# Patient Record
Sex: MALE | Race: WHITE | Employment: OTHER | URBAN - NONMETROPOLITAN AREA
[De-identification: names, ages, dates, MRNs, and addresses within clinical notes are randomized per-mention and may not be internally consistent; named-entity substitution may affect disease eponyms.]

---

## 2018-06-19 ENCOUNTER — HOSPITAL ENCOUNTER (OUTPATIENT)
Dept: GENERAL RADIOLOGY | Age: 48
Discharge: HOME | End: 2018-06-19
Payer: MEDICARE

## 2018-06-19 ENCOUNTER — OFFICE VISIT (OUTPATIENT)
Dept: PRIMARY CARE CLINIC | Age: 48
End: 2018-06-19
Payer: MEDICARE

## 2018-06-19 VITALS
OXYGEN SATURATION: 98 % | DIASTOLIC BLOOD PRESSURE: 75 MMHG | SYSTOLIC BLOOD PRESSURE: 119 MMHG | HEIGHT: 68 IN | WEIGHT: 204 LBS | HEART RATE: 83 BPM | BODY MASS INDEX: 30.92 KG/M2 | TEMPERATURE: 98 F

## 2018-06-19 DIAGNOSIS — M54.50 ACUTE MIDLINE LOW BACK PAIN WITHOUT SCIATICA: ICD-10-CM

## 2018-06-19 DIAGNOSIS — K64.9 HEMORRHOIDS, UNSPECIFIED HEMORRHOID TYPE: ICD-10-CM

## 2018-06-19 DIAGNOSIS — E65 CENTRAL OBESITY: ICD-10-CM

## 2018-06-19 DIAGNOSIS — E78.2 MIXED HYPERLIPIDEMIA: Primary | ICD-10-CM

## 2018-06-19 DIAGNOSIS — H90.5 CONGENITAL HEARING DISORDER OF BOTH EARS: ICD-10-CM

## 2018-06-19 DIAGNOSIS — F17.200 TOBACCO USE DISORDER: ICD-10-CM

## 2018-06-19 PROCEDURE — 72100 X-RAY EXAM L-S SPINE 2/3 VWS: CPT

## 2018-06-19 PROCEDURE — 99407 BEHAV CHNG SMOKING > 10 MIN: CPT | Performed by: FAMILY MEDICINE

## 2018-06-19 PROCEDURE — 4004F PT TOBACCO SCREEN RCVD TLK: CPT | Performed by: FAMILY MEDICINE

## 2018-06-19 PROCEDURE — 3017F COLORECTAL CA SCREEN DOC REV: CPT | Performed by: FAMILY MEDICINE

## 2018-06-19 PROCEDURE — 99204 OFFICE O/P NEW MOD 45 MIN: CPT | Performed by: FAMILY MEDICINE

## 2018-06-19 PROCEDURE — G8417 CALC BMI ABV UP PARAM F/U: HCPCS | Performed by: FAMILY MEDICINE

## 2018-06-19 PROCEDURE — G8427 DOCREV CUR MEDS BY ELIG CLIN: HCPCS | Performed by: FAMILY MEDICINE

## 2018-06-19 RX ORDER — SIMVASTATIN 20 MG
20 TABLET ORAL NIGHTLY
Qty: 90 TABLET | Refills: 3 | Status: SHIPPED | OUTPATIENT
Start: 2018-06-19 | End: 2018-07-17 | Stop reason: SDUPTHER

## 2018-06-19 ASSESSMENT — PATIENT HEALTH QUESTIONNAIRE - PHQ9
1. LITTLE INTEREST OR PLEASURE IN DOING THINGS: 0
SUM OF ALL RESPONSES TO PHQ QUESTIONS 1-9: 0
SUM OF ALL RESPONSES TO PHQ9 QUESTIONS 1 & 2: 0
2. FEELING DOWN, DEPRESSED OR HOPELESS: 0

## 2018-06-19 ASSESSMENT — ENCOUNTER SYMPTOMS
EYE ITCHING: 0
COUGH: 0
SHORTNESS OF BREATH: 0
COLOR CHANGE: 0
NAUSEA: 0
BACK PAIN: 1
SORE THROAT: 0
ABDOMINAL PAIN: 0
RHINORRHEA: 0

## 2018-06-29 ENCOUNTER — TELEPHONE (OUTPATIENT)
Dept: PRIMARY CARE CLINIC | Age: 48
End: 2018-06-29

## 2018-06-29 NOTE — TELEPHONE ENCOUNTER
----- Message from Sarmad Ayala MD sent at 6/20/2018  9:24 PM CDT -----      Called and LM    There is arthritis that is substantial.  Physical therapy and antiinflammatories and stretching. Can do injectionss as well to help perhaps.

## 2018-07-11 DIAGNOSIS — E78.2 MIXED HYPERLIPIDEMIA: ICD-10-CM

## 2018-07-11 LAB
ALBUMIN SERPL-MCNC: 4 G/DL (ref 3.5–5.2)
ALP BLD-CCNC: 122 U/L (ref 40–130)
ALT SERPL-CCNC: 22 U/L (ref 5–41)
ANION GAP SERPL CALCULATED.3IONS-SCNC: 17 MMOL/L (ref 7–19)
AST SERPL-CCNC: 18 U/L (ref 5–40)
BILIRUB SERPL-MCNC: 0.8 MG/DL (ref 0.2–1.2)
BUN BLDV-MCNC: 8 MG/DL (ref 6–20)
CALCIUM SERPL-MCNC: 9.7 MG/DL (ref 8.6–10)
CHLORIDE BLD-SCNC: 99 MMOL/L (ref 98–111)
CHOLESTEROL, TOTAL: 249 MG/DL (ref 160–199)
CO2: 22 MMOL/L (ref 22–29)
CREAT SERPL-MCNC: 0.8 MG/DL (ref 0.5–1.2)
GFR NON-AFRICAN AMERICAN: >60
GLUCOSE BLD-MCNC: 84 MG/DL (ref 74–109)
HDLC SERPL-MCNC: 33 MG/DL (ref 55–121)
LDL CHOLESTEROL CALCULATED: 164 MG/DL
POTASSIUM SERPL-SCNC: 4.2 MMOL/L (ref 3.5–5)
SODIUM BLD-SCNC: 138 MMOL/L (ref 136–145)
TOTAL PROTEIN: 7.3 G/DL (ref 6.6–8.7)
TRIGL SERPL-MCNC: 260 MG/DL (ref 0–149)

## 2018-07-16 ENCOUNTER — TELEPHONE (OUTPATIENT)
Dept: PRIMARY CARE CLINIC | Age: 48
End: 2018-07-16

## 2018-07-17 DIAGNOSIS — E78.00 PURE HYPERCHOLESTEROLEMIA: Primary | ICD-10-CM

## 2018-07-17 RX ORDER — SIMVASTATIN 40 MG
40 TABLET ORAL NIGHTLY
Qty: 90 TABLET | Refills: 1 | Status: SHIPPED | OUTPATIENT
Start: 2018-07-17 | End: 2019-12-02 | Stop reason: SDUPTHER

## 2018-07-24 ENCOUNTER — OFFICE VISIT (OUTPATIENT)
Dept: PRIMARY CARE CLINIC | Age: 48
End: 2018-07-24
Payer: MEDICARE

## 2018-07-24 VITALS
OXYGEN SATURATION: 98 % | RESPIRATION RATE: 20 BRPM | SYSTOLIC BLOOD PRESSURE: 110 MMHG | TEMPERATURE: 98 F | HEIGHT: 68 IN | BODY MASS INDEX: 30.31 KG/M2 | WEIGHT: 200 LBS | HEART RATE: 81 BPM | DIASTOLIC BLOOD PRESSURE: 82 MMHG

## 2018-07-24 DIAGNOSIS — E78.00 PURE HYPERCHOLESTEROLEMIA: Primary | ICD-10-CM

## 2018-07-24 DIAGNOSIS — M47.815 SPONDYLOSIS OF THORACOLUMBAR REGION WITHOUT MYELOPATHY OR RADICULOPATHY: ICD-10-CM

## 2018-07-24 DIAGNOSIS — F17.200 TOBACCO USE DISORDER: ICD-10-CM

## 2018-07-24 DIAGNOSIS — Z23 NEED FOR PROPHYLACTIC VACCINATION AGAINST STREPTOCOCCUS PNEUMONIAE (PNEUMOCOCCUS): ICD-10-CM

## 2018-07-24 PROCEDURE — G0009 ADMIN PNEUMOCOCCAL VACCINE: HCPCS | Performed by: FAMILY MEDICINE

## 2018-07-24 PROCEDURE — 99406 BEHAV CHNG SMOKING 3-10 MIN: CPT | Performed by: FAMILY MEDICINE

## 2018-07-24 PROCEDURE — 3017F COLORECTAL CA SCREEN DOC REV: CPT | Performed by: FAMILY MEDICINE

## 2018-07-24 PROCEDURE — G8427 DOCREV CUR MEDS BY ELIG CLIN: HCPCS | Performed by: FAMILY MEDICINE

## 2018-07-24 PROCEDURE — G8417 CALC BMI ABV UP PARAM F/U: HCPCS | Performed by: FAMILY MEDICINE

## 2018-07-24 PROCEDURE — 90732 PPSV23 VACC 2 YRS+ SUBQ/IM: CPT | Performed by: FAMILY MEDICINE

## 2018-07-24 PROCEDURE — 99213 OFFICE O/P EST LOW 20 MIN: CPT | Performed by: FAMILY MEDICINE

## 2018-07-24 PROCEDURE — 4004F PT TOBACCO SCREEN RCVD TLK: CPT | Performed by: FAMILY MEDICINE

## 2018-07-24 RX ORDER — VARENICLINE TARTRATE 1 MG/1
1 TABLET, FILM COATED ORAL 2 TIMES DAILY
Qty: 60 TABLET | Refills: 3 | Status: SHIPPED | OUTPATIENT
Start: 2018-07-24 | End: 2018-09-05

## 2018-07-24 RX ORDER — VARENICLINE TARTRATE 25 MG
KIT ORAL
Qty: 1 EACH | Refills: 0 | Status: SHIPPED | OUTPATIENT
Start: 2018-07-24 | End: 2018-09-05

## 2018-07-24 ASSESSMENT — ENCOUNTER SYMPTOMS
NAUSEA: 0
DIARRHEA: 0
COUGH: 0
WHEEZING: 0
VOMITING: 0
CONSTIPATION: 0
ABDOMINAL PAIN: 0
CHEST TIGHTNESS: 0
SHORTNESS OF BREATH: 0
BACK PAIN: 1

## 2018-07-24 NOTE — PROGRESS NOTES
Packs/day: 1.00     Years: 21.00     Quit date: 7/24/1997    Smokeless tobacco: Current User    Alcohol use Yes      Comment: OCC       Family History   Problem Relation Age of Onset    High Blood Pressure Father     Diabetes Father        /82   Pulse 81   Temp 98 °F (36.7 °C) (Temporal)   Resp 20   Ht 5' 8\" (1.727 m)   Wt 200 lb (90.7 kg)   SpO2 98%   BMI 30.41 kg/m²     Physical Exam   Constitutional: He is oriented to person, place, and time. He appears well-developed and well-nourished. Non-toxic appearance. No distress. HENT:   Head: Normocephalic and atraumatic. Cardiovascular: Normal rate, regular rhythm, normal heart sounds and intact distal pulses. Exam reveals no gallop and no friction rub. No murmur heard. Pulmonary/Chest: Effort normal and breath sounds normal. No respiratory distress. He has no wheezes. He has no rales. He exhibits no tenderness. Abdominal: Soft. Bowel sounds are normal. He exhibits no distension and no mass. There is no tenderness. There is no rebound and no guarding. Musculoskeletal:        Lumbar back: He exhibits decreased range of motion and tenderness. He exhibits no bony tenderness. Right lower leg: He exhibits no edema. Left lower leg: He exhibits no edema. Neurological: He is alert and oriented to person, place, and time. Coordination and gait normal.   Skin: Skin is warm and dry. He is not diaphoretic. No cyanosis. Nails show no clubbing. Nursing note and vitals reviewed. Assessment:    ICD-10-CM ICD-9-CM    1. Pure hypercholesterolemia E78.00 272.0    2. Need for prophylactic vaccination against Streptococcus pneumoniae (pneumococcus) Z23 V03.82 Pneumococcal polysaccharide vaccine 23-valent PPSV23   3. Tobacco use disorder F17.200 305.1    4. 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  Approximately 12 minutes of education was provided about quit smoking and the harms of tobacco.  Patient does show understanding. Patient has  the desire to quit cigarettes in the near future. Chantix prescribed. Advised patient to remain regular activity and daily stretching for osteoarthritis spine. He does not want any surgery and off on further imaging at this time. Orders Placed This Encounter   Procedures    Pneumococcal polysaccharide vaccine 23-valent PPSV23     Orders Placed This Encounter   Medications    varenicline (CHANTIX STARTING MONTH OMERO) 0.5 MG X 11 & 1 MG X 42 tablet     Sig: Take by mouth. Dispense:  1 each     Refill:  0    varenicline (CHANTIX CONTINUING MONTH OMERO) 1 MG tablet     Sig: Take 1 tablet by mouth 2 times daily     Dispense:  60 tablet     Refill:  3     There are no discontinued medications. Patient Instructions   Start chantix starting pack. Start taking it immediately. Continue to smoke while starting the medication and pick a quit smoking date for about 2 weeks from now. Pick the least stressful time of the week for your quit smoking day. Talk to your family, loved ones, and friends and let them know you will be quitting and to not smoke around you. Continue on the pill even when you quit smoking. You need to continue on the medication if tolerated for 3 months to beat the psychological habit of it. Cassia Ac in there. When you complete the starter pack,  the continuing pack immediately and continue with it. Call if you have questions. Quitting smoking is the most important thing you can do for your health. Patient given educational handouts and has had all questions answered. Patient voices understanding and agrees to plans along with risks and benefits of plan. Patient is instructed to continue prior meds, diet, and exercise plans unless instructed otherwise. Patient agrees to follow up as instructed and sooner if needed. Patient agrees to go to ER if condition becomes emergent.     Notes may be

## 2018-07-24 NOTE — PATIENT INSTRUCTIONS
Start chantix starting pack. Start taking it immediately. Continue to smoke while starting the medication and pick a quit smoking date for about 2 weeks from now. Pick the least stressful time of the week for your quit smoking day. Talk to your family, loved ones, and friends and let them know you will be quitting and to not smoke around you. Continue on the pill even when you quit smoking. You need to continue on the medication if tolerated for 3 months to beat the psychological habit of it. Luther Monk in there. When you complete the starter pack,  the continuing pack immediately and continue with it. Call if you have questions. Quitting smoking is the most important thing you can do for your health.

## 2018-09-05 ENCOUNTER — OFFICE VISIT (OUTPATIENT)
Dept: PRIMARY CARE CLINIC | Age: 48
End: 2018-09-05
Payer: MEDICARE

## 2018-09-05 VITALS
WEIGHT: 198 LBS | DIASTOLIC BLOOD PRESSURE: 80 MMHG | HEART RATE: 88 BPM | RESPIRATION RATE: 20 BRPM | SYSTOLIC BLOOD PRESSURE: 110 MMHG | BODY MASS INDEX: 30.01 KG/M2 | OXYGEN SATURATION: 98 % | HEIGHT: 68 IN | TEMPERATURE: 98 F

## 2018-09-05 DIAGNOSIS — E78.00 PURE HYPERCHOLESTEROLEMIA: ICD-10-CM

## 2018-09-05 DIAGNOSIS — T88.7XXA MEDICATION SIDE EFFECT: ICD-10-CM

## 2018-09-05 DIAGNOSIS — M54.50 CHRONIC MIDLINE LOW BACK PAIN WITHOUT SCIATICA: Primary | ICD-10-CM

## 2018-09-05 DIAGNOSIS — L81.9 HYPERPIGMENTATION: ICD-10-CM

## 2018-09-05 DIAGNOSIS — M51.36 LUMBAR DEGENERATIVE DISC DISEASE: ICD-10-CM

## 2018-09-05 DIAGNOSIS — F17.200 TOBACCO USE DISORDER: ICD-10-CM

## 2018-09-05 DIAGNOSIS — G89.29 CHRONIC MIDLINE LOW BACK PAIN WITHOUT SCIATICA: Primary | ICD-10-CM

## 2018-09-05 PROCEDURE — 99406 BEHAV CHNG SMOKING 3-10 MIN: CPT | Performed by: FAMILY MEDICINE

## 2018-09-05 PROCEDURE — 3017F COLORECTAL CA SCREEN DOC REV: CPT | Performed by: FAMILY MEDICINE

## 2018-09-05 PROCEDURE — 4004F PT TOBACCO SCREEN RCVD TLK: CPT | Performed by: FAMILY MEDICINE

## 2018-09-05 PROCEDURE — 99214 OFFICE O/P EST MOD 30 MIN: CPT | Performed by: FAMILY MEDICINE

## 2018-09-05 PROCEDURE — G8417 CALC BMI ABV UP PARAM F/U: HCPCS | Performed by: FAMILY MEDICINE

## 2018-09-05 PROCEDURE — G8427 DOCREV CUR MEDS BY ELIG CLIN: HCPCS | Performed by: FAMILY MEDICINE

## 2018-09-05 RX ORDER — MELOXICAM 15 MG/1
15 TABLET ORAL DAILY
Qty: 30 TABLET | Refills: 3 | Status: SHIPPED | OUTPATIENT
Start: 2018-09-05 | End: 2019-03-18 | Stop reason: SDUPTHER

## 2018-09-05 ASSESSMENT — ENCOUNTER SYMPTOMS
CONSTIPATION: 0
WHEEZING: 0
VOMITING: 0
SHORTNESS OF BREATH: 0
DIARRHEA: 0
CHEST TIGHTNESS: 0
NAUSEA: 0
ABDOMINAL PAIN: 0
COUGH: 0
BACK PAIN: 1

## 2018-09-05 NOTE — PROGRESS NOTES
chantix due to SE. Taper 1 tab every other week. Nipple pigmentation w/o abnormal exam, likely due to [facility] from hormonal changes but hold off on work up as there is no nippple d/c or gynecomastia. Orders Placed This Encounter   Procedures   [facility]     Referral Priority:   Routine     Referral Type:   Eval and Treat     Referral Reason:   Specialty Services Required     Requested Specialty:   Physical Therapy     Number of Visits Requested:   1    External Referral To Orthopedic Surgery     Referral Priority:   Routine     Referral Type:   Eval and Treat     Referral Reason:   Specialty Services Required     Requested Specialty:   Orthopedic Surgery     Number of Visits Requested:   1     No orders of the defined types were placed in this encounter. Medications Discontinued During This Encounter   Medication Reason    varenicline (CHANTIX STARTING MONTH PAK) 0.5 MG X 11 & 1 MG X 42 tablet     varenicline (CHANTIX CONTINUING MONTH OMERO) 1 MG tablet      There are no Patient Instructions on file for this visit. Patient given educational handouts and has had all questions answered. Patient voices understanding and agrees to plans along with risks and benefits of plan. Patient is instructed to continue prior meds, diet, and exercise plans unless instructed otherwise. Patient agrees to follow up as instructed and sooner if needed. Patient agrees to go to ER if condition becomes emergent. Notes may be completed with dictation device and spelling errors may occur. No Follow-up on file.

## 2018-11-06 ENCOUNTER — OFFICE VISIT (OUTPATIENT)
Dept: PRIMARY CARE CLINIC | Age: 48
End: 2018-11-06
Payer: MEDICARE

## 2018-11-06 VITALS
HEART RATE: 88 BPM | RESPIRATION RATE: 20 BRPM | DIASTOLIC BLOOD PRESSURE: 88 MMHG | SYSTOLIC BLOOD PRESSURE: 136 MMHG | HEIGHT: 68 IN | WEIGHT: 198 LBS | OXYGEN SATURATION: 98 % | BODY MASS INDEX: 30.01 KG/M2 | TEMPERATURE: 98 F

## 2018-11-06 DIAGNOSIS — Z87.891 PERSONAL HISTORY OF TOBACCO USE, PRESENTING HAZARDS TO HEALTH: ICD-10-CM

## 2018-11-06 DIAGNOSIS — Z00.00 ROUTINE GENERAL MEDICAL EXAMINATION AT A HEALTH CARE FACILITY: Primary | ICD-10-CM

## 2018-11-06 DIAGNOSIS — F17.200 TOBACCO USE DISORDER: ICD-10-CM

## 2018-11-06 DIAGNOSIS — Z13.6 SCREENING FOR CARDIOVASCULAR CONDITION: ICD-10-CM

## 2018-11-06 PROCEDURE — 99407 BEHAV CHNG SMOKING > 10 MIN: CPT | Performed by: FAMILY MEDICINE

## 2018-11-06 PROCEDURE — G0438 PPPS, INITIAL VISIT: HCPCS | Performed by: FAMILY MEDICINE

## 2018-11-06 PROCEDURE — G8484 FLU IMMUNIZE NO ADMIN: HCPCS | Performed by: FAMILY MEDICINE

## 2018-11-06 RX ORDER — NICOTINE 21 MG/24HR
1 PATCH, TRANSDERMAL 24 HOURS TRANSDERMAL EVERY 24 HOURS
Qty: 30 PATCH | Refills: 3 | Status: SHIPPED | OUTPATIENT
Start: 2018-11-06 | End: 2019-03-18

## 2018-11-06 ASSESSMENT — LIFESTYLE VARIABLES: HOW OFTEN DO YOU HAVE A DRINK CONTAINING ALCOHOL: 0

## 2018-11-06 ASSESSMENT — ANXIETY QUESTIONNAIRES: GAD7 TOTAL SCORE: 0

## 2018-11-06 ASSESSMENT — PATIENT HEALTH QUESTIONNAIRE - PHQ9
SUM OF ALL RESPONSES TO PHQ QUESTIONS 1-9: 0
SUM OF ALL RESPONSES TO PHQ QUESTIONS 1-9: 0

## 2018-12-28 ENCOUNTER — CARE COORDINATION (OUTPATIENT)
Dept: CARE COORDINATION | Age: 48
End: 2018-12-28

## 2019-02-19 ENCOUNTER — HOSPITAL ENCOUNTER (EMERGENCY)
Age: 49
Discharge: HOME | End: 2019-02-19
Payer: MEDICARE

## 2019-02-19 VITALS
DIASTOLIC BLOOD PRESSURE: 82 MMHG | TEMPERATURE: 97.4 F | WEIGHT: 200 LBS | SYSTOLIC BLOOD PRESSURE: 128 MMHG | HEART RATE: 86 BPM | RESPIRATION RATE: 18 BRPM | BODY MASS INDEX: 30.31 KG/M2 | HEIGHT: 68 IN | OXYGEN SATURATION: 96 %

## 2019-02-19 DIAGNOSIS — R60.9 DEPENDENT EDEMA: Primary | ICD-10-CM

## 2019-02-19 LAB
ALBUMIN SERPL-MCNC: 4.1 G/DL (ref 3.5–5.2)
ALP BLD-CCNC: 119 U/L (ref 40–130)
ALT SERPL-CCNC: 22 U/L (ref 5–41)
ANION GAP SERPL CALCULATED.3IONS-SCNC: 8 MMOL/L (ref 7–19)
APTT: 25 SEC (ref 26–36.2)
AST SERPL-CCNC: 13 U/L (ref 5–40)
BASOPHILS ABSOLUTE: 0.1 K/UL (ref 0–0.2)
BASOPHILS RELATIVE PERCENT: 0.7 % (ref 0–1)
BILIRUB SERPL-MCNC: 0.5 MG/DL (ref 0.2–1.2)
BUN BLDV-MCNC: 10 MG/DL (ref 6–20)
CALCIUM SERPL-MCNC: 9.7 MG/DL (ref 8.6–10)
CHLORIDE BLD-SCNC: 103 MMOL/L (ref 98–111)
CO2: 30 MMOL/L (ref 22–29)
CREAT SERPL-MCNC: 0.9 MG/DL (ref 0.5–1.2)
D DIMER: 0.44 UG/ML FEU (ref 0–0.48)
EOSINOPHILS ABSOLUTE: 0.2 K/UL (ref 0–0.6)
EOSINOPHILS RELATIVE PERCENT: 1.9 % (ref 0–5)
GFR NON-AFRICAN AMERICAN: >60
GLUCOSE BLD-MCNC: 104 MG/DL (ref 74–109)
HCT VFR BLD CALC: 54.1 % (ref 42–52)
HEMOGLOBIN: 18.5 G/DL (ref 14–18)
INR BLD: 0.93 (ref 0.88–1.18)
LYMPHOCYTES ABSOLUTE: 2.9 K/UL (ref 1.1–4.5)
LYMPHOCYTES RELATIVE PERCENT: 27.3 % (ref 20–40)
MCH RBC QN AUTO: 31.5 PG (ref 27–31)
MCHC RBC AUTO-ENTMCNC: 34.2 G/DL (ref 33–37)
MCV RBC AUTO: 92 FL (ref 80–94)
MONOCYTES ABSOLUTE: 0.8 K/UL (ref 0–0.9)
MONOCYTES RELATIVE PERCENT: 7.3 % (ref 0–10)
NEUTROPHILS ABSOLUTE: 6.5 K/UL (ref 1.5–7.5)
NEUTROPHILS RELATIVE PERCENT: 62 % (ref 50–65)
PDW BLD-RTO: 13.2 % (ref 11.5–14.5)
PLATELET # BLD: 271 K/UL (ref 130–400)
PMV BLD AUTO: 9.4 FL (ref 9.4–12.4)
POTASSIUM SERPL-SCNC: 4.5 MMOL/L (ref 3.5–5)
PROTHROMBIN TIME: 11.9 SEC (ref 12–14.6)
RBC # BLD: 5.88 M/UL (ref 4.7–6.1)
SODIUM BLD-SCNC: 141 MMOL/L (ref 136–145)
TOTAL PROTEIN: 7.5 G/DL (ref 6.6–8.7)
WBC # BLD: 10.5 K/UL (ref 4.8–10.8)

## 2019-02-19 PROCEDURE — 85730 THROMBOPLASTIN TIME PARTIAL: CPT

## 2019-02-19 PROCEDURE — 85379 FIBRIN DEGRADATION QUANT: CPT

## 2019-02-19 PROCEDURE — 99283 EMERGENCY DEPT VISIT LOW MDM: CPT | Performed by: NURSE PRACTITIONER

## 2019-02-19 PROCEDURE — 85025 COMPLETE CBC W/AUTO DIFF WBC: CPT

## 2019-02-19 PROCEDURE — 99283 EMERGENCY DEPT VISIT LOW MDM: CPT

## 2019-02-19 PROCEDURE — 85610 PROTHROMBIN TIME: CPT

## 2019-02-19 PROCEDURE — 80053 COMPREHEN METABOLIC PANEL: CPT

## 2019-02-19 PROCEDURE — 36415 COLL VENOUS BLD VENIPUNCTURE: CPT

## 2019-02-19 ASSESSMENT — ENCOUNTER SYMPTOMS
BACK PAIN: 1
GASTROINTESTINAL NEGATIVE: 1

## 2019-02-21 ENCOUNTER — OFFICE VISIT (OUTPATIENT)
Dept: PRIMARY CARE CLINIC | Age: 49
End: 2019-02-21
Payer: MEDICARE

## 2019-02-21 VITALS
BODY MASS INDEX: 30.92 KG/M2 | HEART RATE: 82 BPM | OXYGEN SATURATION: 98 % | TEMPERATURE: 96.7 F | WEIGHT: 204 LBS | SYSTOLIC BLOOD PRESSURE: 118 MMHG | DIASTOLIC BLOOD PRESSURE: 72 MMHG | HEIGHT: 68 IN

## 2019-02-21 DIAGNOSIS — M79.662 PAIN OF LEFT CALF: Chronic | ICD-10-CM

## 2019-02-21 DIAGNOSIS — R60.9 DEPENDENT EDEMA: ICD-10-CM

## 2019-02-21 PROCEDURE — 4004F PT TOBACCO SCREEN RCVD TLK: CPT | Performed by: NURSE PRACTITIONER

## 2019-02-21 PROCEDURE — G8427 DOCREV CUR MEDS BY ELIG CLIN: HCPCS | Performed by: NURSE PRACTITIONER

## 2019-02-21 PROCEDURE — 1111F DSCHRG MED/CURRENT MED MERGE: CPT | Performed by: NURSE PRACTITIONER

## 2019-02-21 PROCEDURE — G8484 FLU IMMUNIZE NO ADMIN: HCPCS | Performed by: NURSE PRACTITIONER

## 2019-02-21 PROCEDURE — G8417 CALC BMI ABV UP PARAM F/U: HCPCS | Performed by: NURSE PRACTITIONER

## 2019-02-21 PROCEDURE — 99214 OFFICE O/P EST MOD 30 MIN: CPT | Performed by: NURSE PRACTITIONER

## 2019-02-21 PROCEDURE — 3017F COLORECTAL CA SCREEN DOC REV: CPT | Performed by: NURSE PRACTITIONER

## 2019-02-21 RX ORDER — TRAMADOL HYDROCHLORIDE 50 MG/1
TABLET ORAL
Refills: 0 | COMMUNITY
Start: 2019-01-31 | End: 2019-03-18

## 2019-02-21 ASSESSMENT — ENCOUNTER SYMPTOMS
SHORTNESS OF BREATH: 0
APNEA: 0
WHEEZING: 0

## 2019-03-04 ENCOUNTER — HOSPITAL ENCOUNTER (OUTPATIENT)
Dept: VASCULAR LAB | Age: 49
Discharge: HOME | End: 2019-03-04
Payer: MEDICARE

## 2019-03-04 DIAGNOSIS — M79.662 PAIN OF LEFT CALF: Chronic | ICD-10-CM

## 2019-03-04 PROCEDURE — 93971 EXTREMITY STUDY: CPT

## 2019-03-08 ENCOUNTER — TELEPHONE (OUTPATIENT)
Dept: PRIMARY CARE CLINIC | Age: 49
End: 2019-03-08

## 2019-03-18 ENCOUNTER — OFFICE VISIT (OUTPATIENT)
Dept: PRIMARY CARE CLINIC | Age: 49
End: 2019-03-18
Payer: MEDICARE

## 2019-03-18 VITALS
RESPIRATION RATE: 20 BRPM | WEIGHT: 212 LBS | HEART RATE: 88 BPM | BODY MASS INDEX: 32.13 KG/M2 | DIASTOLIC BLOOD PRESSURE: 80 MMHG | SYSTOLIC BLOOD PRESSURE: 110 MMHG | TEMPERATURE: 98.7 F | HEIGHT: 68 IN | OXYGEN SATURATION: 98 %

## 2019-03-18 DIAGNOSIS — F17.210 CIGARETTE SMOKER: ICD-10-CM

## 2019-03-18 DIAGNOSIS — I87.2 VENOUS INSUFFICIENCY: ICD-10-CM

## 2019-03-18 DIAGNOSIS — G62.89 OTHER POLYNEUROPATHY: Primary | ICD-10-CM

## 2019-03-18 PROCEDURE — 99406 BEHAV CHNG SMOKING 3-10 MIN: CPT | Performed by: FAMILY MEDICINE

## 2019-03-18 PROCEDURE — 99214 OFFICE O/P EST MOD 30 MIN: CPT | Performed by: FAMILY MEDICINE

## 2019-03-18 PROCEDURE — G8417 CALC BMI ABV UP PARAM F/U: HCPCS | Performed by: FAMILY MEDICINE

## 2019-03-18 PROCEDURE — 4004F PT TOBACCO SCREEN RCVD TLK: CPT | Performed by: FAMILY MEDICINE

## 2019-03-18 PROCEDURE — G8484 FLU IMMUNIZE NO ADMIN: HCPCS | Performed by: FAMILY MEDICINE

## 2019-03-18 PROCEDURE — 3017F COLORECTAL CA SCREEN DOC REV: CPT | Performed by: FAMILY MEDICINE

## 2019-03-18 PROCEDURE — G8427 DOCREV CUR MEDS BY ELIG CLIN: HCPCS | Performed by: FAMILY MEDICINE

## 2019-03-18 RX ORDER — PREGABALIN 50 MG/1
50 CAPSULE ORAL 3 TIMES DAILY
Qty: 90 CAPSULE | Refills: 3 | Status: SHIPPED | OUTPATIENT
Start: 2019-03-18 | End: 2019-04-23

## 2019-03-18 RX ORDER — MELOXICAM 15 MG/1
15 TABLET ORAL DAILY
Qty: 30 TABLET | Refills: 3 | Status: SHIPPED | OUTPATIENT
Start: 2019-03-18 | End: 2019-04-23

## 2019-03-18 ASSESSMENT — PATIENT HEALTH QUESTIONNAIRE - PHQ9
SUM OF ALL RESPONSES TO PHQ9 QUESTIONS 1 & 2: 0
SUM OF ALL RESPONSES TO PHQ QUESTIONS 1-9: 0
1. LITTLE INTEREST OR PLEASURE IN DOING THINGS: 0
2. FEELING DOWN, DEPRESSED OR HOPELESS: 0
SUM OF ALL RESPONSES TO PHQ QUESTIONS 1-9: 0

## 2019-03-18 ASSESSMENT — ENCOUNTER SYMPTOMS
ABDOMINAL PAIN: 0
CONSTIPATION: 0
CHEST TIGHTNESS: 0
NAUSEA: 0
SHORTNESS OF BREATH: 0
DIARRHEA: 0
WHEEZING: 0
VOMITING: 0
COUGH: 0

## 2019-03-28 ENCOUNTER — HOSPITAL ENCOUNTER (EMERGENCY)
Age: 49
Discharge: HOME | End: 2019-03-28
Payer: MEDICARE

## 2019-03-28 ENCOUNTER — APPOINTMENT (OUTPATIENT)
Dept: GENERAL RADIOLOGY | Age: 49
End: 2019-03-28
Payer: MEDICARE

## 2019-03-28 VITALS
TEMPERATURE: 98.4 F | HEIGHT: 68 IN | HEART RATE: 91 BPM | WEIGHT: 210 LBS | OXYGEN SATURATION: 93 % | DIASTOLIC BLOOD PRESSURE: 95 MMHG | SYSTOLIC BLOOD PRESSURE: 133 MMHG | BODY MASS INDEX: 31.83 KG/M2 | RESPIRATION RATE: 20 BRPM

## 2019-03-28 DIAGNOSIS — L03.119 CELLULITIS OF FOOT: Primary | ICD-10-CM

## 2019-03-28 PROCEDURE — 73630 X-RAY EXAM OF FOOT: CPT

## 2019-03-28 PROCEDURE — 73610 X-RAY EXAM OF ANKLE: CPT

## 2019-03-28 PROCEDURE — 99283 EMERGENCY DEPT VISIT LOW MDM: CPT

## 2019-03-28 PROCEDURE — 99283 EMERGENCY DEPT VISIT LOW MDM: CPT | Performed by: PHYSICIAN ASSISTANT

## 2019-03-28 RX ORDER — METHYLPREDNISOLONE 4 MG/1
TABLET ORAL
Qty: 1 KIT | Refills: 0 | Status: SHIPPED | OUTPATIENT
Start: 2019-03-28 | End: 2019-04-03

## 2019-03-28 RX ORDER — DOXYCYCLINE HYCLATE 100 MG/1
100 CAPSULE ORAL 2 TIMES DAILY
Qty: 20 CAPSULE | Refills: 0 | Status: SHIPPED | OUTPATIENT
Start: 2019-03-28 | End: 2019-04-07

## 2019-03-28 SDOH — HEALTH STABILITY: MENTAL HEALTH: HOW OFTEN DO YOU HAVE A DRINK CONTAINING ALCOHOL?: NEVER

## 2019-03-28 ASSESSMENT — PAIN SCALES - GENERAL: PAINLEVEL_OUTOF10: 10

## 2019-03-28 NOTE — ED PROVIDER NOTES
[facility]  eMERGENCYdEPARTMENT eNCOUnter      Pt Name: Junior Trammell  MRN: [id number]  [place] 1970  Date of evaluation: 3/28/2019  Provider:BRODY aCmejo    CHIEF COMPLAINT       Chief Complaint   Patient presents with    Foot Pain     left foot pain x1 week         HISTORY OF PRESENT ILLNESS  (Location/Symptom, Timing/Onset, Context/Setting, Quality, Duration, Modifying Factors, Severity.)   Junior Trammell is a 46 y.o. male who presents to the emergency department with complaint of left foot pain x 1 week with no known injury. Patient denies numbness tingling. Admits to color change. Denies wound. The chief complaint is over the bunion of his foot but also pain in 3rd toe which has purple discoloration to it. He admits to good blood flow and blanching. There is some weeping between 4/5 digit from contact. Concern for poor shoe space. He denies weakness numbness. HPI    Nursing Notes were reviewed and I agree. REVIEW OF SYSTEMS    (2-9 systems for level 4, 10 or more for level 5)     Review of Systems   Constitutional: Negative for chills, diaphoresis, fatigue and fever. Respiratory: Negative for cough, choking, chest tightness, shortness of breath, wheezing and stridor. Cardiovascular: Negative for chest pain, palpitations and leg swelling. Gastrointestinal: Negative for abdominal pain and nausea. Musculoskeletal: Positive for arthralgias and gait problem. Negative for back pain, joint swelling, myalgias and neck pain. Skin: Positive for color change. Negative for pallor, rash and wound. Neurological: Negative for dizziness, tremors, weakness and numbness. Psychiatric/Behavioral: The patient is not nervous/anxious. Except as noted above the remainder of the review of systems was reviewed and negative.        PAST MEDICAL HISTORY     Past Medical History:   Diagnosis Date    Hyperlipidemia     Nerve damage     Bilateral ears, hard of hearing         SURGICAL HISTORY Past Surgical History:   Procedure Laterality Date    HERNIA REPAIR      X2    OTHER SURGICAL HISTORY      infected gland         CURRENT MEDICATIONS       Discharge Medication List as of 3/28/2019 12:25 PM      CONTINUE these medications which have NOT CHANGED    Details   meloxicam (MOBIC) 15 MG tablet Take 1 tablet by mouth daily, Disp-30 tablet, R-3Normal      pregabalin (LYRICA) 50 MG capsule Take 1 capsule by mouth 3 times daily for 30 days. , Disp-90 capsule, R-3Normal      simvastatin (ZOCOR) 40 MG tablet Take 1 tablet by mouth nightly, Disp-90 tablet, R-1Normal             ALLERGIES     Patient has no known allergies.     FAMILY HISTORY       Family History   Problem Relation Age of Onset    High Blood Pressure Father     Diabetes Father           SOCIAL HISTORY       Social History     Socioeconomic History    Marital status:      Spouse name: None    Number of children: None    Years of education: None    Highest education level: None   Occupational History    None   Social Needs    Financial resource strain: None    Food insecurity:     Worry: None     Inability: None    Transportation needs:     Medical: None     Non-medical: None   Tobacco Use    Smoking status: Former Smoker     Packs/day: 1.00     Years: 21.00     Pack years: 21.00     Last attempt to quit: 1997     Years since quittin.6    Smokeless tobacco: Current User   Substance and Sexual Activity    Alcohol use: Never     Frequency: Never     Comment: OCC    Drug use: No    Sexual activity: None   Lifestyle    Physical activity:     Days per week: None     Minutes per session: None    Stress: None   Relationships    Social connections:     Talks on phone: None     Gets together: None     Attends Alevism service: None     Active member of club or organization: None     Attends meetings of clubs or organizations: None     Relationship status: None    Intimate partner violence:     Fear of current or ex partner: None     Emotionally abused: None     Physically abused: None     Forced sexual activity: None   Other Topics Concern    None   Social History Narrative    None       SCREENINGS           PHYSICAL EXAM    (up to 7 forlevel 4, 8 or more for level 5)     ED Triage Vitals [03/28/19 1123]   BP Temp Temp src Pulse Resp SpO2 Height Weight   (!) 133/95 98.4 °F (36.9 °C) -- 91 20 93 % 5' 8\" (1.727 m) 210 lb (95.3 kg)       Physical Exam   Constitutional: He is oriented to person, place, and time. He appears well-developed and well-nourished. No distress. HENT:   Head: Normocephalic and atraumatic. Right Ear: External ear normal.   Left Ear: External ear normal.   Nose: Nose normal.   Mouth/Throat: Oropharynx is clear and moist.   Eyes: Pupils are equal, round, and reactive to light. Conjunctivae and EOM are normal.   Neck: Normal range of motion. Neck supple. No tracheal deviation present. Cardiovascular: Normal rate, regular rhythm, normal heart sounds and intact distal pulses. No murmur heard. Pulmonary/Chest: Effort normal and breath sounds normal. No stridor. He has no wheezes. He exhibits no tenderness. Abdominal: Soft. Bowel sounds are normal. He exhibits no distension. There is no tenderness. Musculoskeletal: Normal range of motion. He exhibits tenderness and deformity. He exhibits no edema. Obvious hallux valgus. There is good blanching of skin on 3rd digit. The 4 5 digit are tender toe nails very long angulated. Serous fluid between 4/5 digit. Patient will need hygiene and foot care with podiatry. No vascular problem suspected at this time. Neurological: He is alert and oriented to person, place, and time. Skin: Skin is warm and dry. Capillary refill takes less than 2 seconds. He is not diaphoretic. Psychiatric: He has a normal mood and affect.  His behavior is normal.         DIAGNOSTIC RESULTS     RADIOLOGY:   Non-plain film images such as CT, Ultrasound and MRI are read by the radiologist. Morgan Sciara radiographic images are visualized and preliminarilyinterpreted by No att. providers found with the below findings:      Interpretation per the Radiologist below, if available at the time of this note:    XR FOOT LEFT (MIN 3 VIEWS)   Final Result   No acute findings. Hallux valgus. Signed by Dr Tanvir Marquez on 3/28/2019 11:50 AM      XR ANKLE LEFT (MIN 3 VIEWS)   Final Result   No acute findings. Signed by Dr Tanvir Marquez on 3/28/2019 11:53 AM          LABS:  Labs Reviewed - No data to display    All other labs were within normal range or notreturned as of this dictation. RE-ASSESSMENT        EMERGENCY DEPARTMENT COURSE and DIFFERENTIAL DIAGNOSIS/MDM:   Vitals:    Vitals:    03/28/19 1123   BP: (!) 133/95   Pulse: 91   Resp: 20   Temp: 98.4 °F (36.9 °C)   SpO2: 93%   Weight: 210 lb (95.3 kg)   Height: 5' 8\" (1.727 m)       MDM  Number of Diagnoses or Management Options  Cellulitis of foot:   Diagnosis management comments: Will cover this patient on abx. He will need to follow with podiatry. Nothing emergent at this time requiring surgery or vascular evaluation. Cellulitis in differential. Discussed better fitting shoes with patient. Plan for discharge. Dr Caterina Marshall the attending is agreeable to plan. PROCEDURES:    Procedures      FINAL IMPRESSION      1.  Cellulitis of foot          DISPOSITION/PLAN   DISPOSITION Decision To Discharge 03/28/2019 12:23:06 PM      PATIENT REFERRED TO:  Elizabeth Staton DPM  [address]  [address] [facility] [address] [phone]    Schedule an appointment as soon as possible for a visit       Paulino Pacheco MD  [address] [facility] [address] [phone]      As needed      DISCHARGE MEDICATIONS:  Discharge Medication List as of 3/28/2019 12:25 PM      START taking these medications    Details   doxycycline hyclate (VIBRAMYCIN) 100 MG capsule Take 1 capsule by mouth 2 times daily for 10 days, Disp-20 capsule, R-0Print      methylPREDNISolone

## 2019-03-28 NOTE — ED NOTES
Pt to be d/c home with Abx and steroid prescription.  Follow up with Podiatrist for further stressed      Hector Santiago RN  03/28/19 1877

## 2019-03-29 ENCOUNTER — APPOINTMENT (OUTPATIENT)
Dept: ULTRASOUND IMAGING | Facility: HOSPITAL | Age: 49
End: 2019-03-29

## 2019-03-29 ENCOUNTER — HOSPITAL ENCOUNTER (INPATIENT)
Facility: HOSPITAL | Age: 49
LOS: 4 days | Discharge: HOME | End: 2019-04-02
Attending: EMERGENCY MEDICINE | Admitting: INTERNAL MEDICINE

## 2019-03-29 ENCOUNTER — OFFICE VISIT (OUTPATIENT)
Dept: URGENT CARE | Age: 49
End: 2019-03-29

## 2019-03-29 ENCOUNTER — APPOINTMENT (OUTPATIENT)
Dept: MRI IMAGING | Facility: HOSPITAL | Age: 49
End: 2019-03-29

## 2019-03-29 ENCOUNTER — APPOINTMENT (OUTPATIENT)
Dept: GENERAL RADIOLOGY | Facility: HOSPITAL | Age: 49
End: 2019-03-29

## 2019-03-29 VITALS
DIASTOLIC BLOOD PRESSURE: 90 MMHG | SYSTOLIC BLOOD PRESSURE: 190 MMHG | HEART RATE: 140 BPM | OXYGEN SATURATION: 97 % | RESPIRATION RATE: 24 BRPM

## 2019-03-29 DIAGNOSIS — I73.9 PAD (PERIPHERAL ARTERY DISEASE) (HCC): ICD-10-CM

## 2019-03-29 DIAGNOSIS — L03.116 CELLULITIS OF FOOT, LEFT: Primary | ICD-10-CM

## 2019-03-29 DIAGNOSIS — M79.672 FOOT PAIN, LEFT: Primary | ICD-10-CM

## 2019-03-29 PROBLEM — R52 ACUTE PAIN: Status: ACTIVE | Noted: 2019-03-29

## 2019-03-29 PROBLEM — I10 ESSENTIAL HYPERTENSION: Status: ACTIVE | Noted: 2019-03-29

## 2019-03-29 PROBLEM — E78.5 HYPERLIPIDEMIA: Status: ACTIVE | Noted: 2019-03-29

## 2019-03-29 LAB
ALBUMIN SERPL-MCNC: 5 G/DL (ref 3.5–5)
ALBUMIN/GLOB SERPL: 1.4 G/DL (ref 1.1–2.5)
ALP SERPL-CCNC: 117 U/L (ref 24–120)
ALT SERPL W P-5'-P-CCNC: 32 U/L (ref 0–54)
ANION GAP SERPL CALCULATED.3IONS-SCNC: 12 MMOL/L (ref 4–13)
AST SERPL-CCNC: 24 U/L (ref 7–45)
BASOPHILS # BLD AUTO: 0.09 10*3/MM3 (ref 0–0.2)
BASOPHILS NFR BLD AUTO: 0.5 % (ref 0–2)
BILIRUB SERPL-MCNC: 0.7 MG/DL (ref 0.1–1)
BUN BLD-MCNC: 15 MG/DL (ref 5–21)
BUN/CREAT SERPL: 20 (ref 7–25)
CALCIUM SPEC-SCNC: 10.1 MG/DL (ref 8.4–10.4)
CHLORIDE SERPL-SCNC: 102 MMOL/L (ref 98–110)
CO2 SERPL-SCNC: 26 MMOL/L (ref 24–31)
CREAT BLD-MCNC: 0.75 MG/DL (ref 0.5–1.4)
D-LACTATE SERPL-SCNC: 1.4 MMOL/L (ref 0.5–2)
DEPRECATED RDW RBC AUTO: 42.2 FL (ref 40–54)
EOSINOPHIL # BLD AUTO: 0.01 10*3/MM3 (ref 0–0.7)
EOSINOPHIL NFR BLD AUTO: 0.1 % (ref 0–4)
ERYTHROCYTE [DISTWIDTH] IN BLOOD BY AUTOMATED COUNT: 12.9 % (ref 12–15)
GFR SERPL CREATININE-BSD FRML MDRD: 110 ML/MIN/1.73
GLOBULIN UR ELPH-MCNC: 3.5 GM/DL
GLUCOSE BLD-MCNC: 85 MG/DL (ref 70–100)
HCT VFR BLD AUTO: 49.9 % (ref 40–52)
HGB BLD-MCNC: 17.6 G/DL (ref 14–18)
IMM GRANULOCYTES # BLD AUTO: 0.15 10*3/MM3 (ref 0–0.05)
IMM GRANULOCYTES NFR BLD AUTO: 0.9 % (ref 0–5)
LYMPHOCYTES # BLD AUTO: 1.88 10*3/MM3 (ref 0.72–4.86)
LYMPHOCYTES NFR BLD AUTO: 11.4 % (ref 15–45)
MCH RBC QN AUTO: 31.4 PG (ref 28–32)
MCHC RBC AUTO-ENTMCNC: 35.3 G/DL (ref 33–36)
MCV RBC AUTO: 88.9 FL (ref 82–95)
MONOCYTES # BLD AUTO: 0.82 10*3/MM3 (ref 0.19–1.3)
MONOCYTES NFR BLD AUTO: 5 % (ref 4–12)
NEUTROPHILS # BLD AUTO: 13.54 10*3/MM3 (ref 1.87–8.4)
NEUTROPHILS NFR BLD AUTO: 82.1 % (ref 39–78)
NRBC BLD AUTO-RTO: 0 /100 WBC (ref 0–0)
PLATELET # BLD AUTO: 324 10*3/MM3 (ref 130–400)
PMV BLD AUTO: 9.8 FL (ref 6–12)
POTASSIUM BLD-SCNC: 4.4 MMOL/L (ref 3.5–5.3)
PROT SERPL-MCNC: 8.5 G/DL (ref 6.3–8.7)
RBC # BLD AUTO: 5.61 10*6/MM3 (ref 4.8–5.9)
SODIUM BLD-SCNC: 140 MMOL/L (ref 135–145)
URATE SERPL-MCNC: 6.7 MG/DL (ref 3.5–8.5)
WBC NRBC COR # BLD: 16.49 10*3/MM3 (ref 4.8–10.8)

## 2019-03-29 PROCEDURE — 80053 COMPREHEN METABOLIC PANEL: CPT | Performed by: EMERGENCY MEDICINE

## 2019-03-29 PROCEDURE — 25010000002 PIPERACILLIN SOD-TAZOBACTAM PER 1 G: Performed by: NURSE PRACTITIONER

## 2019-03-29 PROCEDURE — 93926 LOWER EXTREMITY STUDY: CPT | Performed by: SURGERY

## 2019-03-29 PROCEDURE — 73630 X-RAY EXAM OF FOOT: CPT

## 2019-03-29 PROCEDURE — 25010000002 VANCOMYCIN 1 G RECONSTITUTED SOLUTION: Performed by: EMERGENCY MEDICINE

## 2019-03-29 PROCEDURE — 93926 LOWER EXTREMITY STUDY: CPT

## 2019-03-29 PROCEDURE — 99285 EMERGENCY DEPT VISIT HI MDM: CPT

## 2019-03-29 PROCEDURE — 25010000002 HYDROMORPHONE 1 MG/ML SOLUTION: Performed by: NURSE PRACTITIONER

## 2019-03-29 PROCEDURE — 83605 ASSAY OF LACTIC ACID: CPT | Performed by: EMERGENCY MEDICINE

## 2019-03-29 PROCEDURE — 25010000002 HYDROMORPHONE PER 4 MG: Performed by: NURSE PRACTITIONER

## 2019-03-29 PROCEDURE — 85025 COMPLETE CBC W/AUTO DIFF WBC: CPT | Performed by: EMERGENCY MEDICINE

## 2019-03-29 PROCEDURE — 84550 ASSAY OF BLOOD/URIC ACID: CPT | Performed by: NURSE PRACTITIONER

## 2019-03-29 PROCEDURE — 73720 MRI LWR EXTREMITY W/O&W/DYE: CPT

## 2019-03-29 PROCEDURE — 94799 UNLISTED PULMONARY SVC/PX: CPT

## 2019-03-29 PROCEDURE — 87040 BLOOD CULTURE FOR BACTERIA: CPT | Performed by: EMERGENCY MEDICINE

## 2019-03-29 PROCEDURE — A9577 INJ MULTIHANCE: HCPCS | Performed by: EMERGENCY MEDICINE

## 2019-03-29 PROCEDURE — 0 GADOBENATE DIMEGLUMINE 529 MG/ML SOLUTION: Performed by: EMERGENCY MEDICINE

## 2019-03-29 RX ORDER — HYDROMORPHONE HYDROCHLORIDE 1 MG/ML
0.5 INJECTION, SOLUTION INTRAMUSCULAR; INTRAVENOUS; SUBCUTANEOUS ONCE
Status: COMPLETED | OUTPATIENT
Start: 2019-03-29 | End: 2019-03-29

## 2019-03-29 RX ORDER — ONDANSETRON 4 MG/1
4 TABLET, ORALLY DISINTEGRATING ORAL EVERY 6 HOURS PRN
Status: DISCONTINUED | OUTPATIENT
Start: 2019-03-29 | End: 2019-04-02 | Stop reason: HOSPADM

## 2019-03-29 RX ORDER — SODIUM CHLORIDE 0.9 % (FLUSH) 0.9 %
10 SYRINGE (ML) INJECTION AS NEEDED
Status: DISCONTINUED | OUTPATIENT
Start: 2019-03-29 | End: 2019-04-02 | Stop reason: HOSPADM

## 2019-03-29 RX ORDER — HYDROCODONE BITARTRATE AND ACETAMINOPHEN 10; 325 MG/1; MG/1
1 TABLET ORAL EVERY 4 HOURS PRN
Status: DISCONTINUED | OUTPATIENT
Start: 2019-03-29 | End: 2019-04-02 | Stop reason: HOSPADM

## 2019-03-29 RX ORDER — SODIUM CHLORIDE 0.9 % (FLUSH) 0.9 %
3-10 SYRINGE (ML) INJECTION AS NEEDED
Status: DISCONTINUED | OUTPATIENT
Start: 2019-03-29 | End: 2019-04-02 | Stop reason: HOSPADM

## 2019-03-29 RX ORDER — PREGABALIN 50 MG/1
50 CAPSULE ORAL 3 TIMES DAILY
Status: DISCONTINUED | OUTPATIENT
Start: 2019-03-29 | End: 2019-04-02 | Stop reason: HOSPADM

## 2019-03-29 RX ORDER — ATORVASTATIN CALCIUM 10 MG/1
20 TABLET, FILM COATED ORAL NIGHTLY
Status: DISCONTINUED | OUTPATIENT
Start: 2019-03-29 | End: 2019-04-02 | Stop reason: HOSPADM

## 2019-03-29 RX ORDER — HYDROMORPHONE HYDROCHLORIDE 1 MG/ML
1 INJECTION, SOLUTION INTRAMUSCULAR; INTRAVENOUS; SUBCUTANEOUS ONCE
Status: COMPLETED | OUTPATIENT
Start: 2019-03-29 | End: 2019-03-29

## 2019-03-29 RX ORDER — SODIUM CHLORIDE 0.9 % (FLUSH) 0.9 %
3 SYRINGE (ML) INJECTION EVERY 12 HOURS SCHEDULED
Status: DISCONTINUED | OUTPATIENT
Start: 2019-03-29 | End: 2019-04-02 | Stop reason: HOSPADM

## 2019-03-29 RX ORDER — VANCOMYCIN HYDROCHLORIDE 1 G/200ML
1000 INJECTION, SOLUTION INTRAVENOUS EVERY 8 HOURS
Status: DISCONTINUED | OUTPATIENT
Start: 2019-03-30 | End: 2019-03-31

## 2019-03-29 RX ORDER — DOXYCYCLINE HYCLATE 100 MG/1
100 CAPSULE ORAL 2 TIMES DAILY
COMMUNITY
Start: 2019-03-28 | End: 2019-04-02 | Stop reason: HOSPADM

## 2019-03-29 RX ORDER — MELOXICAM 7.5 MG/1
15 TABLET ORAL DAILY
Status: DISCONTINUED | OUTPATIENT
Start: 2019-03-30 | End: 2019-03-30

## 2019-03-29 RX ORDER — ONDANSETRON 2 MG/ML
4 INJECTION INTRAMUSCULAR; INTRAVENOUS EVERY 6 HOURS PRN
Status: DISCONTINUED | OUTPATIENT
Start: 2019-03-29 | End: 2019-04-02 | Stop reason: HOSPADM

## 2019-03-29 RX ORDER — ONDANSETRON 4 MG/1
4 TABLET, FILM COATED ORAL EVERY 6 HOURS PRN
Status: DISCONTINUED | OUTPATIENT
Start: 2019-03-29 | End: 2019-04-02 | Stop reason: HOSPADM

## 2019-03-29 RX ORDER — ACETAMINOPHEN 325 MG/1
650 TABLET ORAL EVERY 4 HOURS PRN
Status: DISCONTINUED | OUTPATIENT
Start: 2019-03-29 | End: 2019-04-02 | Stop reason: HOSPADM

## 2019-03-29 RX ORDER — MELOXICAM 15 MG/1
15 TABLET ORAL DAILY
COMMUNITY
End: 2019-04-02 | Stop reason: HOSPADM

## 2019-03-29 RX ORDER — PREGABALIN 50 MG/1
50 CAPSULE ORAL 3 TIMES DAILY
COMMUNITY
End: 2019-04-19

## 2019-03-29 RX ORDER — SIMVASTATIN 20 MG
20 TABLET ORAL NIGHTLY
COMMUNITY
End: 2022-05-23 | Stop reason: SDUPTHER

## 2019-03-29 RX ADMIN — VANCOMYCIN HYDROCHLORIDE 1000 MG: 1 INJECTION, POWDER, LYOPHILIZED, FOR SOLUTION INTRAVENOUS at 18:09

## 2019-03-29 RX ADMIN — ATORVASTATIN CALCIUM 20 MG: 10 TABLET, FILM COATED ORAL at 22:24

## 2019-03-29 RX ADMIN — HYDROMORPHONE HYDROCHLORIDE 0.5 MG: 1 INJECTION, SOLUTION INTRAMUSCULAR; INTRAVENOUS; SUBCUTANEOUS at 18:33

## 2019-03-29 RX ADMIN — HYDROMORPHONE HYDROCHLORIDE 1 MG: 1 INJECTION, SOLUTION INTRAMUSCULAR; INTRAVENOUS; SUBCUTANEOUS at 19:38

## 2019-03-29 RX ADMIN — HYDROCODONE BITARTRATE AND ACETAMINOPHEN 1 TABLET: 10; 325 TABLET ORAL at 22:24

## 2019-03-29 RX ADMIN — GADOBENATE DIMEGLUMINE 10 ML: 529 INJECTION, SOLUTION INTRAVENOUS at 19:19

## 2019-03-29 RX ADMIN — SODIUM CHLORIDE, PRESERVATIVE FREE 3 ML: 5 INJECTION INTRAVENOUS at 22:24

## 2019-03-29 RX ADMIN — PREGABALIN 50 MG: 50 CAPSULE ORAL at 22:24

## 2019-03-29 RX ADMIN — TAZOBACTAM SODIUM AND PIPERACILLIN SODIUM 3.38 G: 375; 3 INJECTION, SOLUTION INTRAVENOUS at 22:24

## 2019-03-29 ASSESSMENT — ENCOUNTER SYMPTOMS
CHOKING: 0
SHORTNESS OF BREATH: 0
COLOR CHANGE: 1
ABDOMINAL PAIN: 0
BACK PAIN: 0
CHEST TIGHTNESS: 0
WHEEZING: 0
COUGH: 0
STRIDOR: 0
NAUSEA: 0

## 2019-03-30 ENCOUNTER — APPOINTMENT (OUTPATIENT)
Dept: ULTRASOUND IMAGING | Facility: HOSPITAL | Age: 49
End: 2019-03-30

## 2019-03-30 LAB
ANION GAP SERPL CALCULATED.3IONS-SCNC: 9 MMOL/L (ref 4–13)
ARTICHOKE IGE QN: 171 MG/DL (ref 0–99)
BUN BLD-MCNC: 15 MG/DL (ref 5–21)
BUN/CREAT SERPL: 18.1 (ref 7–25)
CALCIUM SPEC-SCNC: 9.3 MG/DL (ref 8.4–10.4)
CHLORIDE SERPL-SCNC: 103 MMOL/L (ref 98–110)
CHOLEST SERPL-MCNC: 225 MG/DL (ref 130–200)
CO2 SERPL-SCNC: 26 MMOL/L (ref 24–31)
CREAT BLD-MCNC: 0.83 MG/DL (ref 0.5–1.4)
DEPRECATED RDW RBC AUTO: 43.1 FL (ref 40–54)
EOSINOPHIL # BLD MANUAL: 0.11 10*3/MM3 (ref 0–0.7)
EOSINOPHIL NFR BLD MANUAL: 1 % (ref 0–4)
ERYTHROCYTE [DISTWIDTH] IN BLOOD BY AUTOMATED COUNT: 13.2 % (ref 12–15)
GFR SERPL CREATININE-BSD FRML MDRD: 98 ML/MIN/1.73
GLUCOSE BLD-MCNC: 77 MG/DL (ref 70–100)
HBA1C MFR BLD: 5.1 %
HCT VFR BLD AUTO: 46.7 % (ref 40–52)
HDLC SERPL-MCNC: 40 MG/DL
HGB BLD-MCNC: 16.5 G/DL (ref 14–18)
LDLC/HDLC SERPL: 3.84 {RATIO}
LYMPHOCYTES # BLD MANUAL: 3.7 10*3/MM3 (ref 0.72–4.86)
LYMPHOCYTES NFR BLD MANUAL: 35 % (ref 15–45)
LYMPHOCYTES NFR BLD MANUAL: 4 % (ref 4–12)
MCH RBC QN AUTO: 31.7 PG (ref 28–32)
MCHC RBC AUTO-ENTMCNC: 35.3 G/DL (ref 33–36)
MCV RBC AUTO: 89.6 FL (ref 82–95)
MONOCYTES # BLD AUTO: 0.42 10*3/MM3 (ref 0.19–1.3)
NEUTROPHILS # BLD AUTO: 6.02 10*3/MM3 (ref 1.87–8.4)
NEUTROPHILS NFR BLD MANUAL: 57 % (ref 39–78)
PLAT MORPH BLD: NORMAL
PLATELET # BLD AUTO: 244 10*3/MM3 (ref 130–400)
PMV BLD AUTO: 9.8 FL (ref 6–12)
POTASSIUM BLD-SCNC: 4.3 MMOL/L (ref 3.5–5.3)
RBC # BLD AUTO: 5.21 10*6/MM3 (ref 4.8–5.9)
RBC MORPH BLD: NORMAL
SODIUM BLD-SCNC: 138 MMOL/L (ref 135–145)
TRIGL SERPL-MCNC: 157 MG/DL (ref 0–149)
VARIANT LYMPHS NFR BLD MANUAL: 3 % (ref 0–5)
WBC MORPH BLD: NORMAL
WBC NRBC COR # BLD: 10.56 10*3/MM3 (ref 4.8–10.8)

## 2019-03-30 PROCEDURE — 25010000002 PIPERACILLIN SOD-TAZOBACTAM PER 1 G: Performed by: NURSE PRACTITIONER

## 2019-03-30 PROCEDURE — 85025 COMPLETE CBC W/AUTO DIFF WBC: CPT | Performed by: FAMILY MEDICINE

## 2019-03-30 PROCEDURE — 80061 LIPID PANEL: CPT | Performed by: NURSE PRACTITIONER

## 2019-03-30 PROCEDURE — 99222 1ST HOSP IP/OBS MODERATE 55: CPT | Performed by: SURGERY

## 2019-03-30 PROCEDURE — 93923 UPR/LXTR ART STDY 3+ LVLS: CPT

## 2019-03-30 PROCEDURE — 94799 UNLISTED PULMONARY SVC/PX: CPT

## 2019-03-30 PROCEDURE — 93924 LWR XTR VASC STDY BILAT: CPT | Performed by: SURGERY

## 2019-03-30 PROCEDURE — 85007 BL SMEAR W/DIFF WBC COUNT: CPT | Performed by: FAMILY MEDICINE

## 2019-03-30 PROCEDURE — 83036 HEMOGLOBIN GLYCOSYLATED A1C: CPT | Performed by: NURSE PRACTITIONER

## 2019-03-30 PROCEDURE — 80048 BASIC METABOLIC PNL TOTAL CA: CPT | Performed by: NURSE PRACTITIONER

## 2019-03-30 PROCEDURE — 25010000002 VANCOMYCIN PER 500 MG: Performed by: NURSE PRACTITIONER

## 2019-03-30 RX ORDER — ASPIRIN 81 MG/1
81 TABLET ORAL DAILY
Status: DISCONTINUED | OUTPATIENT
Start: 2019-03-30 | End: 2019-04-02 | Stop reason: HOSPADM

## 2019-03-30 RX ADMIN — HYDROCODONE BITARTRATE AND ACETAMINOPHEN 1 TABLET: 10; 325 TABLET ORAL at 08:27

## 2019-03-30 RX ADMIN — SODIUM CHLORIDE, PRESERVATIVE FREE 3 ML: 5 INJECTION INTRAVENOUS at 08:28

## 2019-03-30 RX ADMIN — ATORVASTATIN CALCIUM 20 MG: 10 TABLET, FILM COATED ORAL at 22:15

## 2019-03-30 RX ADMIN — PREGABALIN 50 MG: 50 CAPSULE ORAL at 22:15

## 2019-03-30 RX ADMIN — SODIUM CHLORIDE, PRESERVATIVE FREE 3 ML: 5 INJECTION INTRAVENOUS at 22:17

## 2019-03-30 RX ADMIN — HYDROCODONE BITARTRATE AND ACETAMINOPHEN 1 TABLET: 10; 325 TABLET ORAL at 12:49

## 2019-03-30 RX ADMIN — HYDROCODONE BITARTRATE AND ACETAMINOPHEN 1 TABLET: 10; 325 TABLET ORAL at 04:03

## 2019-03-30 RX ADMIN — ASPIRIN 81 MG: 81 TABLET, DELAYED RELEASE ORAL at 17:37

## 2019-03-30 RX ADMIN — TAZOBACTAM SODIUM AND PIPERACILLIN SODIUM 3.38 G: 375; 3 INJECTION, SOLUTION INTRAVENOUS at 04:03

## 2019-03-30 RX ADMIN — VANCOMYCIN HYDROCHLORIDE 1000 MG: 1 INJECTION, SOLUTION INTRAVENOUS at 18:41

## 2019-03-30 RX ADMIN — HYDROCODONE BITARTRATE AND ACETAMINOPHEN 1 TABLET: 10; 325 TABLET ORAL at 17:37

## 2019-03-30 RX ADMIN — VANCOMYCIN HYDROCHLORIDE 1000 MG: 1 INJECTION, SOLUTION INTRAVENOUS at 10:16

## 2019-03-30 RX ADMIN — MELOXICAM 15 MG: 7.5 TABLET ORAL at 08:27

## 2019-03-30 RX ADMIN — TAZOBACTAM SODIUM AND PIPERACILLIN SODIUM 3.38 G: 375; 3 INJECTION, SOLUTION INTRAVENOUS at 22:17

## 2019-03-30 RX ADMIN — VANCOMYCIN HYDROCHLORIDE 1000 MG: 1 INJECTION, SOLUTION INTRAVENOUS at 02:52

## 2019-03-30 RX ADMIN — PREGABALIN 50 MG: 50 CAPSULE ORAL at 08:28

## 2019-03-30 RX ADMIN — TAZOBACTAM SODIUM AND PIPERACILLIN SODIUM 3.38 G: 375; 3 INJECTION, SOLUTION INTRAVENOUS at 15:40

## 2019-03-30 RX ADMIN — PREGABALIN 50 MG: 50 CAPSULE ORAL at 15:45

## 2019-03-31 ENCOUNTER — APPOINTMENT (OUTPATIENT)
Dept: CT IMAGING | Facility: HOSPITAL | Age: 49
End: 2019-03-31

## 2019-03-31 LAB
ALBUMIN SERPL-MCNC: 3.8 G/DL (ref 3.5–5)
ALBUMIN/GLOB SERPL: 1.5 G/DL (ref 1.1–2.5)
ALP SERPL-CCNC: 80 U/L (ref 24–120)
ALT SERPL W P-5'-P-CCNC: 32 U/L (ref 0–54)
ANION GAP SERPL CALCULATED.3IONS-SCNC: 8 MMOL/L (ref 4–13)
AST SERPL-CCNC: 21 U/L (ref 7–45)
BASOPHILS # BLD AUTO: 0.08 10*3/MM3 (ref 0–0.2)
BASOPHILS NFR BLD AUTO: 0.8 % (ref 0–2)
BILIRUB SERPL-MCNC: 0.9 MG/DL (ref 0.1–1)
BUN BLD-MCNC: 15 MG/DL (ref 5–21)
BUN/CREAT SERPL: 17 (ref 7–25)
CALCIUM SPEC-SCNC: 8.9 MG/DL (ref 8.4–10.4)
CHLORIDE SERPL-SCNC: 103 MMOL/L (ref 98–110)
CO2 SERPL-SCNC: 26 MMOL/L (ref 24–31)
CREAT BLD-MCNC: 0.88 MG/DL (ref 0.5–1.4)
DEPRECATED RDW RBC AUTO: 41.1 FL (ref 40–54)
EOSINOPHIL # BLD AUTO: 0.24 10*3/MM3 (ref 0–0.7)
EOSINOPHIL NFR BLD AUTO: 2.4 % (ref 0–4)
ERYTHROCYTE [DISTWIDTH] IN BLOOD BY AUTOMATED COUNT: 12.9 % (ref 12–15)
GFR SERPL CREATININE-BSD FRML MDRD: 92 ML/MIN/1.73
GLOBULIN UR ELPH-MCNC: 2.5 GM/DL
GLUCOSE BLD-MCNC: 89 MG/DL (ref 70–100)
HCT VFR BLD AUTO: 46.5 % (ref 40–52)
HGB BLD-MCNC: 16.4 G/DL (ref 14–18)
IMM GRANULOCYTES # BLD AUTO: 0.07 10*3/MM3 (ref 0–0.05)
IMM GRANULOCYTES NFR BLD AUTO: 0.7 % (ref 0–5)
LYMPHOCYTES # BLD AUTO: 2.51 10*3/MM3 (ref 0.72–4.86)
LYMPHOCYTES NFR BLD AUTO: 25.2 % (ref 15–45)
MCH RBC QN AUTO: 31.1 PG (ref 28–32)
MCHC RBC AUTO-ENTMCNC: 35.3 G/DL (ref 33–36)
MCV RBC AUTO: 88.1 FL (ref 82–95)
MONOCYTES # BLD AUTO: 1.11 10*3/MM3 (ref 0.19–1.3)
MONOCYTES NFR BLD AUTO: 11.1 % (ref 4–12)
NEUTROPHILS # BLD AUTO: 5.96 10*3/MM3 (ref 1.87–8.4)
NEUTROPHILS NFR BLD AUTO: 59.8 % (ref 39–78)
NRBC BLD AUTO-RTO: 0 /100 WBC (ref 0–0)
PLATELET # BLD AUTO: 245 10*3/MM3 (ref 130–400)
PMV BLD AUTO: 9.9 FL (ref 6–12)
POTASSIUM BLD-SCNC: 4 MMOL/L (ref 3.5–5.3)
PROT SERPL-MCNC: 6.3 G/DL (ref 6.3–8.7)
RBC # BLD AUTO: 5.28 10*6/MM3 (ref 4.8–5.9)
SODIUM BLD-SCNC: 137 MMOL/L (ref 135–145)
VANCOMYCIN TROUGH SERPL-MCNC: 26.2 MCG/ML (ref 10–20)
WBC NRBC COR # BLD: 9.97 10*3/MM3 (ref 4.8–10.8)

## 2019-03-31 PROCEDURE — 94799 UNLISTED PULMONARY SVC/PX: CPT

## 2019-03-31 PROCEDURE — 0 IOPAMIDOL PER 1 ML: Performed by: SURGERY

## 2019-03-31 PROCEDURE — 75635 CT ANGIO ABDOMINAL ARTERIES: CPT

## 2019-03-31 PROCEDURE — 80202 ASSAY OF VANCOMYCIN: CPT | Performed by: FAMILY MEDICINE

## 2019-03-31 PROCEDURE — 25010000002 PIPERACILLIN SOD-TAZOBACTAM PER 1 G: Performed by: NURSE PRACTITIONER

## 2019-03-31 PROCEDURE — 25010000002 VANCOMYCIN PER 500 MG: Performed by: NURSE PRACTITIONER

## 2019-03-31 PROCEDURE — 85025 COMPLETE CBC W/AUTO DIFF WBC: CPT | Performed by: FAMILY MEDICINE

## 2019-03-31 PROCEDURE — 80053 COMPREHEN METABOLIC PANEL: CPT | Performed by: FAMILY MEDICINE

## 2019-03-31 PROCEDURE — 99232 SBSQ HOSP IP/OBS MODERATE 35: CPT | Performed by: SURGERY

## 2019-03-31 RX ORDER — CLINDAMYCIN HYDROCHLORIDE 150 MG/1
300 CAPSULE ORAL EVERY 6 HOURS SCHEDULED
Status: DISCONTINUED | OUTPATIENT
Start: 2019-03-31 | End: 2019-04-02 | Stop reason: HOSPADM

## 2019-03-31 RX ADMIN — PREGABALIN 50 MG: 50 CAPSULE ORAL at 17:39

## 2019-03-31 RX ADMIN — CLINDAMYCIN HYDROCHLORIDE 300 MG: 150 CAPSULE ORAL at 17:40

## 2019-03-31 RX ADMIN — VANCOMYCIN HYDROCHLORIDE 1000 MG: 1 INJECTION, SOLUTION INTRAVENOUS at 02:16

## 2019-03-31 RX ADMIN — IOPAMIDOL 200 ML: 755 INJECTION, SOLUTION INTRAVENOUS at 08:00

## 2019-03-31 RX ADMIN — PREGABALIN 50 MG: 50 CAPSULE ORAL at 20:29

## 2019-03-31 RX ADMIN — VANCOMYCIN HYDROCHLORIDE 1000 MG: 1 INJECTION, SOLUTION INTRAVENOUS at 10:49

## 2019-03-31 RX ADMIN — HYDROCODONE BITARTRATE AND ACETAMINOPHEN 1 TABLET: 10; 325 TABLET ORAL at 00:27

## 2019-03-31 RX ADMIN — HYDROCODONE BITARTRATE AND ACETAMINOPHEN 1 TABLET: 10; 325 TABLET ORAL at 16:04

## 2019-03-31 RX ADMIN — HYDROCODONE BITARTRATE AND ACETAMINOPHEN 1 TABLET: 10; 325 TABLET ORAL at 20:29

## 2019-03-31 RX ADMIN — SODIUM CHLORIDE, PRESERVATIVE FREE 3 ML: 5 INJECTION INTRAVENOUS at 09:11

## 2019-03-31 RX ADMIN — PREGABALIN 50 MG: 50 CAPSULE ORAL at 09:11

## 2019-03-31 RX ADMIN — ASPIRIN 81 MG: 81 TABLET, DELAYED RELEASE ORAL at 09:11

## 2019-03-31 RX ADMIN — TAZOBACTAM SODIUM AND PIPERACILLIN SODIUM 3.38 G: 375; 3 INJECTION, SOLUTION INTRAVENOUS at 04:12

## 2019-03-31 RX ADMIN — TAZOBACTAM SODIUM AND PIPERACILLIN SODIUM 3.38 G: 375; 3 INJECTION, SOLUTION INTRAVENOUS at 12:45

## 2019-03-31 RX ADMIN — HYDROCODONE BITARTRATE AND ACETAMINOPHEN 1 TABLET: 10; 325 TABLET ORAL at 09:11

## 2019-03-31 RX ADMIN — ATORVASTATIN CALCIUM 20 MG: 10 TABLET, FILM COATED ORAL at 20:29

## 2019-04-01 LAB
ALBUMIN SERPL-MCNC: 3.7 G/DL (ref 3.5–5)
ALBUMIN/GLOB SERPL: 1.4 G/DL (ref 1.1–2.5)
ALP SERPL-CCNC: 82 U/L (ref 24–120)
ALT SERPL W P-5'-P-CCNC: 35 U/L (ref 0–54)
ANION GAP SERPL CALCULATED.3IONS-SCNC: 5 MMOL/L (ref 4–13)
AST SERPL-CCNC: 27 U/L (ref 7–45)
BASOPHILS # BLD AUTO: 0.05 10*3/MM3 (ref 0–0.2)
BASOPHILS NFR BLD AUTO: 0.5 % (ref 0–2)
BILIRUB SERPL-MCNC: 0.8 MG/DL (ref 0.1–1)
BUN BLD-MCNC: 14 MG/DL (ref 5–21)
BUN/CREAT SERPL: 15.2 (ref 7–25)
CALCIUM SPEC-SCNC: 8.8 MG/DL (ref 8.4–10.4)
CHLORIDE SERPL-SCNC: 104 MMOL/L (ref 98–110)
CO2 SERPL-SCNC: 30 MMOL/L (ref 24–31)
CREAT BLD-MCNC: 0.92 MG/DL (ref 0.5–1.4)
DEPRECATED RDW RBC AUTO: 42.5 FL (ref 40–54)
EOSINOPHIL # BLD AUTO: 0.31 10*3/MM3 (ref 0–0.7)
EOSINOPHIL NFR BLD AUTO: 3.1 % (ref 0–4)
ERYTHROCYTE [DISTWIDTH] IN BLOOD BY AUTOMATED COUNT: 13 % (ref 12–15)
GFR SERPL CREATININE-BSD FRML MDRD: 87 ML/MIN/1.73
GLOBULIN UR ELPH-MCNC: 2.6 GM/DL
GLUCOSE BLD-MCNC: 90 MG/DL (ref 70–100)
HCT VFR BLD AUTO: 47.2 % (ref 40–52)
HGB BLD-MCNC: 16.5 G/DL (ref 14–18)
IMM GRANULOCYTES # BLD AUTO: 0.06 10*3/MM3 (ref 0–0.05)
IMM GRANULOCYTES NFR BLD AUTO: 0.6 % (ref 0–5)
LYMPHOCYTES # BLD AUTO: 2.7 10*3/MM3 (ref 0.72–4.86)
LYMPHOCYTES NFR BLD AUTO: 27.1 % (ref 15–45)
MCH RBC QN AUTO: 31.7 PG (ref 28–32)
MCHC RBC AUTO-ENTMCNC: 35 G/DL (ref 33–36)
MCV RBC AUTO: 90.8 FL (ref 82–95)
MONOCYTES # BLD AUTO: 0.98 10*3/MM3 (ref 0.19–1.3)
MONOCYTES NFR BLD AUTO: 9.8 % (ref 4–12)
NEUTROPHILS # BLD AUTO: 5.87 10*3/MM3 (ref 1.87–8.4)
NEUTROPHILS NFR BLD AUTO: 58.9 % (ref 39–78)
NRBC BLD AUTO-RTO: 0 /100 WBC (ref 0–0)
PLATELET # BLD AUTO: 231 10*3/MM3 (ref 130–400)
PMV BLD AUTO: 9.9 FL (ref 6–12)
POTASSIUM BLD-SCNC: 4.2 MMOL/L (ref 3.5–5.3)
PROT SERPL-MCNC: 6.3 G/DL (ref 6.3–8.7)
RBC # BLD AUTO: 5.2 10*6/MM3 (ref 4.8–5.9)
SODIUM BLD-SCNC: 139 MMOL/L (ref 135–145)
WBC NRBC COR # BLD: 9.97 10*3/MM3 (ref 4.8–10.8)

## 2019-04-01 PROCEDURE — 94799 UNLISTED PULMONARY SVC/PX: CPT

## 2019-04-01 PROCEDURE — 94760 N-INVAS EAR/PLS OXIMETRY 1: CPT

## 2019-04-01 PROCEDURE — 80053 COMPREHEN METABOLIC PANEL: CPT | Performed by: FAMILY MEDICINE

## 2019-04-01 PROCEDURE — 85025 COMPLETE CBC W/AUTO DIFF WBC: CPT | Performed by: FAMILY MEDICINE

## 2019-04-01 PROCEDURE — 99232 SBSQ HOSP IP/OBS MODERATE 35: CPT | Performed by: SURGERY

## 2019-04-01 RX ADMIN — PREGABALIN 50 MG: 50 CAPSULE ORAL at 19:53

## 2019-04-01 RX ADMIN — CLINDAMYCIN HYDROCHLORIDE 300 MG: 150 CAPSULE ORAL at 00:31

## 2019-04-01 RX ADMIN — SODIUM CHLORIDE, PRESERVATIVE FREE 3 ML: 5 INJECTION INTRAVENOUS at 08:05

## 2019-04-01 RX ADMIN — ASPIRIN 81 MG: 81 TABLET, DELAYED RELEASE ORAL at 08:05

## 2019-04-01 RX ADMIN — ATORVASTATIN CALCIUM 20 MG: 10 TABLET, FILM COATED ORAL at 19:53

## 2019-04-01 RX ADMIN — HYDROCODONE BITARTRATE AND ACETAMINOPHEN 1 TABLET: 10; 325 TABLET ORAL at 18:08

## 2019-04-01 RX ADMIN — HYDROCODONE BITARTRATE AND ACETAMINOPHEN 1 TABLET: 10; 325 TABLET ORAL at 00:31

## 2019-04-01 RX ADMIN — CLINDAMYCIN HYDROCHLORIDE 300 MG: 150 CAPSULE ORAL at 06:47

## 2019-04-01 RX ADMIN — SODIUM CHLORIDE, PRESERVATIVE FREE 3 ML: 5 INJECTION INTRAVENOUS at 19:53

## 2019-04-01 RX ADMIN — HYDROCODONE BITARTRATE AND ACETAMINOPHEN 1 TABLET: 10; 325 TABLET ORAL at 13:47

## 2019-04-01 RX ADMIN — PREGABALIN 50 MG: 50 CAPSULE ORAL at 15:51

## 2019-04-01 RX ADMIN — CLINDAMYCIN HYDROCHLORIDE 300 MG: 150 CAPSULE ORAL at 12:12

## 2019-04-01 RX ADMIN — CLINDAMYCIN HYDROCHLORIDE 300 MG: 150 CAPSULE ORAL at 18:00

## 2019-04-01 RX ADMIN — PREGABALIN 50 MG: 50 CAPSULE ORAL at 08:04

## 2019-04-01 RX ADMIN — HYDROCODONE BITARTRATE AND ACETAMINOPHEN 1 TABLET: 10; 325 TABLET ORAL at 08:05

## 2019-04-01 RX ADMIN — HYDROCODONE BITARTRATE AND ACETAMINOPHEN 1 TABLET: 10; 325 TABLET ORAL at 22:27

## 2019-04-02 ENCOUNTER — APPOINTMENT (OUTPATIENT)
Dept: GENERAL RADIOLOGY | Facility: HOSPITAL | Age: 49
End: 2019-04-02

## 2019-04-02 ENCOUNTER — ANESTHESIA (OUTPATIENT)
Dept: PERIOP | Facility: HOSPITAL | Age: 49
End: 2019-04-02

## 2019-04-02 ENCOUNTER — ANESTHESIA EVENT (OUTPATIENT)
Dept: PERIOP | Facility: HOSPITAL | Age: 49
End: 2019-04-02

## 2019-04-02 ENCOUNTER — APPOINTMENT (OUTPATIENT)
Dept: INTERVENTIONAL RADIOLOGY/VASCULAR | Facility: HOSPITAL | Age: 49
End: 2019-04-02

## 2019-04-02 VITALS
BODY MASS INDEX: 31.86 KG/M2 | OXYGEN SATURATION: 96 % | HEART RATE: 73 BPM | WEIGHT: 210.2 LBS | TEMPERATURE: 98.1 F | SYSTOLIC BLOOD PRESSURE: 130 MMHG | RESPIRATION RATE: 22 BRPM | DIASTOLIC BLOOD PRESSURE: 89 MMHG | HEIGHT: 68 IN

## 2019-04-02 LAB
ALBUMIN SERPL-MCNC: 3.8 G/DL (ref 3.5–5)
ALBUMIN/GLOB SERPL: 1.7 G/DL (ref 1.1–2.5)
ALP SERPL-CCNC: 89 U/L (ref 24–120)
ALT SERPL W P-5'-P-CCNC: 36 U/L (ref 0–54)
ANION GAP SERPL CALCULATED.3IONS-SCNC: 7 MMOL/L (ref 4–13)
AST SERPL-CCNC: 24 U/L (ref 7–45)
BASOPHILS # BLD AUTO: 0.08 10*3/MM3 (ref 0–0.2)
BASOPHILS NFR BLD AUTO: 0.8 % (ref 0–2)
BILIRUB SERPL-MCNC: 0.8 MG/DL (ref 0.1–1)
BUN BLD-MCNC: 14 MG/DL (ref 5–21)
BUN/CREAT SERPL: 15.7 (ref 7–25)
CALCIUM SPEC-SCNC: 9.4 MG/DL (ref 8.4–10.4)
CHLORIDE SERPL-SCNC: 103 MMOL/L (ref 98–110)
CO2 SERPL-SCNC: 29 MMOL/L (ref 24–31)
CREAT BLD-MCNC: 0.89 MG/DL (ref 0.5–1.4)
DEPRECATED RDW RBC AUTO: 43.8 FL (ref 40–54)
EOSINOPHIL # BLD AUTO: 0.37 10*3/MM3 (ref 0–0.7)
EOSINOPHIL NFR BLD AUTO: 3.8 % (ref 0–4)
ERYTHROCYTE [DISTWIDTH] IN BLOOD BY AUTOMATED COUNT: 13.1 % (ref 12–15)
GFR SERPL CREATININE-BSD FRML MDRD: 90 ML/MIN/1.73
GLOBULIN UR ELPH-MCNC: 2.3 GM/DL
GLUCOSE BLD-MCNC: 88 MG/DL (ref 70–100)
HCT VFR BLD AUTO: 48.8 % (ref 40–52)
HGB BLD-MCNC: 16.7 G/DL (ref 14–18)
IMM GRANULOCYTES # BLD AUTO: 0.05 10*3/MM3 (ref 0–0.05)
IMM GRANULOCYTES NFR BLD AUTO: 0.5 % (ref 0–5)
LYMPHOCYTES # BLD AUTO: 3.21 10*3/MM3 (ref 0.72–4.86)
LYMPHOCYTES NFR BLD AUTO: 32.8 % (ref 15–45)
MCH RBC QN AUTO: 31.5 PG (ref 28–32)
MCHC RBC AUTO-ENTMCNC: 34.2 G/DL (ref 33–36)
MCV RBC AUTO: 91.9 FL (ref 82–95)
MONOCYTES # BLD AUTO: 0.9 10*3/MM3 (ref 0.19–1.3)
MONOCYTES NFR BLD AUTO: 9.2 % (ref 4–12)
NEUTROPHILS # BLD AUTO: 5.17 10*3/MM3 (ref 1.87–8.4)
NEUTROPHILS NFR BLD AUTO: 52.9 % (ref 39–78)
NRBC BLD AUTO-RTO: 0 /100 WBC (ref 0–0)
PLATELET # BLD AUTO: 241 10*3/MM3 (ref 130–400)
PMV BLD AUTO: 10.1 FL (ref 6–12)
POTASSIUM BLD-SCNC: 4.7 MMOL/L (ref 3.5–5.3)
PROT SERPL-MCNC: 6.1 G/DL (ref 6.3–8.7)
RBC # BLD AUTO: 5.31 10*6/MM3 (ref 4.8–5.9)
SODIUM BLD-SCNC: 139 MMOL/L (ref 135–145)
WBC NRBC COR # BLD: 9.78 10*3/MM3 (ref 4.8–10.8)

## 2019-04-02 PROCEDURE — 37226 PR REVSC OPN/PRQ FEM/POP W/STNT/ANGIOP SM VSL: CPT | Performed by: SURGERY

## 2019-04-02 PROCEDURE — 93005 ELECTROCARDIOGRAM TRACING: CPT | Performed by: SURGERY

## 2019-04-02 PROCEDURE — C1725 CATH, TRANSLUMIN NON-LASER: HCPCS | Performed by: SURGERY

## 2019-04-02 PROCEDURE — 25010000002 PROPOFOL 1000 MG/ML EMULSION: Performed by: NURSE ANESTHETIST, CERTIFIED REGISTERED

## 2019-04-02 PROCEDURE — 71045 X-RAY EXAM CHEST 1 VIEW: CPT

## 2019-04-02 PROCEDURE — 93010 ELECTROCARDIOGRAM REPORT: CPT | Performed by: INTERNAL MEDICINE

## 2019-04-02 PROCEDURE — 36245 INS CATH ABD/L-EXT ART 1ST: CPT | Performed by: SURGERY

## 2019-04-02 PROCEDURE — C1894 INTRO/SHEATH, NON-LASER: HCPCS | Performed by: SURGERY

## 2019-04-02 PROCEDURE — B41G1ZZ FLUOROSCOPY OF LEFT LOWER EXTREMITY ARTERIES USING LOW OSMOLAR CONTRAST: ICD-10-PCS | Performed by: SURGERY

## 2019-04-02 PROCEDURE — 75716 ARTERY X-RAYS ARMS/LEGS: CPT | Performed by: SURGERY

## 2019-04-02 PROCEDURE — C1887 CATHETER, GUIDING: HCPCS | Performed by: SURGERY

## 2019-04-02 PROCEDURE — 047L3D1 DILATION OF LEFT FEMORAL ARTERY WITH INTRALUMINAL DEVICE, USING DRUG-COATED BALLOON, PERCUTANEOUS APPROACH: ICD-10-PCS | Performed by: SURGERY

## 2019-04-02 PROCEDURE — C1769 GUIDE WIRE: HCPCS | Performed by: SURGERY

## 2019-04-02 PROCEDURE — C1876 STENT, NON-COA/NON-COV W/DEL: HCPCS | Performed by: SURGERY

## 2019-04-02 PROCEDURE — 80053 COMPREHEN METABOLIC PANEL: CPT | Performed by: FAMILY MEDICINE

## 2019-04-02 PROCEDURE — 37220 PR REVASCULARIZATION ILIAC ARTERY ANGIOP 1ST VSL: CPT | Performed by: SURGERY

## 2019-04-02 PROCEDURE — 25010000002 IOPAMIDOL 61 % SOLUTION: Performed by: SURGERY

## 2019-04-02 PROCEDURE — 76000 FLUOROSCOPY <1 HR PHYS/QHP: CPT

## 2019-04-02 PROCEDURE — 75716 ARTERY X-RAYS ARMS/LEGS: CPT

## 2019-04-02 PROCEDURE — 25010000002 HEPARIN (PORCINE) PER 1000 UNITS: Performed by: NURSE ANESTHETIST, CERTIFIED REGISTERED

## 2019-04-02 PROCEDURE — 85025 COMPLETE CBC W/AUTO DIFF WBC: CPT | Performed by: FAMILY MEDICINE

## 2019-04-02 PROCEDURE — B41D1ZZ FLUOROSCOPY OF AORTA AND BILATERAL LOWER EXTREMITY ARTERIES USING LOW OSMOLAR CONTRAST: ICD-10-PCS | Performed by: SURGERY

## 2019-04-02 PROCEDURE — 047J3ZZ DILATION OF LEFT EXTERNAL ILIAC ARTERY, PERCUTANEOUS APPROACH: ICD-10-PCS | Performed by: SURGERY

## 2019-04-02 PROCEDURE — 25010000002 HEPARIN (PORCINE) PER 1000 UNITS: Performed by: SURGERY

## 2019-04-02 PROCEDURE — C1760 CLOSURE DEV, VASC: HCPCS | Performed by: SURGERY

## 2019-04-02 PROCEDURE — C2623 CATH, TRANSLUMIN, DRUG-COAT: HCPCS | Performed by: SURGERY

## 2019-04-02 PROCEDURE — 76937 US GUIDE VASCULAR ACCESS: CPT

## 2019-04-02 DEVICE — STNT PERIPH EVERFLEX ENTRUST 5F 6X60MM 120CM: Type: IMPLANTABLE DEVICE | Site: LEG | Status: FUNCTIONAL

## 2019-04-02 RX ORDER — CLOPIDOGREL BISULFATE 75 MG/1
75 TABLET ORAL DAILY
Status: DISCONTINUED | OUTPATIENT
Start: 2019-04-03 | End: 2019-04-02 | Stop reason: HOSPADM

## 2019-04-02 RX ORDER — KETAMINE HYDROCHLORIDE 50 MG/ML
INJECTION, SOLUTION, CONCENTRATE INTRAMUSCULAR; INTRAVENOUS AS NEEDED
Status: DISCONTINUED | OUTPATIENT
Start: 2019-04-02 | End: 2019-04-02 | Stop reason: SURG

## 2019-04-02 RX ORDER — IBUPROFEN 600 MG/1
600 TABLET ORAL ONCE AS NEEDED
Status: DISCONTINUED | OUTPATIENT
Start: 2019-04-02 | End: 2019-04-02 | Stop reason: HOSPADM

## 2019-04-02 RX ORDER — FENTANYL CITRATE 50 UG/ML
25 INJECTION, SOLUTION INTRAMUSCULAR; INTRAVENOUS AS NEEDED
Status: DISCONTINUED | OUTPATIENT
Start: 2019-04-02 | End: 2019-04-02 | Stop reason: HOSPADM

## 2019-04-02 RX ORDER — ACETAMINOPHEN 500 MG
1000 TABLET ORAL ONCE
Status: COMPLETED | OUTPATIENT
Start: 2019-04-02 | End: 2019-04-02

## 2019-04-02 RX ORDER — CLOPIDOGREL BISULFATE 75 MG/1
150 TABLET ORAL ONCE
Status: COMPLETED | OUTPATIENT
Start: 2019-04-02 | End: 2019-04-02

## 2019-04-02 RX ORDER — IPRATROPIUM BROMIDE AND ALBUTEROL SULFATE 2.5; .5 MG/3ML; MG/3ML
3 SOLUTION RESPIRATORY (INHALATION) ONCE AS NEEDED
Status: DISCONTINUED | OUTPATIENT
Start: 2019-04-02 | End: 2019-04-02 | Stop reason: HOSPADM

## 2019-04-02 RX ORDER — SODIUM CHLORIDE, SODIUM LACTATE, POTASSIUM CHLORIDE, CALCIUM CHLORIDE 600; 310; 30; 20 MG/100ML; MG/100ML; MG/100ML; MG/100ML
100 INJECTION, SOLUTION INTRAVENOUS CONTINUOUS
Status: DISCONTINUED | OUTPATIENT
Start: 2019-04-02 | End: 2019-04-02

## 2019-04-02 RX ORDER — OXYCODONE HYDROCHLORIDE AND ACETAMINOPHEN 5; 325 MG/1; MG/1
1 TABLET ORAL ONCE AS NEEDED
Status: DISCONTINUED | OUTPATIENT
Start: 2019-04-02 | End: 2019-04-02 | Stop reason: HOSPADM

## 2019-04-02 RX ORDER — OXYCODONE AND ACETAMINOPHEN 10; 325 MG/1; MG/1
1 TABLET ORAL EVERY 4 HOURS PRN
Status: DISCONTINUED | OUTPATIENT
Start: 2019-04-02 | End: 2019-04-02 | Stop reason: HOSPADM

## 2019-04-02 RX ORDER — BUPIVACAINE HCL/0.9 % NACL/PF 0.1 %
2 PLASTIC BAG, INJECTION (ML) EPIDURAL ONCE
Status: COMPLETED | OUTPATIENT
Start: 2019-04-02 | End: 2019-04-02

## 2019-04-02 RX ORDER — CLINDAMYCIN HYDROCHLORIDE 300 MG/1
300 CAPSULE ORAL EVERY 6 HOURS SCHEDULED
Qty: 21 CAPSULE | Refills: 0 | Status: SHIPPED | OUTPATIENT
Start: 2019-04-02 | End: 2019-04-08

## 2019-04-02 RX ORDER — MEPERIDINE HYDROCHLORIDE 25 MG/ML
12.5 INJECTION INTRAMUSCULAR; INTRAVENOUS; SUBCUTANEOUS
Status: DISCONTINUED | OUTPATIENT
Start: 2019-04-02 | End: 2019-04-02 | Stop reason: HOSPADM

## 2019-04-02 RX ORDER — NALOXONE HCL 0.4 MG/ML
0.4 VIAL (ML) INJECTION AS NEEDED
Status: DISCONTINUED | OUTPATIENT
Start: 2019-04-02 | End: 2019-04-02 | Stop reason: HOSPADM

## 2019-04-02 RX ORDER — CLOPIDOGREL BISULFATE 75 MG/1
75 TABLET ORAL DAILY
Qty: 30 TABLET | Refills: 0 | Status: SHIPPED | OUTPATIENT
Start: 2019-04-03 | End: 2019-07-24

## 2019-04-02 RX ORDER — ONDANSETRON 2 MG/ML
4 INJECTION INTRAMUSCULAR; INTRAVENOUS ONCE AS NEEDED
Status: DISCONTINUED | OUTPATIENT
Start: 2019-04-02 | End: 2019-04-02 | Stop reason: HOSPADM

## 2019-04-02 RX ORDER — SODIUM CHLORIDE 0.9 % (FLUSH) 0.9 %
1-10 SYRINGE (ML) INJECTION AS NEEDED
Status: DISCONTINUED | OUTPATIENT
Start: 2019-04-02 | End: 2019-04-02 | Stop reason: HOSPADM

## 2019-04-02 RX ORDER — SODIUM CHLORIDE 0.9 % (FLUSH) 0.9 %
3 SYRINGE (ML) INJECTION EVERY 12 HOURS SCHEDULED
Status: DISCONTINUED | OUTPATIENT
Start: 2019-04-02 | End: 2019-04-02 | Stop reason: HOSPADM

## 2019-04-02 RX ORDER — ASPIRIN 81 MG/1
81 TABLET ORAL DAILY
Qty: 30 TABLET | Refills: 0 | Status: SHIPPED | OUTPATIENT
Start: 2019-04-02 | End: 2022-05-23 | Stop reason: SDUPTHER

## 2019-04-02 RX ORDER — IPRATROPIUM BROMIDE AND ALBUTEROL SULFATE 2.5; .5 MG/3ML; MG/3ML
3 SOLUTION RESPIRATORY (INHALATION) ONCE
Status: COMPLETED | OUTPATIENT
Start: 2019-04-02 | End: 2019-04-02

## 2019-04-02 RX ORDER — HEPARIN SODIUM 1000 [USP'U]/ML
INJECTION, SOLUTION INTRAVENOUS; SUBCUTANEOUS AS NEEDED
Status: DISCONTINUED | OUTPATIENT
Start: 2019-04-02 | End: 2019-04-02 | Stop reason: SURG

## 2019-04-02 RX ORDER — LIDOCAINE HYDROCHLORIDE 10 MG/ML
INJECTION, SOLUTION INFILTRATION; PERINEURAL AS NEEDED
Status: DISCONTINUED | OUTPATIENT
Start: 2019-04-02 | End: 2019-04-02 | Stop reason: HOSPADM

## 2019-04-02 RX ORDER — METOCLOPRAMIDE HYDROCHLORIDE 5 MG/ML
5 INJECTION INTRAMUSCULAR; INTRAVENOUS
Status: DISCONTINUED | OUTPATIENT
Start: 2019-04-02 | End: 2019-04-02 | Stop reason: HOSPADM

## 2019-04-02 RX ORDER — LABETALOL HYDROCHLORIDE 5 MG/ML
5 INJECTION, SOLUTION INTRAVENOUS
Status: DISCONTINUED | OUTPATIENT
Start: 2019-04-02 | End: 2019-04-02 | Stop reason: HOSPADM

## 2019-04-02 RX ORDER — HYDROCODONE BITARTRATE AND ACETAMINOPHEN 10; 325 MG/1; MG/1
1 TABLET ORAL EVERY 4 HOURS PRN
Qty: 16 TABLET | Refills: 0 | Status: SHIPPED | OUTPATIENT
Start: 2019-04-02 | End: 2019-04-19

## 2019-04-02 RX ADMIN — Medication 2 G: at 08:51

## 2019-04-02 RX ADMIN — ACETAMINOPHEN 1000 MG: 500 TABLET, FILM COATED ORAL at 08:27

## 2019-04-02 RX ADMIN — CLINDAMYCIN HYDROCHLORIDE 300 MG: 150 CAPSULE ORAL at 01:40

## 2019-04-02 RX ADMIN — CLINDAMYCIN HYDROCHLORIDE 300 MG: 150 CAPSULE ORAL at 14:39

## 2019-04-02 RX ADMIN — PROPOFOL 150 MCG/KG/MIN: 10 INJECTION, EMULSION INTRAVENOUS at 08:52

## 2019-04-02 RX ADMIN — CLOPIDOGREL 150 MG: 75 TABLET, FILM COATED ORAL at 10:25

## 2019-04-02 RX ADMIN — HEPARIN SODIUM 6000 UNITS: 1000 INJECTION, SOLUTION INTRAVENOUS; SUBCUTANEOUS at 09:19

## 2019-04-02 RX ADMIN — CLINDAMYCIN HYDROCHLORIDE 300 MG: 150 CAPSULE ORAL at 17:37

## 2019-04-02 RX ADMIN — SODIUM CHLORIDE, POTASSIUM CHLORIDE, SODIUM LACTATE AND CALCIUM CHLORIDE 100 ML/HR: 600; 310; 30; 20 INJECTION, SOLUTION INTRAVENOUS at 08:26

## 2019-04-02 RX ADMIN — PROPOFOL: 10 INJECTION, EMULSION INTRAVENOUS at 09:43

## 2019-04-02 RX ADMIN — KETAMINE HYDROCHLORIDE 100 MG: 50 INJECTION, SOLUTION INTRAMUSCULAR; INTRAVENOUS at 08:52

## 2019-04-02 RX ADMIN — HYDROCODONE BITARTRATE AND ACETAMINOPHEN 1 TABLET: 10; 325 TABLET ORAL at 11:10

## 2019-04-02 RX ADMIN — PREGABALIN 50 MG: 50 CAPSULE ORAL at 17:37

## 2019-04-02 RX ADMIN — IPRATROPIUM BROMIDE AND ALBUTEROL SULFATE 3 ML: 2.5; .5 SOLUTION RESPIRATORY (INHALATION) at 08:27

## 2019-04-02 NOTE — ANESTHESIA POSTPROCEDURE EVALUATION
"Patient: Gilberto Roberts    Procedure Summary     Date:  04/02/19 Room / Location:   [facility] OR  /  [facility] HYBRID OR 12    Anesthesia Start:  0848 Anesthesia Stop:  1009    Procedure:  AORTAGRAM, LEFT LEG ANGIOGRAM, ANGIOPLASTY/STENT PLACEMENT, ANGIOSEAL CLOSURE (Bilateral Groin) Diagnosis:       PAD (peripheral artery disease) (CMS/HCC)      (PAD (peripheral artery disease) (CMS/HCC) [I73.9])    Surgeon:  Luis Billings MD Provider:  LUIS Davila CRNA    Anesthesia Type:  MAC ASA Status:  3          Anesthesia Type: MAC  Last vitals  BP   148/99 (04/02/19 1037)   Temp   97.2 °F (36.2 °C) (04/02/19 1037)   Pulse   76 (04/02/19 1037)   Resp   14 (04/02/19 1037)     SpO2   96 % (04/02/19 1037)     Post Anesthesia Care and Evaluation    Patient location during evaluation: PACU  Patient participation: complete - patient participated  Level of consciousness: awake and alert  Pain management: adequate  Airway patency: patent  Anesthetic complications: No anesthetic complications    Cardiovascular status: acceptable  Respiratory status: acceptable  Hydration status: acceptable    Comments: Blood pressure 148/99, pulse 76, temperature 97.2 °F (36.2 °C), resp. rate 14, height 172.7 cm (68\"), weight 95.3 kg (210 lb 3.2 oz), SpO2 96 %.    Pt discharged from PACU based on razia score >8      "

## 2019-04-02 NOTE — ANESTHESIA PREPROCEDURE EVALUATION
Anesthesia Evaluation     Patient summary reviewed and Nursing notes reviewed   no history of anesthetic complications:  NPO Solid Status: > 8 hours  NPO Liquid Status: > 8 hours           Airway   Mallampati: III  TM distance: >3 FB  No difficulty expected  Dental      Pulmonary    (+) a smoker Current,   Cardiovascular   Exercise tolerance: poor (<4 METS)    ECG reviewed    (+) hypertension, PVD, hyperlipidemia,       Neuro/Psych  (-) seizures, TIA, CVA  GI/Hepatic/Renal/Endo    (+) obesity,     (-) liver disease, no renal disease, diabetes    Musculoskeletal     (+) back pain,   Abdominal    Substance History      OB/GYN          Other   (+) arthritis                     Anesthesia Plan    ASA 3     MAC     intravenous induction   Anesthetic plan, all risks, benefits, and alternatives have been provided, discussed and informed consent has been obtained with: patient.

## 2019-04-03 ENCOUNTER — READMISSION MANAGEMENT (OUTPATIENT)
Dept: CALL CENTER | Facility: HOSPITAL | Age: 49
End: 2019-04-03

## 2019-04-03 LAB
BACTERIA SPEC AEROBE CULT: NORMAL
BACTERIA SPEC AEROBE CULT: NORMAL

## 2019-04-03 NOTE — OUTREACH NOTE
Prep Survey      Responses   Facility patient discharged from?  [facility]   Is patient eligible?  Yes   Discharge diagnosis  PAD Atherosclerosis, Cellulitis of left foot   Does the patient have one of the following disease processes/diagnoses(primary or secondary)?  General Surgery   Does the patient have Home health ordered?  No   Is there a DME ordered?  No   Medication alerts for this patient  Plavix and ASA    Prep survey completed?  Yes          Marixa Dee RN

## 2019-04-04 ENCOUNTER — TELEPHONE (OUTPATIENT)
Dept: INTERNAL MEDICINE | Age: 49
End: 2019-04-04

## 2019-04-04 DIAGNOSIS — R60.0 EDEMA LEG: ICD-10-CM

## 2019-04-04 DIAGNOSIS — L03.90 CELLULITIS, UNSPECIFIED CELLULITIS SITE: Primary | ICD-10-CM

## 2019-04-04 DIAGNOSIS — L03.116 CELLULITIS OF LEFT FOOT: Primary | ICD-10-CM

## 2019-04-04 NOTE — TELEPHONE ENCOUNTER
Spoke with patient for TCM progress report. Patient would like to be referred to [facility] for assessment for services. Patient states he has difficulty even putting on socks and would like to see if he could possibly have nursing care during his recuperation from cellulitis of left foot, post angioplasty of left external iliac artery. Please review this request and refer patient if appropriate. Please, do not send request back to me as I'm not in office every day. Thank you.

## 2019-04-04 NOTE — TELEPHONE ENCOUNTER
[facility] Trans[facility] Initial Follow Up Call    Outreach made within 2 business days of discharge: Yes    Patient: Rosa Query Patient : 1970   MRN: [id number]  Reason for Admission: Patient was admitted on 2019 due to cellulitis of left foot. Discharge Diagnoses: Active Hospital Problems   Diagnosis    **PAD (peripheral artery disease) (CMS/[facility])  Atherosclerosis    Cellulitis of left foot    Cellulitis of foot, left    Acute pain    Essential hypertension    Hyperlipidemia     Presenting Problem/History of Present Illness:  Cellulitis of left foot [L03.116]  Cellulitis of foot, left [L03.116]    Discharge Date: 2019     Spoke with:patient. Discharge department/facility:[facility] Interactive Patient Contact:  Was patient able to fill all prescriptions: Yes  Was patient instructed to bring all medications to the follow-up visit: Yes  Is patient taking all medications as directed in the discharge summary? Yes  Does patient understand their discharge instructions: Yes  Does patient have questions or concerns that need addressed prior to 7-14 day follow up office visit: no.  Patient states he feels he is doing OK. He still has pain in foot. Bowels and bladder working OK. Appetite is fair. Sleeping in recliner. Patient feels he needs [facility] services as he has difficulty bending over to even put on socks. Will request review for [facility] services. Patient to bring all medications to [facility] for review and will contact office with any concerns.     Scheduled appointment with PCP within 7-14 days    Follow Up  Future Appointments   Date Time Provider Teresa Love   2019  2:45 PM CATHY Gar [facility]   2019  7:45 AM MD SHARI Mitchell [facility]   2019 11:00 AM Jose Martinez MD [address] [facility] Staff, [place]

## 2019-04-05 ENCOUNTER — READMISSION MANAGEMENT (OUTPATIENT)
Dept: CALL CENTER | Facility: HOSPITAL | Age: 49
End: 2019-04-05

## 2019-04-05 NOTE — OUTREACH NOTE
General Surgery Week 1 Survey      Responses   Facility patient discharged from?  [facility]   Does the patient have one of the following disease processes/diagnoses(primary or secondary)?  General Surgery   Is there a successful TCM telephone encounter documented?  No   Week 1 attempt successful?  No   Unsuccessful attempts  Attempt 1          Areli Gallardo RN

## 2019-04-07 ENCOUNTER — READMISSION MANAGEMENT (OUTPATIENT)
Dept: CALL CENTER | Facility: HOSPITAL | Age: 49
End: 2019-04-07

## 2019-04-07 NOTE — OUTREACH NOTE
General Surgery Week 1 Survey      Responses   Facility patient discharged from?  [facility]   Does the patient have one of the following disease processes/diagnoses(primary or secondary)?  General Surgery   Is there a successful TCM telephone encounter documented?  No   Week 1 attempt successful?  Yes   Call start time  1423   Rescheduled  Revoked [declined to participate. patient does not have phone number and only number provided is for the person over [place]]   Call end time  1426   Discharge diagnosis  PAD Atherosclerosis, Cellulitis of left foot          Leola Gallardo RN

## 2019-04-08 ENCOUNTER — OFFICE VISIT (OUTPATIENT)
Dept: PRIMARY CARE CLINIC | Age: 49
End: 2019-04-08
Payer: MEDICARE

## 2019-04-08 VITALS
TEMPERATURE: 97.6 F | OXYGEN SATURATION: 100 % | HEART RATE: 91 BPM | RESPIRATION RATE: 20 BRPM | BODY MASS INDEX: 30.92 KG/M2 | SYSTOLIC BLOOD PRESSURE: 138 MMHG | HEIGHT: 68 IN | WEIGHT: 204 LBS | DIASTOLIC BLOOD PRESSURE: 94 MMHG

## 2019-04-08 DIAGNOSIS — M20.10 HALLUX VALGUS WITH BUNIONS, UNSPECIFIED LATERALITY: ICD-10-CM

## 2019-04-08 DIAGNOSIS — L03.119 CELLULITIS OF LOWER EXTREMITY, UNSPECIFIED LATERALITY: ICD-10-CM

## 2019-04-08 DIAGNOSIS — I15.9 SECONDARY HYPERTENSION: Chronic | ICD-10-CM

## 2019-04-08 DIAGNOSIS — M21.619 HALLUX VALGUS WITH BUNIONS, UNSPECIFIED LATERALITY: ICD-10-CM

## 2019-04-08 DIAGNOSIS — Z09 HOSPITAL DISCHARGE FOLLOW-UP: ICD-10-CM

## 2019-04-08 DIAGNOSIS — I73.9 PAD (PERIPHERAL ARTERY DISEASE) (HCC): Primary | ICD-10-CM

## 2019-04-08 PROCEDURE — 1111F DSCHRG MED/CURRENT MED MERGE: CPT | Performed by: NURSE PRACTITIONER

## 2019-04-08 PROCEDURE — 99495 TRANSJ CARE MGMT MOD F2F 14D: CPT | Performed by: NURSE PRACTITIONER

## 2019-04-08 RX ORDER — CLINDAMYCIN HYDROCHLORIDE 300 MG/1
300 CAPSULE ORAL 3 TIMES DAILY
COMMUNITY
End: 2019-04-23

## 2019-04-08 RX ORDER — CLOPIDOGREL BISULFATE 75 MG/1
75 TABLET ORAL DAILY
COMMUNITY
End: 2021-11-22 | Stop reason: SDUPTHER

## 2019-04-08 NOTE — PROGRESS NOTES
[address] [place] [address]  Phone:  [phone]  Fax:  [phone]       Post-Discharge Transitional Care Management Services      Mauricio Conte   YOB: 1970    Date of Visit:  4/8/2019      No Known Allergies  Outpatient Medications Marked as Taking for the 4/8/19 encounter (Office Visit) with CATHY Ku   Medication Sig Dispense Refill    clindamycin (CLEOCIN) 300 MG capsule Take 300 mg by mouth 3 times daily      clopidogrel (PLAVIX) 75 MG tablet Take 75 mg by mouth daily      LOW-DOSE ASPIRIN PO Take 81 mg by mouth      pregabalin (LYRICA) 50 MG capsule Take 1 capsule by mouth 3 times daily for 30 days. 90 capsule 3    simvastatin (ZOCOR) 40 MG tablet Take 1 tablet by mouth nightly 90 tablet 1         Vitals:    04/08/19 1501 04/08/19 1506   BP: (!) 154/90 (!) 138/94   Pulse: 91    Resp: 20    Temp: 97.6 °F (36.4 °C)    TempSrc: Temporal    SpO2: 100%    Weight: 204 lb (92.5 kg)    Height: 5' 8\" (1.727 m)      Body mass index is 31.02 kg/m². Wt Readings from Last 3 Encounters:   04/08/19 204 lb (92.5 kg)   03/28/19 210 lb (95.3 kg)   03/18/19 212 lb (96.2 kg)     BP Readings from Last 3 Encounters:   04/08/19 (!) 138/94   03/29/19 (!) 190/90   03/28/19 (!) 133/95        Patient was admitted to [place] [id number]    from   Date of Admission: 3/29/2019      to    Date of Discharge: 4/2/2019    for   Discharge Diagnoses: Active Hospital Problems   Diagnosis    **PAD (peripheral artery disease) (CMS/[facility])  Atherosclerosis    Cellulitis of left foot    Cellulitis of foot, left    Acute pain    Essential hypertension    Hyperlipidemia       HPI  Patient presents to the office to follow-up from recent hospitalization. He was admitted to [facility] on March 29, 2019 and discharged home on April 2, 2019. Patient was admitted with cellulitis of the left foot.  Patient also had a left external iliac artery occlusion and had an arterial stent placed during the hospitalization. He tolerated the procedure well was discharged home on antibiotic therapy, started on aspirin and Plavix and statin and is to follow-up with vascular in 2 weeks. Patient reports he is doing well now other than he is having severe pain in his left 5th toe. He has extremely long unkept toenails and there appears to be an area that is abraded between his 4th and 5th toe from his toenail being slightly embedded and rubbing the area. He also has possible fungal infection in his nails and has ongoing foot pain so I'm recommending a referral to podiatry. Inpatient course: Discharge summary reviewed- see chart. Current status:fair    Review of Systems:  A comprehensive review of systems was negative except for what was noted in the HPI. Physical Exam:  Physical Exam   Constitutional: He is oriented to person, place, and time. He appears well-developed and well-nourished. HENT:   Head: Normocephalic and atraumatic. Patient wears hearing aid does have a speech impediment, but seems to communicate well. Eyes: Pupils are equal, round, and reactive to light. EOM are normal.   Neck: Normal range of motion. Neck supple. No JVD present. No thyromegaly present. Cardiovascular: Normal rate, regular rhythm, normal heart sounds and intact distal pulses. Pulmonary/Chest: Effort normal and breath sounds normal. No respiratory distress. He has no wheezes. He has no rales. He exhibits no tenderness. Abdominal: Soft. Bowel sounds are normal. There is tenderness. Musculoskeletal: Normal range of motion. Lymphadenopathy:     He has no cervical adenopathy. Neurological: He is alert and oriented to person, place, and time. Skin: Skin is warm and dry. See foot exam below   Nursing note and vitals reviewed. Monofilament Exam Reveals:  Pulses: normal  Edema:Trace  Skin Lesions:1.  Hallux valgus deformity with bunion

## 2019-04-12 PROBLEM — M20.10 HALLUX VALGUS WITH BUNIONS: Status: ACTIVE | Noted: 2019-04-12

## 2019-04-12 PROBLEM — M21.619 HALLUX VALGUS WITH BUNIONS: Status: ACTIVE | Noted: 2019-04-12

## 2019-04-12 PROBLEM — I73.9 PAD (PERIPHERAL ARTERY DISEASE) (HCC): Status: ACTIVE | Noted: 2019-04-12

## 2019-04-17 ENCOUNTER — TELEPHONE (OUTPATIENT)
Dept: VASCULAR SURGERY | Facility: CLINIC | Age: 49
End: 2019-04-17

## 2019-04-17 NOTE — TELEPHONE ENCOUNTER
I contacted the patient to remind him of his appt in the office on Friday with Dr. Billings.  He was aware of this appt and confirmed.

## 2019-04-18 NOTE — PROGRESS NOTES
04/19/2019      Hernandez Marie MD  [address] [place] [address] [address] [place] [address]        Gilberto Armando  1970    Chief Complaint   Patient presents with   • Post-op     2 wk po angio. Pt states that his leg and foot feel a lot better.         Dear Hernandez Marie MD:    HPI     I had the pleasure of seeing your patient in the office today for follow up.  As you recall, the patient is a 48 y.o. male who we are currently following for peripheral arterial disease.  He recently presented to [facility] with pain and dusky discoloration of the left fourth and fifth toes as well as findings consistent with cellulitis with a leukocytosis to 16,000.  He was admitted to the medical service and started on IV antibiotics.  Noninvasive imaging showed likely left SFA stenosis as well as some tibial disease.  The waveforms are noted to be blunted in the common femoral and so there was concern for more proximal aortoiliac stenosis or occlusion and so CTA was done which did show a significant left external iliac artery stenosis as well. He went left lower extremity angiogram on 4/2 given findings on imaging of both external iliac and SFA stenoses.  We were able to successfully angioplasty the external iliac stenosis as well as perform angioplasty/stenting of the left SFA.  He tolerated the procedure well with no issues.  He returns today with no complaints.  The discoloration to the left fourth and fifth toes has improved and he reports no further issues with claudication since the procedure.  He denies any pain or swelling at the right groin access site.      Review of Systems   Constitutional: Negative.  Negative for activity change, appetite change, chills, diaphoresis, fatigue and fever.   HENT: Negative.  Negative for congestion, sneezing, sore throat and trouble swallowing.    Eyes: Negative.  Negative for visual disturbance.   Respiratory: Negative.  Negative for chest tightness and shortness of breath.    Cardiovascular:  "Negative.  Negative for chest pain, palpitations and leg swelling.   Gastrointestinal: Negative.  Negative for abdominal distention, abdominal pain, nausea and vomiting.   Endocrine: Negative.    Genitourinary: Negative.    Musculoskeletal: Negative.    Skin: Negative.    Allergic/Immunologic: Negative.    Neurological: Negative.    Hematological: Negative.    Psychiatric/Behavioral: Negative.        /76   Pulse 92   Ht 172.7 cm (68\")   Wt 95.3 kg (210 lb)   SpO2 99%   BMI 31.93 kg/m²   Physical Exam   Constitutional: He is oriented to person, place, and time. He appears well-developed and well-nourished.   HENT:   Head: Normocephalic and atraumatic.   Eyes: EOM are normal. Pupils are equal, round, and reactive to light.   Neck: Normal range of motion. Neck supple. No JVD present.   Cardiovascular: Normal rate, regular rhythm and intact distal pulses.   Pulses:       Carotid pulses are 2+ on the right side, and 2+ on the left side.       Radial pulses are 2+ on the right side, and 2+ on the left side.        Femoral pulses are 2+ on the right side, and 2+ on the left side.       Popliteal pulses are 2+ on the right side, and 2+ on the left side.        Dorsalis pedis pulses are 1+ on the right side, and 0 on the left side.        Posterior tibial pulses are 2+ on the right side, and 2+ on the left side.   He has a nonpalpable left DP pulse which is his baseline.  Angiogram showed what appears to be chronic occlusion of the left anterior tibial/DP.  Runoff into the foot was via the posterior tibial and collaterals from the peroneal.   Pulmonary/Chest: Effort normal. No respiratory distress.   Abdominal: Soft. He exhibits no distension and no mass. There is no tenderness.   Musculoskeletal: Normal range of motion. He exhibits no edema, tenderness or deformity.   Neurological: He is alert and oriented to person, place, and time. No sensory deficit. He exhibits normal muscle tone.   Skin:   Dusky " discoloration to the left fourth and fifth toes has resolved.  Capillary refill is now less than 2 seconds bilaterally in both feet are warm and well perfused appearing.   Psychiatric: He has a normal mood and affect. His behavior is normal. Judgment and thought content normal.   Vitals reviewed.      DIAGNOSTIC DATA:    Xr Foot 3+ View Left    Result Date: 3/29/2019  Narrative: XR FOOT 3+ VW LEFT- 3/29/2019 4:01 PM CDT  HISTORY: pain in toes with swelling  COMPARISON: None  FINDINGS: Frontal, lateral and oblique radiographs of the left foot were provided for review.  Valgus deformity is seen in the first through fourth metatarsophalangeal joints. A bunion is present on the first metatarsophalangeal articulation. No fractures are seen. The soft tissues are normal in appearance.       Impression: 1. Hallux valgus deformity with bunion formation. 2. Valgus deformity of the second through fourth MTP joints. This report was finalized on 03/29/2019 16:21 by Dr. Farzad Tafoya MD.    Ir Angiogram Extremity Bilateral    Result Date: 4/3/2019  Narrative: Performed by Dr. Billings. Please see procedure note. This report was finalized on 04/03/2019 10:42 by Dr. Luis Billings MD.    Us Arterial Doppler Lower Extremity Left    Result Date: 3/31/2019  Narrative: History: Ischemia to the toes of the left foot  Comments: Left lower extremity arterial duplex was performed using gray scale imaging as well as color flow Doppler.  On the left the common femoral artery peak systolic velocity is 138 cm/s, the proximal deep femoral artery peak systolic velocity is 79.4 cm/s, proximal superficial femoral artery peak systolic velocity is 63.9 cm/s, the mid superficial femoral artery peak systolic velocity is 280 cm/s, distal superficial femoral artery peak systolic velocity is 31.0 cm/s, the proximal popliteal artery peak systolic velocity is 32.2 cm/s, the distal posterior tibial artery peak systolic velocity is 31.8cm/s, and the proximal  anterior tibial artery peak systolic velocity is 8.7 cm/s. All arteries appear patent. There appears to be high-grade stenosis in the mid to distal SFA. There also appears to be diffuse atherosclerotic disease of the anterior tibial artery with minimal flow noted distally. Finally the common femoral artery and all waveforms distally appear monophasic suggesting more proximal aortoiliac stenosis or occlusion.      Impression: Impression: 1. Patent arteries of the left lower extremity with evidence of significant stenosis in the mid to distal SFA. There also appears to be diffuse anterior tibial artery disease. Given the fact that all waveforms from the common femoral distally are monophasic. There may be a more proximal aortoiliac stenosis or occlusion.   This report was finalized on 03/31/2019 10:42 by Dr. Luis Billings MD.    Ct Angio Abdominal Aorta Bilateral Iliofem Runoff With & Without Contrast    Result Date: 3/31/2019  Narrative: EXAMINATION: CT ANGIO ABDOMINAL AORTA BILAT ILIOFEM RUNOFF W WO CONTRAST- 3/31/2019 9:03 AM CDT  HISTORY: Claudication vascular, assess for revascularization; L03.116-Cellulitis of left lower limb  DOSE: 687 mGycm (Automatic exposure control technique was implemented in an effort to keep the radiation dose as low as possible without compromising image quality)  REPORT: Spiral CT of the abdomen and pelvis was performed after administration of intravenous contrast from the lung bases through the feet using CTA protocol. Reconstructed runoff, coronal, 3-D and sagittal images are also reviewed.  COMPARISON: None.  Review of lung windows demonstrates mild to moderate bibasilar atelectasis. There is decreased attenuation of the liver compatible with fatty infiltration. The gallbladder is unremarkable. The spleen is homogeneous and within normal limits. The stomach is decompressed. The pancreas and adrenal glands are unremarkable. The kidneys enhance normally, there is mild atrophy of the  right kidney. No renal mass or hydronephrosis is identified. Bowel loops are normal in caliber and appear unremarkable. No free fluid or free air is identified. The appendix is normal. There are fat-containing hernias, the largest cyst on the left and measures approximately 3.3 cm. There is evidence of previous inguinal hernia repair. The bladder is within normal limits.  CTA images demonstrate patency of the abdominal aorta, with moderate mixed calcified and noncalcified plaque, mostly distally. There are similar atherosclerotic changes of the iliac arteries. No aortic aneurysm is identified. There is normal patency of the celiac artery and its branches as well as the SMA and LUPE. Both renal arteries are patent, on the left, there is spinal stenosis of the proximal left renal artery which appears to be less than 50%. No right renal artery stenosis is identified. Both iliac arteries are patent, the external and internal iliac arteries are patent, with moderate eccentric plaque disease demonstrated. There is focal stenosis of the left external iliac artery estimated at 75%, visualized on image 200 of series 6.  Runoff images demonstrate somewhat small caliber of the common femoral arteries. The right SFA and profunda artery are patent. Mild plaque deposition is noted within the distal right SFA at the adductor canal. No flow-limiting stenosis or occlusion seen involving the SFA. The right popliteal artery is patent with minimal calcified plaque. Calcified plaque is noted within the proximal trifurcation vessels, there appears to be 2 vessel runoff to the right foot, with segments of the anterior tibial artery not well visualized at the distal leg above the ankle. The posterior tibial artery appears to be the dominant vessel.  On the left, the SFA is patent, the somewhat small in caliber diffusely, the profunda artery is also patent. The left popliteal artery is patent with minimal plaque. There appears to be  two-vessel runoff to the left foot, the posterior tibial and peroneal artery, with poor visualization of a long segment of the anterior tibial artery above the ankle, which may be occluded.      Impression: 1. Moderate atherosclerotic changes of the abdominal aorta, iliac and runoff vessels, there is focal stenosis in the left external iliac artery, approximately 75%, without occlusion. Normal patency of the mesenteric arteries is demonstrated. There is mild left-sided renal artery stenosis, not flow-limiting. 2. There are 2 primary vessels supplying arterial flow to both feet, the posterior tibial and peroneal arteries, with extensive atherosclerosis and poor visualization of segments of both distal anterior tibial arteries, worse on the left. 3. Fatty infiltration of the liver. Mild atrophy of the right kidney. 4. Small bilateral fat-containing inguinal hernias.   This report was finalized on 03/31/2019 09:23 by Dr. Walter Beckham MD.    Xr Chest 1 View    Result Date: 4/2/2019  Narrative: XR CHEST 1 VW- 4/2/2019 3:52 AM CDT  HISTORY: Pre-op Surgery; L03.116-Cellulitis of left lower limb; I73.9-Peripheral vascular disease, unspecified   COMPARISON: 08/17/2019.  FINDINGS: 2 small foci of consolidation are seen in the left base. Lungs are otherwise clear. No pleural effusion. The cardiomediastinal silhouette and pulmonary vascularity are within normal limits.  The osseous structures and surrounding soft tissues demonstrate no acute abnormality.      Impression: 1. Adjacent small foci of consolidation in the left base, possible small inflammatory infiltrates or perhaps scarring.   This report was finalized on 04/02/2019 08:09 by Dr Devin Agrawal, .    Mri Foot Left With & Without Contrast    Result Date: 3/29/2019  Narrative: MRI FOOT LEFT W WO CONTRAST- 3/29/2019 7:17 PM CDT  REASON FOR EXAM: Foot pain, chronic, etiol unknown, first study; L03.116-Cellulitis of left lower limb  COMPARISON: None  TECHNIQUE:  Multiplanar, multisequential MR images of the left foot were obtained with and without contrast.  FINDINGS:  No acute fracture or traumatic subluxation. No pathologic marrow infiltrate. Degenerative interphalangeal joint flexion deformities. First metatarsophalangeal joint osteoarthritis with a moderate valgus deformity. Minimal subchondral cystic change at the head of the first metatarsal. Articulations in the midfoot and hindfoot are well-maintained. Tendinous structures of the foot are intact. Intrinsic foot musculature has normal signal. No muscle atrophy or myositis. No tenosynovitis. There is noticeable soft tissue swelling lateral to the ankle as well as along the included portion of the lower leg. This is predominantly superficial, and I see no evidence of concerning inflammatory change at the ankle or subtalar joint. There is no joint effusion or evidence of synovitis. No evidence for soft tissue abscess.      Impression: 1. Nonspecific subcutaneous edema along the lateral aspect of the lower leg and ankle. No evidence for abscess collection. 2. No effusion or synovitis at the ankle or subtalar joints. No myositis or tenosynovitis. 3. Moderate first metatarsophalangeal joint osteoarthritis with associated hallux valgus deformity.    This report was finalized on 03/29/2019 21:36 by Dr Devin Agrawal, .    Us Ankle / Brachial Indices Extremity Complete    Result Date: 3/31/2019  Narrative:  History: PAD  Comments: Bilateral lower extremity arterial with multi-level pulse volume recordings and segmental pressures were performed at rest and stress.  The right ankle/brachial index is 1.14. Doppler waveforms are biphasic and PVR waveforms at the ankle are mildly dampened. Toe/brachial index is 1.06. First digit PVR waveform is also moderately dampened.These findings are consistent with no significant arterial insufficiency of the right lower extremity at rest.  Post exercise CATALINA is 1.05.  The left ankle/brachial  index is 1.05. Doppler waveform at the posterior tibial is biphasic however at the dorsalis pedis is monophasic and dampened. Toe/brachial index could not be obtained. First digit. Waveform is moderately dampened. These findings are consistent with mild arterial insufficiency of the left lower extremity at rest.    Post exercise CATALINA is 1.08.      Impression: Impression: 1. No significant arterial insufficiency of the right lower extremity at rest. 2. Mild arterial insufficiency of the left lower extremity at rest.   This report was finalized on 03/31/2019 10:44 by Dr. Luis Billings MD.    Fl C Arm During Surgery    Result Date: 4/3/2019  Narrative: Performed by Dr. Billings. Please see procedure note. This report was finalized on 04/03/2019 10:42 by Dr. Luis Billings MD.      Patient Active Problem List   Diagnosis   • Cellulitis of left foot   • PAD (peripheral artery disease) (CMS/[facility])   • Acute pain   • Essential hypertension   • Hyperlipidemia         ICD-10-CM ICD-9-CM   1. PAD (peripheral artery disease) (CMS/[facility]) I73.9 443.9   2. Atherosclerosis of native arteries of the extremities with ulceration (CMS/[facility]) I70.25 440.23     707.9           PLAN: After thoroughly evaluating Gilberto Roberts, I believe the best course of action is to remain conservative from a vascular standpoint.  He has done well since his left lower extremity angiogram with angioplasty of the external iliac as well as angioplasty and stent of the left SFA.  As mentioned above at the completion of the procedure he did have runoff to the foot via a posterior tibial and peroneal however the AT was chronically occluded.  Overall his foot looks good with resolution of the duskiness to the left fourth and fifth toes and he now denies any further claudication type pain.  He continues to follow with podiatry regarding his toes and will see them likely in 3 months.  I will plan to see him back here in the office in 3 months for repeat CATALINA for  continued surveillance..  The patient is to continue taking their medications as previously discussed.   I did discuss vascular risk factors as they pertain to the progression of vascular disease including controlling hyperlipidemia. Patient's Body mass index is 31.93 kg/m². BMI is above normal parameters. Recommendations include: educational material. I did  extensively on smoking cessation, and the patient was advised of the continued risks of smoking.  I provided over 10 minutes counseling on this matter. This was all discussed in full with complete understanding.  Thank you for allowing me to participate in the care of your patient.  Please do not hesitate to call with any questions or concerns.  We will keep you aware of any further encounters with Gilberto Roberts.      Sincerely Yours,      Luis Billings MD

## 2019-04-19 ENCOUNTER — OFFICE VISIT (OUTPATIENT)
Dept: VASCULAR SURGERY | Facility: CLINIC | Age: 49
End: 2019-04-19

## 2019-04-19 VITALS
DIASTOLIC BLOOD PRESSURE: 76 MMHG | WEIGHT: 210 LBS | BODY MASS INDEX: 31.83 KG/M2 | HEIGHT: 68 IN | OXYGEN SATURATION: 99 % | HEART RATE: 92 BPM | SYSTOLIC BLOOD PRESSURE: 124 MMHG

## 2019-04-19 DIAGNOSIS — I73.9 PAD (PERIPHERAL ARTERY DISEASE) (HCC): Primary | ICD-10-CM

## 2019-04-19 DIAGNOSIS — I70.25 ATHEROSCLEROSIS OF NATIVE ARTERIES OF THE EXTREMITIES WITH ULCERATION (HCC): ICD-10-CM

## 2019-04-19 PROCEDURE — 99214 OFFICE O/P EST MOD 30 MIN: CPT | Performed by: SURGERY

## 2019-04-19 NOTE — PATIENT INSTRUCTIONS
For more information:    Quit Now [place]  1-800-QUIT-NOW  https://[place].quitlogix.org/en-[place]/  Steps to Quit Smoking  Smoking tobacco can be harmful to your health and can affect almost every organ in your body. Smoking puts you, and those around you, at risk for developing many serious chronic diseases. Quitting smoking is difficult, but it is one of the best things that you can do for your health. It is never too late to quit.  What are the benefits of quitting smoking?  When you quit smoking, you lower your risk of developing serious diseases and conditions, such as:  · Lung cancer or lung disease, such as COPD.  · Heart disease.  · Stroke.  · Heart attack.  · Infertility.  · Osteoporosis and bone fractures.  Additionally, symptoms such as coughing, wheezing, and shortness of breath may get better when you quit. You may also find that you get sick less often because your body is stronger at fighting off colds and infections. If you are pregnant, quitting smoking can help to reduce your chances of having a baby of low birth weight.  How do I get ready to quit?  When you decide to quit smoking, create a plan to make sure that you are successful. Before you quit:  · Pick a date to quit. Set a date within the next two weeks to give you time to prepare.  · Write down the reasons why you are quitting. Keep this list in places where you will see it often, such as on your bathroom mirror or in your car or wallet.  · Identify the people, places, things, and activities that make you want to smoke (triggers) and avoid them. Make sure to take these actions:  ¨ Throw away all cigarettes at home, at work, and in your car.  ¨ Throw away smoking accessories, such as ashtrays and lighters.  ¨ Clean your car and make sure to empty the ashtray.  ¨ Clean your home, including curtains and carpets.  · Tell your family, friends, and coworkers that you are quitting. Support from your loved ones can make quitting easier.  · Talk with  your health care provider about your options for quitting smoking.  · Find out what treatment options are covered by your health insurance.  What strategies can I use to quit smoking?  Talk with your healthcare provider about different strategies to quit smoking. Some strategies include:  · Quitting smoking altogether instead of gradually lessening how much you smoke over a period of time. Research shows that quitting “cold turkey” is more successful than gradually quitting.  · Attending in-person counseling to help you build problem-solving skills. You are more likely to have success in quitting if you attend several counseling sessions. Even short sessions of 10 minutes can be effective.  · Finding resources and support systems that can help you to quit smoking and remain smoke-free after you quit. These resources are most helpful when you use them often. They can include:  ¨ Online chats with a counselor.  ¨ Telephone quitlines.  ¨ Printed self-help materials.  ¨ Support groups or group counseling.  ¨ Text messaging programs.  ¨ Mobile phone applications.  · Taking medicines to help you quit smoking. (If you are pregnant or breastfeeding, talk with your health care provider first.) Some medicines contain nicotine and some do not. Both types of medicines help with cravings, but the medicines that include nicotine help to relieve withdrawal symptoms. Your health care provider may recommend:  ¨ Nicotine patches, gum, or lozenges.  ¨ Nicotine inhalers or sprays.  ¨ Non-nicotine medicine that is taken by mouth.  Talk with your health care provider about combining strategies, such as taking medicines while you are also receiving in-person counseling. Using these two strategies together makes you more likely to succeed in quitting than if you used either strategy on its own.  If you are pregnant or breastfeeding, talk with your health care provider about finding counseling or other support strategies to quit smoking. Do  not take medicine to help you quit smoking unless told to do so by your health care provider.  What things can I do to make it easier to quit?  Quitting smoking might feel overwhelming at first, but there is a lot that you can do to make it easier. Take these important actions:  · Reach out to your family and friends and ask that they support and encourage you during this time. Call telephone quitlines, reach out to support groups, or work with a counselor for support.  · Ask people who smoke to avoid smoking around you.  · Avoid places that trigger you to smoke, such as bars, parties, or smoke-break areas at work.  · Spend time around people who do not smoke.  · Lessen stress in your life, because stress can be a smoking trigger for some people. To lessen stress, try:  ¨ Exercising regularly.  ¨ Deep-breathing exercises.  ¨ Yoga.  ¨ Meditating.  ¨ Performing a body scan. This involves closing your eyes, scanning your body from head to toe, and noticing which parts of your body are particularly tense. Purposefully relax the muscles in those areas.  · Download or purchase mobile phone or tablet apps (applications) that can help you stick to your quit plan by providing reminders, tips, and encouragement. There are many free apps, such as QuitGuide from the CDC (Centers for Disease Control and Prevention). You can find other support for quitting smoking (smoking cessation) through smokefree.gov and other websites.  How will I feel when I quit smoking?  Within the first 24 hours of quitting smoking, you may start to feel some withdrawal symptoms. These symptoms are usually most noticeable 2-3 days after quitting, but they usually do not last beyond 2-3 weeks. Changes or symptoms that you might experience include:  · Mood swings.  · Restlessness, anxiety, or irritation.  · Difficulty concentrating.  · Dizziness.  · Strong cravings for sugary foods in addition to nicotine.  · Mild weight  gain.  · Constipation.  · Nausea.  · Coughing or a sore throat.  · Changes in how your medicines work in your body.  · A depressed mood.  · Difficulty sleeping (insomnia).  After the first 2-3 weeks of quitting, you may start to notice more positive results, such as:  · Improved sense of smell and taste.  · Decreased coughing and sore throat.  · Slower heart rate.  · Lower blood pressure.  · Clearer skin.  · The ability to breathe more easily.  · Fewer sick days.  Quitting smoking is very challenging for most people. Do not get discouraged if you are not successful the first time. Some people need to make many attempts to quit before they achieve long-term success. Do your best to stick to your quit plan, and talk with your health care provider if you have any questions or concerns.  This information is not intended to replace advice given to you by your health care provider. Make sure you discuss any questions you have with your health care provider.  Document Released: 12/12/2002 Document Revised: 08/15/2017 Document Reviewed: 05/03/2016  [employer] Interactive Patient Education © 2017 [employer]    BMI for Adults  Body mass index (BMI) is a number that is calculated from a person's weight and height. In most adults, the number is used to find how much of an adult's weight is made up of fat. BMI is not as accurate as a direct measure of body fat.  How is BMI calculated?  BMI is calculated by dividing weight in kilograms by height in meters squared. It can also be calculated by dividing weight in pounds by height in inches squared, then multiplying the resulting number by 703. Charts are available to help you find your BMI quickly and easily without doing this calculation.  How is BMI interpreted?  Health care professionals use BMI charts to identify whether an adult is underweight, at a normal weight, or overweight based on the following guidelines:  · Underweight: BMI less than 18.5.  · Normal weight: BMI  between 18.5 and 24.9.  · Overweight: BMI between 25 and 29.9.  · Obese: BMI of 30 and above.    BMI is usually interpreted the same for males and females.  Weight includes both fat and muscle, so someone with a muscular build, such as an athlete, may have a BMI that is higher than 24.9. In cases like these, BMI may not accurately depict body fat. To determine if excess body fat is the cause of a BMI of 25 or higher, further assessments may need to be done by a health care provider.  Why is BMI a useful tool?  BMI is used to identify a possible weight problem that may be related to a medical problem or may increase the risk for medical problems. BMI can also be used to promote changes to reach a healthy weight.  This information is not intended to replace advice given to you by your health care provider. Make sure you discuss any questions you have with your health care provider.  Document Released: 08/29/2005 Document Revised: 04/27/2017 Document Reviewed: 05/15/2015  [employer] Interactive Patient Education © 2018 [employer]

## 2019-04-23 ENCOUNTER — OFFICE VISIT (OUTPATIENT)
Dept: PRIMARY CARE CLINIC | Age: 49
End: 2019-04-23
Payer: MEDICARE

## 2019-04-23 VITALS
OXYGEN SATURATION: 98 % | WEIGHT: 206 LBS | SYSTOLIC BLOOD PRESSURE: 134 MMHG | BODY MASS INDEX: 31.22 KG/M2 | HEIGHT: 68 IN | TEMPERATURE: 97.4 F | DIASTOLIC BLOOD PRESSURE: 82 MMHG | HEART RATE: 78 BPM

## 2019-04-23 DIAGNOSIS — I73.9 PAD (PERIPHERAL ARTERY DISEASE) (HCC): Primary | ICD-10-CM

## 2019-04-23 PROCEDURE — G8417 CALC BMI ABV UP PARAM F/U: HCPCS | Performed by: FAMILY MEDICINE

## 2019-04-23 PROCEDURE — G8427 DOCREV CUR MEDS BY ELIG CLIN: HCPCS | Performed by: FAMILY MEDICINE

## 2019-04-23 PROCEDURE — 99213 OFFICE O/P EST LOW 20 MIN: CPT | Performed by: FAMILY MEDICINE

## 2019-04-23 PROCEDURE — 4004F PT TOBACCO SCREEN RCVD TLK: CPT | Performed by: FAMILY MEDICINE

## 2019-04-23 PROCEDURE — 3017F COLORECTAL CA SCREEN DOC REV: CPT | Performed by: FAMILY MEDICINE

## 2019-04-23 ASSESSMENT — ENCOUNTER SYMPTOMS
ABDOMINAL PAIN: 0
VOMITING: 0
DIARRHEA: 0
WHEEZING: 0
CHEST TIGHTNESS: 0
SHORTNESS OF BREATH: 0
CONSTIPATION: 0
COUGH: 0
NAUSEA: 0
BACK PAIN: 0

## 2019-04-23 NOTE — PROGRESS NOTES
Anthony Skaggs is a 46 y.o. male who presents today for   Chief Complaint   Patient presents with    Pain     polyneuropathy lyrica f/u        HPI  Patient is here for f/u polyneuropathy. Had recent stenting of L LE and doing better and feeling better. Has been taking off of the lyrica and mobic and feeling well. Circulation and color is improving. No change in PMH, family, social, or surgical history unless mentioned above. Review of Systems   Constitutional: Negative for chills and fever. Respiratory: Negative for cough, chest tightness, shortness of breath and wheezing. Cardiovascular: Negative for chest pain, palpitations and leg swelling. Gastrointestinal: Negative for abdominal pain, constipation, diarrhea, nausea and vomiting. Genitourinary: Negative for difficulty urinating, dysuria and frequency. Musculoskeletal: Positive for gait problem. Negative for back pain. Past Medical History:   Diagnosis Date    Hyperlipidemia     Nerve damage     Bilateral ears, hard of hearing       Current Outpatient Medications   Medication Sig Dispense Refill    clopidogrel (PLAVIX) 75 MG tablet Take 75 mg by mouth daily      LOW-DOSE ASPIRIN PO Take 81 mg by mouth      simvastatin (ZOCOR) 40 MG tablet Take 1 tablet by mouth nightly 90 tablet 1     No current facility-administered medications for this visit.         No Known Allergies    Past Surgical History:   Procedure Laterality Date    ARTERIAL BYPASS SURGRY      L leg 2.5 weeks ago     HERNIA REPAIR      X2    OTHER SURGICAL HISTORY      infected gland       Social History     Tobacco Use    Smoking status: Former Smoker     Packs/day: 1.00     Years: 21.00     Pack years: 21.00     Last attempt to quit: 1997     Years since quittin.7    Smokeless tobacco: Current User   Substance Use Topics    Alcohol use: Never     Frequency: Never     Comment: OCC    Drug use: No       Family History   Problem Relation Age of Patient given educational handouts and has had all questions answered. Patient voices understanding and agrees to plans along with risks and benefits of plan. Patient isinstructed to continue prior meds, diet, and exercise plans unless instructed otherwise. Patient agrees to follow up as instructed and sooner if needed. Patient agrees to go to ER if condition becomes emergent. Notesmay be completed with dictation device and spelling errors may occur. Return in about 6 months (around 10/23/2019) for f/u PAD.

## 2019-05-02 ENCOUNTER — TELEPHONE (OUTPATIENT)
Dept: PRIMARY CARE CLINIC | Age: 49
End: 2019-05-02

## 2019-06-18 ENCOUNTER — TELEPHONE (OUTPATIENT)
Dept: VASCULAR SURGERY | Facility: CLINIC | Age: 49
End: 2019-06-18

## 2019-06-18 NOTE — TELEPHONE ENCOUNTER
Left a message for the patient to call back to let him know that we had to change his appts for testing and to see Dr. Billings on 7/19/19 due to Dr. Billings being in surgery that morning.

## 2019-07-17 ENCOUNTER — TELEPHONE (OUTPATIENT)
Dept: VASCULAR SURGERY | Facility: CLINIC | Age: 49
End: 2019-07-17

## 2019-07-17 NOTE — TELEPHONE ENCOUNTER
Left a message for the patient to call back reminding him of his appts for testing and to be seen at Dr. Billings on Friday.

## 2019-07-17 NOTE — TELEPHONE ENCOUNTER
I called the patient to remind him of his appt on Friday with Dr. Billings.  He said that he needed an appt for a Monday or Wednesday because those were the days that he is off work.     I called down and rescheduled his testing for 7/24.  I called the patient and let him know that I was able to change his appts and have him the dates and times.  He said that this would work for him.

## 2019-07-19 ENCOUNTER — APPOINTMENT (OUTPATIENT)
Dept: ULTRASOUND IMAGING | Facility: HOSPITAL | Age: 49
End: 2019-07-19

## 2019-07-24 ENCOUNTER — OFFICE VISIT (OUTPATIENT)
Dept: VASCULAR SURGERY | Facility: CLINIC | Age: 49
End: 2019-07-24

## 2019-07-24 ENCOUNTER — HOSPITAL ENCOUNTER (OUTPATIENT)
Dept: ULTRASOUND IMAGING | Facility: HOSPITAL | Age: 49
Discharge: HOME | End: 2019-07-24
Admitting: SURGERY

## 2019-07-24 VITALS
DIASTOLIC BLOOD PRESSURE: 80 MMHG | BODY MASS INDEX: 32.43 KG/M2 | HEIGHT: 68 IN | WEIGHT: 214 LBS | OXYGEN SATURATION: 96 % | HEART RATE: 97 BPM | SYSTOLIC BLOOD PRESSURE: 136 MMHG

## 2019-07-24 DIAGNOSIS — E78.5 HYPERLIPIDEMIA, UNSPECIFIED HYPERLIPIDEMIA TYPE: ICD-10-CM

## 2019-07-24 DIAGNOSIS — I65.23 CAROTID STENOSIS, ASYMPTOMATIC, BILATERAL: ICD-10-CM

## 2019-07-24 DIAGNOSIS — I70.25 ATHEROSCLEROSIS OF NATIVE ARTERIES OF THE EXTREMITIES WITH ULCERATION (HCC): ICD-10-CM

## 2019-07-24 DIAGNOSIS — I73.9 PAD (PERIPHERAL ARTERY DISEASE) (HCC): Primary | ICD-10-CM

## 2019-07-24 DIAGNOSIS — I10 ESSENTIAL HYPERTENSION: ICD-10-CM

## 2019-07-24 PROCEDURE — 93924 LWR XTR VASC STDY BILAT: CPT | Performed by: SURGERY

## 2019-07-24 PROCEDURE — 99407 BEHAV CHNG SMOKING > 10 MIN: CPT | Performed by: SURGERY

## 2019-07-24 PROCEDURE — 99214 OFFICE O/P EST MOD 30 MIN: CPT | Performed by: SURGERY

## 2019-07-24 PROCEDURE — 93923 UPR/LXTR ART STDY 3+ LVLS: CPT

## 2019-07-24 NOTE — PATIENT INSTRUCTIONS
BMI for Adults  Body mass index (BMI) is a number that is calculated from a person's weight and height. In most adults, the number is used to find how much of an adult's weight is made up of fat. BMI is not as accurate as a direct measure of body fat.  How is BMI calculated?  BMI is calculated by dividing weight in kilograms by height in meters squared. It can also be calculated by dividing weight in pounds by height in inches squared, then multiplying the resulting number by 703. Charts are available to help you find your BMI quickly and easily without doing this calculation.  How is BMI interpreted?  Health care professionals use BMI charts to identify whether an adult is underweight, at a normal weight, or overweight based on the following guidelines:  · Underweight: BMI less than 18.5.  · Normal weight: BMI between 18.5 and 24.9.  · Overweight: BMI between 25 and 29.9.  · Obese: BMI of 30 and above.    BMI is usually interpreted the same for males and females.  Weight includes both fat and muscle, so someone with a muscular build, such as an athlete, may have a BMI that is higher than 24.9. In cases like these, BMI may not accurately depict body fat. To determine if excess body fat is the cause of a BMI of 25 or higher, further assessments may need to be done by a health care provider.  Why is BMI a useful tool?  BMI is used to identify a possible weight problem that may be related to a medical problem or may increase the risk for medical problems. BMI can also be used to promote changes to reach a healthy weight.  This information is not intended to replace advice given to you by your health care provider. Make sure you discuss any questions you have with your health care provider.  Document Released: 08/29/2005 Document Revised: 04/27/2017 Document Reviewed: 05/15/2015  [employer] Interactive Patient Education © 2018 [employer]        For more information:    Quit Now  [place]  1-800-QUIT-NOW  https://[place].quitlogix.org/en-[place]/  Steps to Quit Smoking  Smoking tobacco can be harmful to your health and can affect almost every organ in your body. Smoking puts you, and those around you, at risk for developing many serious chronic diseases. Quitting smoking is difficult, but it is one of the best things that you can do for your health. It is never too late to quit.  What are the benefits of quitting smoking?  When you quit smoking, you lower your risk of developing serious diseases and conditions, such as:  · Lung cancer or lung disease, such as COPD.  · Heart disease.  · Stroke.  · Heart attack.  · Infertility.  · Osteoporosis and bone fractures.  Additionally, symptoms such as coughing, wheezing, and shortness of breath may get better when you quit. You may also find that you get sick less often because your body is stronger at fighting off colds and infections. If you are pregnant, quitting smoking can help to reduce your chances of having a baby of low birth weight.  How do I get ready to quit?  When you decide to quit smoking, create a plan to make sure that you are successful. Before you quit:  · Pick a date to quit. Set a date within the next two weeks to give you time to prepare.  · Write down the reasons why you are quitting. Keep this list in places where you will see it often, such as on your bathroom mirror or in your car or wallet.  · Identify the people, places, things, and activities that make you want to smoke (triggers) and avoid them. Make sure to take these actions:  ¨ Throw away all cigarettes at home, at work, and in your car.  ¨ Throw away smoking accessories, such as ashtrays and lighters.  ¨ Clean your car and make sure to empty the ashtray.  ¨ Clean your home, including curtains and carpets.  · Tell your family, friends, and coworkers that you are quitting. Support from your loved ones can make quitting easier.  · Talk with your health care provider about  your options for quitting smoking.  · Find out what treatment options are covered by your health insurance.  What strategies can I use to quit smoking?  Talk with your healthcare provider about different strategies to quit smoking. Some strategies include:  · Quitting smoking altogether instead of gradually lessening how much you smoke over a period of time. Research shows that quitting “cold turkey” is more successful than gradually quitting.  · Attending in-person counseling to help you build problem-solving skills. You are more likely to have success in quitting if you attend several counseling sessions. Even short sessions of 10 minutes can be effective.  · Finding resources and support systems that can help you to quit smoking and remain smoke-free after you quit. These resources are most helpful when you use them often. They can include:  ¨ Online chats with a counselor.  ¨ Telephone quitlines.  ¨ Printed self-help materials.  ¨ Support groups or group counseling.  ¨ Text messaging programs.  ¨ Mobile phone applications.  · Taking medicines to help you quit smoking. (If you are pregnant or breastfeeding, talk with your health care provider first.) Some medicines contain nicotine and some do not. Both types of medicines help with cravings, but the medicines that include nicotine help to relieve withdrawal symptoms. Your health care provider may recommend:  ¨ Nicotine patches, gum, or lozenges.  ¨ Nicotine inhalers or sprays.  ¨ Non-nicotine medicine that is taken by mouth.  Talk with your health care provider about combining strategies, such as taking medicines while you are also receiving in-person counseling. Using these two strategies together makes you more likely to succeed in quitting than if you used either strategy on its own.  If you are pregnant or breastfeeding, talk with your health care provider about finding counseling or other support strategies to quit smoking. Do not take medicine to help you  quit smoking unless told to do so by your health care provider.  What things can I do to make it easier to quit?  Quitting smoking might feel overwhelming at first, but there is a lot that you can do to make it easier. Take these important actions:  · Reach out to your family and friends and ask that they support and encourage you during this time. Call telephone quitlines, reach out to support groups, or work with a counselor for support.  · Ask people who smoke to avoid smoking around you.  · Avoid places that trigger you to smoke, such as bars, parties, or smoke-break areas at work.  · Spend time around people who do not smoke.  · Lessen stress in your life, because stress can be a smoking trigger for some people. To lessen stress, try:  ¨ Exercising regularly.  ¨ Deep-breathing exercises.  ¨ Yoga.  ¨ Meditating.  ¨ Performing a body scan. This involves closing your eyes, scanning your body from head to toe, and noticing which parts of your body are particularly tense. Purposefully relax the muscles in those areas.  · Download or purchase mobile phone or tablet apps (applications) that can help you stick to your quit plan by providing reminders, tips, and encouragement. There are many free apps, such as QuitGuide from the CDC (Centers for Disease Control and Prevention). You can find other support for quitting smoking (smoking cessation) through smokefree.gov and other websites.  How will I feel when I quit smoking?  Within the first 24 hours of quitting smoking, you may start to feel some withdrawal symptoms. These symptoms are usually most noticeable 2-3 days after quitting, but they usually do not last beyond 2-3 weeks. Changes or symptoms that you might experience include:  · Mood swings.  · Restlessness, anxiety, or irritation.  · Difficulty concentrating.  · Dizziness.  · Strong cravings for sugary foods in addition to nicotine.  · Mild weight gain.  · Constipation.  · Nausea.  · Coughing or a sore  throat.  · Changes in how your medicines work in your body.  · A depressed mood.  · Difficulty sleeping (insomnia).  After the first 2-3 weeks of quitting, you may start to notice more positive results, such as:  · Improved sense of smell and taste.  · Decreased coughing and sore throat.  · Slower heart rate.  · Lower blood pressure.  · Clearer skin.  · The ability to breathe more easily.  · Fewer sick days.  Quitting smoking is very challenging for most people. Do not get discouraged if you are not successful the first time. Some people need to make many attempts to quit before they achieve long-term success. Do your best to stick to your quit plan, and talk with your health care provider if you have any questions or concerns.  This information is not intended to replace advice given to you by your health care provider. Make sure you discuss any questions you have with your health care provider.  Document Released: 12/12/2002 Document Revised: 08/15/2017 Document Reviewed: 05/03/2016  [employer] Interactive Patient Education © 2017 [employer]

## 2019-07-24 NOTE — PROGRESS NOTES
07/24/2019      Hernandez Marie MD  [address] [place] [address] DR [place] [address]        Gilberto LEYVA Armando  1970    Chief Complaint   Patient presents with   • Follow-up     3 month f/u with ABIs.         Dear Hernandez Marie MD:    HPI     I had the pleasure of seeing your patient in the office today for follow up.  As you recall, the patient is a 49 y.o. male who we are currently following for left lower extremity ischemia.  He had previously been seen here when he was admitted to [facility] with ischemic discoloration of toes to the left foot.  He ultimately underwent angiogram with angioplasty of the left external iliac artery as well as angioplasty and stenting of the left SFA.  He was seen here postoperatively back in April and was doing very well with resolution of the symptoms to his left foot and palpable posterior tibial pulse.  Today he returns after having undergone surveillance CATALINA/PVR which I did review.  It shows normal CATALINA values bilaterally with no evidence of arterial insufficiency.  He notes he is able to walk without issue and has no complaints regarding his lower extremities.  He continues taking 81 mg aspirin daily as well as a statin.  He does continue to smoke.  He is otherwise without complaint today.      Review of Systems   Constitutional: Negative.  Negative for activity change, appetite change, chills, diaphoresis, fatigue and fever.   HENT: Negative.  Negative for congestion, sneezing, sore throat and trouble swallowing.    Eyes: Negative.  Negative for visual disturbance.   Respiratory: Negative.  Negative for chest tightness and shortness of breath.    Cardiovascular: Negative.  Negative for chest pain, palpitations and leg swelling.   Gastrointestinal: Negative.  Negative for abdominal distention, abdominal pain, nausea and vomiting.   Endocrine: Negative.    Genitourinary: Negative.    Musculoskeletal: Negative.    Skin: Negative.    Allergic/Immunologic: Negative.   "  Neurological: Negative.    Hematological: Negative.    Psychiatric/Behavioral: Negative.        /80   Pulse 97   Ht 172.7 cm (68\")   Wt 97.1 kg (214 lb)   SpO2 96%   BMI 32.54 kg/m²   Physical Exam   Constitutional: He is oriented to person, place, and time. He appears well-developed and well-nourished.   HENT:   Head: Normocephalic and atraumatic.   Eyes: EOM are normal. Pupils are equal, round, and reactive to light.   Neck: Normal range of motion. Neck supple. No JVD present.   Cardiovascular: Normal rate, regular rhythm and intact distal pulses.   Pulses:       Carotid pulses are 2+ on the right side, and 2+ on the left side.       Radial pulses are 2+ on the right side, and 2+ on the left side.        Femoral pulses are 2+ on the right side, and 2+ on the left side.       Popliteal pulses are 2+ on the right side, and 2+ on the left side.        Dorsalis pedis pulses are 2+ on the right side, and 0 on the left side.        Posterior tibial pulses are 2+ on the right side, and 2+ on the left side.   He has a nonpalpable left DP pulse which is his baseline.  Angiogram showed what appears to be chronic occlusion of the left anterior tibial/DP.  Runoff into the foot was via the posterior tibial and collaterals from the peroneal.   Pulmonary/Chest: Effort normal. No respiratory distress.   Abdominal: Soft. He exhibits no distension and no mass. There is no tenderness.   Musculoskeletal: Normal range of motion. He exhibits no edema, tenderness or deformity.   Neurological: He is alert and oriented to person, place, and time. No sensory deficit. He exhibits normal muscle tone.   Skin:   Dusky discoloration to the left fourth and fifth toes has resolved.  Capillary refill is now less than 2 seconds bilaterally in both feet are warm and well perfused appearing.   Psychiatric: He has a normal mood and affect. His behavior is normal. Judgment and thought content normal.   Vitals reviewed.      DIAGNOSTIC " DATA:  CATALINA/PVR was done today which I did review in the office.  It shows normal CATALINA values bilaterally with no evidence of significant arterial insufficiency to the bilateral lower extremities.    No results found.    Patient Active Problem List   Diagnosis   • Cellulitis of left foot   • PAD (peripheral artery disease) (CMS/formerly [facility])   • Acute pain   • Essential hypertension   • Hyperlipidemia         ICD-10-CM ICD-9-CM   1. PAD (peripheral artery disease) (CMS/formerly [facility]) I73.9 443.9   2. Essential hypertension I10 401.9   3. Hyperlipidemia, unspecified hyperlipidemia type E78.5 272.4   4. Carotid stenosis, asymptomatic, bilateral  I65.23 433.10     433.30       Lab Frequency Next Occurrence   US Ankle / Brachial Indices Extremity Complete Once 01/20/2020   US Carotid Bilateral Once 01/20/2020       PLAN: After thoroughly evaluating Gilberto Roberts, I believe the best course of action is to remain conservative from a vascular standpoint.  Overall he is done very well after having undergone left lower extremity angiogram with angioplasty of the left external iliac artery, and angioplasty/stenting of the left SFA.  The ischemic coloration to his toes had completely resolved at our last visit and remains resolved.  He denies any claudication or ischemic rest pain in both feet are warm with good pulses.  I will plan to see him back here in the office in 6 months with repeat CATALINA/PVR for continued surveillance.  I have also ordered a carotid duplex to evaluate for carotid disease given his history of peripheral vascular disease as well as smoking.  The patient is to continue taking their medications as previously discussed.   I did discuss vascular risk factors as they pertain to the progression of vascular disease including controlling hyperlipidemia. Patient's Body mass index is 32.54 kg/m². BMI is above normal parameters. Recommendations include: educational material. I did  extensively on smoking cessation, and the  patient was advised of the continued risks of smoking.  I provided over 10 minutes counseling on this matter. This was all discussed in full with complete understanding.  Thank you for allowing me to participate in the care of your patient.  Please do not hesitate to call with any questions or concerns.  We will keep you aware of any further encounters with Gilberto Roberts.      Sincerely Yours,      Luis Billings MD

## 2019-08-05 ENCOUNTER — HOSPITAL ENCOUNTER (EMERGENCY)
Facility: HOSPITAL | Age: 49
Discharge: HOME | End: 2019-08-05
Attending: EMERGENCY MEDICINE | Admitting: EMERGENCY MEDICINE

## 2019-08-05 VITALS
RESPIRATION RATE: 16 BRPM | OXYGEN SATURATION: 97 % | SYSTOLIC BLOOD PRESSURE: 122 MMHG | HEIGHT: 68 IN | DIASTOLIC BLOOD PRESSURE: 85 MMHG | WEIGHT: 214 LBS | HEART RATE: 65 BPM | BODY MASS INDEX: 32.43 KG/M2 | TEMPERATURE: 98 F

## 2019-08-05 DIAGNOSIS — S39.012A STRAIN OF LUMBAR REGION, INITIAL ENCOUNTER: Primary | ICD-10-CM

## 2019-08-05 PROCEDURE — 96372 THER/PROPH/DIAG INJ SC/IM: CPT

## 2019-08-05 PROCEDURE — 25010000002 KETOROLAC TROMETHAMINE PER 15 MG: Performed by: EMERGENCY MEDICINE

## 2019-08-05 PROCEDURE — 99283 EMERGENCY DEPT VISIT LOW MDM: CPT

## 2019-08-05 RX ORDER — CYCLOBENZAPRINE HCL 10 MG
10 TABLET ORAL 3 TIMES DAILY PRN
Qty: 15 TABLET | Refills: 0 | OUTPATIENT
Start: 2019-08-05 | End: 2019-12-19 | Stop reason: HOSPADM

## 2019-08-05 RX ORDER — KETOROLAC TROMETHAMINE 30 MG/ML
60 INJECTION, SOLUTION INTRAMUSCULAR; INTRAVENOUS ONCE
Status: COMPLETED | OUTPATIENT
Start: 2019-08-05 | End: 2019-08-05

## 2019-08-05 RX ORDER — DIAZEPAM 5 MG/1
5 TABLET ORAL ONCE
Status: COMPLETED | OUTPATIENT
Start: 2019-08-05 | End: 2019-08-05

## 2019-08-05 RX ADMIN — DIAZEPAM 5 MG: 5 TABLET ORAL at 13:19

## 2019-08-05 RX ADMIN — KETOROLAC TROMETHAMINE 60 MG: 30 INJECTION, SOLUTION INTRAMUSCULAR; INTRAVENOUS at 13:19

## 2019-08-05 NOTE — ED PROVIDER NOTES
"Subjective   This patient presents with an acute exacerbation of his chronic right-sided lower back pain.  He has been having pain there for years about 3 days ago he was out gardening and developed with the pain which is really made him stiff.  He denies any recent trauma.  Denies any radiation of pain down the legs denies problem with bowel or bladder function or any urinary symptoms.            Review of Systems   Constitutional: Positive for activity change.   HENT: Negative.    Respiratory: Negative.    Cardiovascular: Negative.    Gastrointestinal: Negative.    Genitourinary: Negative.    Musculoskeletal: Positive for back pain.        Patient denies any radiation of pain down the legs.  Nuys any bowel or bladder dysfunction.  Blood in the urine or dysuria frequency.  Pain is located to the right paraspinal region of his back.  This is in the lower lumbar region about L3-L5.   Skin: Negative.    Neurological: Negative.        Past Medical History:   Diagnosis Date   • Arthritis    • Essential hypertension 3/29/2019   • Hyperlipidemia    • Injury of back        No Known Allergies    Past Surgical History:   Procedure Laterality Date   • AORTAGRAM Bilateral 4/2/2019    Procedure: AORTAGRAM, LEFT LEG ANGIOGRAM, ANGIOPLASTY/STENT PLACEMENT, ANGIOSEAL CLOSURE;  Surgeon: Luis Billings MD;  Location: [facility] OR ;  Service: Vascular   • HEMORRHOIDECTOMY      x 3   • HERNIA REPAIR     • LEG SURGERY      as a child due to \"club foot\"       Family History   Problem Relation Age of Onset   • COPD Mother         respiratory failure   • Dementia Father    • Diabetes Father        Social History     Socioeconomic History   • Marital status:      Spouse name: Not on file   • Number of children: Not on file   • Years of education: Not on file   • Highest education level: Not on file   Tobacco Use   • Smoking status: Current Every Day Smoker     Packs/day: 0.50     Types: Cigarettes   • Smokeless " tobacco: Former User     Types: Chew   Substance and Sexual Activity   • Alcohol use: No     Frequency: Never   • Drug use: No   • Sexual activity: Defer           Objective   Physical Exam   Constitutional: He is oriented to person, place, and time. He appears well-developed and well-nourished.   HENT:   Head: Normocephalic and atraumatic.   Eyes: EOM are normal. Pupils are equal, round, and reactive to light.   Neck: Normal range of motion. Neck supple.   Cardiovascular: Normal rate and regular rhythm.   Pulmonary/Chest: Effort normal and breath sounds normal.   Abdominal: Soft. Bowel sounds are normal.   Musculoskeletal:   Patient is tender right lumbosacral paraspinal region.   Neurological: He is alert and oriented to person, place, and time.   Patient has a slightly straight leg raising sign on the right positive at about 40 degrees negative on the left.  Patient has normal deep tendon reflexes and muscle strength to the lower extremities bilaterally.  Normal sensation.   Skin: Skin is warm and dry. No rash noted.   Psychiatric: He has a normal mood and affect. His behavior is normal.   Nursing note and vitals reviewed.      Procedures           ED Course                  MDM  Number of Diagnoses or Management Options  Diagnosis management comments: Patient was given an injection of Toradol 60 mg IM and 5 mg of Valium.  Have some feeling of improvement.  He still moves rather stiffly.        Final diagnoses:   Strain of lumbar region, initial encounter            Dany Mckoy DO  08/05/19 9684

## 2019-10-23 ENCOUNTER — OFFICE VISIT (OUTPATIENT)
Dept: PRIMARY CARE CLINIC | Age: 49
End: 2019-10-23
Payer: MEDICARE

## 2019-10-23 VITALS
WEIGHT: 221.2 LBS | HEART RATE: 86 BPM | BODY MASS INDEX: 33.52 KG/M2 | DIASTOLIC BLOOD PRESSURE: 78 MMHG | TEMPERATURE: 96.6 F | HEIGHT: 68 IN | OXYGEN SATURATION: 98 % | RESPIRATION RATE: 16 BRPM | SYSTOLIC BLOOD PRESSURE: 120 MMHG

## 2019-10-23 DIAGNOSIS — M54.50 CHRONIC LOW BACK PAIN, UNSPECIFIED BACK PAIN LATERALITY, UNSPECIFIED WHETHER SCIATICA PRESENT: ICD-10-CM

## 2019-10-23 DIAGNOSIS — I73.9 PAD (PERIPHERAL ARTERY DISEASE) (HCC): Primary | ICD-10-CM

## 2019-10-23 DIAGNOSIS — M25.50 ARTHRALGIA OF MULTIPLE SITES: ICD-10-CM

## 2019-10-23 DIAGNOSIS — Z23 NEED FOR INFLUENZA VACCINATION: ICD-10-CM

## 2019-10-23 DIAGNOSIS — R79.9 ABNORMAL FINDING OF BLOOD CHEMISTRY, UNSPECIFIED: ICD-10-CM

## 2019-10-23 DIAGNOSIS — G89.29 CHRONIC LOW BACK PAIN, UNSPECIFIED BACK PAIN LATERALITY, UNSPECIFIED WHETHER SCIATICA PRESENT: ICD-10-CM

## 2019-10-23 PROCEDURE — 90686 IIV4 VACC NO PRSV 0.5 ML IM: CPT | Performed by: FAMILY MEDICINE

## 2019-10-23 PROCEDURE — G8427 DOCREV CUR MEDS BY ELIG CLIN: HCPCS | Performed by: FAMILY MEDICINE

## 2019-10-23 PROCEDURE — 3017F COLORECTAL CA SCREEN DOC REV: CPT | Performed by: FAMILY MEDICINE

## 2019-10-23 PROCEDURE — 4004F PT TOBACCO SCREEN RCVD TLK: CPT | Performed by: FAMILY MEDICINE

## 2019-10-23 PROCEDURE — G0008 ADMIN INFLUENZA VIRUS VAC: HCPCS | Performed by: FAMILY MEDICINE

## 2019-10-23 PROCEDURE — 99214 OFFICE O/P EST MOD 30 MIN: CPT | Performed by: FAMILY MEDICINE

## 2019-10-23 PROCEDURE — G8417 CALC BMI ABV UP PARAM F/U: HCPCS | Performed by: FAMILY MEDICINE

## 2019-10-23 PROCEDURE — G8482 FLU IMMUNIZE ORDER/ADMIN: HCPCS | Performed by: FAMILY MEDICINE

## 2019-10-23 RX ORDER — CELECOXIB 200 MG/1
200 CAPSULE ORAL 2 TIMES DAILY
Qty: 60 CAPSULE | Refills: 3 | Status: SHIPPED | OUTPATIENT
Start: 2019-10-23 | End: 2020-02-13

## 2019-10-23 ASSESSMENT — ENCOUNTER SYMPTOMS
VOMITING: 0
WHEEZING: 0
CONSTIPATION: 0
COUGH: 0
DIARRHEA: 0
ABDOMINAL PAIN: 0
CHEST TIGHTNESS: 0
NAUSEA: 0
SHORTNESS OF BREATH: 0

## 2019-11-20 DIAGNOSIS — I73.9 PAD (PERIPHERAL ARTERY DISEASE) (HCC): ICD-10-CM

## 2019-11-20 DIAGNOSIS — R79.9 ABNORMAL FINDING OF BLOOD CHEMISTRY, UNSPECIFIED: ICD-10-CM

## 2019-11-20 LAB
ALBUMIN SERPL-MCNC: 3.8 G/DL (ref 3.5–5.2)
ALP BLD-CCNC: 130 U/L (ref 40–130)
ALT SERPL-CCNC: 34 U/L (ref 5–41)
ANION GAP SERPL CALCULATED.3IONS-SCNC: 14 MMOL/L (ref 7–19)
AST SERPL-CCNC: 19 U/L (ref 5–40)
BILIRUB SERPL-MCNC: <0.2 MG/DL (ref 0.2–1.2)
BUN BLDV-MCNC: 9 MG/DL (ref 6–20)
CALCIUM SERPL-MCNC: 9.8 MG/DL (ref 8.6–10)
CHLORIDE BLD-SCNC: 101 MMOL/L (ref 98–111)
CHOLESTEROL, TOTAL: 240 MG/DL (ref 160–199)
CO2: 25 MMOL/L (ref 22–29)
CREAT SERPL-MCNC: 0.8 MG/DL (ref 0.5–1.2)
GFR NON-AFRICAN AMERICAN: >60
GLUCOSE BLD-MCNC: 121 MG/DL (ref 74–109)
HCT VFR BLD CALC: 51.1 % (ref 42–52)
HDLC SERPL-MCNC: 32 MG/DL (ref 55–121)
HEMOGLOBIN: 17.2 G/DL (ref 14–18)
LDL CHOLESTEROL CALCULATED: ABNORMAL MG/DL
LDL CHOLESTEROL DIRECT: 126 MG/DL
MCH RBC QN AUTO: 32.3 PG (ref 27–31)
MCHC RBC AUTO-ENTMCNC: 33.7 G/DL (ref 33–37)
MCV RBC AUTO: 95.9 FL (ref 80–94)
PDW BLD-RTO: 13 % (ref 11.5–14.5)
PLATELET # BLD: 313 K/UL (ref 130–400)
PMV BLD AUTO: 10.7 FL (ref 9.4–12.4)
POTASSIUM SERPL-SCNC: 3.7 MMOL/L (ref 3.5–5)
RBC # BLD: 5.33 M/UL (ref 4.7–6.1)
SODIUM BLD-SCNC: 140 MMOL/L (ref 136–145)
TOTAL PROTEIN: 6.8 G/DL (ref 6.6–8.7)
TRIGL SERPL-MCNC: 707 MG/DL (ref 0–149)
WBC # BLD: 12.5 K/UL (ref 4.8–10.8)

## 2019-12-02 RX ORDER — SIMVASTATIN 40 MG
40 TABLET ORAL NIGHTLY
Qty: 30 TABLET | Refills: 3 | Status: SHIPPED | OUTPATIENT
Start: 2019-12-02 | End: 2019-12-05 | Stop reason: SDUPTHER

## 2019-12-05 RX ORDER — SIMVASTATIN 40 MG
20 TABLET ORAL NIGHTLY
Qty: 30 TABLET | Refills: 3 | Status: SHIPPED | OUTPATIENT
Start: 2019-12-05 | End: 2020-04-14

## 2019-12-19 ENCOUNTER — APPOINTMENT (OUTPATIENT)
Dept: ULTRASOUND IMAGING | Facility: HOSPITAL | Age: 49
End: 2019-12-19

## 2019-12-19 ENCOUNTER — HOSPITAL ENCOUNTER (EMERGENCY)
Facility: HOSPITAL | Age: 49
Discharge: HOME | End: 2019-12-19
Attending: EMERGENCY MEDICINE | Admitting: EMERGENCY MEDICINE

## 2019-12-19 VITALS
TEMPERATURE: 98.4 F | SYSTOLIC BLOOD PRESSURE: 126 MMHG | OXYGEN SATURATION: 94 % | HEIGHT: 68 IN | WEIGHT: 225 LBS | DIASTOLIC BLOOD PRESSURE: 90 MMHG | HEART RATE: 91 BPM | BODY MASS INDEX: 34.1 KG/M2 | RESPIRATION RATE: 18 BRPM

## 2019-12-19 DIAGNOSIS — I73.9 PERIPHERAL VASCULAR DISEASE (HCC): Primary | ICD-10-CM

## 2019-12-19 LAB
ALBUMIN SERPL-MCNC: 4.1 G/DL (ref 3.5–5.2)
ALBUMIN/GLOB SERPL: 1.3 G/DL
ALP SERPL-CCNC: 113 U/L (ref 39–117)
ALT SERPL W P-5'-P-CCNC: 38 U/L (ref 1–41)
AMPHET+METHAMPHET UR QL: NEGATIVE
AMPHETAMINES UR QL: NEGATIVE
ANION GAP SERPL CALCULATED.3IONS-SCNC: 10 MMOL/L (ref 5–15)
AST SERPL-CCNC: 23 U/L (ref 1–40)
BARBITURATES UR QL SCN: NEGATIVE
BASOPHILS # BLD AUTO: 0.09 10*3/MM3 (ref 0–0.2)
BASOPHILS NFR BLD AUTO: 0.6 % (ref 0–1.5)
BENZODIAZ UR QL SCN: NEGATIVE
BILIRUB SERPL-MCNC: 0.6 MG/DL (ref 0.2–1.2)
BUN BLD-MCNC: 9 MG/DL (ref 6–20)
BUN/CREAT SERPL: 9.4 (ref 7–25)
BUPRENORPHINE SERPL-MCNC: NEGATIVE NG/ML
CALCIUM SPEC-SCNC: 9.6 MG/DL (ref 8.6–10.5)
CANNABINOIDS SERPL QL: POSITIVE
CHLORIDE SERPL-SCNC: 100 MMOL/L (ref 98–107)
CO2 SERPL-SCNC: 29 MMOL/L (ref 22–29)
COCAINE UR QL: NEGATIVE
CREAT BLD-MCNC: 0.96 MG/DL (ref 0.76–1.27)
DEPRECATED RDW RBC AUTO: 42.1 FL (ref 37–54)
EOSINOPHIL # BLD AUTO: 0.29 10*3/MM3 (ref 0–0.4)
EOSINOPHIL NFR BLD AUTO: 2 % (ref 0.3–6.2)
ERYTHROCYTE [DISTWIDTH] IN BLOOD BY AUTOMATED COUNT: 12.8 % (ref 12.3–15.4)
GFR SERPL CREATININE-BSD FRML MDRD: 83 ML/MIN/1.73
GLOBULIN UR ELPH-MCNC: 3.1 GM/DL
GLUCOSE BLD-MCNC: 117 MG/DL (ref 65–99)
HCT VFR BLD AUTO: 49 % (ref 37.5–51)
HGB BLD-MCNC: 17.3 G/DL (ref 13–17.7)
IMM GRANULOCYTES # BLD AUTO: 0.09 10*3/MM3 (ref 0–0.05)
IMM GRANULOCYTES NFR BLD AUTO: 0.6 % (ref 0–0.5)
LYMPHOCYTES # BLD AUTO: 3.44 10*3/MM3 (ref 0.7–3.1)
LYMPHOCYTES NFR BLD AUTO: 23.6 % (ref 19.6–45.3)
MCH RBC QN AUTO: 31.5 PG (ref 26.6–33)
MCHC RBC AUTO-ENTMCNC: 35.3 G/DL (ref 31.5–35.7)
MCV RBC AUTO: 89.1 FL (ref 79–97)
METHADONE UR QL SCN: NEGATIVE
MONOCYTES # BLD AUTO: 0.9 10*3/MM3 (ref 0.1–0.9)
MONOCYTES NFR BLD AUTO: 6.2 % (ref 5–12)
NEUTROPHILS # BLD AUTO: 9.77 10*3/MM3 (ref 1.7–7)
NEUTROPHILS NFR BLD AUTO: 67 % (ref 42.7–76)
NRBC BLD AUTO-RTO: 0 /100 WBC (ref 0–0.2)
OPIATES UR QL: NEGATIVE
OXYCODONE UR QL SCN: NEGATIVE
PCP UR QL SCN: NEGATIVE
PLATELET # BLD AUTO: 326 10*3/MM3 (ref 140–450)
PMV BLD AUTO: 9.6 FL (ref 6–12)
POTASSIUM BLD-SCNC: 3.7 MMOL/L (ref 3.5–5.2)
PROPOXYPH UR QL: NEGATIVE
PROT SERPL-MCNC: 7.2 G/DL (ref 6–8.5)
RBC # BLD AUTO: 5.5 10*6/MM3 (ref 4.14–5.8)
SODIUM BLD-SCNC: 139 MMOL/L (ref 136–145)
TRICYCLICS UR QL SCN: NEGATIVE
WBC NRBC COR # BLD: 14.58 10*3/MM3 (ref 3.4–10.8)

## 2019-12-19 PROCEDURE — 93926 LOWER EXTREMITY STUDY: CPT | Performed by: SURGERY

## 2019-12-19 PROCEDURE — 80053 COMPREHEN METABOLIC PANEL: CPT | Performed by: EMERGENCY MEDICINE

## 2019-12-19 PROCEDURE — 85025 COMPLETE CBC W/AUTO DIFF WBC: CPT | Performed by: EMERGENCY MEDICINE

## 2019-12-19 PROCEDURE — 80307 DRUG TEST PRSMV CHEM ANLYZR: CPT | Performed by: EMERGENCY MEDICINE

## 2019-12-19 PROCEDURE — 99284 EMERGENCY DEPT VISIT MOD MDM: CPT

## 2019-12-19 PROCEDURE — 99283 EMERGENCY DEPT VISIT LOW MDM: CPT | Performed by: SURGERY

## 2019-12-19 PROCEDURE — 93926 LOWER EXTREMITY STUDY: CPT

## 2019-12-19 NOTE — ED PROVIDER NOTES
Subjective   Lower extremity pain the left side has got history angioplasty in April came the ER increasing pain very poor historian noncompliant he states that if his leg has to be cut off he is fine with that does not appear to have ischemic extremity the pulse amplitude is diminished but present no necrosis or gangrene noted      Leg Pain   Location:  Leg  Injury: no    Leg location:  L leg  Pain details:     Quality:  Aching    Radiates to:  Does not radiate    Severity:  Moderate    Onset quality:  Sudden    Timing:  Constant    Progression:  Worsening  Chronicity:  Recurrent  Dislocation: no    Prior injury to area:  No  Relieved by:  Nothing  Ineffective treatments:  None tried  Associated symptoms: no back pain, no decreased ROM, no fatigue, no fever, no muscle weakness, no neck pain and no tingling    Risk factors: no concern for non-accidental trauma and no obesity        Review of Systems   Constitutional: Negative.  Negative for chills, fatigue and fever.   HENT: Negative.  Negative for congestion.    Respiratory: Negative.  Negative for cough, chest tightness and stridor.    Cardiovascular: Negative.  Negative for chest pain.   Gastrointestinal: Negative.  Negative for abdominal distention, abdominal pain, nausea and vomiting.   Endocrine: Negative.    Genitourinary: Negative.  Negative for difficulty urinating and flank pain.   Musculoskeletal: Negative.  Negative for back pain and neck pain.   Skin: Negative.  Negative for color change.   Neurological: Negative.  Negative for dizziness and headaches.   All other systems reviewed and are negative.      Past Medical History:   Diagnosis Date   • Arthritis    • Essential hypertension 3/29/2019   • Hyperlipidemia    • Injury of back        No Known Allergies    Past Surgical History:   Procedure Laterality Date   • AORTAGRAM Bilateral 4/2/2019    Procedure: AORTAGRAM, LEFT LEG ANGIOGRAM, ANGIOPLASTY/STENT PLACEMENT, ANGIOSEAL CLOSURE;  Surgeon: Awa  "Luis Richmond MD;  Location: [facility]BRID OR 12;  Service: Vascular   • HEMORRHOIDECTOMY      x 3   • HERNIA REPAIR     • LEG SURGERY      as a child due to \"club foot\"       Family History   Problem Relation Age of Onset   • COPD Mother         respiratory failure   • Dementia Father    • Diabetes Father        Social History     Socioeconomic History   • Marital status:      Spouse name: Not on file   • Number of children: Not on file   • Years of education: Not on file   • Highest education level: Not on file   Tobacco Use   • Smoking status: Current Every Day Smoker     Packs/day: 0.50     Types: Cigarettes   • Smokeless tobacco: Former User     Types: Chew   Substance and Sexual Activity   • Alcohol use: No     Frequency: Never   • Drug use: No   • Sexual activity: Defer           Objective   Physical Exam   Constitutional: He is oriented to person, place, and time. He appears well-developed and well-nourished.   HENT:   Head: Normocephalic and atraumatic.   Eyes: Pupils are equal, round, and reactive to light. Conjunctivae and EOM are normal.   Neck: Normal range of motion. Neck supple.   Cardiovascular: Normal rate, regular rhythm, normal heart sounds and intact distal pulses.   Pulses:       Dorsalis pedis pulses are 1+ on the right side, and 1+ on the left side.        Posterior tibial pulses are 1+ on the right side, and 1+ on the left side.   Pulmonary/Chest: Effort normal and breath sounds normal.   Abdominal: Soft. Bowel sounds are normal.   Musculoskeletal: Normal range of motion.   Neurological: He is alert and oriented to person, place, and time. He has normal reflexes.   Skin: Skin is warm and dry.   Psychiatric: He has a normal mood and affect.   Nursing note and vitals reviewed.      Procedures           ED Course  ED Course as of Dec 20 0654   u Dec 19, 2019   1528 Angioplasty of the left external iliac artery; angioplasty of the left superficial femoral artery; stent placement in the " left superficial femoral artery -performed on April 2 by Dr. Billings       [TS]   1640 Dr. Billings was called    [TS]   1739 This patient had angioplasty performed after comers today he comes in with progressive worsening left leg pain he is got dorsalis pedis and posterior tibial pulse which are palpable but decreased amplitude there is no evidence of any gangrene sonogram of the lower extremity has not been officially read but does reveal some blockage as per the tech.  Patient is comfortable in the bed waiting I have called the vascular surgery on call but not received a call back from them we have called him again waiting on their call back to give disposition for the patient    [TS]   1810 Still waiting on Vascular surgery Turned over to Dr crawford     [TS]   1858 Dr. Billings aware of patient, will come to see.     [JH]   2006 Dr. Billings did see the patient, feels this is a chronic occlusion will see in the office tomorrow. He does have dopplerable pulses. Patient is okay with this plan.    [JH]      ED Course User Index  [JH] Chris Crawford MD  [TS] Ward Yost MD                      No data recorded                        MDM    Final diagnoses:   Peripheral vascular disease (CMS/[facility])              Ward Yost MD  12/19/19 1913       Ward Yost MD  12/20/19 4348

## 2019-12-20 NOTE — ED PROVIDER NOTES
"Subjective   History of Present Illness    Review of Systems    Past Medical History:   Diagnosis Date   • Arthritis    • Essential hypertension 3/29/2019   • Hyperlipidemia    • Injury of back        No Known Allergies    Past Surgical History:   Procedure Laterality Date   • AORTAGRAM Bilateral 4/2/2019    Procedure: AORTAGRAM, LEFT LEG ANGIOGRAM, ANGIOPLASTY/STENT PLACEMENT, ANGIOSEAL CLOSURE;  Surgeon: Luis Billings MD;  Location: [place] [address];  Service: Vascular   • HEMORRHOIDECTOMY      x 3   • HERNIA REPAIR     • LEG SURGERY      as a child due to \"club foot\"       Family History   Problem Relation Age of Onset   • COPD Mother         respiratory failure   • Dementia Father    • Diabetes Father        Social History     Socioeconomic History   • Marital status:      Spouse name: Not on file   • Number of children: Not on file   • Years of education: Not on file   • Highest education level: Not on file   Tobacco Use   • Smoking status: Current Every Day Smoker     Packs/day: 0.50     Types: Cigarettes   • Smokeless tobacco: Former User     Types: Chew   Substance and Sexual Activity   • Alcohol use: No     Frequency: Never   • Drug use: No   • Sexual activity: Defer           Objective   Physical Exam    Procedures           ED Course  ED Course as of Dec 19 2008   Thu Dec 19, 2019   1528 Angioplasty of the left external iliac artery; angioplasty of the left superficial femoral artery; stent placement in the left superficial femoral artery -performed on April 2 by Dr. Billings       [TS]   1640 Dr. Billings was called    [TS]   2226 This patient had angioplasty performed after comers today he comes in with progressive worsening left leg pain he is got dorsalis pedis and posterior tibial pulse which are palpable but decreased amplitude there is no evidence of any gangrene sonogram of the lower extremity has not been officially read but does reveal some blockage as per the tech.  Patient is " comfortable in the bed waiting I have called the vascular surgery on call but not received a call back from them we have called him again waiting on their call back to give disposition for the patient    [TS]   1810 Still waiting on Vascular surgery Turned over to Dr crawford     [TS]   1858 Dr. Billings aware of patient, will come to see.     [JH]   2006 Dr. Billings did see the patient, feels this is a chronic occlusion will see in the office tomorrow. He does have dopplerable pulses. Patient is okay with this plan.    [JH]      ED Course User Index  [JH] Chris Crawford MD  [TS] Ward Yost MD                      No data recorded                        MDM    Final diagnoses:   Peripheral vascular disease (CMS/HCC)              Chris Crawford MD  12/19/19 2008

## 2019-12-20 NOTE — CONSULTS
"Gilberto Roberts  [id number]  [id number]  43/43  Chris Cabello MD  12/19/2019    Chief Complaint   Patient presents with   • Leg Pain       HPI: Gilberto Roberts is a 49 y.o. male who presented with left calf swelling.  He is a fairly poor historian.  He initially presented here to [facility], [place] in March with complaints of pain to the left foot and some discoloration of the toes.  He ultimately underwent angiogram on 4/2/2019 and had angioplasty of a left external iliac artery stenosis as well as angioplasty and stenting of a stenosis of the left SFA.  He had done well postoperatively and at his last office visit in July he had normal ABIs and was doing well with no claudication.  However he reports over the last few weeks he has been having some swelling to the left calf and it also sounds like he describes claudication to the left calf with moderate exercise.  He denies any ischemic rest pain.  Due to this he presented to the ER and ultimately had an arterial duplex that was done which I was able to review.  It does appear to show some in-stent stenosis at the previously placed SFA stent with dampened waveforms distal to this but maintained flow.  Patient reports he is continued on both his aspirin and statin at home and does not take any other medication.  He continues to smoke.  He otherwise is without complaints.    Past Medical History:   Diagnosis Date   • Arthritis    • Essential hypertension 3/29/2019   • Hyperlipidemia    • Injury of back        Past Surgical History:   Procedure Laterality Date   • AORTAGRAM Bilateral 4/2/2019    Procedure: AORTAGRAM, LEFT LEG ANGIOGRAM, ANGIOPLASTY/STENT PLACEMENT, ANGIOSEAL CLOSURE;  Surgeon: Luis Billings MD;  Location: [place] [address];  Service: Vascular   • HEMORRHOIDECTOMY      x 3   • HERNIA REPAIR     • LEG SURGERY      as a child due to \"club foot\"       Family History   Problem Relation Age of Onset   • COPD Mother         respiratory failure "   • Dementia Father    • Diabetes Father        Social History     Socioeconomic History   • Marital status:      Spouse name: Not on file   • Number of children: Not on file   • Years of education: Not on file   • Highest education level: Not on file   Tobacco Use   • Smoking status: Current Every Day Smoker     Packs/day: 0.50     Types: Cigarettes   • Smokeless tobacco: Former User     Types: Chew   Substance and Sexual Activity   • Alcohol use: No     Frequency: Never   • Drug use: No   • Sexual activity: Defer       No Known Allergies        Review of Systems   Constitutional: Negative.  Negative for activity change, appetite change, chills, diaphoresis, fatigue and fever.   HENT: Negative.  Negative for congestion, sneezing, sore throat and trouble swallowing.    Eyes: Negative.  Negative for visual disturbance.   Respiratory: Negative.  Negative for chest tightness and shortness of breath.    Cardiovascular: Negative.  Negative for chest pain, palpitations and leg swelling.   Gastrointestinal: Negative.  Negative for abdominal distention, abdominal pain, nausea and vomiting.   Endocrine: Negative.    Genitourinary: Negative.    Musculoskeletal: Negative.         Left calf claudication   Skin: Negative.    Allergic/Immunologic: Negative.    Neurological: Negative.    Hematological: Negative.    Psychiatric/Behavioral: Negative.        Physical Exam   Constitutional: He is oriented to person, place, and time. He appears well-developed and well-nourished.   HENT:   Head: Normocephalic and atraumatic.   Eyes: Pupils are equal, round, and reactive to light. EOM are normal.   Neck: Normal range of motion. Neck supple. No JVD present.   Cardiovascular: Normal rate and regular rhythm.   Pulses:       Carotid pulses are 2+ on the right side, and 2+ on the left side.       Radial pulses are 2+ on the right side, and 2+ on the left side.        Femoral pulses are 2+ on the right side, and 2+ on the left side.        Popliteal pulses are 2+ on the right side, and 0 on the left side.        Dorsalis pedis pulses are 2+ on the right side, and 0 on the left side.        Posterior tibial pulses are 2+ on the right side, and 0 on the left side.   On the left he has an easily audible Doppler signal at the PT and also a good Doppler signal at the distal AT at the ankle level.  Both feet are warm with no motor or sensory deficits.   Pulmonary/Chest: Effort normal.   Abdominal: Soft. He exhibits no distension and no mass. There is no tenderness.   Musculoskeletal: Normal range of motion. He exhibits no edema, tenderness or deformity.   Neurological: He is alert and oriented to person, place, and time. No sensory deficit. He exhibits normal muscle tone.   Skin: Skin is warm and dry. Less than 2 seconds on the right, 2 to 3 seconds on the left..   Psychiatric: He has a normal mood and affect. His behavior is normal. Judgment and thought content normal.   Vitals reviewed.      Laboratory Data:  Results from last 7 days   Lab Units 12/19/19  1542   WBC 10*3/mm3 14.58*   HEMOGLOBIN g/dL 17.3   HEMATOCRIT % 49.0   PLATELETS 10*3/mm3 326       Results from last 7 days   Lab Units 12/19/19  1542   SODIUM mmol/L 139   POTASSIUM mmol/L 3.7   CHLORIDE mmol/L 100   CO2 mmol/L 29.0   BUN mg/dL 9   CREATININE mg/dL 0.96   CALCIUM mg/dL 9.6   BILIRUBIN mg/dL 0.6   ALK PHOS U/L 113   ALT (SGPT) U/L 38   AST (SGOT) U/L 23   GLUCOSE mg/dL 117*             Diagnostic Data:  Imaging Results (Last 24 Hours)     Procedure Component Value Units Date/Time    US Arterial Doppler Lower Extremity Left [[id number]] Resulted:  12/19/19 1601     Updated:  12/19/19 1626          Impression: This is a 49-year-old gentleman with known peripheral vascular disease who underwent prior left lower extremity angiogram with angioplasty of the left external iliac as well as angioplasty and stent of the left SFA are now presents with what sounds like claudication to the left  lower extremity.  He has no evidence of any acute ischemia and no wounds.    * No active hospital problems. *      Plan: After thoroughly evaluating Gilberto Roberts, I believe the best course of action is to plan for repeat angiogram as an outpatient in the next week or so.  Currently it sounds like he is having some claudication to the left lower extremity with moderate exercise but has no ischemic rest pain.  Arterial duplex does appear to show dampened waveforms within the mid and distal SFA and likely significant stenosis of the previously placed stent.  However he does have flow distally in the popliteal as well as the posterior tibial and anterior tibial.  As such he will need repeat angiogram and intervention but this can be done as an outpatient.  As such she is clear for DC from the ER and I did discuss this with the ER team.  He will call my office tomorrow to make further arrangements to be seen and for the angiogram.  He should continue on his aspirin and statin. Thank you for allowing me to see Gilberto Roberts in consult. Please feel free to reach out with any questions or concerns.    Luis Billings MD  Vascular Surgery  [phone]

## 2019-12-23 ENCOUNTER — OFFICE VISIT (OUTPATIENT)
Dept: VASCULAR SURGERY | Facility: CLINIC | Age: 49
End: 2019-12-23

## 2019-12-23 VITALS
BODY MASS INDEX: 34.1 KG/M2 | DIASTOLIC BLOOD PRESSURE: 82 MMHG | SYSTOLIC BLOOD PRESSURE: 128 MMHG | HEART RATE: 95 BPM | HEIGHT: 68 IN | OXYGEN SATURATION: 98 % | WEIGHT: 225 LBS

## 2019-12-23 DIAGNOSIS — I70.212 ATHEROSCLEROSIS OF NATIVE ARTERY OF LEFT LOWER EXTREMITY WITH INTERMITTENT CLAUDICATION (HCC): Primary | ICD-10-CM

## 2019-12-23 DIAGNOSIS — E78.5 HYPERLIPIDEMIA, UNSPECIFIED HYPERLIPIDEMIA TYPE: ICD-10-CM

## 2019-12-23 PROCEDURE — 99214 OFFICE O/P EST MOD 30 MIN: CPT | Performed by: SURGERY

## 2019-12-23 NOTE — H&P
HPI     I had the pleasure of seeing your patient in the office today for follow up.  As you recall, the patient is a 49 y.o. male who we are currently following for peripheral vascular disease.  He had previously been admitted here at [facility] in the early part of this year and was found to have an arterial insufficiency of the left lower extremity.  He underwent angiogram of the left lower extremity with atherectomy, angioplasty, and stenting of the left SFA.  He had done well following this and was seen in the office here in July and noted to have a palpable posterior tibial pulse at that time.  However unfortunately he has continued to smoke despite our previous discussions.  He was recently seen here in the emergency room with new claudication to the left lower extremity at the calf level.  Ultimately arterial duplex was done which appears to show an occlusion of the mid left SFA with reconstitution distally and decreased waveforms beyond this.  He denies any ischemic rest pain.  He is continued on his aspirin as well as his statin since our last visit.  He is otherwise without acute complaint.      Review of Systems   Constitutional: Negative.  Negative for activity change, appetite change, chills, diaphoresis, fatigue and fever.   HENT: Negative.  Negative for congestion, sneezing, sore throat and trouble swallowing.    Eyes: Negative.  Negative for visual disturbance.   Respiratory: Negative.  Negative for chest tightness and shortness of breath.    Cardiovascular: Negative.  Negative for chest pain, palpitations and leg swelling.   Gastrointestinal: Negative.  Negative for abdominal distention, abdominal pain, nausea and vomiting.   Endocrine: Negative.    Genitourinary: Negative.    Musculoskeletal: Negative.         He reports claudication to the left calf.   Skin: Negative.    Allergic/Immunologic: Negative.    Neurological: Negative.    Hematological: Negative.    Psychiatric/Behavioral: Negative.   "      /82   Pulse 95   Ht 172.7 cm (68\")   Wt 102 kg (225 lb)   SpO2 98%   BMI 34.21 kg/m²   Physical Exam   Constitutional: He is oriented to person, place, and time. He appears well-developed and well-nourished.   HENT:   Head: Normocephalic and atraumatic.   Eyes: Pupils are equal, round, and reactive to light. EOM are normal.   Neck: Normal range of motion. Neck supple. No JVD present.   Cardiovascular: Normal rate, regular rhythm and intact distal pulses.   Pulses:       Carotid pulses are 2+ on the right side, and 2+ on the left side.       Radial pulses are 2+ on the right side, and 2+ on the left side.        Femoral pulses are 2+ on the right side, and 2+ on the left side.       Popliteal pulses are 2+ on the right side, and 0 on the left side.        Dorsalis pedis pulses are 2+ on the right side, and 0 on the left side.        Posterior tibial pulses are 2+ on the right side, and 0 on the left side.   Today he has a nonpalpable left DP and PT.  The nonpalpable DP is chronic in nature however he previously did have a palpable left PT pulse at our last visit in July.  He now has only a Doppler signal at the left DP.   Pulmonary/Chest: Effort normal. No respiratory distress.   Abdominal: Soft. He exhibits no distension and no mass. There is no tenderness.   Musculoskeletal: Normal range of motion. He exhibits no edema, tenderness or deformity.   Neurological: He is alert and oriented to person, place, and time. No sensory deficit. He exhibits normal muscle tone.   Skin: On the right is less than 2 seconds and on the left is 2 to 3 seconds..   Psychiatric: He has a normal mood and affect. His behavior is normal. Judgment and thought content normal.   Vitals reviewed.      DIAGNOSTIC DATA:    Us Arterial Doppler Lower Extremity Left    Result Date: 12/20/2019  Narrative: History: Pain  Comments: Grayscale imaging as well as color flow duplex were used to evaluate the left lower extremity arterial " systems.  On the left, the peak systolic velocity in the common femoral artery measured 149cm/s. In the profunda femoris artery measured 118cm/s. In the proximal SFA measured 62.9cm/s. The mid to distal SFA appears occluded. In the distal SFA measured 19.3 cm/s. In the popliteal artery measured 21cm/s. In the posterior tibial artery measured 24cm/s. In the anterior tibial artery measured 23.3cm/s.      Impression: Occluded mid to distal superficial femoral artery with dampened monophasic flow distally.  This report was finalized on 12/20/2019 11:08 by Dr. Carlos Hong MD.      Patient Active Problem List   Diagnosis   • Cellulitis of left foot   • PAD (peripheral artery disease) (CMS/[facility])   • Acute pain   • Essential hypertension   • Hyperlipidemia         ICD-10-CM ICD-9-CM   1. Atherosclerosis of native artery of left lower extremity with intermittent claudication (CMS/[facility]) I70.212 440.21   2. Hyperlipidemia, unspecified hyperlipidemia type E78.5 272.4       Lab Frequency Next Occurrence   US Ankle / Brachial Indices Extremity Complete Once 01/20/2020   US Carotid Bilateral Once 01/20/2020       PLAN: After thoroughly evaluating Gilberto Roberts, I believe the best course of action is to proceed with angiogram of the left lower extremity.  Previously at the time of left lower extremity angiogram he had atherectomy, angioplasty, and stenting of the left SFA.  It now appears that his stent has occluded and he is having claudication of the left lower extremity.  However his foot remains warm and he does have a Doppler signal at the left PT.  As such he will need to undergo angiogram and intervention for revascularization. Risks of angiogram were discussed.  These include, but are not limited to, bleeding, infection, vessel damage, nerve damage, embolus, and loss of limb.  The patient understands these risks and wishes to proceed with procedure.  The patient is to continue taking their medications as previously  discussed.   I did discuss vascular risk factors as they pertain to the progression of vascular disease including controlling hyperlipidemia. These factors remain stable. Patient's Body mass index is 34.21 kg/m². BMI is above normal parameters. Recommendations include: educational material.  Unfortunately he also continues to smoke and I did discuss at length with him the need for smoking cessation and without it the risk of recurrent arterial occlusion as well as ultimate limb loss. This was all discussed in full with complete understanding.

## 2019-12-23 NOTE — PATIENT INSTRUCTIONS
For more information:    Quit Now [place]  1-800-QUIT-NOW  https://[place].quitlogix.org/en-[place]/  Steps to Quit Smoking  Smoking tobacco can be harmful to your health and can affect almost every organ in your body. Smoking puts you, and those around you, at risk for developing many serious chronic diseases. Quitting smoking is difficult, but it is one of the best things that you can do for your health. It is never too late to quit.  What are the benefits of quitting smoking?  When you quit smoking, you lower your risk of developing serious diseases and conditions, such as:  · Lung cancer or lung disease, such as COPD.  · Heart disease.  · Stroke.  · Heart attack.  · Infertility.  · Osteoporosis and bone fractures.  Additionally, symptoms such as coughing, wheezing, and shortness of breath may get better when you quit. You may also find that you get sick less often because your body is stronger at fighting off colds and infections. If you are pregnant, quitting smoking can help to reduce your chances of having a baby of low birth weight.  How do I get ready to quit?  When you decide to quit smoking, create a plan to make sure that you are successful. Before you quit:  · Pick a date to quit. Set a date within the next two weeks to give you time to prepare.  · Write down the reasons why you are quitting. Keep this list in places where you will see it often, such as on your bathroom mirror or in your car or wallet.  · Identify the people, places, things, and activities that make you want to smoke (triggers) and avoid them. Make sure to take these actions:  ¨ Throw away all cigarettes at home, at work, and in your car.  ¨ Throw away smoking accessories, such as ashtrays and lighters.  ¨ Clean your car and make sure to empty the ashtray.  ¨ Clean your home, including curtains and carpets.  · Tell your family, friends, and coworkers that you are quitting. Support from your loved ones can make quitting easier.  · Talk with  your health care provider about your options for quitting smoking.  · Find out what treatment options are covered by your health insurance.  What strategies can I use to quit smoking?  Talk with your healthcare provider about different strategies to quit smoking. Some strategies include:  · Quitting smoking altogether instead of gradually lessening how much you smoke over a period of time. Research shows that quitting “cold turkey” is more successful than gradually quitting.  · Attending in-person counseling to help you build problem-solving skills. You are more likely to have success in quitting if you attend several counseling sessions. Even short sessions of 10 minutes can be effective.  · Finding resources and support systems that can help you to quit smoking and remain smoke-free after you quit. These resources are most helpful when you use them often. They can include:  ¨ Online chats with a counselor.  ¨ Telephone quitlines.  ¨ Printed self-help materials.  ¨ Support groups or group counseling.  ¨ Text messaging programs.  ¨ Mobile phone applications.  · Taking medicines to help you quit smoking. (If you are pregnant or breastfeeding, talk with your health care provider first.) Some medicines contain nicotine and some do not. Both types of medicines help with cravings, but the medicines that include nicotine help to relieve withdrawal symptoms. Your health care provider may recommend:  ¨ Nicotine patches, gum, or lozenges.  ¨ Nicotine inhalers or sprays.  ¨ Non-nicotine medicine that is taken by mouth.  Talk with your health care provider about combining strategies, such as taking medicines while you are also receiving in-person counseling. Using these two strategies together makes you more likely to succeed in quitting than if you used either strategy on its own.  If you are pregnant or breastfeeding, talk with your health care provider about finding counseling or other support strategies to quit smoking. Do  not take medicine to help you quit smoking unless told to do so by your health care provider.  What things can I do to make it easier to quit?  Quitting smoking might feel overwhelming at first, but there is a lot that you can do to make it easier. Take these important actions:  · Reach out to your family and friends and ask that they support and encourage you during this time. Call telephone quitlines, reach out to support groups, or work with a counselor for support.  · Ask people who smoke to avoid smoking around you.  · Avoid places that trigger you to smoke, such as bars, parties, or smoke-break areas at work.  · Spend time around people who do not smoke.  · Lessen stress in your life, because stress can be a smoking trigger for some people. To lessen stress, try:  ¨ Exercising regularly.  ¨ Deep-breathing exercises.  ¨ Yoga.  ¨ Meditating.  ¨ Performing a body scan. This involves closing your eyes, scanning your body from head to toe, and noticing which parts of your body are particularly tense. Purposefully relax the muscles in those areas.  · Download or purchase mobile phone or tablet apps (applications) that can help you stick to your quit plan by providing reminders, tips, and encouragement. There are many free apps, such as QuitGuide from the CDC (Centers for Disease Control and Prevention). You can find other support for quitting smoking (smoking cessation) through smokefree.gov and other websites.  How will I feel when I quit smoking?  Within the first 24 hours of quitting smoking, you may start to feel some withdrawal symptoms. These symptoms are usually most noticeable 2-3 days after quitting, but they usually do not last beyond 2-3 weeks. Changes or symptoms that you might experience include:  · Mood swings.  · Restlessness, anxiety, or irritation.  · Difficulty concentrating.  · Dizziness.  · Strong cravings for sugary foods in addition to nicotine.  · Mild weight  gain.  · Constipation.  · Nausea.  · Coughing or a sore throat.  · Changes in how your medicines work in your body.  · A depressed mood.  · Difficulty sleeping (insomnia).  After the first 2-3 weeks of quitting, you may start to notice more positive results, such as:  · Improved sense of smell and taste.  · Decreased coughing and sore throat.  · Slower heart rate.  · Lower blood pressure.  · Clearer skin.  · The ability to breathe more easily.  · Fewer sick days.  Quitting smoking is very challenging for most people. Do not get discouraged if you are not successful the first time. Some people need to make many attempts to quit before they achieve long-term success. Do your best to stick to your quit plan, and talk with your health care provider if you have any questions or concerns.  This information is not intended to replace advice given to you by your health care provider. Make sure you discuss any questions you have with your health care provider.  Document Released: 12/12/2002 Document Revised: 08/15/2017 Document Reviewed: 05/03/2016  [employer] Interactive Patient Education © 2017 [employer]        BMI for Adults    Body mass index (BMI) is a number that is calculated from a person's weight and height. BMI may help to estimate how much of a person's weight is composed of fat. BMI can help identify those who may be at higher risk for certain medical problems.  How is BMI used with adults?  BMI is used as a screening tool to identify possible weight problems. It is used to check whether a person is obese, overweight, healthy weight, or underweight.  How is BMI calculated?  BMI measures your weight and compares it to your height. This can be done either in English (U.S[place] or metric measurements. Note that charts are available to help you find your BMI quickly and easily without having to do these calculations yourself.  To calculate your BMI in English (U.S[place] measurements, your health care provider  "will:  1. Measure your weight in pounds (lb).  2. Multiply the number of pounds by 703.  ? For example, for a person who weighs 180 lb, multiply that number by 703, which equals 126,540.  3. Measure your height in inches (in). Then multiply that number by itself to get a measurement called \"inches squared.\"  ? For example, for a person who is 70 in tall, the \"inches squared\" measurement is 70 in x 70 in, which equals 4900 inches squared.  4. Divide the total from Step 2 (number of lb x 703) by the total from Step 3 (inches squared): 126,540 ÷ 4900 = 25.8. This is your BMI.  To calculate your BMI in metric measurements, your health care provider will:  1. Measure your weight in kilograms (kg).  2. Measure your height in meters (m). Then multiply that number by itself to get a measurement called \"meters squared.\"  ? For example, for a person who is 1.75 m tall, the \"meters squared\" measurement is 1.75 m x 1.75 m, which is equal to 3.1 meters squared.  3. Divide the number of kilograms (your weight) by the meters squared number. In this example: 70 ÷ 3.1 = 22.6. This is your BMI.  How is BMI interpreted?  To interpret your results, your health care provider will use BMI charts to identify whether you are underweight, normal weight, overweight, or obese. The following guidelines will be used:  · Underweight: BMI less than 18.5.  · Normal weight: BMI between 18.5 and 24.9.  · Overweight: BMI between 25 and 29.9.  · Obese: BMI of 30 and above.  Please note:  · Weight includes both fat and muscle, so someone with a muscular build, such as an athlete, may have a BMI that is higher than 24.9. In cases like these, BMI is not an accurate measure of body fat.  · To determine if excess body fat is the cause of a BMI of 25 or higher, further assessments may need to be done by a health care provider.  · BMI is usually interpreted in the same way for men and women.  Why is BMI a useful tool?  BMI is useful in two " ways:  · Identifying a weight problem that may be related to a medical condition, or that may increase the risk for medical problems.  · Promoting lifestyle and diet changes in order to reach a healthy weight.  Summary  · Body mass index (BMI) is a number that is calculated from a person's weight and height.  · BMI may help to estimate how much of a person's weight is composed of fat. BMI can help identify those who may be at higher risk for certain medical problems.  · BMI can be measured using English measurements or metric measurements.  · To interpret your results, your health care provider will use BMI charts to identify whether you are underweight, normal weight, overweight, or obese.  This information is not intended to replace advice given to you by your health care provider. Make sure you discuss any questions you have with your health care provider.  Document Released: 08/29/2005 Document Revised: 10/31/2018 Document Reviewed: 10/31/2018  [employer] Interactive Patient Education © 2019 [employer]

## 2019-12-23 NOTE — PROGRESS NOTES
12/23/2019      Hernandez Marie MD  [address] [place] [address] DR MACHADO Carla  [place] [address]        Gilberto LEYVA Armando  1970    Chief Complaint   Patient presents with   • Follow-up     ER f/u.  Pt was seen in the ER on 12/19/19 with swelling of the left calf and claudication symptoms.         Dear Hernandez Marie MD:    HPI     I had the pleasure of seeing your patient in the office today for follow up.  As you recall, the patient is a 49 y.o. male who we are currently following for peripheral vascular disease.  He had previously been admitted here at [facility] in the early part of this year and was found to have an arterial insufficiency of the left lower extremity.  He underwent angiogram of the left lower extremity with atherectomy, angioplasty, and stenting of the left SFA.  He had done well following this and was seen in the office here in July and noted to have a palpable posterior tibial pulse at that time.  However unfortunately he has continued to smoke despite our previous discussions.  He was recently seen here in the emergency room with new claudication to the left lower extremity at the calf level.  Ultimately arterial duplex was done which appears to show an occlusion of the mid left SFA with reconstitution distally and decreased waveforms beyond this.  He denies any ischemic rest pain.  He is continued on his aspirin as well as his statin since our last visit.  He is otherwise without acute complaint.      Review of Systems   Constitutional: Negative.  Negative for activity change, appetite change, chills, diaphoresis, fatigue and fever.   HENT: Negative.  Negative for congestion, sneezing, sore throat and trouble swallowing.    Eyes: Negative.  Negative for visual disturbance.   Respiratory: Negative.  Negative for chest tightness and shortness of breath.    Cardiovascular: Negative.  Negative for chest pain, palpitations and leg swelling.   Gastrointestinal: Negative.  Negative for abdominal distention,  "abdominal pain, nausea and vomiting.   Endocrine: Negative.    Genitourinary: Negative.    Musculoskeletal: Negative.         He reports claudication to the left calf.   Skin: Negative.    Allergic/Immunologic: Negative.    Neurological: Negative.    Hematological: Negative.    Psychiatric/Behavioral: Negative.        /82   Pulse 95   Ht 172.7 cm (68\")   Wt 102 kg (225 lb)   SpO2 98%   BMI 34.21 kg/m²   Physical Exam   Constitutional: He is oriented to person, place, and time. He appears well-developed and well-nourished.   HENT:   Head: Normocephalic and atraumatic.   Eyes: Pupils are equal, round, and reactive to light. EOM are normal.   Neck: Normal range of motion. Neck supple. No JVD present.   Cardiovascular: Normal rate, regular rhythm and intact distal pulses.   Pulses:       Carotid pulses are 2+ on the right side, and 2+ on the left side.       Radial pulses are 2+ on the right side, and 2+ on the left side.        Femoral pulses are 2+ on the right side, and 2+ on the left side.       Popliteal pulses are 2+ on the right side, and 0 on the left side.        Dorsalis pedis pulses are 2+ on the right side, and 0 on the left side.        Posterior tibial pulses are 2+ on the right side, and 0 on the left side.   Today he has a nonpalpable left DP and PT.  The nonpalpable DP is chronic in nature however he previously did have a palpable left PT pulse at our last visit in July.  He now has only a Doppler signal at the left DP.   Pulmonary/Chest: Effort normal. No respiratory distress.   Abdominal: Soft. He exhibits no distension and no mass. There is no tenderness.   Musculoskeletal: Normal range of motion. He exhibits no edema, tenderness or deformity.   Neurological: He is alert and oriented to person, place, and time. No sensory deficit. He exhibits normal muscle tone.   Skin: On the right is less than 2 seconds and on the left is 2 to 3 seconds..   Psychiatric: He has a normal mood and affect. " His behavior is normal. Judgment and thought content normal.   Vitals reviewed.      DIAGNOSTIC DATA:    Us Arterial Doppler Lower Extremity Left    Result Date: 12/20/2019  Narrative: History: Pain  Comments: Grayscale imaging as well as color flow duplex were used to evaluate the left lower extremity arterial systems.  On the left, the peak systolic velocity in the common femoral artery measured 149cm/s. In the profunda femoris artery measured 118cm/s. In the proximal SFA measured 62.9cm/s. The mid to distal SFA appears occluded. In the distal SFA measured 19.3 cm/s. In the popliteal artery measured 21cm/s. In the posterior tibial artery measured 24cm/s. In the anterior tibial artery measured 23.3cm/s.      Impression: Occluded mid to distal superficial femoral artery with dampened monophasic flow distally.  This report was finalized on 12/20/2019 11:08 by Dr. Carlos Hong MD.      Patient Active Problem List   Diagnosis   • Cellulitis of left foot   • PAD (peripheral artery disease) (CMS/[facility])   • Acute pain   • Essential hypertension   • Hyperlipidemia         ICD-10-CM ICD-9-CM   1. Atherosclerosis of native artery of left lower extremity with intermittent claudication (CMS/HCC) I70.212 440.21   2. Hyperlipidemia, unspecified hyperlipidemia type E78.5 272.4       Lab Frequency Next Occurrence   US Ankle / Brachial Indices Extremity Complete Once 01/20/2020   US Carotid Bilateral Once 01/20/2020       PLAN: After thoroughly evaluating Gilberto Roberts, I believe the best course of action is to proceed with angiogram of the left lower extremity.  Previously at the time of left lower extremity angiogram he had atherectomy, angioplasty, and stenting of the left SFA.  It now appears that his stent has occluded and he is having claudication of the left lower extremity.  However his foot remains warm and he does have a Doppler signal at the left PT.  As such he will need to undergo angiogram and intervention for  revascularization. Risks of angiogram were discussed.  These include, but are not limited to, bleeding, infection, vessel damage, nerve damage, embolus, and loss of limb.  The patient understands these risks and wishes to proceed with procedure.  The patient is to continue taking their medications as previously discussed.   I did discuss vascular risk factors as they pertain to the progression of vascular disease including controlling hyperlipidemia. These factors remain stable. Patient's Body mass index is 34.21 kg/m². BMI is above normal parameters. Recommendations include: educational material.  Unfortunately he also continues to smoke and I did discuss at length with him the need for smoking cessation and without it the risk of recurrent arterial occlusion as well as ultimate limb loss. This was all discussed in full with complete understanding.  Thank you for allowing me to participate in the care of your patient.  Please do not hesitate to call with any questions or concerns.  We will keep you aware of any further encounters with Gilberto Roberts.      Sincerely Yours,      Luis Billings MD

## 2019-12-27 ENCOUNTER — TELEPHONE (OUTPATIENT)
Dept: VASCULAR SURGERY | Facility: CLINIC | Age: 49
End: 2019-12-27

## 2019-12-27 PROBLEM — I70.212 ATHEROSCLEROSIS OF NATIVE ARTERY OF LEFT LOWER EXTREMITY WITH INTERMITTENT CLAUDICATION: Status: ACTIVE | Noted: 2019-12-27

## 2019-12-27 NOTE — TELEPHONE ENCOUNTER
Spoke with patient and advised of upcoming procedure.  Patient pre work is scheduled for 1/3/2020 at 1015 am.  Patient procedure is scheduled for 1/9/2020 at 600 am.  Patient advised of location time and prep.  Patient expressed understanding for all that was discussed. All information mailed to patients confirmed address

## 2020-01-03 ENCOUNTER — APPOINTMENT (OUTPATIENT)
Dept: PREADMISSION TESTING | Facility: HOSPITAL | Age: 50
End: 2020-01-03

## 2020-01-03 VITALS
HEART RATE: 95 BPM | WEIGHT: 224.87 LBS | SYSTOLIC BLOOD PRESSURE: 125 MMHG | DIASTOLIC BLOOD PRESSURE: 73 MMHG | OXYGEN SATURATION: 95 % | RESPIRATION RATE: 16 BRPM | HEIGHT: 68 IN | BODY MASS INDEX: 34.08 KG/M2

## 2020-01-03 DIAGNOSIS — I70.212 ATHEROSCLEROSIS OF NATIVE ARTERY OF LEFT LOWER EXTREMITY WITH INTERMITTENT CLAUDICATION (HCC): ICD-10-CM

## 2020-01-03 LAB
ANION GAP SERPL CALCULATED.3IONS-SCNC: 11 MMOL/L (ref 5–15)
BUN BLD-MCNC: 8 MG/DL (ref 6–20)
BUN/CREAT SERPL: 10.1 (ref 7–25)
CALCIUM SPEC-SCNC: 9.8 MG/DL (ref 8.6–10.5)
CHLORIDE SERPL-SCNC: 99 MMOL/L (ref 98–107)
CO2 SERPL-SCNC: 29 MMOL/L (ref 22–29)
CREAT BLD-MCNC: 0.79 MG/DL (ref 0.76–1.27)
DEPRECATED RDW RBC AUTO: 42 FL (ref 37–54)
ERYTHROCYTE [DISTWIDTH] IN BLOOD BY AUTOMATED COUNT: 12.8 % (ref 12.3–15.4)
GFR SERPL CREATININE-BSD FRML MDRD: 103 ML/MIN/1.73
GLUCOSE BLD-MCNC: 93 MG/DL (ref 65–99)
HCT VFR BLD AUTO: 50.3 % (ref 37.5–51)
HGB BLD-MCNC: 17.5 G/DL (ref 13–17.7)
INR PPP: 0.85 (ref 0.91–1.09)
MCH RBC QN AUTO: 31.2 PG (ref 26.6–33)
MCHC RBC AUTO-ENTMCNC: 34.8 G/DL (ref 31.5–35.7)
MCV RBC AUTO: 89.7 FL (ref 79–97)
PLATELET # BLD AUTO: 347 10*3/MM3 (ref 140–450)
PMV BLD AUTO: 10.4 FL (ref 6–12)
POTASSIUM BLD-SCNC: 3.8 MMOL/L (ref 3.5–5.2)
PROTHROMBIN TIME: 11.9 SECONDS (ref 11.9–14.6)
RBC # BLD AUTO: 5.61 10*6/MM3 (ref 4.14–5.8)
SODIUM BLD-SCNC: 139 MMOL/L (ref 136–145)
WBC NRBC COR # BLD: 11.67 10*3/MM3 (ref 3.4–10.8)

## 2020-01-03 PROCEDURE — 85610 PROTHROMBIN TIME: CPT | Performed by: SURGERY

## 2020-01-03 PROCEDURE — 93005 ELECTROCARDIOGRAM TRACING: CPT

## 2020-01-03 PROCEDURE — 80048 BASIC METABOLIC PNL TOTAL CA: CPT | Performed by: SURGERY

## 2020-01-03 PROCEDURE — 36415 COLL VENOUS BLD VENIPUNCTURE: CPT

## 2020-01-03 PROCEDURE — 93010 ELECTROCARDIOGRAM REPORT: CPT | Performed by: INTERNAL MEDICINE

## 2020-01-03 PROCEDURE — 85027 COMPLETE CBC AUTOMATED: CPT | Performed by: SURGERY

## 2020-01-03 NOTE — DISCHARGE INSTRUCTIONS
DAY OF SURGERY INSTRUCTIONS        YOUR SURGEON: *** ELAN SR    PROCEDURE: ***ANGIOGRAM    DATE OF SURGERY: ***January 9,2020    ARRIVAL TIME: AS DIRECTED BY OFFICE    YOU MAY TAKE THE FOLLOWING MEDICATION(S) THE MORNING OF SURGERY WITH A SIP OF WATER: ***NONE      ALL OTHER HOME MEDICATION CHECK WITH YOUR PHYSICIAN                MANAGING PAIN AFTER SURGERY    We know you are probably wondering what your pain will be like after surgery.  Following surgery it is unrealistic to expect you will not have pain.   Pain is how our bodies let us know that something is wrong or cautions us to be careful.  That said, our goal is to make your pain tolerable.    Methods we may use to treat your pain include (oral or IV medications, PCAs, epidurals, nerve blocks, etc.)   While some procedures require IV pain medications for a short time after surgery, transitioning to pain medications by mouth allows for better management of pain.   Your nurse will encourage you to take oral pain medications whenever possible.  IV medications work almost immediately, but only last a short while.  Taking medications by mouth allows for a more constant level of medication in your blood stream for a longer period of time.      Once your pain is out of control it is harder to get back under control.  It is important you are aware when your next dose of pain medication is due.  If you are admitted, your nurse may write the time of your next dose on the white board in your room to help you remember.      We are interested in your pain and encourage you to inform us about aggravating factors during your visit.   Many times a simple repositioning every few hours can make a big difference.    If your physician says it is okay, do not let your pain prevent you from getting out of bed. Be sure to call your nurse for assistance prior to getting up so you do not fall.      Before surgery, please decide your tolerable pain goal.  These faces help  describe the pain ratings we use on a 0-10 scale.   Be prepared to tell us your goal and whether or not you take pain or anxiety medications at home.          BEFORE YOU COME TO THE HOSPITAL  (Pre-op instructions)  • Do not eat, drink, smoke or chew gum after midnight the night before surgery.  This also includes no mints.  • Morning of surgery take only the medicines you have been instructed with a sip of water unless otherwise instructed  by your physician.  • Do not shave, wear makeup or dark nail polish.  • Remove all jewelry including rings.  • Leave anything you consider valuable at home.  • Leave your suitcase in the car until after your surgery.  • Bring the following with you if applicable:  o Picture ID and insurance, Medicare or Medicaid cards  o Co-pay/deductible required by insurance (cash, check, credit card)  o Copy of advance directive, living will or power-of- documents if not brought to PAT  o CPAP or BIPAP mask and tubing  o Relaxation aids ( book, magazine), etc.  o Hearing aids                        ON THE DAY OF SURGERY  · On the day of surgery check in at registration located at the main entrance of the hospital.   ? You will be registered and given a beeper with instructions where to wait in the main lobby.  ? When your beeper lights up and vibrates a member of the Outpatient Surgery staff will meet you at the double doors under the stair steps and escort you to your preoperative room.   · You may have cloth compression devices placed on your legs. These help to prevent blood clots and reduce swelling in your legs.  · An IV may be inserted into one of your veins.  · In the operating room, you may be given one or more of the following:  ? A medicine to help you relax (sedative).  ? A medicine to numb the area (local anesthetic).  ? A medicine to make you fall asleep (general anesthetic).  ? A medicine that is injected into an area of your body to numb everything below the injection  "site (regional anesthetic).  · Your surgical site will be marked or identified.  · You may be given an antibiotic through your IV to help prevent infection.  Contact a health care provider if you:  · Develop a fever of more than 100.4°F (38°C) or other feelings of illness during the 48 hours before your surgery.  · Have symptoms that get worse.  Have questions or concerns about your surgery    General Anesthesia/Surgery, Adult  General anesthesia is the use of medicines to make a person \"go to sleep\" (unconscious) for a medical procedure. General anesthesia must be used for certain procedures, and is often recommended for procedures that:  · Last a long time.  · Require you to be still or in an unusual position.  · Are major and can cause blood loss.  The medicines used for general anesthesia are called general anesthetics. As well as making you unconscious for a certain amount of time, these medicines:  · Prevent pain.  · Control your blood pressure.  · Relax your muscles.  Tell a health care provider about:  · Any allergies you have.  · All medicines you are taking, including vitamins, herbs, eye drops, creams, and over-the-counter medicines.  · Any problems you or family members have had with anesthetic medicines.  · Types of anesthetics you have had in the past.  · Any blood disorders you have.  · Any surgeries you have had.  · Any medical conditions you have.  · Any recent upper respiratory, chest, or ear infections.  · Any history of:  ? Heart or lung conditions, such as heart failure, sleep apnea, asthma, or chronic obstructive pulmonary disease (COPD).  ?  service.  ? Depression or anxiety.  · Any tobacco or drug use, including marijuana or alcohol use.  · Whether you are pregnant or may be pregnant.  What are the risks?  Generally, this is a safe procedure. However, problems may occur, including:  · Allergic reaction.  · Lung and heart problems.  · Inhaling food or liquid from the stomach into the " lungs (aspiration).  · Nerve injury.  · Air in the bloodstream, which can lead to stroke.  · Extreme agitation or confusion (delirium) when you wake up from the anesthetic.  · Waking up during your procedure and being unable to move. This is rare.  These problems are more likely to develop if you are having a major surgery or if you have an advanced or serious medical condition. You can prevent some of these complications by answering all of your health care provider's questions thoroughly and by following all instructions before your procedure.  General anesthesia can cause side effects, including:  · Nausea or vomiting.  · A sore throat from the breathing tube.  · Hoarseness.  · Wheezing or coughing.  · Shaking chills.  · Tiredness.  · Body aches.  · Anxiety.  · Sleepiness or drowsiness.  · Confusion or agitation.  RISKS AND COMPLICATIONS OF SURGERY  Your health care provider will discuss possible risks and complications with you before surgery. Common risks and complications include:    · Problems due to the use of anesthetics.  · Blood loss and replacement (does not apply to minor surgical procedures).  · Temporary increase in pain due to surgery.  · Uncorrected pain or problems that the surgery was meant to correct.  · Infection.  · New damage.    What happens before the procedure?    Medicines  Ask your health care provider about:  · Changing or stopping your regular medicines. This is especially important if you are taking diabetes medicines or blood thinners.  · Taking medicines such as aspirin and ibuprofen. These medicines can thin your blood. Do not take these medicines unless your health care provider tells you to take them.  · Taking over-the-counter medicines, vitamins, herbs, and supplements. Do not take these during the week before your procedure unless your health care provider approves them.  General instructions  · Starting 3-6 weeks before the procedure, do not use any products that contain  nicotine or tobacco, such as cigarettes and e-cigarettes. If you need help quitting, ask your health care provider.  · If you brush your teeth on the morning of the procedure, make sure to spit out all of the toothpaste.  · Tell your health care provider if you become ill or develop a cold, cough, or fever.  · If instructed by your health care provider, bring your sleep apnea device with you on the day of your surgery (if applicable).  · Ask your health care provider if you will be going home the same day, the following day, or after a longer hospital stay.  ? Plan to have someone take you home from the hospital or clinic.  ? Plan to have a responsible adult care for you for at least 24 hours after you leave the hospital or clinic. This is important.  What happens during the procedure?  · You will be given anesthetics through both of the following:  ? A mask placed over your nose and mouth.  ? An IV in one of your veins.  · You may receive a medicine to help you relax (sedative).  · After you are unconscious, a breathing tube may be inserted down your throat to help you breathe. This will be removed before you wake up.  · An anesthesia specialist will stay with you throughout your procedure. He or she will:  ? Keep you comfortable and safe by continuing to give you medicines and adjusting the amount of medicine that you get.  ? Monitor your blood pressure, pulse, and oxygen levels to make sure that the anesthetics do not cause any problems.  The procedure may vary among health care providers and hospitals.  What happens after the procedure?  · Your blood pressure, temperature, heart rate, breathing rate, and blood oxygen level will be monitored until the medicines you were given have worn off.  · You will wake up in a recovery area. You may wake up slowly.  · If you feel anxious or agitated, you may be given medicine to help you calm down.  · If you will be going home the same day, your health care provider may  check to make sure you can walk, drink, and urinate.  · Your health care provider will treat any pain or side effects you have before you go home.  · Do not drive for 24 hours if you were given a sedative.  Summary  · General anesthesia is used to keep you still and prevent pain during a procedure.  · It is important to tell your healthcare provider about your medical history and any surgeries you have had, and previous experience with anesthesia.  · Follow your healthcare provider’s instructions about when to stop eating, drinking, or taking certain medicines before your procedure.  · Plan to have someone take you home from the hospital or clinic.  This information is not intended to replace advice given to you by your health care provider. Make sure you discuss any questions you have with your health care provider.  Document Released: 03/26/2009 Document Revised: 08/03/2018 Document Reviewed: 08/03/2018  [employer] Interactive Patient Education © 2019 [employer]       Fall Prevention in Hospitals, Adult  As a hospital patient, your condition and the treatments you receive can increase your risk for falls. Some additional risk factors for falls in a hospital include:  · Being in an unfamiliar environment.  · Being on bed rest.  · Your surgery.  · Taking certain medicines.  · Your tubing requirements, such as intravenous (IV) therapy or catheters.  It is important that you learn how to decrease fall risks while at the hospital. Below are important tips that can help prevent falls.  SAFETY TIPS FOR PREVENTING FALLS  Talk about your risk of falling.  · Ask your health care provider why you are at risk for falling. Is it your medicine, illness, tubing placement, or something else?  · Make a plan with your health care provider to keep you safe from falls.  · Ask your health care provider or pharmacist about side effects of your medicines. Some medicines can make you dizzy or affect your coordination.  Ask for  help.  · Ask for help before getting out of bed. You may need to press your call button.  · Ask for assistance in getting safely to the toilet.  · Ask for a walker or cane to be put at your bedside. Ask that most of the side rails on your bed be placed up before your health care provider leaves the room.  · Ask family or friends to sit with you.  · Ask for things that are out of your reach, such as your glasses, hearing aids, telephone, bedside table, or call button.  Follow these tips to avoid falling:  · Stay lying or seated, rather than standing, while waiting for help.  · Wear rubber-soled slippers or shoes whenever you walk in the hospital.  · Avoid quick, sudden movements.  ¨ Change positions slowly.  ¨ Sit on the side of your bed before standing.  ¨ Stand up slowly and wait before you start to walk.  · Let your health care provider know if there is a spill on the floor.  · Pay careful attention to the medical equipment, electrical cords, and tubes around you.  · When you need help, use your call button by your bed or in the bathroom. Wait for one of your health care providers to help you.  · If you feel dizzy or unsure of your footing, return to bed and wait for assistance.  · Avoid being distracted by the TV, telephone, or another person in your room.  · Do not lean or support yourself on rolling objects, such as IV poles or bedside tables.     This information is not intended to replace advice given to you by your health care provider. Make sure you discuss any questions you have with your health care provider.     Document Released: 12/15/2001 Document Revised: 01/08/2016 Document Reviewed: 08/25/2013  [employer] Interactive Patient Education ©2016 [employer]       Surgical Site Infections FAQs  What is a Surgical Site Infection (SSI)?  A surgical site infection is an infection that occurs after surgery in the part of the body where the surgery took place. Most patients who have surgery do not develop an  infection. However, infections develop in about 1 to 3 out of every 100 patients who have surgery.  Some of the common symptoms of a surgical site infection are:  · Redness and pain around the area where you had surgery  · Drainage of cloudy fluid from your surgical wound  · Fever  Can SSIs be treated?  Yes. Most surgical site infections can be treated with antibiotics. The antibiotic given to you depends on the bacteria (germs) causing the infection. Sometimes patients with SSIs also need another surgery to treat the infection.  What are some of the things that hospitals are doing to prevent SSIs?  To prevent SSIs, doctors, nurses, and other healthcare providers:  · Clean their hands and arms up to their elbows with an antiseptic agent just before the surgery.  · Clean their hands with soap and water or an alcohol-based hand rub before and after caring for each patient.  · May remove some of your hair immediately before your surgery using electric clippers if the hair is in the same area where the procedure will occur. They should not shave you with a razor.  · Wear special hair covers, masks, gowns, and gloves during surgery to keep the surgery area clean.  · Give you antibiotics before your surgery starts. In most cases, you should get antibiotics within 60 minutes before the surgery starts and the antibiotics should be stopped within 24 hours after surgery.  · Clean the skin at the site of your surgery with a special soap that kills germs.  What can I do to help prevent SSIs?  Before your surgery:  · Tell your doctor about other medical problems you may have. Health problems such as allergies, diabetes, and obesity could affect your surgery and your treatment.  · Quit smoking. Patients who smoke get more infections. Talk to your doctor about how you can quit before your surgery.  · Do not shave near where you will have surgery. Shaving with a razor can irritate your skin and make it easier to develop an  infection.  At the time of your surgery:  · Speak up if someone tries to shave you with a razor before surgery. Ask why you need to be shaved and talk with your surgeon if you have any concerns.  · Ask if you will get antibiotics before surgery.  After your surgery:  · Make sure that your healthcare providers clean their hands before examining you, either with soap and water or an alcohol-based hand rub.  · If you do not see your providers clean their hands, please ask them to do so.  · Family and friends who visit you should not touch the surgical wound or dressings.  · Family and friends should clean their hands with soap and water or an alcohol-based hand rub before and after visiting you. If you do not see them clean their hands, ask them to clean their hands.  What do I need to do when I go home from the hospital?  · Before you go home, your doctor or nurse should explain everything you need to know about taking care of your wound. Make sure you understand how to care for your wound before you leave the hospital.  · Always clean your hands before and after caring for your wound.  · Before you go home, make sure you know who to contact if you have questions or problems after you get home.  · If you have any symptoms of an infection, such as redness and pain at the surgery site, drainage, or fever, call your doctor immediately.  If you have additional questions, please ask your doctor or nurse.  Developed and co-sponsored by The Society for Healthcare Epidemiology of [place] (SHEA); Infectious Diseases Society of [place] (IDSA); American Hospital Association; Association for Professionals in Infection Control and Epidemiology (APIC); Centers for Disease Control and Prevention (CDC); and The Joint Commission.     This information is not intended to replace advice given to you by your health care provider. Make sure you discuss any questions you have with your health care provider.     Document Released: 12/23/2014  Document Revised: 01/08/2016 Document Reviewed: 03/02/2016  [employer] Interactive Patient Education ©2016 [employer]           [facility]  CHG 4% Patient Instruction Sheet    Chlorhexidine Before Surgery  Chlorhexidine gluconate (CHG) is a germ-killing (antiseptic) solution that is used to clean the skin. It gets rid of the bacteria that normally live on the skin. Cleaning your skin with CHG before surgery helps lower the risk for infection after surgery.    How to use CHG solution  · You will take 2 showers, one shower the night before surgery, the second shower the morning of surgery before coming to the hospital.  · Use CHG only as told by your health care provider, and follow the instructions on the label.  · Use CHG solution while taking a shower. Follow these steps when using CHG solution (unless your health care provider gives you different instructions):  1. Start the shower.  2. Use your normal soap and shampoo to wash your face and hair.  3. Turn off the shower or move out of the shower stream.  4. Pour the CHG onto a clean washcloth. Do not use any type of brush or rough-edged sponge.  5. Starting at your neck, lather your body down to your toes. Make sure you:  6. Pay special attention to the part of your body where you will be having surgery. Scrub this area for at least 1 minute.  7. Use the full amount of CHG as directed. Usually, this is one half bottle for each shower.  8. Do not use CHG on your head or face. If the solution gets into your ears or eyes, rinse them well with water.  9. Avoid your genital area.  10. Avoid any areas of skin that have broken skin, cuts, or scrapes.  11. Scrub your back and under your arms. Make sure to wash skin folds.  12. Let the lather sit on your skin for 1-2 minutes or as long as told by your health care  provider.  13. Thoroughly rinse your entire body in the shower. Make sure that all body creases and crevices are rinsed well.  14. Dry off with a  clean towel. Do not put any substances on your body afterward, such as powder, lotion, or perfume.  15. Put on clean clothes or pajamas.  16. If it is the night before your surgery, sleep in clean sheets.    What are the risks?  Risks of using CHG include:  · A skin reaction.  · Hearing loss, if CHG gets in your ears.  · Eye injury, if CHG gets in your eyes and is not rinsed out.  · The CHG product catching fire.  Make sure that you avoid smoking and flames after applying CHG to your skin.  Do not use CHG:  · If you have a chlorhexidine allergy or have previously reacted to chlorhexidine.  · On babies younger than 2 months of age.      On the day of surgery, when you are taken to your room in Outpatient Surgery you will be given a CHG prepackaged cloth to wipe the site for your surgery.  How to use CHG prepackaged cloths  · Follow the instructions on the label.  · Use the CHG cloth on clean, dry skin. Follow these steps when using a CHG cloth (unless your health care provider gives you different instructions):  1. Using the CHG cloth, vigorously scrub the part of your body where you will be having surgery. Scrub using a back-and-forth motion for 3 minutes. The area on your body should be completely wet with CHG when you are finished scrubbing.  2. Do not rinse. Discard the cloth and let the area air-dry for 1 minute. Do not put any substances on your body afterward, such as powder, lotion, or perfume.  Contact a health care provider if:  · Your skin gets irritated after scrubbing.  · You have questions about using your solution or cloth.  Get help right away if:  · Your eyes become very red or swollen.  · Your eyes itch badly.  · Your skin itches badly and is red or swollen.  · Your hearing changes.  · You have trouble seeing.  · You have swelling or tingling in your mouth or throat.  · You have trouble breathing.  · You swallow any chlorhexidine.  Summary  · Chlorhexidine gluconate (CHG) is a germ-killing  (antiseptic) solution that is used to clean the skin. Cleaning your skin with CHG before surgery helps lower the risk for infection after surgery.  · You may be given CHG to use at home. It may be in a bottle or in a prepackaged cloth to use on your skin. Carefully follow your health care provider's instructions and the instructions on the product label.  · Do not use CHG if you have a chlorhexidine allergy.  · Contact your health care provider if your skin gets irritated after scrubbing.  This information is not intended to replace advice given to you by your health care provider. Make sure you discuss any questions you have with your health care provider.  Document Released: 09/11/2013 Document Revised: 11/15/2018 Document Reviewed: 11/15/2018  [employer] Interactive Patient Education © 2019 [employer]          PATIENT/FAMILY/RESPONSIBLE PARTY VERBALIZES UNDERSTANDING OF ABOVE EDUCATION.  COPY OF PAIN SCALE GIVEN AND REVIEWED WITH VERBALIZED UNDERSTANDING.

## 2020-01-08 ENCOUNTER — APPOINTMENT (OUTPATIENT)
Dept: ULTRASOUND IMAGING | Facility: HOSPITAL | Age: 50
End: 2020-01-08

## 2020-01-08 ENCOUNTER — TELEPHONE (OUTPATIENT)
Dept: VASCULAR SURGERY | Facility: CLINIC | Age: 50
End: 2020-01-08

## 2020-01-08 NOTE — TELEPHONE ENCOUNTER
Left message reminding patient of arrival time tomorrow of 600 am for his procedure with Dr. Hong.  Left location for patient to report to.

## 2020-01-09 ENCOUNTER — HOSPITAL ENCOUNTER (OUTPATIENT)
Facility: HOSPITAL | Age: 50
Discharge: HOME | End: 2020-01-09
Attending: SURGERY | Admitting: SURGERY

## 2020-01-09 ENCOUNTER — APPOINTMENT (OUTPATIENT)
Dept: INTERVENTIONAL RADIOLOGY/VASCULAR | Facility: HOSPITAL | Age: 50
End: 2020-01-09

## 2020-01-09 ENCOUNTER — ANESTHESIA EVENT (OUTPATIENT)
Dept: PERIOP | Facility: HOSPITAL | Age: 50
End: 2020-01-09

## 2020-01-09 ENCOUNTER — ANESTHESIA (OUTPATIENT)
Dept: PERIOP | Facility: HOSPITAL | Age: 50
End: 2020-01-09

## 2020-01-09 VITALS
SYSTOLIC BLOOD PRESSURE: 119 MMHG | DIASTOLIC BLOOD PRESSURE: 86 MMHG | HEART RATE: 84 BPM | RESPIRATION RATE: 16 BRPM | OXYGEN SATURATION: 94 % | TEMPERATURE: 97.8 F

## 2020-01-09 DIAGNOSIS — I70.212 ATHEROSCLEROSIS OF NATIVE ARTERY OF LEFT LOWER EXTREMITY WITH INTERMITTENT CLAUDICATION (HCC): ICD-10-CM

## 2020-01-09 PROBLEM — I70.209 ATHEROSCLEROSIS OF NATIVE ARTERY OF EXTREMITY: Status: ACTIVE | Noted: 2020-01-09

## 2020-01-09 LAB
ABO GROUP BLD: NORMAL
BLD GP AB SCN SERPL QL: NEGATIVE
RH BLD: POSITIVE
T&S EXPIRATION DATE: NORMAL

## 2020-01-09 PROCEDURE — C2623 CATH, TRANSLUMIN, DRUG-COAT: HCPCS | Performed by: SURGERY

## 2020-01-09 PROCEDURE — 25010000002 FENTANYL CITRATE (PF) 100 MCG/2ML SOLUTION: Performed by: NURSE ANESTHETIST, CERTIFIED REGISTERED

## 2020-01-09 PROCEDURE — 37227 PR REVSC OPN/PRQ FEM/POP W/STNT/ATHRC/ANGIOP SM VSL: CPT | Performed by: SURGERY

## 2020-01-09 PROCEDURE — 75710 ARTERY X-RAYS ARM/LEG: CPT

## 2020-01-09 PROCEDURE — C1714 CATH, TRANS ATHERECTOMY, DIR: HCPCS | Performed by: SURGERY

## 2020-01-09 PROCEDURE — 86900 BLOOD TYPING SEROLOGIC ABO: CPT | Performed by: SURGERY

## 2020-01-09 PROCEDURE — 86901 BLOOD TYPING SEROLOGIC RH(D): CPT | Performed by: SURGERY

## 2020-01-09 PROCEDURE — 34703 EVASC RPR A-UNILAC NDGFT: CPT | Performed by: SURGERY

## 2020-01-09 PROCEDURE — 25010000002 PROPOFOL 10 MG/ML EMULSION: Performed by: NURSE ANESTHETIST, CERTIFIED REGISTERED

## 2020-01-09 PROCEDURE — C1884 EMBOLIZATION PROTECT SYST: HCPCS | Performed by: SURGERY

## 2020-01-09 PROCEDURE — C1760 CLOSURE DEV, VASC: HCPCS | Performed by: SURGERY

## 2020-01-09 PROCEDURE — 25010000002 MIDAZOLAM PER 1 MG: Performed by: ANESTHESIOLOGY

## 2020-01-09 PROCEDURE — C1876 STENT, NON-COA/NON-COV W/DEL: HCPCS | Performed by: SURGERY

## 2020-01-09 PROCEDURE — 25010000002 IOPAMIDOL 61 % SOLUTION: Performed by: SURGERY

## 2020-01-09 PROCEDURE — 25010000002 HEPARIN (PORCINE) PER 1000 UNITS: Performed by: SURGERY

## 2020-01-09 PROCEDURE — 76000 FLUOROSCOPY <1 HR PHYS/QHP: CPT

## 2020-01-09 PROCEDURE — C1894 INTRO/SHEATH, NON-LASER: HCPCS | Performed by: SURGERY

## 2020-01-09 PROCEDURE — C1887 CATHETER, GUIDING: HCPCS | Performed by: SURGERY

## 2020-01-09 PROCEDURE — 86850 RBC ANTIBODY SCREEN: CPT | Performed by: SURGERY

## 2020-01-09 PROCEDURE — C1769 GUIDE WIRE: HCPCS | Performed by: SURGERY

## 2020-01-09 PROCEDURE — 25010000002 HEPARIN (PORCINE) PER 1000 UNITS: Performed by: NURSE ANESTHETIST, CERTIFIED REGISTERED

## 2020-01-09 PROCEDURE — 25010000003 LIDOCAINE 1 % SOLUTION: Performed by: SURGERY

## 2020-01-09 PROCEDURE — C1725 CATH, TRANSLUMIN NON-LASER: HCPCS | Performed by: SURGERY

## 2020-01-09 DEVICE — STNT PERIPH EVERFLEX ENTRUST 5F 6X150MM 120CM: Type: IMPLANTABLE DEVICE | Site: LEG | Status: FUNCTIONAL

## 2020-01-09 DEVICE — STNT PERIPH EVERFLEX ENTRUST 5F 6X60MM 120CM: Type: IMPLANTABLE DEVICE | Site: LEG | Status: FUNCTIONAL

## 2020-01-09 RX ORDER — MIDAZOLAM HYDROCHLORIDE 1 MG/ML
2 INJECTION INTRAMUSCULAR; INTRAVENOUS
Status: DISCONTINUED | OUTPATIENT
Start: 2020-01-09 | End: 2020-01-09 | Stop reason: HOSPADM

## 2020-01-09 RX ORDER — SODIUM CHLORIDE 0.9 % (FLUSH) 0.9 %
3 SYRINGE (ML) INJECTION AS NEEDED
Status: DISCONTINUED | OUTPATIENT
Start: 2020-01-09 | End: 2020-01-09 | Stop reason: HOSPADM

## 2020-01-09 RX ORDER — OXYCODONE HYDROCHLORIDE AND ACETAMINOPHEN 5; 325 MG/1; MG/1
1 TABLET ORAL EVERY 6 HOURS PRN
Qty: 12 TABLET | Refills: 0 | Status: SHIPPED | OUTPATIENT
Start: 2020-01-09 | End: 2020-01-12

## 2020-01-09 RX ORDER — LIDOCAINE HYDROCHLORIDE 10 MG/ML
INJECTION, SOLUTION INFILTRATION; PERINEURAL AS NEEDED
Status: DISCONTINUED | OUTPATIENT
Start: 2020-01-09 | End: 2020-01-09 | Stop reason: HOSPADM

## 2020-01-09 RX ORDER — CLOPIDOGREL BISULFATE 75 MG/1
75 TABLET ORAL DAILY
Qty: 30 TABLET | Refills: 3 | Status: SHIPPED | OUTPATIENT
Start: 2020-01-09 | End: 2020-04-14

## 2020-01-09 RX ORDER — CLOPIDOGREL BISULFATE 75 MG/1
150 TABLET ORAL ONCE
Status: COMPLETED | OUTPATIENT
Start: 2020-01-09 | End: 2020-01-09

## 2020-01-09 RX ORDER — IPRATROPIUM BROMIDE AND ALBUTEROL SULFATE 2.5; .5 MG/3ML; MG/3ML
3 SOLUTION RESPIRATORY (INHALATION) ONCE AS NEEDED
Status: DISCONTINUED | OUTPATIENT
Start: 2020-01-09 | End: 2020-01-09 | Stop reason: HOSPADM

## 2020-01-09 RX ORDER — BUPIVACAINE HCL/0.9 % NACL/PF 0.1 %
2 PLASTIC BAG, INJECTION (ML) EPIDURAL ONCE
Status: COMPLETED | OUTPATIENT
Start: 2020-01-09 | End: 2020-01-09

## 2020-01-09 RX ORDER — SODIUM CHLORIDE, SODIUM LACTATE, POTASSIUM CHLORIDE, CALCIUM CHLORIDE 600; 310; 30; 20 MG/100ML; MG/100ML; MG/100ML; MG/100ML
1000 INJECTION, SOLUTION INTRAVENOUS CONTINUOUS
Status: DISCONTINUED | OUTPATIENT
Start: 2020-01-09 | End: 2020-01-09 | Stop reason: HOSPADM

## 2020-01-09 RX ORDER — IBUPROFEN 600 MG/1
600 TABLET ORAL ONCE AS NEEDED
Status: COMPLETED | OUTPATIENT
Start: 2020-01-09 | End: 2020-01-09

## 2020-01-09 RX ORDER — SODIUM CHLORIDE 0.9 % (FLUSH) 0.9 %
10 SYRINGE (ML) INJECTION EVERY 12 HOURS SCHEDULED
Status: DISCONTINUED | OUTPATIENT
Start: 2020-01-09 | End: 2020-01-09 | Stop reason: HOSPADM

## 2020-01-09 RX ORDER — FENTANYL CITRATE 50 UG/ML
25 INJECTION, SOLUTION INTRAMUSCULAR; INTRAVENOUS AS NEEDED
Status: DISCONTINUED | OUTPATIENT
Start: 2020-01-09 | End: 2020-01-09 | Stop reason: HOSPADM

## 2020-01-09 RX ORDER — SODIUM CHLORIDE 0.9 % (FLUSH) 0.9 %
10 SYRINGE (ML) INJECTION AS NEEDED
Status: DISCONTINUED | OUTPATIENT
Start: 2020-01-09 | End: 2020-01-09 | Stop reason: HOSPADM

## 2020-01-09 RX ORDER — HYDROCODONE BITARTRATE AND ACETAMINOPHEN 5; 325 MG/1; MG/1
1 TABLET ORAL ONCE AS NEEDED
Status: DISCONTINUED | OUTPATIENT
Start: 2020-01-09 | End: 2020-01-09 | Stop reason: HOSPADM

## 2020-01-09 RX ORDER — FENTANYL CITRATE 50 UG/ML
INJECTION, SOLUTION INTRAMUSCULAR; INTRAVENOUS AS NEEDED
Status: DISCONTINUED | OUTPATIENT
Start: 2020-01-09 | End: 2020-01-09 | Stop reason: SURG

## 2020-01-09 RX ORDER — ASPIRIN 81 MG/1
81 TABLET, CHEWABLE ORAL ONCE
Status: COMPLETED | OUTPATIENT
Start: 2020-01-09 | End: 2020-01-09

## 2020-01-09 RX ORDER — HEPARIN SODIUM 1000 [USP'U]/ML
INJECTION, SOLUTION INTRAVENOUS; SUBCUTANEOUS AS NEEDED
Status: DISCONTINUED | OUTPATIENT
Start: 2020-01-09 | End: 2020-01-09 | Stop reason: SURG

## 2020-01-09 RX ORDER — DEXTROSE MONOHYDRATE 25 G/50ML
12.5 INJECTION, SOLUTION INTRAVENOUS AS NEEDED
Status: DISCONTINUED | OUTPATIENT
Start: 2020-01-09 | End: 2020-01-09 | Stop reason: HOSPADM

## 2020-01-09 RX ORDER — NALOXONE HCL 0.4 MG/ML
0.4 VIAL (ML) INJECTION AS NEEDED
Status: DISCONTINUED | OUTPATIENT
Start: 2020-01-09 | End: 2020-01-09 | Stop reason: HOSPADM

## 2020-01-09 RX ORDER — OXYCODONE AND ACETAMINOPHEN 10; 325 MG/1; MG/1
1 TABLET ORAL ONCE AS NEEDED
Status: DISCONTINUED | OUTPATIENT
Start: 2020-01-09 | End: 2020-01-09 | Stop reason: HOSPADM

## 2020-01-09 RX ORDER — ONDANSETRON 2 MG/ML
4 INJECTION INTRAMUSCULAR; INTRAVENOUS ONCE AS NEEDED
Status: DISCONTINUED | OUTPATIENT
Start: 2020-01-09 | End: 2020-01-09 | Stop reason: HOSPADM

## 2020-01-09 RX ORDER — FENTANYL CITRATE 50 UG/ML
25 INJECTION, SOLUTION INTRAMUSCULAR; INTRAVENOUS
Status: DISCONTINUED | OUTPATIENT
Start: 2020-01-09 | End: 2020-01-09 | Stop reason: HOSPADM

## 2020-01-09 RX ORDER — METOCLOPRAMIDE HYDROCHLORIDE 5 MG/ML
5 INJECTION INTRAMUSCULAR; INTRAVENOUS
Status: DISCONTINUED | OUTPATIENT
Start: 2020-01-09 | End: 2020-01-09 | Stop reason: HOSPADM

## 2020-01-09 RX ORDER — LABETALOL HYDROCHLORIDE 5 MG/ML
5 INJECTION, SOLUTION INTRAVENOUS
Status: DISCONTINUED | OUTPATIENT
Start: 2020-01-09 | End: 2020-01-09 | Stop reason: HOSPADM

## 2020-01-09 RX ORDER — OXYCODONE AND ACETAMINOPHEN 7.5; 325 MG/1; MG/1
2 TABLET ORAL EVERY 4 HOURS PRN
Status: DISCONTINUED | OUTPATIENT
Start: 2020-01-09 | End: 2020-01-09 | Stop reason: HOSPADM

## 2020-01-09 RX ORDER — MIDAZOLAM HYDROCHLORIDE 1 MG/ML
1 INJECTION INTRAMUSCULAR; INTRAVENOUS
Status: DISCONTINUED | OUTPATIENT
Start: 2020-01-09 | End: 2020-01-09 | Stop reason: HOSPADM

## 2020-01-09 RX ORDER — SODIUM CHLORIDE, SODIUM LACTATE, POTASSIUM CHLORIDE, CALCIUM CHLORIDE 600; 310; 30; 20 MG/100ML; MG/100ML; MG/100ML; MG/100ML
9 INJECTION, SOLUTION INTRAVENOUS CONTINUOUS
Status: DISCONTINUED | OUTPATIENT
Start: 2020-01-09 | End: 2020-01-09 | Stop reason: HOSPADM

## 2020-01-09 RX ADMIN — HEPARIN SODIUM 5000 UNITS: 1000 INJECTION, SOLUTION INTRAVENOUS; SUBCUTANEOUS at 09:32

## 2020-01-09 RX ADMIN — Medication 2 G: at 09:14

## 2020-01-09 RX ADMIN — MIDAZOLAM 2 MG: 1 INJECTION INTRAMUSCULAR; INTRAVENOUS at 08:55

## 2020-01-09 RX ADMIN — PROPOFOL 100 MCG/KG/MIN: 10 INJECTION, EMULSION INTRAVENOUS at 09:08

## 2020-01-09 RX ADMIN — FENTANYL CITRATE 100 MCG: 50 INJECTION, SOLUTION INTRAMUSCULAR; INTRAVENOUS at 09:08

## 2020-01-09 RX ADMIN — CLOPIDOGREL 150 MG: 75 TABLET, FILM COATED ORAL at 11:16

## 2020-01-09 RX ADMIN — SODIUM CHLORIDE, POTASSIUM CHLORIDE, SODIUM LACTATE AND CALCIUM CHLORIDE: 600; 310; 30; 20 INJECTION, SOLUTION INTRAVENOUS at 09:05

## 2020-01-09 RX ADMIN — OXYCODONE HYDROCHLORIDE AND ACETAMINOPHEN 2 TABLET: 7.5; 325 TABLET ORAL at 11:29

## 2020-01-09 RX ADMIN — IBUPROFEN 600 MG: 600 TABLET ORAL at 15:46

## 2020-01-09 RX ADMIN — HEPARIN SODIUM 1000 UNITS: 1000 INJECTION, SOLUTION INTRAVENOUS; SUBCUTANEOUS at 09:24

## 2020-01-09 RX ADMIN — ASPIRIN 81 MG 81 MG: 81 TABLET ORAL at 11:17

## 2020-01-09 NOTE — ANESTHESIA PREPROCEDURE EVALUATION
Anesthesia Evaluation     Patient summary reviewed and Nursing notes reviewed   no history of anesthetic complications:  NPO Solid Status: > 8 hours             Airway   Mallampati: III  TM distance: >3 FB  No difficulty expected  Dental      Pulmonary    (+) a smoker Current,   (-) COPD, asthma, sleep apnea  Cardiovascular   Exercise tolerance: poor (<4 METS)    ECG reviewed    (+) hypertension, PVD, hyperlipidemia,   (-) pacemaker, past MI, angina, cardiac stents      Neuro/Psych  (-) seizures, TIA, CVA  GI/Hepatic/Renal/Endo    (+) obesity,     (-) GERD, liver disease, no renal disease, diabetes    Musculoskeletal     (+) back pain,   Abdominal    Substance History      OB/GYN          Other   arthritis,                        Anesthesia Plan    ASA 3     MAC     intravenous induction     Anesthetic plan, all risks, benefits, and alternatives have been provided, discussed and informed consent has been obtained with: patient.

## 2020-01-09 NOTE — ANESTHESIA POSTPROCEDURE EVALUATION
Patient: Gilberto Roberts    Procedure Summary     Date:  01/09/20 Room / Location:  [facility] OR  /  PAD HYBRID OR 12    Anesthesia Start:  0905 Anesthesia Stop:      Procedure:  AORTAGRAM, LEFT LOWER EXTREMITY ANGIOGRAM, ATHRECTOMY, BALLOON ANGIOPLASTY, STENT PLACEMENT, MYNX CLOSURE (Left Groin) Diagnosis:       Atherosclerosis of native artery of left lower extremity with intermittent claudication (CMS/HCC)      (Atherosclerosis of native artery of left lower extremity with intermittent claudication (CMS/HCC) [I70.212])    Surgeon:  Lusi Billings MD Provider:  Juanpablo Durham CRNA    Anesthesia Type:  MAC ASA Status:  3          Anesthesia Type: MAC    Vitals  Vitals Value Taken Time   /76 1/9/2020 11:30 AM   Temp 97.8 °F (36.6 °C) 1/9/2020 11:30 AM   Pulse 82 1/9/2020 11:40 AM   Resp 16 1/9/2020 11:30 AM   SpO2 94 % 1/9/2020 11:40 AM   Vitals shown include unvalidated device data.        Post Anesthesia Care and Evaluation    Patient location during evaluation: PACU  Patient participation: complete - patient participated  Level of consciousness: awake and alert  Pain management: adequate  Airway patency: patent  Anesthetic complications: No anesthetic complications    Cardiovascular status: acceptable  Respiratory status: acceptable  Hydration status: acceptable    Comments: Blood pressure 121/80, pulse 84, temperature 97.8 °F (36.6 °C), resp. rate 18, SpO2 95 %.    Pt discharged from PACU based on razia score >8

## 2020-01-10 ENCOUNTER — HOSPITAL ENCOUNTER (OUTPATIENT)
Dept: ULTRASOUND IMAGING | Facility: HOSPITAL | Age: 50
Discharge: HOME | End: 2020-01-10
Admitting: SURGERY

## 2020-01-10 ENCOUNTER — TELEPHONE (OUTPATIENT)
Dept: VASCULAR SURGERY | Facility: CLINIC | Age: 50
End: 2020-01-10

## 2020-01-10 ENCOUNTER — OFFICE VISIT (OUTPATIENT)
Dept: VASCULAR SURGERY | Facility: CLINIC | Age: 50
End: 2020-01-10

## 2020-01-10 VITALS
DIASTOLIC BLOOD PRESSURE: 84 MMHG | OXYGEN SATURATION: 98 % | HEART RATE: 98 BPM | WEIGHT: 224 LBS | BODY MASS INDEX: 33.95 KG/M2 | SYSTOLIC BLOOD PRESSURE: 130 MMHG | HEIGHT: 68 IN

## 2020-01-10 DIAGNOSIS — E78.5 HYPERLIPIDEMIA, UNSPECIFIED HYPERLIPIDEMIA TYPE: ICD-10-CM

## 2020-01-10 DIAGNOSIS — M79.605 LOWER EXTREMITY PAIN, LEFT: ICD-10-CM

## 2020-01-10 DIAGNOSIS — I10 ESSENTIAL HYPERTENSION: ICD-10-CM

## 2020-01-10 DIAGNOSIS — M79.89 LEG SWELLING: Primary | ICD-10-CM

## 2020-01-10 DIAGNOSIS — M79.89 LEG SWELLING: ICD-10-CM

## 2020-01-10 DIAGNOSIS — I70.212 ATHEROSCLEROSIS OF NATIVE ARTERY OF LEFT LOWER EXTREMITY WITH INTERMITTENT CLAUDICATION (HCC): Primary | ICD-10-CM

## 2020-01-10 PROCEDURE — 93971 EXTREMITY STUDY: CPT

## 2020-01-10 PROCEDURE — 99213 OFFICE O/P EST LOW 20 MIN: CPT | Performed by: SURGERY

## 2020-01-10 PROCEDURE — 93971 EXTREMITY STUDY: CPT | Performed by: SURGERY

## 2020-01-10 NOTE — PROGRESS NOTES
01/10/2020      Hernandez Marie MD  [address] [place] [address] [address] [place] [place] [address]        Gilberto LEYVA Armando  1970    Chief Complaint   Patient presents with   • Post-op     Pt states that he started hurting severely in the left inner thigh and knee.  He states that this started later in the evening last night.  Pt had an angiogram with stent placement on the left.         Dear Hernandez Marie MD:    HPI     I had the pleasure of seeing your patient in the office today for follow up.  As you recall, the patient is a 49 y.o. male who we are currently following for known peripheral vascular disease.  He returns to the office today after having undergone left lower extremity angiogram with atherectomy and angioplasty as well as stent placement in the left SFA.  He had done well immediately postoperatively yesterday and was discharged home however he reports he developed some discomfort to the left medial thigh overnight and so presented to the office this morning for further evaluation.  Here in the office his left lower extremity arterial system appears intact and he does have a palpable left posterior tibial pulse which is the same exam as postoperatively yesterday.  He also has an excellent Doppler signal at the left dorsalis pedis.  He had some mild tenderness along the upper medial thigh on the left but no sign of any hematoma and no significant edema.  His right groin access site appeared intact with no sign of hematoma.  Ultimately he was sent for left lower extremity venous duplex in the left SFA was also interrogated on that exam and it showed no evidence of any DVT as well as maintained flow in the stented portions of the SFA with no surrounding hematoma or any other acute findings.  He otherwise has no other new complaints today.      Review of Systems   Constitutional: Negative.  Negative for activity change, appetite change, chills, diaphoresis, fatigue and fever.   HENT: Negative.  Negative for  "congestion, sneezing, sore throat and trouble swallowing.    Eyes: Negative.  Negative for visual disturbance.   Respiratory: Negative.  Negative for chest tightness and shortness of breath.    Cardiovascular: Negative.  Negative for chest pain, palpitations and leg swelling.   Gastrointestinal: Negative.  Negative for abdominal distention, abdominal pain, nausea and vomiting.   Endocrine: Negative.    Genitourinary: Negative.    Musculoskeletal: Negative.         He reports discomfort today to the left proximal medial thigh.   Skin: Negative.    Allergic/Immunologic: Negative.    Neurological: Negative.    Hematological: Negative.    Psychiatric/Behavioral: Negative.        /84   Pulse 98   Ht 172.7 cm (68\")   Wt 102 kg (224 lb)   SpO2 98%   BMI 34.06 kg/m²   Physical Exam   Constitutional: He is oriented to person, place, and time. He appears well-developed and well-nourished.   HENT:   Head: Normocephalic and atraumatic.   Eyes: Pupils are equal, round, and reactive to light. EOM are normal.   Neck: Normal range of motion. Neck supple. No JVD present.   Cardiovascular: Normal rate, regular rhythm and intact distal pulses.   Pulses:       Carotid pulses are 2+ on the right side, and 2+ on the left side.       Radial pulses are 2+ on the right side, and 2+ on the left side.        Femoral pulses are 2+ on the right side, and 2+ on the left side.       Popliteal pulses are 2+ on the right side.        Dorsalis pedis pulses are 2+ on the right side, and 0 on the left side.        Posterior tibial pulses are 2+ on the right side, and 2+ on the left side.   Today he does have a palpable left posterior tibial pulse and a strong Doppler signal at the dorsalis pedis.    His right groin access site from the angiogram yesterday is soft with no signs of hematoma and no tenderness   Pulmonary/Chest: Effort normal. No respiratory distress.   Abdominal: Soft. He exhibits no distension and no mass. There is no " tenderness.   Musculoskeletal: Normal range of motion. He exhibits no edema, tenderness or deformity.   Neurological: He is alert and oriented to person, place, and time. No sensory deficit. He exhibits normal muscle tone.   Skin: On the right is less than 2 seconds and on the left is 2 to 3 seconds..   Psychiatric: He has a normal mood and affect. His behavior is normal. Judgment and thought content normal.   Vitals reviewed.      DIAGNOSTIC DATA:    Ir Angiogram Extremity    Result Date: 1/10/2020  Narrative: Performed by Dr. Billings. Please see procedure note. This report was finalized on  by Dr. Luis Billings MD.    Us Arterial Doppler Lower Extremity Left    Result Date: 12/20/2019  Narrative: History: Pain  Comments: Grayscale imaging as well as color flow duplex were used to evaluate the left lower extremity arterial systems.  On the left, the peak systolic velocity in the common femoral artery measured 149cm/s. In the profunda femoris artery measured 118cm/s. In the proximal SFA measured 62.9cm/s. The mid to distal SFA appears occluded. In the distal SFA measured 19.3 cm/s. In the popliteal artery measured 21cm/s. In the posterior tibial artery measured 24cm/s. In the anterior tibial artery measured 23.3cm/s.      Impression: Occluded mid to distal superficial femoral artery with dampened monophasic flow distally.  This report was finalized on 12/20/2019 11:08 by Dr. Carlos Hong MD.    Us Venous Doppler Lower Extremity Left (duplex)    Result Date: 1/10/2020  Narrative: History: Pain and swelling      Impression: Impression: There is no evidence of deep venous thrombosis or superficial thrombophlebitis of the left lower extremity.  Comments: Left lower extremity venous duplex exam was performed using color Doppler flow, Doppler wave form analysis, and grayscale imaging, with and without compression. There is no evidence of deep venous thrombosis of the common femoral, superficial femoral, popliteal,  posterior tibial, and peroneal veins. There is no thrombus identified in the saphenofemoral junction or the greater saphenous vein. There is no evidence of clot in the right common femoral vein.  This report was finalized on 01/10/2020 14:39 by Dr. Carlos Hong MD.    Fl C Arm During Surgery    Result Date: 1/10/2020  Narrative: Performed by Dr. Billings. Please see procedure note. This report was finalized on  by Dr. Luis Billings MD.      Patient Active Problem List   Diagnosis   • Cellulitis of left foot   • PAD (peripheral artery disease) (CMS/HCC)   • Acute pain   • Essential hypertension   • Hyperlipidemia   • Atherosclerosis of native artery of left lower extremity with intermittent claudication (CMS/HCC)   • Atherosclerosis of native artery of extremity (CMS/HCC)         ICD-10-CM ICD-9-CM   1. Atherosclerosis of native artery of left lower extremity with intermittent claudication (CMS/HCC) I70.212 440.21   2. Lower extremity pain, left M79.605 729.5   3. Essential hypertension I10 401.9   4. Hyperlipidemia, unspecified hyperlipidemia type E78.5 272.4       Lab Frequency Next Occurrence   US Ankle / Brachial Indices Extremity Complete Once 01/20/2020   US Carotid Bilateral Once 01/20/2020   Follow Anesthesia Guidelines / Standing Orders Once 12/23/2019   Obtain Informed Consent Once 12/28/2019   Provide NPO Instructions to Patient Once 12/28/2019   Chlorhexidine Skin Prep Once 12/28/2019       PLAN: After thoroughly evaluating Gilberto Roberts, I believe the best course of action is to remain conservative from a vascular standpoint.  At this point I feel he is having some inflammation and discomfort after his previous stent placement during angiogram yesterday.  Clinically his arterial system remains intact with a palpable left posterior tibial pulse and excellent perfusion of his left foot.  On duplex exam there is no evidence of DVT or other hematoma in the left thigh and the SFA was also interrogated  and shown to be patent with no evidence of any hematoma around the artery or the stents.  As such at this point no further vascular intervention is necessary.  I had given him a prescription for some pain medicine yesterday postoperatively which she can continue to use as needed.  I have told him to continue to rest and elevate his left lower extremity to help aid in edema reduction and allow the pain to subside.  He otherwise can ambulate normally but should continue with no heavy lifting over 10 pounds until he sees me again in 2 weeks.  I will plan to see him at his regularly scheduled follow-up postoperatively in 2 weeks for continued evaluation.  Should he have any further issues he will call the office.  The patient is to continue taking their medications as previously discussed.   I did discuss vascular risk factors as they pertain to the progression of vascular disease including controlling hypertension, and hyperlipidemia. These factors remain stable. Patient's Body mass index is 34.06 kg/m². BMI is above normal parameters. Recommendations include: educational material. I did  extensively on smoking cessation, and the patient was advised of the continued risks of smoking.  I provided over 10 minutes counseling on this matter.   This was all discussed in full with complete understanding.  Thank you for allowing me to participate in the care of your patient.  Please do not hesitate to call with any questions or concerns.  We will keep you aware of any further encounters with Gilberto Roberts.      Sincerely Yours,      Luis Billings MD

## 2020-01-10 NOTE — PATIENT INSTRUCTIONS
"BMI for Adults    Body mass index (BMI) is a number that is calculated from a person's weight and height. BMI may help to estimate how much of a person's weight is composed of fat. BMI can help identify those who may be at higher risk for certain medical problems.  How is BMI used with adults?  BMI is used as a screening tool to identify possible weight problems. It is used to check whether a person is obese, overweight, healthy weight, or underweight.  How is BMI calculated?  BMI measures your weight and compares it to your height. This can be done either in English (U[place] or metric measurements. Note that charts are available to help you find your BMI quickly and easily without having to do these calculations yourself.  To calculate your BMI in English ([place] measurements, your health care provider will:  1. Measure your weight in pounds (lb).  2. Multiply the number of pounds by 703.  ? For example, for a person who weighs 180 lb, multiply that number by 703, which equals 126,540.  3. Measure your height in inches (in). Then multiply that number by itself to get a measurement called \"inches squared.\"  ? For example, for a person who is 70 in tall, the \"inches squared\" measurement is 70 in x 70 in, which equals 4900 inches squared.  4. Divide the total from Step 2 (number of lb x 703) by the total from Step 3 (inches squared): 126,540 ÷ 4900 = 25.8. This is your BMI.  To calculate your BMI in metric measurements, your health care provider will:  1. Measure your weight in kilograms (kg).  2. Measure your height in meters (m). Then multiply that number by itself to get a measurement called \"meters squared.\"  ? For example, for a person who is 1.75 m tall, the \"meters squared\" measurement is 1.75 m x 1.75 m, which is equal to 3.1 meters squared.  3. Divide the number of kilograms (your weight) by the meters squared number. In this example: 70 ÷ 3.1 = 22.6. This is your BMI.  How is BMI interpreted?  To interpret your " results, your health care provider will use BMI charts to identify whether you are underweight, normal weight, overweight, or obese. The following guidelines will be used:  · Underweight: BMI less than 18.5.  · Normal weight: BMI between 18.5 and 24.9.  · Overweight: BMI between 25 and 29.9.  · Obese: BMI of 30 and above.  Please note:  · Weight includes both fat and muscle, so someone with a muscular build, such as an athlete, may have a BMI that is higher than 24.9. In cases like these, BMI is not an accurate measure of body fat.  · To determine if excess body fat is the cause of a BMI of 25 or higher, further assessments may need to be done by a health care provider.  · BMI is usually interpreted in the same way for men and women.  Why is BMI a useful tool?  BMI is useful in two ways:  · Identifying a weight problem that may be related to a medical condition, or that may increase the risk for medical problems.  · Promoting lifestyle and diet changes in order to reach a healthy weight.  Summary  · Body mass index (BMI) is a number that is calculated from a person's weight and height.  · BMI may help to estimate how much of a person's weight is composed of fat. BMI can help identify those who may be at higher risk for certain medical problems.  · BMI can be measured using English measurements or metric measurements.  · To interpret your results, your health care provider will use BMI charts to identify whether you are underweight, normal weight, overweight, or obese.  This information is not intended to replace advice given to you by your health care provider. Make sure you discuss any questions you have with your health care provider.  Document Released: 08/29/2005 Document Revised: 10/31/2018 Document Reviewed: 10/31/2018  [employer] Interactive Patient Education © 2019 [employer]      For more information:    Quit Now [place]  1-800-QUIT-NOW  https://[place].quitlogix.org/en-[place]/  Steps to Quit  Smoking  Smoking tobacco can be harmful to your health and can affect almost every organ in your body. Smoking puts you, and those around you, at risk for developing many serious chronic diseases. Quitting smoking is difficult, but it is one of the best things that you can do for your health. It is never too late to quit.  What are the benefits of quitting smoking?  When you quit smoking, you lower your risk of developing serious diseases and conditions, such as:  · Lung cancer or lung disease, such as COPD.  · Heart disease.  · Stroke.  · Heart attack.  · Infertility.  · Osteoporosis and bone fractures.  Additionally, symptoms such as coughing, wheezing, and shortness of breath may get better when you quit. You may also find that you get sick less often because your body is stronger at fighting off colds and infections. If you are pregnant, quitting smoking can help to reduce your chances of having a baby of low birth weight.  How do I get ready to quit?  When you decide to quit smoking, create a plan to make sure that you are successful. Before you quit:  · Pick a date to quit. Set a date within the next two weeks to give you time to prepare.  · Write down the reasons why you are quitting. Keep this list in places where you will see it often, such as on your bathroom mirror or in your car or wallet.  · Identify the people, places, things, and activities that make you want to smoke (triggers) and avoid them. Make sure to take these actions:  ¨ Throw away all cigarettes at home, at work, and in your car.  ¨ Throw away smoking accessories, such as ashtrays and lighters.  ¨ Clean your car and make sure to empty the ashtray.  ¨ Clean your home, including curtains and carpets.  · Tell your family, friends, and coworkers that you are quitting. Support from your loved ones can make quitting easier.  · Talk with your health care provider about your options for quitting smoking.  · Find out what treatment options are  covered by your health insurance.  What strategies can I use to quit smoking?  Talk with your healthcare provider about different strategies to quit smoking. Some strategies include:  · Quitting smoking altogether instead of gradually lessening how much you smoke over a period of time. Research shows that quitting “cold turkey” is more successful than gradually quitting.  · Attending in-person counseling to help you build problem-solving skills. You are more likely to have success in quitting if you attend several counseling sessions. Even short sessions of 10 minutes can be effective.  · Finding resources and support systems that can help you to quit smoking and remain smoke-free after you quit. These resources are most helpful when you use them often. They can include:  ¨ Online chats with a counselor.  ¨ Telephone quitlines.  ¨ Printed self-help materials.  ¨ Support groups or group counseling.  ¨ Text messaging programs.  ¨ Mobile phone applications.  · Taking medicines to help you quit smoking. (If you are pregnant or breastfeeding, talk with your health care provider first.) Some medicines contain nicotine and some do not. Both types of medicines help with cravings, but the medicines that include nicotine help to relieve withdrawal symptoms. Your health care provider may recommend:  ¨ Nicotine patches, gum, or lozenges.  ¨ Nicotine inhalers or sprays.  ¨ Non-nicotine medicine that is taken by mouth.  Talk with your health care provider about combining strategies, such as taking medicines while you are also receiving in-person counseling. Using these two strategies together makes you more likely to succeed in quitting than if you used either strategy on its own.  If you are pregnant or breastfeeding, talk with your health care provider about finding counseling or other support strategies to quit smoking. Do not take medicine to help you quit smoking unless told to do so by your health care provider.  What  things can I do to make it easier to quit?  Quitting smoking might feel overwhelming at first, but there is a lot that you can do to make it easier. Take these important actions:  · Reach out to your family and friends and ask that they support and encourage you during this time. Call telephone quitlines, reach out to support groups, or work with a counselor for support.  · Ask people who smoke to avoid smoking around you.  · Avoid places that trigger you to smoke, such as bars, parties, or smoke-break areas at work.  · Spend time around people who do not smoke.  · Lessen stress in your life, because stress can be a smoking trigger for some people. To lessen stress, try:  ¨ Exercising regularly.  ¨ Deep-breathing exercises.  ¨ Yoga.  ¨ Meditating.  ¨ Performing a body scan. This involves closing your eyes, scanning your body from head to toe, and noticing which parts of your body are particularly tense. Purposefully relax the muscles in those areas.  · Download or purchase mobile phone or tablet apps (applications) that can help you stick to your quit plan by providing reminders, tips, and encouragement. There are many free apps, such as QuitGuide from the CDC (Centers for Disease Control and Prevention). You can find other support for quitting smoking (smoking cessation) through smokefree.gov and other websites.  How will I feel when I quit smoking?  Within the first 24 hours of quitting smoking, you may start to feel some withdrawal symptoms. These symptoms are usually most noticeable 2-3 days after quitting, but they usually do not last beyond 2-3 weeks. Changes or symptoms that you might experience include:  · Mood swings.  · Restlessness, anxiety, or irritation.  · Difficulty concentrating.  · Dizziness.  · Strong cravings for sugary foods in addition to nicotine.  · Mild weight gain.  · Constipation.  · Nausea.  · Coughing or a sore throat.  · Changes in how your medicines work in your body.  · A depressed  mood.  · Difficulty sleeping (insomnia).  After the first 2-3 weeks of quitting, you may start to notice more positive results, such as:  · Improved sense of smell and taste.  · Decreased coughing and sore throat.  · Slower heart rate.  · Lower blood pressure.  · Clearer skin.  · The ability to breathe more easily.  · Fewer sick days.  Quitting smoking is very challenging for most people. Do not get discouraged if you are not successful the first time. Some people need to make many attempts to quit before they achieve long-term success. Do your best to stick to your quit plan, and talk with your health care provider if you have any questions or concerns.  This information is not intended to replace advice given to you by your health care provider. Make sure you discuss any questions you have with your health care provider.  Document Released: 12/12/2002 Document Revised: 08/15/2017 Document Reviewed: 05/03/2016  [employer] Interactive Patient Education © 2017 [employer]

## 2020-01-10 NOTE — TELEPHONE ENCOUNTER
Pt called and stated that he was having a lot of pain in his leg.  He rated it over a 10 on the pain scale.  He said that it was really swollen and he couldn't stand on it.     I text Dr. Billings, in surgery, and he said to have the patient come to the office to be seen.    I called the patient back and told him to come in to the office as soon as he could get here.  He stated understanding.

## 2020-01-15 ENCOUNTER — TELEPHONE (OUTPATIENT)
Dept: VASCULAR SURGERY | Facility: CLINIC | Age: 50
End: 2020-01-15

## 2020-01-15 DIAGNOSIS — I70.219 ATHEROSCLEROSIS OF ARTERY OF EXTREMITY WITH INTERMITTENT CLAUDICATION (HCC): Primary | ICD-10-CM

## 2020-01-15 RX ORDER — HYDROCODONE BITARTRATE AND ACETAMINOPHEN 7.5; 325 MG/1; MG/1
1 TABLET ORAL EVERY 6 HOURS PRN
Qty: 12 TABLET | Refills: 0 | Status: SHIPPED | OUTPATIENT
Start: 2020-01-15 | End: 2020-01-22

## 2020-01-15 NOTE — TELEPHONE ENCOUNTER
Pt called and stated that he is still hurting really bad.  He states that he can only sleep about 3 hours at a time.  He said that he can walk on it, but it throbs.    I gave this info to Dr. Billings and he said that he would call a couple of more days of the Norco in for the patient since he had run out on Monday.

## 2020-01-20 ENCOUNTER — TELEPHONE (OUTPATIENT)
Dept: VASCULAR SURGERY | Facility: CLINIC | Age: 50
End: 2020-01-20

## 2020-01-20 NOTE — TELEPHONE ENCOUNTER
Left a message for the patient to call back to remind him of his appt on Wednesday with Dr. Billings.

## 2020-01-22 ENCOUNTER — OFFICE VISIT (OUTPATIENT)
Dept: VASCULAR SURGERY | Facility: CLINIC | Age: 50
End: 2020-01-22

## 2020-01-22 VITALS
SYSTOLIC BLOOD PRESSURE: 124 MMHG | WEIGHT: 220 LBS | OXYGEN SATURATION: 100 % | HEIGHT: 68 IN | BODY MASS INDEX: 33.34 KG/M2 | DIASTOLIC BLOOD PRESSURE: 82 MMHG | HEART RATE: 94 BPM

## 2020-01-22 DIAGNOSIS — E78.5 HYPERLIPIDEMIA, UNSPECIFIED HYPERLIPIDEMIA TYPE: ICD-10-CM

## 2020-01-22 DIAGNOSIS — I10 ESSENTIAL HYPERTENSION: ICD-10-CM

## 2020-01-22 DIAGNOSIS — I70.212 ATHEROSCLEROSIS OF NATIVE ARTERY OF LEFT LOWER EXTREMITY WITH INTERMITTENT CLAUDICATION (HCC): Primary | ICD-10-CM

## 2020-01-22 PROCEDURE — 99214 OFFICE O/P EST MOD 30 MIN: CPT | Performed by: SURGERY

## 2020-01-22 NOTE — PATIENT INSTRUCTIONS
For more information:    Quit Now [place]  1-800-QUIT-NOW  https://[place].quitlogix.org/en-[place]/  Steps to Quit Smoking  Smoking tobacco can be harmful to your health and can affect almost every organ in your body. Smoking puts you, and those around you, at risk for developing many serious chronic diseases. Quitting smoking is difficult, but it is one of the best things that you can do for your health. It is never too late to quit.  What are the benefits of quitting smoking?  When you quit smoking, you lower your risk of developing serious diseases and conditions, such as:  · Lung cancer or lung disease, such as COPD.  · Heart disease.  · Stroke.  · Heart attack.  · Infertility.  · Osteoporosis and bone fractures.  Additionally, symptoms such as coughing, wheezing, and shortness of breath may get better when you quit. You may also find that you get sick less often because your body is stronger at fighting off colds and infections. If you are pregnant, quitting smoking can help to reduce your chances of having a baby of low birth weight.  How do I get ready to quit?  When you decide to quit smoking, create a plan to make sure that you are successful. Before you quit:  · Pick a date to quit. Set a date within the next two weeks to give you time to prepare.  · Write down the reasons why you are quitting. Keep this list in places where you will see it often, such as on your bathroom mirror or in your car or wallet.  · Identify the people, places, things, and activities that make you want to smoke (triggers) and avoid them. Make sure to take these actions:  ¨ Throw away all cigarettes at home, at work, and in your car.  ¨ Throw away smoking accessories, such as ashtrays and lighters.  ¨ Clean your car and make sure to empty the ashtray.  ¨ Clean your home, including curtains and carpets.  · Tell your family, friends, and coworkers that you are quitting. Support from your loved ones can make quitting easier.  · Talk with  your health care provider about your options for quitting smoking.  · Find out what treatment options are covered by your health insurance.  What strategies can I use to quit smoking?  Talk with your healthcare provider about different strategies to quit smoking. Some strategies include:  · Quitting smoking altogether instead of gradually lessening how much you smoke over a period of time. Research shows that quitting “cold turkey” is more successful than gradually quitting.  · Attending in-person counseling to help you build problem-solving skills. You are more likely to have success in quitting if you attend several counseling sessions. Even short sessions of 10 minutes can be effective.  · Finding resources and support systems that can help you to quit smoking and remain smoke-free after you quit. These resources are most helpful when you use them often. They can include:  ¨ Online chats with a counselor.  ¨ Telephone quitlines.  ¨ Printed self-help materials.  ¨ Support groups or group counseling.  ¨ Text messaging programs.  ¨ Mobile phone applications.  · Taking medicines to help you quit smoking. (If you are pregnant or breastfeeding, talk with your health care provider first.) Some medicines contain nicotine and some do not. Both types of medicines help with cravings, but the medicines that include nicotine help to relieve withdrawal symptoms. Your health care provider may recommend:  ¨ Nicotine patches, gum, or lozenges.  ¨ Nicotine inhalers or sprays.  ¨ Non-nicotine medicine that is taken by mouth.  Talk with your health care provider about combining strategies, such as taking medicines while you are also receiving in-person counseling. Using these two strategies together makes you more likely to succeed in quitting than if you used either strategy on its own.  If you are pregnant or breastfeeding, talk with your health care provider about finding counseling or other support strategies to quit smoking. Do  not take medicine to help you quit smoking unless told to do so by your health care provider.  What things can I do to make it easier to quit?  Quitting smoking might feel overwhelming at first, but there is a lot that you can do to make it easier. Take these important actions:  · Reach out to your family and friends and ask that they support and encourage you during this time. Call telephone quitlines, reach out to support groups, or work with a counselor for support.  · Ask people who smoke to avoid smoking around you.  · Avoid places that trigger you to smoke, such as bars, parties, or smoke-break areas at work.  · Spend time around people who do not smoke.  · Lessen stress in your life, because stress can be a smoking trigger for some people. To lessen stress, try:  ¨ Exercising regularly.  ¨ Deep-breathing exercises.  ¨ Yoga.  ¨ Meditating.  ¨ Performing a body scan. This involves closing your eyes, scanning your body from head to toe, and noticing which parts of your body are particularly tense. Purposefully relax the muscles in those areas.  · Download or purchase mobile phone or tablet apps (applications) that can help you stick to your quit plan by providing reminders, tips, and encouragement. There are many free apps, such as QuitGuide from the CDC (Centers for Disease Control and Prevention). You can find other support for quitting smoking (smoking cessation) through smokefree.gov and other websites.  How will I feel when I quit smoking?  Within the first 24 hours of quitting smoking, you may start to feel some withdrawal symptoms. These symptoms are usually most noticeable 2-3 days after quitting, but they usually do not last beyond 2-3 weeks. Changes or symptoms that you might experience include:  · Mood swings.  · Restlessness, anxiety, or irritation.  · Difficulty concentrating.  · Dizziness.  · Strong cravings for sugary foods in addition to nicotine.  · Mild weight  gain.  · Constipation.  · Nausea.  · Coughing or a sore throat.  · Changes in how your medicines work in your body.  · A depressed mood.  · Difficulty sleeping (insomnia).  After the first 2-3 weeks of quitting, you may start to notice more positive results, such as:  · Improved sense of smell and taste.  · Decreased coughing and sore throat.  · Slower heart rate.  · Lower blood pressure.  · Clearer skin.  · The ability to breathe more easily.  · Fewer sick days.  Quitting smoking is very challenging for most people. Do not get discouraged if you are not successful the first time. Some people need to make many attempts to quit before they achieve long-term success. Do your best to stick to your quit plan, and talk with your health care provider if you have any questions or concerns.  This information is not intended to replace advice given to you by your health care provider. Make sure you discuss any questions you have with your health care provider.  Document Released: 12/12/2002 Document Revised: 08/15/2017 Document Reviewed: 05/03/2016  [employer] Interactive Patient Education © 2017 [employer]      BMI for Adults    Body mass index (BMI) is a number that is calculated from a person's weight and height. BMI may help to estimate how much of a person's weight is composed of fat. BMI can help identify those who may be at higher risk for certain medical problems.  How is BMI used with adults?  BMI is used as a screening tool to identify possible weight problems. It is used to check whether a person is obese, overweight, healthy weight, or underweight.  How is BMI calculated?  BMI measures your weight and compares it to your height. This can be done either in English (U.S[place] or metric measurements. Note that charts are available to help you find your BMI quickly and easily without having to do these calculations yourself.  To calculate your BMI in English (U.S.) measurements, your health care provider  "will:  1. Measure your weight in pounds (lb).  2. Multiply the number of pounds by 703.  ? For example, for a person who weighs 180 lb, multiply that number by 703, which equals 126,540.  3. Measure your height in inches (in). Then multiply that number by itself to get a measurement called \"inches squared.\"  ? For example, for a person who is 70 in tall, the \"inches squared\" measurement is 70 in x 70 in, which equals 4900 inches squared.  4. Divide the total from Step 2 (number of lb x 703) by the total from Step 3 (inches squared): 126,540 ÷ 4900 = 25.8. This is your BMI.  To calculate your BMI in metric measurements, your health care provider will:  1. Measure your weight in kilograms (kg).  2. Measure your height in meters (m). Then multiply that number by itself to get a measurement called \"meters squared.\"  ? For example, for a person who is 1.75 m tall, the \"meters squared\" measurement is 1.75 m x 1.75 m, which is equal to 3.1 meters squared.  3. Divide the number of kilograms (your weight) by the meters squared number. In this example: 70 ÷ 3.1 = 22.6. This is your BMI.  How is BMI interpreted?  To interpret your results, your health care provider will use BMI charts to identify whether you are underweight, normal weight, overweight, or obese. The following guidelines will be used:  · Underweight: BMI less than 18.5.  · Normal weight: BMI between 18.5 and 24.9.  · Overweight: BMI between 25 and 29.9.  · Obese: BMI of 30 and above.  Please note:  · Weight includes both fat and muscle, so someone with a muscular build, such as an athlete, may have a BMI that is higher than 24.9. In cases like these, BMI is not an accurate measure of body fat.  · To determine if excess body fat is the cause of a BMI of 25 or higher, further assessments may need to be done by a health care provider.  · BMI is usually interpreted in the same way for men and women.  Why is BMI a useful tool?  BMI is useful in two " ways:  · Identifying a weight problem that may be related to a medical condition, or that may increase the risk for medical problems.  · Promoting lifestyle and diet changes in order to reach a healthy weight.  Summary  · Body mass index (BMI) is a number that is calculated from a person's weight and height.  · BMI may help to estimate how much of a person's weight is composed of fat. BMI can help identify those who may be at higher risk for certain medical problems.  · BMI can be measured using English measurements or metric measurements.  · To interpret your results, your health care provider will use BMI charts to identify whether you are underweight, normal weight, overweight, or obese.  This information is not intended to replace advice given to you by your health care provider. Make sure you discuss any questions you have with your health care provider.  Document Released: 08/29/2005 Document Revised: 10/31/2018 Document Reviewed: 10/31/2018  [employer] Interactive Patient Education © 2019 [employer]

## 2020-01-23 NOTE — PROGRESS NOTES
"01/22/2020      Hernandez Marie MD  [address] [place] [address] DR MACHADO Carla  [place] [address]        Gilberto LEYVA Armando  1970    Chief Complaint   Patient presents with   • Post-op     2 wk po f/u for left angiogram with stent placement.        Dear Hernandez Marie MD:    HPI     I had the pleasure of seeing your patient in the office today for follow up.  As you recall, the patient is a 49 y.o. male who we are currently following for peripheral vascular disease.  He recently underwent left lower extremity angiogram with atherectomy and stent placement in the left SFA due to recurrent occlusion.  He was initially seen a couple of days after the procedure and complained of some left thigh swelling but imaging at that time showed no abnormality.  He returns today for continued surveillance.  He reports that the previous left thigh pain is now resolved and he is doing quite well.  He denies any claudication to the left lower extremity.  He continues on his aspirin and Plavix.  He otherwise is without complaint.  Unfortunately he continues to smoke..      Review of Systems   Constitutional: Negative.  Negative for activity change, appetite change, chills, diaphoresis, fatigue and fever.   HENT: Negative.  Negative for congestion, sneezing, sore throat and trouble swallowing.    Eyes: Negative.  Negative for visual disturbance.   Respiratory: Negative.  Negative for chest tightness and shortness of breath.    Cardiovascular: Negative.  Negative for chest pain, palpitations and leg swelling.   Gastrointestinal: Negative.  Negative for abdominal distention, abdominal pain, nausea and vomiting.   Endocrine: Negative.    Genitourinary: Negative.    Musculoskeletal: Negative.         Previous left thigh discomfort has now resolved.   Skin: Negative.    Allergic/Immunologic: Negative.    Neurological: Negative.    Hematological: Negative.    Psychiatric/Behavioral: Negative.        /82   Pulse 94   Ht 172.7 cm (68\")   Wt 99.8 " kg (220 lb)   SpO2 100%   BMI 33.45 kg/m²   Physical Exam   Constitutional: He is oriented to person, place, and time. He appears well-developed and well-nourished.   HENT:   Head: Normocephalic and atraumatic.   Eyes: Pupils are equal, round, and reactive to light. EOM are normal.   Neck: Normal range of motion. Neck supple. No JVD present.   Cardiovascular: Normal rate, regular rhythm and intact distal pulses.   Pulses:       Carotid pulses are 2+ on the right side, and 2+ on the left side.       Radial pulses are 2+ on the right side, and 2+ on the left side.        Femoral pulses are 2+ on the right side, and 2+ on the left side.       Popliteal pulses are 2+ on the right side.        Dorsalis pedis pulses are 2+ on the right side, and 0 on the left side.        Posterior tibial pulses are 2+ on the right side, and 2+ on the left side.   Today he does have a palpable left posterior tibial pulse and a strong Doppler signal at the dorsalis pedis.    His right groin access site from his recent angiogram is soft with no signs of hematoma and no tenderness   Pulmonary/Chest: Effort normal. No respiratory distress.   Abdominal: Soft. He exhibits no distension and no mass. There is no tenderness.   Musculoskeletal: Normal range of motion. He exhibits no edema, tenderness or deformity.   Neurological: He is alert and oriented to person, place, and time. No sensory deficit. He exhibits normal muscle tone.   Skin: Capillary refill takes less than 2 seconds.   Psychiatric: He has a normal mood and affect. His behavior is normal. Judgment and thought content normal.   Vitals reviewed.      DIAGNOSTIC DATA:    Ir Angiogram Extremity    Result Date: 1/13/2020  Narrative: Performed by Dr. Billings. Please see procedure note. This report was finalized on 01/13/2020 16:05 by Dr. Luis Billings MD.    Us Venous Doppler Lower Extremity Left (duplex)    Result Date: 1/10/2020  Narrative: History: Pain and swelling      Impression:  Impression: There is no evidence of deep venous thrombosis or superficial thrombophlebitis of the left lower extremity.  Comments: Left lower extremity venous duplex exam was performed using color Doppler flow, Doppler wave form analysis, and grayscale imaging, with and without compression. There is no evidence of deep venous thrombosis of the common femoral, superficial femoral, popliteal, posterior tibial, and peroneal veins. There is no thrombus identified in the saphenofemoral junction or the greater saphenous vein. There is no evidence of clot in the right common femoral vein.  This report was finalized on 01/10/2020 14:39 by Dr. Carlos Hong MD.    Fl C Arm During Surgery    Result Date: 1/13/2020  Narrative: Performed by Dr. Billings. Please see procedure note. This report was finalized on 01/13/2020 16:05 by Dr. Luis Billings MD.      Patient Active Problem List   Diagnosis   • Cellulitis of left foot   • PAD (peripheral artery disease) (CMS/HCC)   • Acute pain   • Essential hypertension   • Hyperlipidemia   • Atherosclerosis of native artery of left lower extremity with intermittent claudication (CMS/HCC)   • Atherosclerosis of native artery of extremity (CMS/HCC)         ICD-10-CM ICD-9-CM   1. Atherosclerosis of native artery of left lower extremity with intermittent claudication (CMS/HCC) I70.212 440.21   2. Essential hypertension I10 401.9   3. Hyperlipidemia, unspecified hyperlipidemia type E78.5 272.4       Lab Frequency Next Occurrence   US Ankle / Brachial Indices Extremity Complete Once 01/20/2020   US Carotid Bilateral Once 01/20/2020   Follow Anesthesia Guidelines / Standing Orders Once 12/23/2019   Obtain Informed Consent Once 12/28/2019   Provide NPO Instructions to Patient Once 12/28/2019   Chlorhexidine Skin Prep Once 12/28/2019   US Ankle / Brachial Indices Extremity Complete Once 04/20/2020       PLAN: After thoroughly evaluating Gilberto Rboerts, I believe the best course of action is  to remain conservative from a vascular standpoint.  Overall he is done well since his recent left lower extremity angiogram with atherectomy, angioplasty, and stenting of the left SFA.  He continues to have no further claudication to the left lower extremity since the procedure.  At this point I will plan to see him back in the office in 3 months with repeat CATALINA/PVR for continued surveillance..  The patient is to continue taking their medications as previously discussed.   I did discuss vascular risk factors as they pertain to the progression of vascular disease including controlling hyperlipidemia. These factors remain stable. Patient's Body mass index is 33.45 kg/m². BMI is above normal parameters. Recommendations include: educational material. I did  extensively on smoking cessation, and the patient was advised of the continued risks of smoking.  I provided over 10 minutes counseling on this matter. This was all discussed in full with complete understanding.  Thank you for allowing me to participate in the care of your patient.  Please do not hesitate to call with any questions or concerns.  We will keep you aware of any further encounters with Gilberto Roberts.      Sincerely Yours,      Luis Billings MD

## 2020-02-13 ENCOUNTER — TELEPHONE (OUTPATIENT)
Dept: PRIMARY CARE CLINIC | Age: 50
End: 2020-02-13

## 2020-02-13 ENCOUNTER — TELEPHONE (OUTPATIENT)
Dept: VASCULAR SURGERY | Facility: CLINIC | Age: 50
End: 2020-02-13

## 2020-02-13 ENCOUNTER — OFFICE VISIT (OUTPATIENT)
Dept: PRIMARY CARE CLINIC | Age: 50
End: 2020-02-13
Payer: MEDICARE

## 2020-02-13 VITALS
SYSTOLIC BLOOD PRESSURE: 120 MMHG | WEIGHT: 225.8 LBS | DIASTOLIC BLOOD PRESSURE: 88 MMHG | HEIGHT: 68 IN | HEART RATE: 93 BPM | TEMPERATURE: 96.8 F | BODY MASS INDEX: 34.22 KG/M2 | OXYGEN SATURATION: 98 %

## 2020-02-13 DIAGNOSIS — F41.9 ANXIETY: ICD-10-CM

## 2020-02-13 DIAGNOSIS — R82.2 BILIRUBIN IN URINE: ICD-10-CM

## 2020-02-13 DIAGNOSIS — R42 DIZZINESS: ICD-10-CM

## 2020-02-13 LAB
ALBUMIN SERPL-MCNC: 4 G/DL (ref 3.5–5.2)
ALP BLD-CCNC: 124 U/L (ref 40–130)
ALT SERPL-CCNC: 39 U/L (ref 5–41)
ANION GAP SERPL CALCULATED.3IONS-SCNC: 14 MMOL/L (ref 7–19)
APPEARANCE FLUID: CLEAR
AST SERPL-CCNC: 28 U/L (ref 5–40)
BASOPHILS ABSOLUTE: 0.1 K/UL (ref 0–0.2)
BASOPHILS RELATIVE PERCENT: 0.8 % (ref 0–1)
BILIRUB SERPL-MCNC: 0.5 MG/DL (ref 0.2–1.2)
BILIRUBIN, POC: NORMAL
BLOOD URINE, POC: NORMAL
BUN BLDV-MCNC: 7 MG/DL (ref 6–20)
CALCIUM SERPL-MCNC: 9.4 MG/DL (ref 8.6–10)
CHLORIDE BLD-SCNC: 100 MMOL/L (ref 98–111)
CLARITY, POC: CLEAR
CO2: 26 MMOL/L (ref 22–29)
COLOR, POC: YELLOW
CREAT SERPL-MCNC: 0.8 MG/DL (ref 0.5–1.2)
EOSINOPHILS ABSOLUTE: 0.3 K/UL (ref 0–0.6)
EOSINOPHILS RELATIVE PERCENT: 2.7 % (ref 0–5)
GFR NON-AFRICAN AMERICAN: >60
GLUCOSE BLD-MCNC: 72 MG/DL (ref 74–109)
GLUCOSE URINE, POC: NORMAL
HBA1C MFR BLD: 5.5 %
HCT VFR BLD CALC: 51.6 % (ref 42–52)
HEMOGLOBIN: 17.4 G/DL (ref 14–18)
IMMATURE GRANULOCYTES #: 0.1 K/UL
KETONES, POC: NORMAL
LEUKOCYTE EST, POC: NORMAL
LYMPHOCYTES ABSOLUTE: 2.9 K/UL (ref 1.1–4.5)
LYMPHOCYTES RELATIVE PERCENT: 28.3 % (ref 20–40)
MAGNESIUM: 2.3 MG/DL (ref 1.6–2.6)
MCH RBC QN AUTO: 31.1 PG (ref 27–31)
MCHC RBC AUTO-ENTMCNC: 33.7 G/DL (ref 33–37)
MCV RBC AUTO: 92.3 FL (ref 80–94)
MONOCYTES ABSOLUTE: 0.8 K/UL (ref 0–0.9)
MONOCYTES RELATIVE PERCENT: 7.7 % (ref 0–10)
NEUTROPHILS ABSOLUTE: 6.1 K/UL (ref 1.5–7.5)
NEUTROPHILS RELATIVE PERCENT: 59.9 % (ref 50–65)
NITRITE, POC: NORMAL
PDW BLD-RTO: 13.3 % (ref 11.5–14.5)
PH, POC: 5.5
PLATELET # BLD: 285 K/UL (ref 130–400)
PMV BLD AUTO: 9.9 FL (ref 9.4–12.4)
POTASSIUM SERPL-SCNC: 4.1 MMOL/L (ref 3.5–5)
PROTEIN, POC: NORMAL
RBC # BLD: 5.59 M/UL (ref 4.7–6.1)
SODIUM BLD-SCNC: 140 MMOL/L (ref 136–145)
SPECIFIC GRAVITY, POC: 1.03
TOTAL PROTEIN: 7.3 G/DL (ref 6.6–8.7)
TSH REFLEX FT4: 1.97 UIU/ML (ref 0.35–5.5)
UROBILINOGEN, POC: 0.2
VITAMIN B-12: 536 PG/ML (ref 211–946)
WBC # BLD: 10.3 K/UL (ref 4.8–10.8)

## 2020-02-13 PROCEDURE — G8427 DOCREV CUR MEDS BY ELIG CLIN: HCPCS | Performed by: NURSE PRACTITIONER

## 2020-02-13 PROCEDURE — 81002 URINALYSIS NONAUTO W/O SCOPE: CPT | Performed by: NURSE PRACTITIONER

## 2020-02-13 PROCEDURE — G8417 CALC BMI ABV UP PARAM F/U: HCPCS | Performed by: NURSE PRACTITIONER

## 2020-02-13 PROCEDURE — 99214 OFFICE O/P EST MOD 30 MIN: CPT | Performed by: NURSE PRACTITIONER

## 2020-02-13 PROCEDURE — G8482 FLU IMMUNIZE ORDER/ADMIN: HCPCS | Performed by: NURSE PRACTITIONER

## 2020-02-13 PROCEDURE — 4004F PT TOBACCO SCREEN RCVD TLK: CPT | Performed by: NURSE PRACTITIONER

## 2020-02-13 PROCEDURE — 3017F COLORECTAL CA SCREEN DOC REV: CPT | Performed by: NURSE PRACTITIONER

## 2020-02-13 PROCEDURE — 83036 HEMOGLOBIN GLYCOSYLATED A1C: CPT | Performed by: NURSE PRACTITIONER

## 2020-02-13 ASSESSMENT — PATIENT HEALTH QUESTIONNAIRE - PHQ9
SUM OF ALL RESPONSES TO PHQ QUESTIONS 1-9: 1
SUM OF ALL RESPONSES TO PHQ QUESTIONS 1-9: 1
2. FEELING DOWN, DEPRESSED OR HOPELESS: 1
1. LITTLE INTEREST OR PLEASURE IN DOING THINGS: 0
SUM OF ALL RESPONSES TO PHQ9 QUESTIONS 1 & 2: 1

## 2020-02-13 NOTE — TELEPHONE ENCOUNTER
Pt called states his lips and tongue is numb cant taste anything this has been going on for about a week and also states his urine smells rotten. He states \" I dont want to die\" I let him know I didn't think he would die but I would be happy to send a message to see what the Doctor wants to do. ..  please advise

## 2020-02-13 NOTE — TELEPHONE ENCOUNTER
----- Message from CATHY Kelly sent at 2/13/2020  4:41 PM CST -----  Please notify patient of normal result. CBC is normal- so no sign of bleeding out so far.

## 2020-02-13 NOTE — TELEPHONE ENCOUNTER
Pt came in the office today and stated that since he has started the Plavix that he has lost his sense of taste.  He says that he went to his PCP and was told that he didn't have anything wrong and it could be the Plavix.  He stopped by to ask if he needed to continue taking it.      I spoke to Dr. Billings.  He said to let the patient know that he needed to stop smoking and would likely regain his sense of taste.  He should continue taking his Plavix at this time.  The patient wanted to know if he would have to take this medication for the rest of his life.  I told him that he would at least have to continue it until his next f/u visit.  The patient stated understanding.
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
## 2020-02-14 ENCOUNTER — TELEPHONE (OUTPATIENT)
Dept: PRIMARY CARE CLINIC | Age: 50
End: 2020-02-14

## 2020-02-14 NOTE — TELEPHONE ENCOUNTER
----- Message from CATHY Johnson sent at 2/14/2020  7:14 AM CST -----  Please notify patient of normal result.

## 2020-02-14 NOTE — TELEPHONE ENCOUNTER
----- Message from CATHY Dallas sent at 2/13/2020  5:04 PM CST -----  Please notify patient of normal result.

## 2020-02-15 LAB — URINE CULTURE, ROUTINE: NORMAL

## 2020-02-19 ASSESSMENT — ENCOUNTER SYMPTOMS
DIARRHEA: 0
EYE PAIN: 0
NAUSEA: 0
ABDOMINAL DISTENTION: 1
COUGH: 0
ABDOMINAL PAIN: 1
BACK PAIN: 0
CONSTIPATION: 1
WHEEZING: 0
VOMITING: 0
RECTAL PAIN: 0
SHORTNESS OF BREATH: 0

## 2020-02-26 ENCOUNTER — OFFICE VISIT (OUTPATIENT)
Dept: PRIMARY CARE CLINIC | Age: 50
End: 2020-02-26
Payer: MEDICARE

## 2020-02-26 VITALS
HEART RATE: 87 BPM | HEIGHT: 68 IN | TEMPERATURE: 97.9 F | BODY MASS INDEX: 33.8 KG/M2 | OXYGEN SATURATION: 96 % | WEIGHT: 223 LBS | SYSTOLIC BLOOD PRESSURE: 126 MMHG | DIASTOLIC BLOOD PRESSURE: 76 MMHG

## 2020-02-26 PROCEDURE — G8417 CALC BMI ABV UP PARAM F/U: HCPCS | Performed by: FAMILY MEDICINE

## 2020-02-26 PROCEDURE — G8427 DOCREV CUR MEDS BY ELIG CLIN: HCPCS | Performed by: FAMILY MEDICINE

## 2020-02-26 PROCEDURE — 3017F COLORECTAL CA SCREEN DOC REV: CPT | Performed by: FAMILY MEDICINE

## 2020-02-26 PROCEDURE — 4004F PT TOBACCO SCREEN RCVD TLK: CPT | Performed by: FAMILY MEDICINE

## 2020-02-26 PROCEDURE — G0439 PPPS, SUBSEQ VISIT: HCPCS | Performed by: FAMILY MEDICINE

## 2020-02-26 PROCEDURE — G0447 BEHAVIOR COUNSEL OBESITY 15M: HCPCS | Performed by: FAMILY MEDICINE

## 2020-02-26 PROCEDURE — 99406 BEHAV CHNG SMOKING 3-10 MIN: CPT | Performed by: FAMILY MEDICINE

## 2020-02-26 PROCEDURE — 99214 OFFICE O/P EST MOD 30 MIN: CPT | Performed by: FAMILY MEDICINE

## 2020-02-26 PROCEDURE — G8482 FLU IMMUNIZE ORDER/ADMIN: HCPCS | Performed by: FAMILY MEDICINE

## 2020-02-26 RX ORDER — TERBINAFINE HYDROCHLORIDE 250 MG/1
250 TABLET ORAL DAILY
Qty: 84 TABLET | Refills: 0 | Status: SHIPPED | OUTPATIENT
Start: 2020-02-26 | End: 2020-05-20

## 2020-02-26 RX ORDER — BUPROPION HYDROCHLORIDE 100 MG/1
100 TABLET, EXTENDED RELEASE ORAL 2 TIMES DAILY
Qty: 60 TABLET | Refills: 3 | Status: SHIPPED | OUTPATIENT
Start: 2020-02-26 | End: 2020-09-01

## 2020-02-26 ASSESSMENT — ENCOUNTER SYMPTOMS
VOMITING: 0
NAUSEA: 0
WHEEZING: 0
CONSTIPATION: 0
ABDOMINAL PAIN: 1
COUGH: 0
SHORTNESS OF BREATH: 0
CHEST TIGHTNESS: 0
DIARRHEA: 0

## 2020-02-26 ASSESSMENT — PATIENT HEALTH QUESTIONNAIRE - PHQ9
SUM OF ALL RESPONSES TO PHQ QUESTIONS 1-9: 2
SUM OF ALL RESPONSES TO PHQ QUESTIONS 1-9: 2

## 2020-02-26 NOTE — PATIENT INSTRUCTIONS
every 6 months. · Try to get at least 150 minutes of exercise per week or 10,000 steps per day on a pedometer . · Order or download the FREE \"Exercise & Physical Activity: Your Everyday Guide\" from [employer]. Call 9-933.151.2632 or search [employer] online. · You need 1004-1400 mg of calcium and 5997-3990 IU of vitamin D per day. It is possible to meet your calcium requirement with diet alone, but a vitamin D supplement is usually necessary to meet this goal.  · When exposed to the sun, use a sunscreen that protects against both UVA and UVB radiation with an SPF of 30 or greater. Reapply every 2 to 3 hours or after sweating, drying off with a towel, or swimming. · Always wear a seat belt when traveling in a car. Always wear a helmet when riding a bicycle or motorcycle.

## 2020-02-26 NOTE — PROGRESS NOTES
Brenna Cope is a 44 y.o. male who presents today for   Chief Complaint   Patient presents with    Annual Exam     AWV       HPI  Patient is here for Medicare AWV. Pt reports cold weather has exacerbated his arthralgias. He states he continues to gain weight despite only eating ~1 meal per day and inquires about methods for weight loss. Pt reports intermittent abd pain related to hernia and notes potentially having surgery in the future. He c/o chronic dysgeusia with paresthesia in tongue and lips. He also c/o chronic depression. He states he took Prozac ~20 years ago. No change in PMH, family, social, or surgical history unless mentioned above. Review of Systems   Constitutional: Negative for chills and fever. Dysgeusia   Respiratory: Negative for cough, chest tightness, shortness of breath and wheezing. Cardiovascular: Negative for chest pain, palpitations and leg swelling. Gastrointestinal: Positive for abdominal pain. Negative for constipation, diarrhea, nausea and vomiting. Genitourinary: Negative for difficulty urinating, dysuria and frequency. Musculoskeletal: Positive for arthralgias. Neurological: Positive for numbness. Psychiatric/Behavioral: Positive for dysphoric mood. Negative for self-injury and suicidal ideas. The patient is not nervous/anxious.         Past Medical History:   Diagnosis Date    Hyperlipidemia     Nerve damage     Bilateral ears, hard of hearing       Current Outpatient Medications   Medication Sig Dispense Refill    buPROPion (WELLBUTRIN SR) 100 MG extended release tablet Take 1 tablet by mouth 2 times daily 60 tablet 3    terbinafine (LAMISIL) 250 MG tablet Take 1 tablet by mouth daily 84 tablet 0    simvastatin (ZOCOR) 40 MG tablet Take 0.5 tablets by mouth nightly 30 tablet 3    clopidogrel (PLAVIX) 75 MG tablet Take 75 mg by mouth daily      LOW-DOSE ASPIRIN PO Take 81 mg by mouth       No current facility-administered medications for this visit. No Known Allergies    Past Surgical History:   Procedure Laterality Date    ARTERIAL BYPASS SURGRY      L leg 2.5 weeks ago     HERNIA REPAIR      X2    OTHER SURGICAL HISTORY      infected gland       Social History     Tobacco Use    Smoking status: Former Smoker     Packs/day: 1.00     Years: 21.00     Pack years: 21.00     Last attempt to quit: 1997     Years since quittin.6    Smokeless tobacco: Current User   Substance Use Topics    Alcohol use: Never     Frequency: Never     Comment: OCC    Drug use: No       Family History   Problem Relation Age of Onset    High Blood Pressure Father     Diabetes Father        /76   Pulse 87   Temp 97.9 °F (36.6 °C)   Ht 5' 8\" (1.727 m)   Wt 223 lb (101.2 kg)   SpO2 96%   BMI 33.91 kg/m²     Physical Exam  Vitals signs and nursing note reviewed. Constitutional:       General: He is not in acute distress. Appearance: He is well-developed. He is not toxic-appearing or diaphoretic. HENT:      Head: Normocephalic and atraumatic. Ears:      Comments: B/l hearing aids. Cardiovascular:      Rate and Rhythm: Normal rate and regular rhythm. Heart sounds: Normal heart sounds. No murmur. No friction rub. No gallop. Pulmonary:      Effort: Pulmonary effort is normal. No respiratory distress. Breath sounds: Normal breath sounds. No wheezing or rales. Chest:      Chest wall: No tenderness. Abdominal:      General: Bowel sounds are normal. There is no distension. Palpations: Abdomen is soft. There is no mass. Tenderness: There is no abdominal tenderness. There is no guarding or rebound. Comments: Central obesity   Musculoskeletal:      Right lower leg: No edema. Left lower leg: No edema. Right foot: Bunion present. Feet:      Right foot:      Skin integrity: Callus present. Toenail Condition: Right toenails are abnormally thick. Comments: R toenail yellowing.    R foot: keratin plug middle anterior foot. Skin:     General: Skin is warm and dry. Nails: There is no clubbing. Neurological:      Mental Status: He is alert and oriented to person, place, and time. Coordination: Coordination normal.      Gait: Gait normal.   Psychiatric:         Mood and Affect: Mood is depressed. Mood is not anxious. Speech: Speech is not rapid and pressured. Behavior: Behavior is not agitated. Thought Content: Thought content does not include homicidal or suicidal ideation. Cognition and Memory: Memory is not impaired. He does not exhibit impaired recent memory or impaired remote memory. Judgment: Judgment is not impulsive or inappropriate. Assessment:    ICD-10-CM    1. Routine general medical examination at a health care facility Z00.00    2. Current moderate episode of major depressive disorder without prior episode [facility] F32.1    3. Dietary counseling Z71.3    4. PAD (peripheral artery disease) ([facility]) I73.9    5. Onychomycosis B35.1    6. Cigarette smoker F17.210    7. Class 1 obesity due to excess calories with serious comorbidity and body mass index (BMI) of 33.0 to 33.9 in adult E66.09     Z68.33        Plan:   Reviewed labs. Start Bupropion. Instructions for weight loss, depression, and smoking. Pt was given approximately 15 minutes of counseling about diet and exercise including education on what calories are, where calories come from, the need for portion control, and healthy snacks along side an active lifestyle with supplementary exercise of approx 30 minutes a week, 5 days a week of moderate to high intensity exercise for weight loss. The patient voiced increased understanding of the topics discussed. Approximately 5 minutes of education was provided about quit smoking and the harms of tobacco.  Patient does show understanding. Patient has  the desire to quit smoking in the near future. Start Wellbutrin.      No orders of the imaging, and/or referrals for you. A list of these orders (if applicable) as well as your Preventive Care list are included within your After Visit Summary for your review. Other Preventive Recommendations:    · A preventive eye exam performed by an eye specialist is recommended every 1-2 years to screen for glaucoma; cataracts, macular degeneration, and other eye disorders. · A preventive dental visit is recommended every 6 months. · Try to get at least 150 minutes of exercise per week or 10,000 steps per day on a pedometer . · Order or download the FREE \"Exercise & Physical Activity: Your Everyday Guide\" from [employer]. Call 2-868.142.4220 or search [employer] online. · You need 0971-2880 mg of calcium and 2238-2955 IU of vitamin D per day. It is possible to meet your calcium requirement with diet alone, but a vitamin D supplement is usually necessary to meet this goal.  · When exposed to the sun, use a sunscreen that protects against both UVA and UVB radiation with an SPF of 30 or greater. Reapply every 2 to 3 hours or after sweating, drying off with a towel, or swimming. · Always wear a seat belt when traveling in a car. Always wear a helmet when riding a bicycle or motorcycle. Patient given educational handouts and has had all questions answered. Patient voices understanding and agrees to plans along with risks and benefits of plan. Patient isinstructed to continue prior meds, diet, and exercise plans unless instructed otherwise. Patient agrees to follow up as instructed and sooner if needed. Patient agrees to go to ER if condition becomes emergent. Notesmay be completed with dictation device and spelling errors may occur.     Tiffanie Angel am scribing for and in the presence of Dr. Jens Wilkerson. 2/26/2020   I, Dr. Haley Decker, the medical provider for the encounter with patient on 2/26/2020 at 6:20 PM have reviewed my scribe's documentation in earnest

## 2020-02-26 NOTE — PROGRESS NOTES
Medicare Annual Wellness Visit  Name: Bo Coburn Date: 2020   MRN: [id number] Sex: Male   Age: 44 y.o. Ethnicity: Non-/Non    : 1970 Race: Darrius Pinon is here for Annual Exam (AWV)    Screenings for behavioral, psychosocial and functional/safety risks, and cognitive dysfunction are all negative except as indicated below. These results, as well as other patient data from the [email or web address] form, are documented in Flowsheets linked to this Encounter. No Known Allergies    Prior to Visit Medications    Medication Sig Taking?  Authorizing Provider   buPROPion [facility] SR) 100 MG extended release tablet Take 1 tablet by mouth 2 times daily Yes Augustus Nichols MD   terbinafine (LAMISIL) 250 MG tablet Take 1 tablet by mouth daily Yes Augustus Nichols MD   simvastatin (ZOCOR) 40 MG tablet Take 0.5 tablets by mouth nightly Yes Augustus Nichols MD   clopidogrel (PLAVIX) 75 MG tablet Take 75 mg by mouth daily Yes Historical Provider, MD   LOW-DOSE ASPIRIN PO Take 81 mg by mouth Yes Historical Provider, MD       Past Medical History:   Diagnosis Date    Hyperlipidemia     Nerve damage     Bilateral ears, hard of hearing       Past Surgical History:   Procedure Laterality Date    ARTERIAL BYPASS SURGRY      L leg 2.5 weeks ago     HERNIA REPAIR      X2    OTHER SURGICAL HISTORY      infected gland       Family History   Problem Relation Age of Onset    High Blood Pressure Father     Diabetes Father        CareTeam (Including outside providers/suppliers regularly involved in providing care):   Patient Care Team:  Augustus Nichols MD as PCP - General (Family Medicine)  Augustus Nichols MD as PCP - [facility] Empaneled Provider    Wt Readings from Last 3 Encounters:   20 223 lb (101.2 kg)   20 225 lb 12.8 oz (102.4 kg)   10/23/19 221 lb 3.2 oz (100.3 kg)     Vitals:    20 0814   BP: 126/76   Pulse: 87   Temp: 97.9 °F (36.6 °C)   SpO2: 96%   Weight: 223 lb Interventions:  · Inadequate physical activity:  educational materials provided to promote increased physical activity  · Nutritional issues:  educational materials to promote weight loss provided  · Dental exam overdue:  patient encouraged to make appointment with his/her dentist    Hearing/Vision:  No exam data present  Hearing/Vision  Do you or your family notice any trouble with your hearing?: No  Do you have difficulty driving, watching TV, or doing any of your daily activities because of your eyesight?: No  Have you had an eye exam within the past year?: (!) No  Hearing/Vision Interventions:  · Hearing concerns:  patient declines any further evaluation/treatment for hearing issues  · Vision concerns:  patient encouraged to make appointment with his/her eye specialist    Personalized Preventive Plan   Current Health Maintenance Status  Immunization History   Administered Date(s) Administered    Influenza, Quadv, IM, PF (6 mo and older Fluzone, Flulaval, Fluarix, and 3 yrs and older Afluria) 10/23/2019    Pneumococcal Polysaccharide ([id number]) 07/24/2018        Health Maintenance   Topic Date Due    DTaP/Tdap/Td vaccine (1 - Tdap) 06/14/1979    HIV screen  06/14/1983    Shingles Vaccine (1 of 2) 06/14/2018    Annual Wellness Visit (AWV)  05/29/2019    Lipid screen  11/20/2020    Colon cancer screen colonoscopy  07/16/2028    Flu vaccine  Completed    Hepatitis A vaccine  Aged Out    Hepatitis B vaccine  Aged Out    Hib vaccine  Aged Out    Meningococcal (ACWY) vaccine  Aged Out    Pneumococcal 0-64 years Vaccine  Aged Out     Recommendations for [facility] Due: see orders and patient instructions/AVS.  .   Recommended screening schedule for the next 5-10 years is provided to the patient in written form: see Patient Karen Banks was seen today for annual exam.    Diagnoses and all orders for this visit:    Routine general medical examination at a health care facility    PVD (peripheral vascular disease) ([facility]    Current moderate episode of major depressive disorder without prior episode ([facility]    Dietary counseling    PAD (peripheral artery disease) ([facility])    Onychomycosis    Cigarette smoker    Other orders  -     buPROPion (WELLBUTRIN SR) 100 MG extended release tablet; Take 1 tablet by mouth 2 times daily  -     terbinafine (LAMISIL) 250 MG tablet;  Take 1 tablet by mouth daily

## 2020-03-04 ENCOUNTER — HOSPITAL ENCOUNTER (OUTPATIENT)
Age: 50
Setting detail: OBSERVATION
Discharge: HOME | End: 2020-03-05
Attending: INTERNAL MEDICINE | Admitting: INTERNAL MEDICINE
Payer: MEDICARE

## 2020-03-04 PROBLEM — R07.9 CHEST PAIN: Status: ACTIVE | Noted: 2020-03-04

## 2020-03-04 LAB
TROPONIN: <0.01 NG/ML (ref 0–0.03)
TROPONIN: <0.01 NG/ML (ref 0–0.03)

## 2020-03-04 PROCEDURE — 99220 PR INITIAL OBSERVATION CARE/DAY 70 MINUTES: CPT | Performed by: INTERNAL MEDICINE

## 2020-03-04 PROCEDURE — 6360000004 HC RX CONTRAST MEDICATION: Performed by: INTERNAL MEDICINE

## 2020-03-04 PROCEDURE — 93458 L HRT ARTERY/VENTRICLE ANGIO: CPT | Performed by: INTERNAL MEDICINE

## 2020-03-04 PROCEDURE — 99153 MOD SED SAME PHYS/QHP EA: CPT

## 2020-03-04 PROCEDURE — G0379 DIRECT REFER HOSPITAL OBSERV: HCPCS

## 2020-03-04 PROCEDURE — 99152 MOD SED SAME PHYS/QHP 5/>YRS: CPT

## 2020-03-04 PROCEDURE — 36415 COLL VENOUS BLD VENIPUNCTURE: CPT

## 2020-03-04 PROCEDURE — 6360000002 HC RX W HCPCS

## 2020-03-04 PROCEDURE — C1769 GUIDE WIRE: HCPCS

## 2020-03-04 PROCEDURE — G0378 HOSPITAL OBSERVATION PER HR: HCPCS

## 2020-03-04 PROCEDURE — 93458 L HRT ARTERY/VENTRICLE ANGIO: CPT

## 2020-03-04 PROCEDURE — C1894 INTRO/SHEATH, NON-LASER: HCPCS

## 2020-03-04 PROCEDURE — 99152 MOD SED SAME PHYS/QHP 5/>YRS: CPT | Performed by: INTERNAL MEDICINE

## 2020-03-04 PROCEDURE — 2580000003 HC RX 258: Performed by: INTERNAL MEDICINE

## 2020-03-04 PROCEDURE — 84484 ASSAY OF TROPONIN QUANT: CPT

## 2020-03-04 PROCEDURE — 2709999900 HC NON-CHARGEABLE SUPPLY

## 2020-03-04 PROCEDURE — 2500000003 HC RX 250 WO HCPCS

## 2020-03-04 PROCEDURE — 6370000000 HC RX 637 (ALT 250 FOR IP): Performed by: INTERNAL MEDICINE

## 2020-03-04 PROCEDURE — C1887 CATHETER, GUIDING: HCPCS

## 2020-03-04 RX ORDER — ACETAMINOPHEN 650 MG/1
650 SUPPOSITORY RECTAL EVERY 6 HOURS PRN
Status: DISCONTINUED | OUTPATIENT
Start: 2020-03-04 | End: 2020-03-05 | Stop reason: HOSPADM

## 2020-03-04 RX ORDER — PROMETHAZINE HYDROCHLORIDE 12.5 MG/1
12.5 TABLET ORAL EVERY 6 HOURS PRN
Status: DISCONTINUED | OUTPATIENT
Start: 2020-03-04 | End: 2020-03-05 | Stop reason: HOSPADM

## 2020-03-04 RX ORDER — ACETAMINOPHEN 325 MG/1
650 TABLET ORAL EVERY 4 HOURS PRN
Status: DISCONTINUED | OUTPATIENT
Start: 2020-03-04 | End: 2020-03-05 | Stop reason: HOSPADM

## 2020-03-04 RX ORDER — LISINOPRIL 5 MG/1
5 TABLET ORAL DAILY
Status: DISCONTINUED | OUTPATIENT
Start: 2020-03-04 | End: 2020-03-05 | Stop reason: HOSPADM

## 2020-03-04 RX ORDER — NITROGLYCERIN 0.4 MG/1
0.4 TABLET SUBLINGUAL EVERY 5 MIN PRN
Status: DISCONTINUED | OUTPATIENT
Start: 2020-03-04 | End: 2020-03-05 | Stop reason: HOSPADM

## 2020-03-04 RX ORDER — ISOSORBIDE MONONITRATE 30 MG/1
30 TABLET, EXTENDED RELEASE ORAL DAILY
Status: DISCONTINUED | OUTPATIENT
Start: 2020-03-04 | End: 2020-03-05 | Stop reason: HOSPADM

## 2020-03-04 RX ORDER — ALPRAZOLAM 0.5 MG/1
0.5 TABLET ORAL
Status: ACTIVE | OUTPATIENT
Start: 2020-03-04 | End: 2020-03-04

## 2020-03-04 RX ORDER — ACETAMINOPHEN 325 MG/1
650 TABLET ORAL EVERY 6 HOURS PRN
Status: DISCONTINUED | OUTPATIENT
Start: 2020-03-04 | End: 2020-03-05 | Stop reason: HOSPADM

## 2020-03-04 RX ORDER — METOPROLOL SUCCINATE 25 MG/1
25 TABLET, EXTENDED RELEASE ORAL DAILY
Status: DISCONTINUED | OUTPATIENT
Start: 2020-03-04 | End: 2020-03-05 | Stop reason: HOSPADM

## 2020-03-04 RX ORDER — ONDANSETRON 2 MG/ML
4 INJECTION INTRAMUSCULAR; INTRAVENOUS EVERY 6 HOURS PRN
Status: DISCONTINUED | OUTPATIENT
Start: 2020-03-04 | End: 2020-03-05 | Stop reason: HOSPADM

## 2020-03-04 RX ORDER — ATORVASTATIN CALCIUM 40 MG/1
40 TABLET, FILM COATED ORAL NIGHTLY
Status: DISCONTINUED | OUTPATIENT
Start: 2020-03-04 | End: 2020-03-05 | Stop reason: HOSPADM

## 2020-03-04 RX ORDER — SODIUM CHLORIDE 0.9 % (FLUSH) 0.9 %
10 SYRINGE (ML) INJECTION EVERY 12 HOURS SCHEDULED
Status: DISCONTINUED | OUTPATIENT
Start: 2020-03-04 | End: 2020-03-05 | Stop reason: HOSPADM

## 2020-03-04 RX ORDER — POLYETHYLENE GLYCOL 3350 17 G/17G
17 POWDER, FOR SOLUTION ORAL DAILY PRN
Status: DISCONTINUED | OUTPATIENT
Start: 2020-03-04 | End: 2020-03-05 | Stop reason: HOSPADM

## 2020-03-04 RX ORDER — SODIUM CHLORIDE 0.9 % (FLUSH) 0.9 %
10 SYRINGE (ML) INJECTION PRN
Status: DISCONTINUED | OUTPATIENT
Start: 2020-03-04 | End: 2020-03-05 | Stop reason: HOSPADM

## 2020-03-04 RX ORDER — SODIUM CHLORIDE 9 MG/ML
INJECTION, SOLUTION INTRAVENOUS CONTINUOUS
Status: DISCONTINUED | OUTPATIENT
Start: 2020-03-04 | End: 2020-03-04

## 2020-03-04 RX ORDER — SODIUM CHLORIDE 9 MG/ML
INJECTION, SOLUTION INTRAVENOUS CONTINUOUS
Status: DISCONTINUED | OUTPATIENT
Start: 2020-03-04 | End: 2020-03-05 | Stop reason: HOSPADM

## 2020-03-04 RX ORDER — ASPIRIN 81 MG/1
81 TABLET, CHEWABLE ORAL DAILY
Status: DISCONTINUED | OUTPATIENT
Start: 2020-03-04 | End: 2020-03-05 | Stop reason: HOSPADM

## 2020-03-04 RX ADMIN — ASPIRIN 81 MG 81 MG: 81 TABLET ORAL at 13:12

## 2020-03-04 RX ADMIN — IOPAMIDOL 172 ML: 612 INJECTION, SOLUTION INTRAVENOUS at 17:07

## 2020-03-04 RX ADMIN — SODIUM CHLORIDE: 9 INJECTION, SOLUTION INTRAVENOUS at 19:11

## 2020-03-04 RX ADMIN — ATORVASTATIN CALCIUM 40 MG: 40 TABLET, FILM COATED ORAL at 21:41

## 2020-03-04 RX ADMIN — METOPROLOL SUCCINATE 25 MG: 25 TABLET, EXTENDED RELEASE ORAL at 13:12

## 2020-03-04 RX ADMIN — ISOSORBIDE MONONITRATE 30 MG: 30 TABLET, EXTENDED RELEASE ORAL at 13:11

## 2020-03-04 RX ADMIN — LISINOPRIL 5 MG: 5 TABLET ORAL at 13:12

## 2020-03-04 ASSESSMENT — ENCOUNTER SYMPTOMS
BLOOD IN STOOL: 0
BACK PAIN: 1
SHORTNESS OF BREATH: 0
ABDOMINAL PAIN: 0
ABDOMINAL DISTENTION: 0
DIARRHEA: 0
COUGH: 0
WHEEZING: 0
VOMITING: 0

## 2020-03-04 ASSESSMENT — PAIN SCALES - GENERAL: PAINLEVEL_OUTOF10: 0

## 2020-03-04 NOTE — H&P
Patient:  Erika Baker                  1970  MRN: [id number]    PROBLEM LIST:    Patient Active Problem List    Diagnosis Date Noted    PAD (peripheral artery disease) ([facility] 04/12/2019     Priority: Low     Overview Note:     Follow-up is arranged with vascular surgery      Hallux valgus with bunions 04/12/2019     Priority: Low     Overview Note:     Has bilateral bunions and extremely long unkept nails we will refer him to podiatry      Spondylosis of thoracolumbar region without myelopathy or radiculopathy 07/24/2018     Priority: Low    Pure hypercholesterolemia 07/17/2018     Priority: Low    Tobacco use disorder 06/19/2018     Priority: Low    Congenital hearing disorder of both ears 06/19/2018     Priority: Low    Hemorrhoids 02/04/2015     Priority: Low    Nerve damage      Priority: Low     Overview Note:     Bilateral ears, hard of hearing       1. Unstable angina presentation with progressive chest pain, dynamic ST T changes on EKG. 2. Peripheral arterial disease with prior left SFA stent with occlusion. 3. Active long-standing tobacco use (stopped 4 days ago). 4. Hearing deficit with speech impediment. PRESENTATION: Erika Baker is a 44y.o. year old male who presents as a referral from [facility]. He has been having progressive chest pain since December. He has a history of heartburn but this current symptom is slightly different with retrosternal discomfort that occurs at any time. Chest pain has been waking him up 3 times last night. EKG shows T-wave inversions from V3 to V6 with dynamic changes from prior EKG. No shortness of breath reported. He is limited by claudication symptoms. He had prior intervention to his left SFA with subsequent occlusion of stents. Long-standing tobacco use but stopped 4 days ago due to worsening symptoms. REVIEW OF SYSTEMS:  Review of Systems   Constitutional: Negative for activity change, diaphoresis and fatigue.    HENT: Negative for hearing loss, nosebleeds and tinnitus. Eyes: Negative for visual disturbance. Respiratory: Negative for cough, shortness of breath and wheezing. Cardiovascular: Positive for chest pain. Negative for palpitations and leg swelling. Gastrointestinal: Negative for abdominal distention, abdominal pain, blood in stool, diarrhea and vomiting. Endocrine: Negative for cold intolerance, heat intolerance, polydipsia, polyphagia and polyuria. Genitourinary: Negative for difficulty urinating, flank pain and hematuria. Musculoskeletal: Positive for back pain and gait problem. Negative for arthralgias, joint swelling and myalgias. Skin: Negative for pallor and rash. Neurological: Negative for dizziness, seizures, syncope and headaches. Psychiatric/Behavioral: Negative for behavioral problems and dysphoric mood. The patient is not nervous/anxious. Past Medical History:      Diagnosis Date    Hyperlipidemia     Nerve damage     Bilateral ears, hard of hearing       Past Surgical History:      Procedure Laterality Date    ARTERIAL BYPASS SURGRY      L leg 2.5 weeks ago     HERNIA REPAIR      X2    OTHER SURGICAL HISTORY      infected gland       Medications Prior to Admission:    Prior to Admission medications    Medication Sig Start Date End Date Taking? Authorizing Provider   buPROPion [facility] SR) 100 MG extended release tablet Take 1 tablet by mouth 2 times daily 2/26/20  Yes Amna Cerda MD   terbinafine (LAMISIL) 250 MG tablet Take 1 tablet by mouth daily 2/26/20 5/20/20 Yes Amna Cerda MD   simvastatin (ZOCOR) 40 MG tablet Take 0.5 tablets by mouth nightly 12/5/19  Yes Amna Cerda MD   clopidogrel (PLAVIX) 75 MG tablet Take 75 mg by mouth daily   Yes Historical Provider, MD   LOW-DOSE ASPIRIN PO Take 81 mg by mouth   Yes Historical Provider, MD       Allergies:  Patient has no known allergies.     Past Social History:  Social History     Socioeconomic History    Marital status:  SPECGRAV 1.030 02/13/2020    GLUCOSEU - 02/13/2020     -----------------------------------------------------------------  IMAGING:  No orders to display         Assessment and Recommendations: This is a 44y.o. year old male with past medical history of peripheral arterial disease with prior intervention to left SFA with subsequent occlusion, long-standing tobacco use, deafness with speech impediment presenting with progressive chest pain that has been worsening with rest symptoms and dynamic ST-T changes in precordial leads referred for cardiac catheterization. 1. Have stressed the importance of smoking cessation lifelong. 2. Start statin therapy, aspirin, Imdur 30 mg, lisinopril 5 mg and Toprol-XL 25 mg daily. 3. We'll proceed with cardiac catheterization. Risks, benefits, alternatives of cardiac catheterization/PCI discussed with the patient and full informed consent obtained.   Acceptable Mallampati score  Consent for moderate conscious sedation  ASA 3            Electronically signed by Megan Holm MD on 3/4/2020 at 12:25 PM

## 2020-03-04 NOTE — PROGRESS NOTES
Elizabeth Campos arrived to room # 427. Presented with: Chest pain  Mental Status: Patient is oriented, alert, coherent, logical, thought processes intact and able to concentrate and follow conversation. Vitals:    03/04/20 1123   BP: 121/78   Pulse: 83   Resp: 18   Temp: 97 °F (36.1 °C)   SpO2: 95%     Patient safety contract and falls prevention contract reviewed with patient Yes. Oriented Patient to room. Call light within reach. Yes.   Needs, issues or concerns expressed at this time: no.      Electronically signed by Sim Cueto RN on 3/4/2020 at 11:35 AM

## 2020-03-04 NOTE — PROGRESS NOTES
Consent for cardiac catheterization to be performed by Dr. Benigno Nieves signed by pt and placed in pt soft chart. Pt stated that he had no questions about the procedure. Pt also stated that he understood he is to have nothing by mouth, to eat or drink, before the procedure.      Electronically signed by Sourav Lemus RN on 3/4/2020 at 3:42 PM

## 2020-03-05 VITALS
SYSTOLIC BLOOD PRESSURE: 93 MMHG | HEART RATE: 82 BPM | BODY MASS INDEX: 32.63 KG/M2 | RESPIRATION RATE: 18 BRPM | HEIGHT: 68 IN | TEMPERATURE: 96.5 F | DIASTOLIC BLOOD PRESSURE: 60 MMHG | OXYGEN SATURATION: 95 % | WEIGHT: 215.3 LBS

## 2020-03-05 LAB
ALBUMIN SERPL-MCNC: 3.9 G/DL (ref 3.5–5.2)
ALP BLD-CCNC: 98 U/L (ref 40–130)
ALT SERPL-CCNC: 24 U/L (ref 5–41)
ANION GAP SERPL CALCULATED.3IONS-SCNC: 11 MMOL/L (ref 7–19)
AST SERPL-CCNC: 18 U/L (ref 5–40)
BILIRUB SERPL-MCNC: 0.4 MG/DL (ref 0.2–1.2)
BUN BLDV-MCNC: 12 MG/DL (ref 6–20)
CALCIUM SERPL-MCNC: 9 MG/DL (ref 8.6–10)
CHLORIDE BLD-SCNC: 106 MMOL/L (ref 98–111)
CO2: 23 MMOL/L (ref 22–29)
CREAT SERPL-MCNC: 1.1 MG/DL (ref 0.5–1.2)
EKG P AXIS: 58 DEGREES
EKG P-R INTERVAL: 130 MS
EKG Q-T INTERVAL: 388 MS
EKG QRS DURATION: 86 MS
EKG QTC CALCULATION (BAZETT): 403 MS
EKG T AXIS: 118 DEGREES
GFR NON-AFRICAN AMERICAN: >60
GLUCOSE BLD-MCNC: 88 MG/DL (ref 74–109)
HCT VFR BLD CALC: 47.8 % (ref 42–52)
HEMOGLOBIN: 15.5 G/DL (ref 14–18)
MCH RBC QN AUTO: 30.4 PG (ref 27–31)
MCHC RBC AUTO-ENTMCNC: 32.4 G/DL (ref 33–37)
MCV RBC AUTO: 93.7 FL (ref 80–94)
PDW BLD-RTO: 13.4 % (ref 11.5–14.5)
PLATELET # BLD: 275 K/UL (ref 130–400)
PMV BLD AUTO: 9.9 FL (ref 9.4–12.4)
POTASSIUM REFLEX MAGNESIUM: 4.4 MMOL/L (ref 3.5–5)
RBC # BLD: 5.1 M/UL (ref 4.7–6.1)
SODIUM BLD-SCNC: 140 MMOL/L (ref 136–145)
TOTAL PROTEIN: 6.2 G/DL (ref 6.6–8.7)
WBC # BLD: 11.2 K/UL (ref 4.8–10.8)

## 2020-03-05 PROCEDURE — G0378 HOSPITAL OBSERVATION PER HR: HCPCS

## 2020-03-05 PROCEDURE — 85027 COMPLETE CBC AUTOMATED: CPT

## 2020-03-05 PROCEDURE — 2580000003 HC RX 258: Performed by: INTERNAL MEDICINE

## 2020-03-05 PROCEDURE — 80053 COMPREHEN METABOLIC PANEL: CPT

## 2020-03-05 PROCEDURE — 93005 ELECTROCARDIOGRAM TRACING: CPT | Performed by: INTERNAL MEDICINE

## 2020-03-05 PROCEDURE — 6370000000 HC RX 637 (ALT 250 FOR IP): Performed by: INTERNAL MEDICINE

## 2020-03-05 PROCEDURE — 36415 COLL VENOUS BLD VENIPUNCTURE: CPT

## 2020-03-05 RX ADMIN — ISOSORBIDE MONONITRATE 30 MG: 30 TABLET, EXTENDED RELEASE ORAL at 08:26

## 2020-03-05 RX ADMIN — ASPIRIN 81 MG 81 MG: 81 TABLET ORAL at 08:26

## 2020-03-05 RX ADMIN — LISINOPRIL 5 MG: 5 TABLET ORAL at 08:26

## 2020-03-05 RX ADMIN — SODIUM CHLORIDE, PRESERVATIVE FREE 10 ML: 5 INJECTION INTRAVENOUS at 08:26

## 2020-03-05 RX ADMIN — METOPROLOL SUCCINATE 25 MG: 25 TABLET, EXTENDED RELEASE ORAL at 08:26

## 2020-03-05 NOTE — DISCHARGE SUMMARY
Coordination: Coordination normal.      Deep Tendon Reflexes: Reflexes normal.           Discharge Medications:       Mateus Craig   Home Medication Instructions PSO:[id number]    Printed on:03/05/20 1100   Medication Information                      buPROPion (WELLBUTRIN SR) 100 MG extended release tablet  Take 1 tablet by mouth 2 times daily             clopidogrel (PLAVIX) 75 MG tablet  Take 75 mg by mouth daily             LOW-DOSE ASPIRIN PO  Take 81 mg by mouth             simvastatin (ZOCOR) 40 MG tablet  Take 0.5 tablets by mouth nightly             terbinafine (LAMISIL) 250 MG tablet  Take 1 tablet by mouth daily                 Discharge Instructions:   Mariia Bridges MD  [address] [place] [address] [id number]  [place] [phone]              Take medications as directed. Resume activity as tolerated. Diet: DIET CARDIAC;      Disposition: Patient is medically stable and will be discharged *    Electronically signed by Jazmin Ortiz MD on 3/5/2020 at 11:00 AM

## 2020-03-06 ENCOUNTER — TELEPHONE (OUTPATIENT)
Dept: INTERNAL MEDICINE | Age: 50
End: 2020-03-06

## 2020-03-09 ENCOUNTER — TELEPHONE (OUTPATIENT)
Dept: INTERNAL MEDICINE | Age: 50
End: 2020-03-09

## 2020-03-10 ENCOUNTER — OFFICE VISIT (OUTPATIENT)
Dept: PRIMARY CARE CLINIC | Age: 50
End: 2020-03-10
Payer: MEDICARE

## 2020-03-10 VITALS
WEIGHT: 161 LBS | TEMPERATURE: 96.2 F | OXYGEN SATURATION: 97 % | RESPIRATION RATE: 20 BRPM | HEART RATE: 108 BPM | DIASTOLIC BLOOD PRESSURE: 70 MMHG | SYSTOLIC BLOOD PRESSURE: 110 MMHG | BODY MASS INDEX: 24.48 KG/M2

## 2020-03-10 PROBLEM — R07.9 CHEST PAIN: Status: RESOLVED | Noted: 2020-03-04 | Resolved: 2020-03-10

## 2020-03-10 PROBLEM — F17.210 CIGARETTE SMOKER: Status: ACTIVE | Noted: 2020-03-10

## 2020-03-10 PROBLEM — F17.200 TOBACCO USE DISORDER: Status: RESOLVED | Noted: 2018-06-19 | Resolved: 2020-03-10

## 2020-03-10 PROCEDURE — 99496 TRANSJ CARE MGMT HIGH F2F 7D: CPT | Performed by: FAMILY MEDICINE

## 2020-03-10 PROCEDURE — 1111F DSCHRG MED/CURRENT MED MERGE: CPT | Performed by: FAMILY MEDICINE

## 2020-03-10 RX ORDER — NICOTINE 21 MG/24HR
1 PATCH, TRANSDERMAL 24 HOURS TRANSDERMAL EVERY 24 HOURS
COMMUNITY
End: 2020-05-28

## 2020-03-10 NOTE — PROGRESS NOTES
Post-Discharge Transitional Care Management Services or Hospital Follow Up      Melita Pereira   YOB: 1970    Date of Office Visit:  3/10/2020  Date of Hospital Admission: 3/4/20  Date of Hospital Discharge: 3/5/20  Risk of hospital readmission (high >=14%.  Medium >=10%) :Readmission Risk Score: 6      Care management risk score Rising risk (score 2-5) and Complex Care (Scores >=6): 1     Non face to face  following discharge, date last encounter closed (first attempt may have been earlier): 3/9/2020 10:20 AM    Call initiated 2 business days of discharge: Yes    Patient Active Problem List   Diagnosis    Nerve damage    Hemorrhoids    Tobacco use disorder    Congenital hearing disorder of both ears    Pure hypercholesterolemia    Spondylosis of thoracolumbar region without myelopathy or radiculopathy    PAD (peripheral artery disease) (HCC)    Hallux valgus with bunions    Chest pain    Unstable angina (HCC)       No Known Allergies    Medications listed as ordered at the time of discharge from hospital   José Miguel Pinon   Home Medication Instructions ARIANE:    Printed on:03/10/20 4959   Medication Information                      buPROPion (WELLBUTRIN SR) 100 MG extended release tablet  Take 1 tablet by mouth 2 times daily             clopidogrel (PLAVIX) 75 MG tablet  Take 75 mg by mouth daily             LOW-DOSE ASPIRIN PO  Take 81 mg by mouth             nicotine (NICODERM CQ) 21 MG/24HR  Place 1 patch onto the skin every 24 hours             simvastatin (ZOCOR) 40 MG tablet  Take 0.5 tablets by mouth nightly             terbinafine (LAMISIL) 250 MG tablet  Take 1 tablet by mouth daily                   Medications marked \"taking\" at this time  Outpatient Medications Marked as Taking for the 3/10/20 encounter (Office Visit) with Zita Waite MD   Medication Sig Dispense Refill    nicotine (NICODERM CQ) 21 MG/24HR Place 1 patch onto the skin every 24 hours      buPROPion [facility] SR) 100 MG extended release tablet Take 1 tablet by mouth 2 times daily 60 tablet 3    terbinafine (LAMISIL) 250 MG tablet Take 1 tablet by mouth daily 84 tablet 0    simvastatin (ZOCOR) 40 MG tablet Take 0.5 tablets by mouth nightly 30 tablet 3    clopidogrel (PLAVIX) 75 MG tablet Take 75 mg by mouth daily      LOW-DOSE ASPIRIN PO Take 81 mg by mouth          Medications patient taking as of now reconciled against medications ordered at time of hospital discharge: Yes    Chief Complaint   Patient presents with    Follow-Up from Hospital       History of Present illness - Follow up of Hospital diagnosis(es): unstable angina, EKG changes    Inpatient course: Discharge summary reviewed- see chart. Interval history/Current status: fair, stable        Vitals:    03/10/20 0809   BP: 110/70   Pulse: 108   Resp: 20   Temp: 96.2 °F (35.7 °C)   SpO2: 97%   Weight: 161 lb (73 kg)     Body mass index is 24.48 kg/m².    Wt Readings from Last 3 Encounters:   03/10/20 161 lb (73 kg)   03/05/20 215 lb 4.8 oz (97.7 kg)   02/26/20 223 lb (101.2 kg)     BP Readings from Last 3 Encounters:   03/10/20 110/70   03/05/20 93/60   02/26/20 126/76          Medical Decision Making: high complexity

## 2020-03-17 PROBLEM — I25.110 CORONARY ARTERY DISEASE INVOLVING NATIVE CORONARY ARTERY OF NATIVE HEART WITH UNSTABLE ANGINA PECTORIS (HCC): Status: ACTIVE | Noted: 2020-03-17

## 2020-03-17 ASSESSMENT — ENCOUNTER SYMPTOMS
VOMITING: 0
CONSTIPATION: 0
ABDOMINAL PAIN: 0
WHEEZING: 0
SHORTNESS OF BREATH: 0
NAUSEA: 0
CHEST TIGHTNESS: 0
COUGH: 0
DIARRHEA: 0

## 2020-04-14 RX ORDER — CLOPIDOGREL BISULFATE 75 MG/1
TABLET ORAL
Qty: 30 TABLET | Refills: 3 | Status: SHIPPED | OUTPATIENT
Start: 2020-04-14 | End: 2020-08-31

## 2020-04-14 RX ORDER — SIMVASTATIN 20 MG
TABLET ORAL
Qty: 30 TABLET | Refills: 3 | Status: SHIPPED | OUTPATIENT
Start: 2020-04-14 | End: 2020-07-30

## 2020-04-15 ENCOUNTER — TELEPHONE (OUTPATIENT)
Dept: CARDIOLOGY | Age: 50
End: 2020-04-15

## 2020-04-22 ENCOUNTER — APPOINTMENT (OUTPATIENT)
Dept: ULTRASOUND IMAGING | Facility: HOSPITAL | Age: 50
End: 2020-04-22

## 2020-04-30 ENCOUNTER — VIRTUAL VISIT (OUTPATIENT)
Dept: PRIMARY CARE CLINIC | Age: 50
End: 2020-04-30
Payer: MEDICARE

## 2020-04-30 VITALS — HEIGHT: 68 IN | BODY MASS INDEX: 34.25 KG/M2 | WEIGHT: 226 LBS

## 2020-04-30 PROCEDURE — G8428 CUR MEDS NOT DOCUMENT: HCPCS | Performed by: FAMILY MEDICINE

## 2020-04-30 PROCEDURE — 3017F COLORECTAL CA SCREEN DOC REV: CPT | Performed by: FAMILY MEDICINE

## 2020-04-30 PROCEDURE — G8420 CALC BMI NORM PARAMETERS: HCPCS | Performed by: FAMILY MEDICINE

## 2020-04-30 PROCEDURE — 99213 OFFICE O/P EST LOW 20 MIN: CPT | Performed by: FAMILY MEDICINE

## 2020-04-30 PROCEDURE — 99406 BEHAV CHNG SMOKING 3-10 MIN: CPT | Performed by: FAMILY MEDICINE

## 2020-04-30 PROCEDURE — 4004F PT TOBACCO SCREEN RCVD TLK: CPT | Performed by: FAMILY MEDICINE

## 2020-04-30 ASSESSMENT — ENCOUNTER SYMPTOMS
DIARRHEA: 0
VOMITING: 0
COUGH: 0
ABDOMINAL PAIN: 0
CONSTIPATION: 0
WHEEZING: 0
CHEST TIGHTNESS: 0
NAUSEA: 0
SHORTNESS OF BREATH: 0

## 2020-04-30 NOTE — PROGRESS NOTES
2020    TELEHEALTH EVALUATION -- Audio/Visual (During TVXNB-60 public health emergency)    HPI:    Milton Sibley (:  1970) has requested an audio/video evaluation for the following concern(s):    Patient presents today via video visit for f/u unstable angina. Notes he continues to have less angina. He is smoke free for 45 days and is off nicotine patch. Still taking toenail fungus med, no issues and notes it is improving his nail health. Review of Systems   Constitutional: Negative for chills and fever. Respiratory: Negative for cough, chest tightness, shortness of breath and wheezing. Cardiovascular: Negative for chest pain, palpitations and leg swelling. Gastrointestinal: Negative for abdominal pain, constipation, diarrhea, nausea and vomiting. Genitourinary: Negative for difficulty urinating, dysuria and frequency. Prior to Visit Medications    Medication Sig Taking? Authorizing Provider   simvastatin (ZOCOR) 20 MG tablet TAKE ONE TABLET BY MOUTH EVERY NIGHT AT BEDTIME.   Doreen Mcrae MD   nicotine (NICODERM CQ) 21 MG/24HR Place 1 patch onto the skin every 24 hours  Historical Provider, MD   buPROPion [facility] SR) 100 MG extended release tablet Take 1 tablet by mouth 2 times daily  Doreen Mcrae MD   terbinafine (LAMISIL) 250 MG tablet Take 1 tablet by mouth daily  Doreen Mcrae MD   clopidogrel (PLAVIX) 75 MG tablet Take 75 mg by mouth daily  Historical Provider, MD   LOW-DOSE ASPIRIN PO Take 81 mg by mouth  Historical Provider, MD       Social History     Tobacco Use    Smoking status: Former Smoker     Packs/day: 1.00     Years: 21.00     Pack years: 21.00     Last attempt to quit: 1997     Years since quittin.7    Smokeless tobacco: Current User   Substance Use Topics    Alcohol use: Never     Frequency: Never     Comment: OCC    Drug use: No        No Known Allergies,   Past Medical History:   Diagnosis Date    Hyperlipidemia     Nerve damage Normal  [] Abnormal -         Discharge []  None visible  [] Abnormal -    HENT:   [x] Normocephalic, atraumatic. [] Abnormal   [x] Mouth/Throat: Mucous membranes are moist.     External Ears [x] Normal  [] Abnormal-     Neck: [x] No visualized mass     Pulmonary/Chest: [x] Respiratory effort normal.  [x] No visualized signs of difficulty breathing or respiratory distress        [] Abnormal-      Musculoskeletal:   [x] Normal gait with no signs of ataxia         [x] Normal range of motion of neck        [] Abnormal-       Neurological:        [x] No Facial Asymmetry (Cranial nerve 7 motor function) (limited exam to video visit)          [x] No gaze palsy        [] Abnormal-         Skin:        [x] No significant exanthematous lesions or discoloration noted on facial skin         [] Abnormal-            Psychiatric:       [x] Normal Affect [x] No Hallucinations        [] Abnormal-     Other pertinent observable physical exam findings-     ASSESSMENT/PLAN:    ICD-10-CM    1. PAD (peripheral artery disease) ([facility]) I73.9    2. Unstable angina ([facility]) I20.0    3. Coronary artery disease involving native coronary artery of native heart with unstable angina pectoris ([facility] [address] I25.110    4. Cigarette smoker F17.210    5. Onychomycosis B35.1    6. Tobacco use disorder, mild, in early remission F17.201        Overall stable conditions. He is improving and smoke free. Approximately 4 minutes of education was provided about quit smoking and the harms of tobacco.  Patient does show understanding. Patient has the will to remain smoke free, counseled on ways to stay and avoid smoke. No orders of the defined types were placed in this encounter. No orders of the defined types were placed in this encounter. Return in about 3 months (around 7/30/2020) for VV - f/u CAD, smoking cessation. Medina Winston is a 44 y.o. male being evaluated by a Virtual Visit (video visit) encounter to address concerns as mentioned above.

## 2020-05-28 ENCOUNTER — TELEPHONE (OUTPATIENT)
Dept: VASCULAR SURGERY | Facility: CLINIC | Age: 50
End: 2020-05-28

## 2020-05-28 ENCOUNTER — OFFICE VISIT (OUTPATIENT)
Dept: CARDIOLOGY | Age: 50
End: 2020-05-28
Payer: MEDICARE

## 2020-05-28 VITALS
DIASTOLIC BLOOD PRESSURE: 80 MMHG | BODY MASS INDEX: 32.43 KG/M2 | HEART RATE: 80 BPM | WEIGHT: 214 LBS | SYSTOLIC BLOOD PRESSURE: 138 MMHG | HEIGHT: 68 IN

## 2020-05-28 PROCEDURE — G8427 DOCREV CUR MEDS BY ELIG CLIN: HCPCS | Performed by: CLINICAL NURSE SPECIALIST

## 2020-05-28 PROCEDURE — 4004F PT TOBACCO SCREEN RCVD TLK: CPT | Performed by: CLINICAL NURSE SPECIALIST

## 2020-05-28 PROCEDURE — G8417 CALC BMI ABV UP PARAM F/U: HCPCS | Performed by: CLINICAL NURSE SPECIALIST

## 2020-05-28 PROCEDURE — 99214 OFFICE O/P EST MOD 30 MIN: CPT | Performed by: CLINICAL NURSE SPECIALIST

## 2020-05-28 PROCEDURE — 3017F COLORECTAL CA SCREEN DOC REV: CPT | Performed by: CLINICAL NURSE SPECIALIST

## 2020-05-28 NOTE — PATIENT INSTRUCTIONS
high blood pressure, and high cholesterol. If you think you may have a problem with alcohol or drug use, talk to your doctor. · If you have angina symptoms, pay attention to your symptoms. This can help you see what causes them and what is typical for you. · Avoid colds and flu. Get a pneumococcal vaccine shot. If you have had one before, ask your doctor whether you need another dose. Get a flu vaccine every year. If you must be around people with colds or flu, wash your hands often. · If you think you have symptoms of depression, talk to your doctor. Symptoms include feeling sad or hopeless most of the time, or losing interest in activities that used to make you happy. When should you call for help? [id number] anytime you think you may need emergency care. For example, call if:  · You have symptoms of a heart attack. These may include:  ? Chest pain or pressure, or a strange feeling in the chest.  ? Sweating. ? Shortness of breath. ? Nausea or vomiting. ? Pain, pressure, or a strange feeling in the back, neck, jaw, or upper belly or in one or both shoulders or arms. ? Lightheadedness or sudden weakness. ? A fast or irregular heartbeat. After you call 911, the  may tell you to chew 1 adult-strength or 2 to 4 low-dose aspirin. Wait for an ambulance. Do not try to drive yourself. · You have angina symptoms (such as chest pain or pressure) that do not go away with rest or are not getting better within 5 minutes after you take a dose of nitroglycerin. · You passed out (lost consciousness). Call your doctor now or seek immediate medical care if:  · You are having angina symptoms, such as chest pain or pressure, more often than usual, or they are different or worse than usual.  · You have new or increased shortness of breath. · You are dizzy or lightheaded, or you feel like you may faint. Watch closely for changes in your health, and be sure to contact your doctor if you have any problems.   Where can

## 2020-05-28 NOTE — TELEPHONE ENCOUNTER
Spoke with Mr Roberts reminding him of his appointments for Friday, May 29th, 2020. [facility]30 [facility] for testing and follow up afterwards at 245 pm with Dr Billings. Mr Roberts confirmed he would be here.

## 2020-05-28 NOTE — PROGRESS NOTES
[address] [facility]  [facility] [address]  [address]  Phone: [phone]  Fax: [phone]    OFFICE VISIT:  2020    Bishnu Winston - : 1970    Reason For Visit:  Chadd Silva is a 44 y.o. male who is here for Follow-up (6 wk post cath  no cardiac symptoms); Hyperlipidemia; and Coronary Artery Disease  Hospital 3/4/2020 with unstable angina. Underwent heart catheterization  LMCA: Left main short and patent.  LAD: LAD mid 55% diffuse stenosis with mild myocardial bridging. LAD with SHELDON 2 flow without significant obstructive disease     Lesion on Mid LAD: Proximal subsection. 55% stenosis 18 mm length.  LCx: Circumflex dominant vessel with mild to moderate irregularities. OM1 proximal diffuse 50% stenosis.     Lesion on 1st Ob Kerri% stenosis 10 mm length.  RCA: RCA nondominant vessel with SHELDON 2 flow. No significant stenotic  disease.   LV function assessed as:Normal.  Ejection Fraction    - Method: LV gram. EF%: 60.    Has history of peripheral vascular disease with prior SFA stent    Returns today for follow-up. He states overall he is doing pretty good. He continues to have leg pain with walking consistent with claudication and back pain. Was recently in the [facility] for back pain and spasm. He has continued to be smoke-free.     Subjective  Chadd Silva denies exertional chest pain, shortness of breath, orthopnea, paroxysmal nocturnal dyspnea, syncope, presyncope, arrhythmia, edema and fatigue. The patient denies numbness or weakness to suggest cerebrovascular accident or transient ischemic attack. Safia Wei MD is PCP and follows labs including lipids.   Bishnu Winston has the following history as recorded in EpicCare:    Patient Active Problem List    Diagnosis Date Noted    Unstable angina [facility])      Priority: High    Coronary artery disease involving native coronary artery of native heart with unstable angina pectoris ([facility] [address] 2020    Cigarette

## 2020-05-29 ENCOUNTER — HOSPITAL ENCOUNTER (OUTPATIENT)
Dept: ULTRASOUND IMAGING | Facility: HOSPITAL | Age: 50
Discharge: HOME | End: 2020-05-29
Admitting: SURGERY

## 2020-05-29 ENCOUNTER — OFFICE VISIT (OUTPATIENT)
Dept: VASCULAR SURGERY | Facility: CLINIC | Age: 50
End: 2020-05-29

## 2020-05-29 VITALS
WEIGHT: 214 LBS | SYSTOLIC BLOOD PRESSURE: 126 MMHG | HEART RATE: 93 BPM | DIASTOLIC BLOOD PRESSURE: 84 MMHG | BODY MASS INDEX: 32.43 KG/M2 | HEIGHT: 68 IN | OXYGEN SATURATION: 98 %

## 2020-05-29 DIAGNOSIS — E78.5 HYPERLIPIDEMIA, UNSPECIFIED HYPERLIPIDEMIA TYPE: ICD-10-CM

## 2020-05-29 DIAGNOSIS — I70.212 ATHEROSCLEROSIS OF NATIVE ARTERY OF LEFT LOWER EXTREMITY WITH INTERMITTENT CLAUDICATION (HCC): Primary | ICD-10-CM

## 2020-05-29 DIAGNOSIS — I70.212 ATHEROSCLEROSIS OF NATIVE ARTERY OF LEFT LOWER EXTREMITY WITH INTERMITTENT CLAUDICATION (HCC): ICD-10-CM

## 2020-05-29 DIAGNOSIS — I10 ESSENTIAL HYPERTENSION: ICD-10-CM

## 2020-05-29 DIAGNOSIS — I73.9 PAD (PERIPHERAL ARTERY DISEASE) (HCC): ICD-10-CM

## 2020-05-29 PROCEDURE — 99214 OFFICE O/P EST MOD 30 MIN: CPT | Performed by: SURGERY

## 2020-05-29 PROCEDURE — 93923 UPR/LXTR ART STDY 3+ LVLS: CPT

## 2020-05-29 PROCEDURE — 93923 UPR/LXTR ART STDY 3+ LVLS: CPT | Performed by: SURGERY

## 2020-05-29 NOTE — PATIENT INSTRUCTIONS

## 2020-05-29 NOTE — PROGRESS NOTES
05/29/2020      Hernandez Marie MD  [address] [place] [address] DR MACHADO Carla  [place] [address]        Gilberto LEYVA Armando  1970    Chief Complaint   Patient presents with   • Follow-up     3 Month Follow UP For Atherosclerosis of native artery of left lower extremity with intermittent claudication . Test [id number]  pad ankle / brach ind ext comp. Patient denies any stroke like symptoms.        Dear Hernandez Marie MD:    HPI     I had the pleasure of seeing your patient in the office today for follow up.  As you recall, the patient is a 49 y.o. male who we are currently following for peripheral vascular disease.  He returns today for continued surveillance after undergoing previous left lower extremity angiogram with stenting of the left SFA.  Today he reports feeling well and denies any claudication symptoms to the left lower extremity.  He reports he is able to ambulate without any issues or restrictions.  He does report that he was recently seen [facility] due to chest pain and undergoing cardiac cath which showed some LAD disease (55%) but it does not appear that any intervention was carried out.  He denies any further shortness of breath or chest pain.  He did have repeat CATALINA/PVR done today which I did review.  It shows no evidence of any arterial insufficiency of the bilateral lower extremities with normal CATALINA values bilaterally.  He continues on aspirin, Plavix, and a statin.  He otherwise is without any acute complaints today.    Review of Systems   Constitutional: Negative.  Negative for activity change, appetite change, chills, diaphoresis, fatigue and fever.   HENT: Negative.  Negative for congestion, sneezing, sore throat and trouble swallowing.    Eyes: Negative.  Negative for visual disturbance.   Respiratory: Negative.  Negative for chest tightness and shortness of breath.    Cardiovascular: Negative.  Negative for chest pain, palpitations and leg swelling.   Gastrointestinal: Negative.   "Negative for abdominal distention, abdominal pain, nausea and vomiting.   Endocrine: Negative.    Genitourinary: Negative.    Musculoskeletal: Negative.    Skin: Negative.    Allergic/Immunologic: Negative.    Neurological: Negative.    Hematological: Negative.    Psychiatric/Behavioral: Negative.        /84 (BP Location: Left arm, Patient Position: Sitting, Cuff Size: Adult)   Pulse 93   Ht 172.7 cm (68\")   Wt 97.1 kg (214 lb)   SpO2 98%   BMI 32.54 kg/m²   Physical Exam   Constitutional: He is oriented to person, place, and time. He appears well-developed and well-nourished.   HENT:   Head: Normocephalic and atraumatic.   Eyes: Pupils are equal, round, and reactive to light. EOM are normal.   Neck: Normal range of motion. Neck supple. No JVD present.   Cardiovascular: Normal rate, regular rhythm and intact distal pulses.   Pulses:       Carotid pulses are 2+ on the right side, and 2+ on the left side.       Radial pulses are 2+ on the right side, and 2+ on the left side.        Femoral pulses are 2+ on the right side, and 2+ on the left side.       Dorsalis pedis pulses are 2+ on the right side, and 2+ on the left side.        Posterior tibial pulses are 2+ on the right side, and 2+ on the left side.   He does have palpable DP and PT pulses bilaterally.  Both feet are warm and appear well-perfused   Pulmonary/Chest: Effort normal. No respiratory distress.   Abdominal: Soft. He exhibits no distension and no mass. There is no tenderness.   Musculoskeletal: Normal range of motion. He exhibits no edema, tenderness or deformity.   Neurological: He is alert and oriented to person, place, and time. No sensory deficit. He exhibits normal muscle tone.   Skin: Capillary refill takes less than 2 seconds.   Psychiatric: He has a normal mood and affect. His behavior is normal. Judgment and thought content normal.   Vitals reviewed.      DIAGNOSTIC DATA:        Patient Active Problem List   Diagnosis   • Cellulitis " of left foot   • PAD (peripheral artery disease) (CMS/[facility])   • Acute pain   • Essential hypertension   • Hyperlipidemia   • Atherosclerosis of native artery of left lower extremity with intermittent claudication (CMS/[facility])   • Atherosclerosis of native artery of extremity (CMS/[facility])         ICD-10-CM ICD-9-CM   1. Atherosclerosis of native artery of left lower extremity with intermittent claudication (CMS/HCC) I70.212 440.21   2. Essential hypertension I10 401.9   3. Hyperlipidemia, unspecified hyperlipidemia type E78.5 272.4   4. PAD (peripheral artery disease) (CMS/[facility]) I73.9 443.9       Lab Frequency Next Occurrence   US Ankle / Brachial Indices Extremity Complete Once 06/01/2020   US Carotid Bilateral Once 03/27/2020   Follow Anesthesia Guidelines / Standing Orders Once 12/23/2019   Obtain Informed Consent Once 12/28/2019   Provide NPO Instructions to Patient Once 12/28/2019   Chlorhexidine Skin Prep Once 12/28/2019   US Ankle / Brachial Indices Extremity Complete Once 11/25/2020       PLAN: After thoroughly evaluating Gilberto Roberts, I believe the best course of action is to remain conservative from a vascular standpoint.  Overall he seems to be doing quite well after his previous left lower extremity angiogram with angioplasty and stenting of the left SFA.  He continues to have no claudication symptoms to the left lower extremity and is ambulating well.  As above CATALINA/PVR which was repeated today and I did review shows no evidence of any arterial insufficiency in the bilateral lower extremities.  I will plan to see him back here in the office in 6 months with repeat CATALINA/PVR for continued surveillance.  The patient is to continue taking their medications as previously discussed.   I did discuss vascular risk factors as they pertain to the progression of vascular disease including controlling hypertension, and hyperlipidemia. These factors remain stable. Patient's Body mass index is 32.54 kg/m². BMI is above  normal parameters. Recommendations include: educational material.  Patient also reports that since our last visit he has completely quit smoking. This was all discussed in full with complete understanding.  Thank you for allowing me to participate in the care of your patient.  Please do not hesitate to call with any questions or concerns.  We will keep you aware of any further encounters with Gilberto Roberts.      Sincerely Yours,      Luis Billings MD

## 2020-07-30 RX ORDER — SIMVASTATIN 20 MG
TABLET ORAL
Qty: 30 TABLET | Refills: 3 | Status: SHIPPED | OUTPATIENT
Start: 2020-07-30 | End: 2021-02-03

## 2020-08-03 ENCOUNTER — VIRTUAL VISIT (OUTPATIENT)
Dept: PRIMARY CARE CLINIC | Age: 50
End: 2020-08-03
Payer: MEDICARE

## 2020-08-03 PROBLEM — I73.9 PAD (PERIPHERAL ARTERY DISEASE) (HCC): Chronic | Status: ACTIVE | Noted: 2019-04-12

## 2020-08-03 PROBLEM — I25.110 CORONARY ARTERY DISEASE INVOLVING NATIVE CORONARY ARTERY OF NATIVE HEART WITH UNSTABLE ANGINA PECTORIS (HCC): Chronic | Status: ACTIVE | Noted: 2020-03-17

## 2020-08-03 PROCEDURE — 3017F COLORECTAL CA SCREEN DOC REV: CPT | Performed by: FAMILY MEDICINE

## 2020-08-03 PROCEDURE — 4004F PT TOBACCO SCREEN RCVD TLK: CPT | Performed by: FAMILY MEDICINE

## 2020-08-03 PROCEDURE — G8428 CUR MEDS NOT DOCUMENT: HCPCS | Performed by: FAMILY MEDICINE

## 2020-08-03 PROCEDURE — G8417 CALC BMI ABV UP PARAM F/U: HCPCS | Performed by: FAMILY MEDICINE

## 2020-08-03 PROCEDURE — 99214 OFFICE O/P EST MOD 30 MIN: CPT | Performed by: FAMILY MEDICINE

## 2020-08-03 ASSESSMENT — ENCOUNTER SYMPTOMS
NAUSEA: 0
SHORTNESS OF BREATH: 0
VOMITING: 0
ABDOMINAL PAIN: 0
COUGH: 0
CONSTIPATION: 0
WHEEZING: 0
DIARRHEA: 0
CHEST TIGHTNESS: 0

## 2020-08-03 NOTE — PROGRESS NOTES
8/3/2020    TELEHEALTH EVALUATION -- Audio/Visual (During HHBLN-26 public health emergency)    HPI:    Francesco Cueto (:  1970) has requested an audio/video evaluation for the following concern(s):    Patient presents today via video visit for f/u CAD and PVD. Has h/o mild CAD w/ angina. He is having multiple episodes that he feels hot, sweaty, then dizzy and tired that needs to sit down for a while. He feels like he \"isn't going to live for very long\". He is asking about masks for covid. Review of Systems   Constitutional: Negative for chills and fever. Respiratory: Negative for cough, chest tightness, shortness of breath and wheezing. Cardiovascular: Negative for chest pain, palpitations and leg swelling. Gastrointestinal: Negative for abdominal pain, constipation, diarrhea, nausea and vomiting. Endocrine: Negative for cold intolerance and heat intolerance. Genitourinary: Negative for difficulty urinating, dysuria and frequency. Neurological: Positive for dizziness. Negative for tremors. Prior to Visit Medications    Medication Sig Taking? Authorizing Provider   simvastatin (ZOCOR) 20 MG tablet TAKE ONE TABLET BY MOUTH EVERY NIGHT AT BEDTIME.   Greg Bonner MD   buPROPion [facility] SR) 100 MG extended release tablet Take 1 tablet by mouth 2 times daily  Patient not taking: Reported on 2020  Greg Bonner MD   clopidogrel (PLAVIX) 75 MG tablet Take 75 mg by mouth daily  Historical Provider, MD   LOW-DOSE ASPIRIN PO Take 81 mg by mouth  Historical Provider, MD       Social History     Tobacco Use    Smoking status: Former Smoker     Packs/day: 1.00     Years: 21.00     Pack years: 21.00     Last attempt to quit: 1997     Years since quittin.0    Smokeless tobacco: Current User   Substance Use Topics    Alcohol use: Never     Frequency: Never     Comment: OCC    Drug use: No        No Known Allergies,   Past Medical History:   Diagnosis Date    [x]Able to follow commands      Eyes:  EOM    [x]  Normal  [] Abnormal-  Sclera  [x]  Normal  [] Abnormal -         Discharge []  None visible  [] Abnormal -    HENT:   [x] Normocephalic, atraumatic. [] Abnormal   [x] Mouth/Throat: Mucous membranes are moist.     External Ears [x] Normal  [] Abnormal-     Neck: [x] No visualized mass     Pulmonary/Chest: [x] Respiratory effort normal.  [x] No visualized signs of difficulty breathing or respiratory distress        [] Abnormal-      Musculoskeletal:   [x] Normal gait with no signs of ataxia         [x] Normal range of motion of neck        [] Abnormal-       Neurological:        [x] No Facial Asymmetry (Cranial nerve 7 motor function) (limited exam to video visit)          [x] No gaze palsy        [] Abnormal-         Skin:        [x] No significant exanthematous lesions or discoloration noted on facial skin         [] Abnormal-            Psychiatric:       [x] Normal Affect [x] No Hallucinations        [] Abnormal-     Other pertinent observable physical exam findings-     ASSESSMENT/PLAN:    ICD-10-CM    1. Postural dizziness with presyncope  R42 Longterm Continuous Cardiac Event Monitor    R55    2. Unstable angina (HCC)  I20.0 Longterm Continuous Cardiac Event Monitor   3. Palpitations  R00.2 Longterm Continuous Cardiac Event Monitor       Needs to have arrhythmia ruled out, poor historian but sounds like possible SVT vs a fib. No h/o DM or on antihypertensives. Possible vasovagal vs vertigo as well. No orders of the defined types were placed in this encounter. Orders Placed This Encounter   Procedures    Longterm Continuous Cardiac Event Monitor     Multiple presyncopal episodes     Standing Status:   Future     Standing Expiration Date:   8/3/2021     Order Specific Question:   Reason for Exam?     Answer:   Irregular heart rate       Return in about 4 weeks (around 8/31/2020) for VV - f/u zio patch.     Hanna Race is a 44 y.o. male being

## 2020-08-11 ENCOUNTER — HOSPITAL ENCOUNTER (OUTPATIENT)
Dept: NON INVASIVE DIAGNOSTICS | Age: 50
Discharge: HOME | End: 2020-08-11
Payer: MEDICARE

## 2020-08-11 PROCEDURE — 93229 REMOTE 30 DAY ECG TECH SUPP: CPT

## 2020-08-31 RX ORDER — CLOPIDOGREL BISULFATE 75 MG/1
TABLET ORAL
Qty: 30 TABLET | Refills: 3 | Status: SHIPPED | OUTPATIENT
Start: 2020-08-31 | End: 2021-01-29

## 2020-09-01 ENCOUNTER — VIRTUAL VISIT (OUTPATIENT)
Dept: PRIMARY CARE CLINIC | Age: 50
End: 2020-09-01
Payer: MEDICARE

## 2020-09-01 PROCEDURE — 99213 OFFICE O/P EST LOW 20 MIN: CPT | Performed by: FAMILY MEDICINE

## 2020-09-01 RX ORDER — ESCITALOPRAM OXALATE 5 MG/1
TABLET ORAL
Qty: 60 TABLET | Refills: 5 | Status: SHIPPED | OUTPATIENT
Start: 2020-09-01 | End: 2021-03-08

## 2020-09-01 ASSESSMENT — ENCOUNTER SYMPTOMS
ABDOMINAL PAIN: 0
DIARRHEA: 0
CONSTIPATION: 0
NAUSEA: 0
COUGH: 0
SHORTNESS OF BREATH: 0
VOMITING: 0
WHEEZING: 0
CHEST TIGHTNESS: 0

## 2020-09-01 NOTE — PROGRESS NOTES
hearing   ,   Past Surgical History:   Procedure Laterality Date    ARTERIAL BYPASS SURGRY      L leg 2.5 weeks ago     DIAGNOSTIC CARDIAC CATH LAB PROCEDURE      HERNIA REPAIR      X2    OTHER SURGICAL HISTORY      infected gland   ,   Social History     Tobacco Use    Smoking status: Former Smoker     Packs/day: 1.00     Years: 21.00     Pack years: 21.00     Last attempt to quit: 1997     Years since quittin.1    Smokeless tobacco: Current User   Substance Use Topics    Alcohol use: Never     Frequency: Never     Comment: OCC    Drug use: No   ,   Family History   Problem Relation Age of Onset    High Blood Pressure Father     Diabetes Father    ,   Immunization History   Administered Date(s) Administered    Influenza, Quadv, IM, PF (6 mo and older Fluzone, Flulaval, Fluarix, and 3 yrs and older Afluria) 10/23/2019    Pneumococcal Polysaccharide ([id number]) 2018   ,   Health Maintenance   Topic Date Due    HIV screen  1983    DTaP/Tdap/Td vaccine (1 - Tdap) 1987    Shingles Vaccine (1 of 2) 2018    Flu vaccine (1) 2020    Lipid screen  2020    Annual Wellness Visit (AWV)  2021    Colon cancer screen colonoscopy  2028    Hepatitis A vaccine  Aged Out    Hepatitis B vaccine  Aged Out    Hib vaccine  Aged Out    Meningococcal (ACWY) vaccine  Aged Out    Pneumococcal 0-64 years Vaccine  Aged Out       PHYSICAL EXAMINATION:  [ INSTRUCTIONS:  \"[x]\" Indicates a positive item  \"[]\" Indicates a negative item  -- DELETE ALL ITEMS NOT EXAMINED]  Vital Signs: (As obtained by patient/caregiver or practitioner observation)    Blood pressure-  Heart rate-    Respiratory rate-    Temperature-  Pulse oximetry-     Constitutional: [x] Appears well-developed and well-nourished [] No apparent distress      [] Abnormal-   Mental status  [x] Alert and awake  [x] Oriented to person/place/time [x]Able to follow commands      Eyes:  EOM    [x]  Normal  [] Abnormal-  Sclera  [x]  Normal  [] Abnormal -         Discharge []  None visible  [] Abnormal -    HENT:   [x] Normocephalic, atraumatic. [] Abnormal   [x] Mouth/Throat: Mucous membranes are moist.     External Ears [x] Normal  [] Abnormal-     Neck: [x] No visualized mass     Pulmonary/Chest: [x] Respiratory effort normal.  [x] No visualized signs of difficulty breathing or respiratory distress        [] Abnormal-      Musculoskeletal:   [x] Normal gait with no signs of ataxia         [x] Normal range of motion of neck        [] Abnormal-       Neurological:        [x] No Facial Asymmetry (Cranial nerve 7 motor function) (limited exam to video visit)          [x] No gaze palsy        [] Abnormal-         Skin:        [x] No significant exanthematous lesions or discoloration noted on facial skin         [] Abnormal-            Psychiatric:       [] Normal Affect [x] No Hallucinations        [x] Abnormal- anxious affect     Other pertinent observable physical exam findings-     ASSESSMENT/PLAN:    ICD-10-CM    1. Coronary artery disease involving native coronary artery of native heart with unstable angina pectoris ([facility] [address]  I25.110    2. MICHAEL (generalized anxiety disorder)  F41.1    3. Panic disorder  F41.0        Believe he is dealing w/ panic d/o. Had normal cath other than mild diffuse diffuse CAD. Start SSRI. Orders Placed This Encounter   Medications    escitalopram (LEXAPRO) 5 MG tablet     Sig: Start 1 tab nightly for 7 nights, then 2 tabs nightly daily. Dispense:  60 tablet     Refill:  5       No orders of the defined types were placed in this encounter. Return in about 6 weeks (around 10/13/2020) for VV - f/u start lexapro. Casandra Dempsey is a 44 y.o. male being evaluated by a Virtual Visit (video visit) encounter to address concerns as mentioned above. A caregiver was present when appropriate.  Due to this being a TeleHealth encounter (During IORSY-55 public health emergency), evaluation

## 2020-09-02 ENCOUNTER — TELEPHONE (OUTPATIENT)
Dept: PRIMARY CARE CLINIC | Age: 50
End: 2020-09-02

## 2020-09-03 RX ORDER — FUROSEMIDE 20 MG/1
20 TABLET ORAL DAILY
Qty: 20 TABLET | Refills: 0 | Status: SHIPPED | OUTPATIENT
Start: 2020-09-03 | End: 2020-09-24

## 2020-09-03 NOTE — TELEPHONE ENCOUNTER
Called and left detailed message for patient in regards to lasix and how to take it. Medication has been sent to pharmacy.

## 2020-09-24 RX ORDER — FUROSEMIDE 20 MG/1
20 TABLET ORAL DAILY PRN
Qty: 20 TABLET | Refills: 0 | Status: SHIPPED | OUTPATIENT
Start: 2020-09-24

## 2020-10-13 ENCOUNTER — VIRTUAL VISIT (OUTPATIENT)
Dept: PRIMARY CARE CLINIC | Age: 50
End: 2020-10-13
Payer: MEDICARE

## 2020-10-13 PROCEDURE — G8417 CALC BMI ABV UP PARAM F/U: HCPCS | Performed by: FAMILY MEDICINE

## 2020-10-13 PROCEDURE — 4004F PT TOBACCO SCREEN RCVD TLK: CPT | Performed by: FAMILY MEDICINE

## 2020-10-13 PROCEDURE — G8428 CUR MEDS NOT DOCUMENT: HCPCS | Performed by: FAMILY MEDICINE

## 2020-10-13 PROCEDURE — G8484 FLU IMMUNIZE NO ADMIN: HCPCS | Performed by: FAMILY MEDICINE

## 2020-10-13 PROCEDURE — 99213 OFFICE O/P EST LOW 20 MIN: CPT | Performed by: FAMILY MEDICINE

## 2020-10-13 PROCEDURE — 3017F COLORECTAL CA SCREEN DOC REV: CPT | Performed by: FAMILY MEDICINE

## 2020-10-13 ASSESSMENT — ENCOUNTER SYMPTOMS
VOMITING: 0
WHEEZING: 0
SHORTNESS OF BREATH: 0
ABDOMINAL PAIN: 0
NAUSEA: 0
COUGH: 0
CONSTIPATION: 0
CHEST TIGHTNESS: 0
DIARRHEA: 0

## 2020-10-13 NOTE — PROGRESS NOTES
More Hale is a 44 y.o. male who presents today for No chief complaint on file. HPI  Patient is here for f/u anxiety. He was hospitalized for hemorrhoid removal.  He is doing better on the anxiety med. He was prescribed lexapro. No change in PMH, family, social, or surgical history unless mentioned above. Review of Systems   Constitutional: Negative for chills and fever. Respiratory: Negative for cough, chest tightness, shortness of breath and wheezing. Cardiovascular: Negative for chest pain, palpitations and leg swelling. Gastrointestinal: Negative for abdominal pain, constipation, diarrhea, nausea and vomiting. Genitourinary: Negative for difficulty urinating, dysuria and frequency. Psychiatric/Behavioral: Negative for dysphoric mood, self-injury, sleep disturbance and suicidal ideas. The patient is not nervous/anxious. Past Medical History:   Diagnosis Date    Hyperlipidemia     Nerve damage     Bilateral ears, hard of hearing       Current Outpatient Medications   Medication Sig Dispense Refill    furosemide (LASIX) 20 MG tablet Take 1 tablet by mouth daily as needed (for swelling) 20 tablet 0    escitalopram (LEXAPRO) 5 MG tablet Start 1 tab nightly for 7 nights, then 2 tabs nightly daily. 60 tablet 5    simvastatin (ZOCOR) 20 MG tablet TAKE ONE TABLET BY MOUTH EVERY NIGHT AT BEDTIME. 30 tablet 3    clopidogrel (PLAVIX) 75 MG tablet Take 75 mg by mouth daily      LOW-DOSE ASPIRIN PO Take 81 mg by mouth       No current facility-administered medications for this visit.         No Known Allergies    Past Surgical History:   Procedure Laterality Date    ARTERIAL BYPASS SURGRY      L leg 2.5 weeks ago     DIAGNOSTIC CARDIAC CATH LAB PROCEDURE      HERNIA REPAIR      X2    OTHER SURGICAL HISTORY      infected gland       Social History     Tobacco Use    Smoking status: Former Smoker     Packs/day: 1.00     Years: 21.00     Pack years: 21.00     Last attempt to quit: 1997     Years since quittin.2    Smokeless tobacco: Current User   Substance Use Topics    Alcohol use: Never     Frequency: Never     Comment: OCC    Drug use: No       Family History   Problem Relation Age of Onset    High Blood Pressure Father     Diabetes Father        There were no vitals taken for this visit. Physical Exam  Video:  Normal constitution, nad, normal respirations  Assessment:    ICD-10-CM    1. MICHAEL (generalized anxiety disorder)  F41.1 Vitamin B12 & Folate     TSH without Reflex   2. Excessive body weight gain  R63.5 Vitamin B12 & Folate     TSH without Reflex       Plan:   Improving MICHAEL. Continue stool softeners for hemorrhoids  Orders Placed This Encounter   Procedures    Vitamin B12 & Folate     Standing Status:   Future     Standing Expiration Date:   10/13/2021    TSH without Reflex     Standing Status:   Future     Standing Expiration Date:   10/13/2021     No orders of the defined types were placed in this encounter. There are no discontinued medications. There are no Patient Instructions on file for this visit. Patient given educational handouts and has had all questions answered. Patient voices understanding and agrees to plans along with risks and benefits of plan. Patient isinstructed to continue prior meds, diet, and exercise plans unless instructed otherwise. Patient agrees to follow up as instructed and sooner if needed. Patient agrees to go to ER if condition becomes emergent. Notesmay be completed with dictation device and spelling errors may occur. Materials may be copied and pasted from a notepad outside of EMR, all of which, I, Dr. Juan Chanel MD, take sole intellectual ownership of and have approved adding to my note. Return if symptoms worsen or fail to improve.

## 2020-12-09 ENCOUNTER — TELEPHONE (OUTPATIENT)
Dept: VASCULAR SURGERY | Facility: CLINIC | Age: 50
End: 2020-12-09

## 2020-12-09 NOTE — TELEPHONE ENCOUNTER
Called the patient to remind him of his testing and appt to see Dr. Billings on Friday.  Pt confirmed.

## 2020-12-11 ENCOUNTER — APPOINTMENT (OUTPATIENT)
Dept: ULTRASOUND IMAGING | Facility: HOSPITAL | Age: 50
End: 2020-12-11

## 2020-12-16 ENCOUNTER — OFFICE VISIT (OUTPATIENT)
Dept: CARDIOLOGY | Age: 50
End: 2020-12-16
Payer: MEDICARE

## 2020-12-16 VITALS
SYSTOLIC BLOOD PRESSURE: 132 MMHG | BODY MASS INDEX: 33.8 KG/M2 | HEIGHT: 68 IN | WEIGHT: 223 LBS | HEART RATE: 114 BPM | DIASTOLIC BLOOD PRESSURE: 84 MMHG

## 2020-12-16 PROCEDURE — G8417 CALC BMI ABV UP PARAM F/U: HCPCS | Performed by: INTERNAL MEDICINE

## 2020-12-16 PROCEDURE — 99214 OFFICE O/P EST MOD 30 MIN: CPT | Performed by: INTERNAL MEDICINE

## 2020-12-16 PROCEDURE — G8427 DOCREV CUR MEDS BY ELIG CLIN: HCPCS | Performed by: INTERNAL MEDICINE

## 2020-12-16 PROCEDURE — G8484 FLU IMMUNIZE NO ADMIN: HCPCS | Performed by: INTERNAL MEDICINE

## 2020-12-16 PROCEDURE — 4004F PT TOBACCO SCREEN RCVD TLK: CPT | Performed by: INTERNAL MEDICINE

## 2020-12-16 PROCEDURE — 3017F COLORECTAL CA SCREEN DOC REV: CPT | Performed by: INTERNAL MEDICINE

## 2020-12-16 ASSESSMENT — ENCOUNTER SYMPTOMS
WHEEZING: 0
VOMITING: 0
COUGH: 0
BACK PAIN: 0
DIARRHEA: 0
BLOOD IN STOOL: 0
ABDOMINAL DISTENTION: 0
ABDOMINAL PAIN: 0
SHORTNESS OF BREATH: 0

## 2020-12-16 NOTE — PROGRESS NOTES
[facility]  Cardiology Office Note  [address] [address]  Phone: [phone]  Fax: [phone]                            Date:  12/16/2020  Patient: Ricardo Norton  Age:  44 y.o., 1970    Referral: No ref. provider found      PROBLEM LIST:    Patient Active Problem List    Diagnosis Date Noted    Unstable angina [facility])      Priority: High    Coronary artery disease involving native coronary artery of native heart with unstable angina pectoris ([facility] 03/17/2020     Priority: Low    Cigarette smoker 03/10/2020     Priority: Low    PAD (peripheral artery disease) ([facility] 04/12/2019     Priority: Low     Overview Note:     Follow-up is arranged with vascular surgery      Hallux valgus with bunions 04/12/2019     Priority: Low     Overview Note:     Has bilateral bunions and extremely long unkept nails we will refer him to podiatry      Spondylosis of thoracolumbar region without myelopathy or radiculopathy 07/24/2018     Priority: Low    Pure hypercholesterolemia 07/17/2018     Priority: Low    Congenital hearing disorder of both ears 06/19/2018     Priority: Low    Hemorrhoids 02/04/2015     Priority: Low    Nerve damage      Priority: Low     Overview Note:     Bilateral ears, hard of hearing       1. Coronary artery disease with mild nonobstructive disease by catheterization 3/5/2020, mid LAD 35%, normal LV ejection fraction. 2.  Peripheral arterial disease with prior left SFA stent with subsequent occlusion ([facility]). 3.  Active ongoing tobacco use. 4.  Hearing deficit with speech impediment. PRESENTATION: Ricardo Norton is a 44y.o. year old male presents for follow-up evaluation. He reports no current chest pains or shortness of breath. He had presented in March with significant symptoms and EKG changes. Cardiac catheterization had shown nonobstructive disease with some slow flow at that time.   Despite repeated advice to stop, he is still smoking. REVIEW OF SYSTEMS:  Review of Systems   Constitutional: Negative for activity change, diaphoresis and fatigue. HENT: Negative for hearing loss, nosebleeds and tinnitus. Eyes: Negative for visual disturbance. Respiratory: Negative for cough, shortness of breath and wheezing. Cardiovascular: Negative for chest pain, palpitations and leg swelling. Gastrointestinal: Negative for abdominal distention, abdominal pain, blood in stool, diarrhea and vomiting. Endocrine: Negative for cold intolerance, heat intolerance, polydipsia, polyphagia and polyuria. Genitourinary: Negative for difficulty urinating, flank pain and hematuria. Musculoskeletal: Negative for arthralgias, back pain, joint swelling and myalgias. Skin: Negative for pallor and rash. Neurological: Negative for dizziness, seizures, syncope and headaches. Psychiatric/Behavioral: Negative for behavioral problems and dysphoric mood. The patient is not nervous/anxious. Past Medical History:      Diagnosis Date    Hyperlipidemia     Nerve damage     Bilateral ears, hard of hearing       Past Surgical History:      Procedure Laterality Date    ARTERIAL BYPASS SURGRY      L leg 2.5 weeks ago     DIAGNOSTIC CARDIAC CATH LAB PROCEDURE      HERNIA REPAIR      X2    OTHER SURGICAL HISTORY      infected gland       Medications:  Current Outpatient Medications   Medication Sig Dispense Refill    escitalopram (LEXAPRO) 5 MG tablet Start 1 tab nightly for 7 nights, then 2 tabs nightly daily. 60 tablet 5    simvastatin (ZOCOR) 20 MG tablet TAKE ONE TABLET BY MOUTH EVERY NIGHT AT BEDTIME. 30 tablet 3    clopidogrel (PLAVIX) 75 MG tablet Take 75 mg by mouth daily      LOW-DOSE ASPIRIN PO Take 81 mg by mouth      furosemide (LASIX) 20 MG tablet Take 1 tablet by mouth daily as needed (for swelling) (Patient not taking: Reported on 12/16/2020) 20 tablet 0     No current facility-administered medications for this visit. Allergies:  Patient has no known allergies. Past Social History:  Social History     Socioeconomic History    Marital status:      Spouse name: Not on file    Number of children: Not on file    Years of education: Not on file    Highest education level: Not on file   Occupational History    Not on file   Social Needs    Financial resource strain: Not on file    Food insecurity     Worry: Not on file     Inability: Not on file   [employer] needs     Medical: Not on file     Non-medical: Not on file   Tobacco Use    Smoking status: Former Smoker     Packs/day: 1.00     Years: 21.00     Pack years: 21.00     Quit date: 1997     Years since quittin.4    Smokeless tobacco: Current User   Substance and Sexual Activity    Alcohol use: Never     Frequency: Never     Comment: OCC    Drug use: No    Sexual activity: Not on file   Lifestyle    Physical activity     Days per week: Not on file     Minutes per session: Not on file    Stress: Not on file   Relationships    Social connections     Talks on phone: Not on file     Gets together: Not on file     Attends Mandaeism service: Not on file     Active member of club or organization: Not on file     Attends meetings of clubs or organizations: Not on file     Relationship status: Not on file    Intimate partner violence     Fear of current or ex partner: Not on file     Emotionally abused: Not on file     Physically abused: Not on file     Forced sexual activity: Not on file   Other Topics Concern    Not on file   Social History Narrative    Not on file       Family History:       Problem Relation Age of Onset    High Blood Pressure Father     Diabetes Father          Physical Examination:  /84   Pulse 114   Ht 5' 8\" (1.727 m)   Wt 223 lb (101.2 kg)   BMI 33.91 kg/m²   Physical Exam  Constitutional:       Appearance: He is well-developed.       Comments: Severe truncal obesity  Blood pressure right arm sitting 110/74 mmHg, pulse 84 bpm regular   HENT:      Mouth/Throat:      Pharynx: No oropharyngeal exudate. Eyes:      General: No scleral icterus. Right eye: No discharge. Left eye: No discharge. Neck:      Thyroid: No thyromegaly. Vascular: No JVD. Cardiovascular:      Rate and Rhythm: Normal rate and regular rhythm. Heart sounds: No murmur. No friction rub. No gallop. Comments: No JVD  Pedal pulses are not well felt  Varicosities in both lower extremities  No skin breakdown  Trace edema  No systolic or diastolic murmurs appreciated    Pulmonary:      Effort: No respiratory distress. Breath sounds: No stridor. No wheezing or rales. Abdominal:      General: Bowel sounds are normal. There is no distension. Palpations: Abdomen is soft. There is no mass. Tenderness: There is no abdominal tenderness. There is no guarding or rebound. Comments: No palpable organomegaly   Musculoskeletal:         General: No deformity. Skin:     General: Skin is warm. Coloration: Skin is not pale. Findings: No erythema or rash. Neurological:      Mental Status: He is alert and oriented to person, place, and time. Motor: No abnormal muscle tone. Coordination: Coordination normal.      Deep Tendon Reflexes: Reflexes normal.           Labs:   CBC: No results for input(s): WBC, HGB, HCT, PLT in the last 72 hours. BMP:No results for input(s): NA, K, CO2, BUN, CREATININE, LABGLOM, GLUCOSE in the last 72 hours. BNP: No results for input(s): BNP in the last 72 hours. PT/INR: No results for input(s): PROTIME, INR in the last 72 hours. APTT:No results for input(s): APTT in the last 72 hours. CARDIAC ENZYMES:No results for input(s): CKTOTAL, CKMB, CKMBINDEX, TROPONINI in the last 72 hours.   FASTING LIPID PANEL:  Lab Results   Component Value Date    HDL 32 11/20/2019    LDLDIRECT 126 11/20/2019    LDLCALC see below 11/20/2019    TRIG 707 11/20/2019     LIVER PROFILE:No results for input(s): AST, ALT, LABALBU in the last 72 hours. Imaging:          ASSESSMENT and PLAN:    75-year-old gentleman with past medical history of hearing deficit with speech impediment, peripheral arterial disease with prior intervention to left SFA with subsequent occlusion, longstanding ongoing tobacco use, nonobstructive CAD by catheterization 3/5/2020, normal LV ejection fraction here for follow-up evaluation. 1.  Have again stressed the need for tobacco cessation with the patient. Especially in light of his peripheral arterial disease ongoing tobacco use will be detrimental to him. He is on statin therapy as well as antiplatelet therapy. He will continue the same unchanged. 2.  He will follow-up with me in 1 year. Orders:  No orders of the defined types were placed in this encounter. No orders of the defined types were placed in this encounter. Return in about 1 year (around 12/16/2021). Electronically signed by Reynold Eller MD on 12/16/2020 at 11:53 AM    [facility]      Thisdictation was generated by voice recognition computer software. Although all attempts are made to edit the dictation for accuracy, there may be errors in the transcription that are not intended.

## 2020-12-23 ENCOUNTER — TELEPHONE (OUTPATIENT)
Dept: VASCULAR SURGERY | Facility: CLINIC | Age: 50
End: 2020-12-23

## 2020-12-28 ENCOUNTER — HOSPITAL ENCOUNTER (OUTPATIENT)
Dept: ULTRASOUND IMAGING | Facility: HOSPITAL | Age: 50
Discharge: HOME | End: 2020-12-28
Admitting: SURGERY

## 2020-12-28 ENCOUNTER — OFFICE VISIT (OUTPATIENT)
Dept: VASCULAR SURGERY | Facility: CLINIC | Age: 50
End: 2020-12-28

## 2020-12-28 VITALS
HEART RATE: 86 BPM | DIASTOLIC BLOOD PRESSURE: 88 MMHG | SYSTOLIC BLOOD PRESSURE: 126 MMHG | OXYGEN SATURATION: 99 % | BODY MASS INDEX: 33.19 KG/M2 | WEIGHT: 219 LBS | HEIGHT: 68 IN

## 2020-12-28 DIAGNOSIS — I70.212 ATHEROSCLEROSIS OF NATIVE ARTERY OF LEFT LOWER EXTREMITY WITH INTERMITTENT CLAUDICATION (HCC): ICD-10-CM

## 2020-12-28 DIAGNOSIS — E78.2 MIXED HYPERLIPIDEMIA: ICD-10-CM

## 2020-12-28 DIAGNOSIS — I73.9 PAD (PERIPHERAL ARTERY DISEASE) (HCC): Primary | ICD-10-CM

## 2020-12-28 DIAGNOSIS — I70.203 ATHEROSCLEROSIS OF NATIVE ARTERY OF BOTH LOWER EXTREMITIES, WITH UNSPECIFIED PRESENCE OF CLINICAL MANIFESTATION (HCC): ICD-10-CM

## 2020-12-28 PROCEDURE — 99214 OFFICE O/P EST MOD 30 MIN: CPT | Performed by: SURGERY

## 2020-12-28 PROCEDURE — 93923 UPR/LXTR ART STDY 3+ LVLS: CPT | Performed by: SURGERY

## 2020-12-28 PROCEDURE — 93923 UPR/LXTR ART STDY 3+ LVLS: CPT

## 2020-12-28 RX ORDER — ESCITALOPRAM OXALATE 5 MG/1
5 TABLET ORAL DAILY
COMMUNITY
Start: 2020-12-22 | End: 2022-05-23 | Stop reason: SDUPTHER

## 2020-12-28 NOTE — PATIENT INSTRUCTIONS
"BMI for Adults  What is BMI?  Body mass index (BMI) is a number that is calculated from a person's weight and height. BMI can help estimate how much of a person's weight is composed of fat. BMI does not measure body fat directly. Rather, it is an alternative to procedures that directly measure body fat, which can be difficult and expensive.  BMI can help identify people who may be at higher risk for certain medical problems.  What are BMI measurements used for?  BMI is used as a screening tool to identify possible weight problems. It helps determine whether a person is obese, overweight, a healthy weight, or underweight.  BMI is useful for:  · Identifying a weight problem that may be related to a medical condition or may increase the risk for medical problems.  · Promoting changes, such as changes in diet and exercise, to help reach a healthy weight. BMI screening can be repeated to see if these changes are working.  How is BMI calculated?  BMI involves measuring your weight in relation to your height. Both height and weight are measured, and the BMI is calculated from those numbers. This can be done either in English (U.S[place] or metric measurements. Note that charts and online BMI calculators are available to help you find your BMI quickly and easily without having to do these calculations yourself.  To calculate your BMI in English (U.S.) measurements:    1. Measure your weight in pounds (lb).  2. Multiply the number of pounds by 703.  ? For example, for a person who weighs 180 lb, multiply that number by 703, which equals 126,540.  3. Measure your height in inches. Then multiply that number by itself to get a measurement called \"inches squared.\"  ? For example, for a person who is 70 inches tall, the \"inches squared\" measurement is 70 inches x 70 inches, which equals 4,900 inches squared.  4. Divide the total from step 2 (number of lb x 703) by the total from step 3 (inches squared): 126,540 ÷ 4,900 = 25.8. This is " "your BMI.  To calculate your BMI in metric measurements:  1. Measure your weight in kilograms (kg).  2. Measure your height in meters (m). Then multiply that number by itself to get a measurement called \"meters squared.\"  ? For example, for a person who is 1.75 m tall, the \"meters squared\" measurement is 1.75 m x 1.75 m, which is equal to 3.1 meters squared.  3. Divide the number of kilograms (your weight) by the meters squared number. In this example: 70 ÷ 3.1 = 22.6. This is your BMI.  What do the results mean?  BMI charts are used to identify whether you are underweight, normal weight, overweight, or obese. The following guidelines will be used:  · Underweight: BMI less than 18.5.  · Normal weight: BMI between 18.5 and 24.9.  · Overweight: BMI between 25 and 29.9.  · Obese: BMI of 30 or above.  Keep these notes in mind:  · Weight includes both fat and muscle, so someone with a muscular build, such as an athlete, may have a BMI that is higher than 24.9. In cases like these, BMI is not an accurate measure of body fat.  · To determine if excess body fat is the cause of a BMI of 25 or higher, further assessments may need to be done by a health care provider.  · BMI is usually interpreted in the same way for men and women.  Where to find more information  For more information about BMI, including tools to quickly calculate your BMI, go to these websites:  · Centers for Disease Control and Prevention: www.cdc.gov  · American Heart Association: www.heart.org  · National Heart, Lung, and Blood [place]: www.nhlbi.nih.gov  Summary  · Body mass index (BMI) is a number that is calculated from a person's weight and height.  · BMI may help estimate how much of a person's weight is composed of fat. BMI can help identify those who may be at higher risk for certain medical problems.  · BMI can be measured using English measurements or metric measurements.  · BMI charts are used to identify whether you are underweight, normal " weight, overweight, or obese.  This information is not intended to replace advice given to you by your health care provider. Make sure you discuss any questions you have with your health care provider.  Document Revised: 09/09/2020 Document Reviewed: 07/17/2020  Elsevier Patient Education © 2020 [employer] Inc[employer]

## 2020-12-28 NOTE — PROGRESS NOTES
12/28/2020      Hernandez Marie MD  [address] [place] [address] [address] [place] [address]        Gilberto LEYVA Armando  1970    Chief Complaint   Patient presents with   • Follow-up     6 month f/u with ABIs.  Pt denies any changes in his legs since his last visit.        Dear Hernandez Marie MD:    HPI     I had the pleasure of seeing your patient in the office today for follow up.  As you recall, the patient is a 50 y.o. male who we are currently following for known peripheral arterial disease.  He had previously undergone angiogram with intervention of the left lower extremity with atherectomy, angioplasty, and stenting of the SFA.  This was done in January of this year.  He has done well since that time with no further complaints of claudication to the left lower extremity.  He returns today after having undergone repeat CATALINA/PVR for continued surveillance which I did review personally in the office today.  It shows normal CATALINA values bilaterally with no evidence of any arterial insufficiency in the bilateral lower extremities.  He is maintained on aspirin, Plavix, and a statin.  He otherwise has no acute complaints today.      Review of Systems   Constitutional: Negative.  Negative for activity change, appetite change, chills, diaphoresis, fatigue and fever.   HENT: Negative.  Negative for congestion, sneezing, sore throat and trouble swallowing.    Eyes: Negative.  Negative for visual disturbance.   Respiratory: Negative.  Negative for chest tightness and shortness of breath.    Cardiovascular: Negative.  Negative for chest pain, palpitations and leg swelling.   Gastrointestinal: Negative.  Negative for abdominal distention, abdominal pain, nausea and vomiting.   Endocrine: Negative.    Genitourinary: Negative.    Musculoskeletal: Positive for arthralgias.   Skin: Negative.    Allergic/Immunologic: Negative.    Neurological: Negative.    Hematological: Negative.    Psychiatric/Behavioral: Negative.        /88    "Pulse 86   Ht 172.7 cm (68\")   Wt 99.3 kg (219 lb)   SpO2 99%   BMI 33.30 kg/m²   Physical Exam  Vitals signs reviewed.   Constitutional:       Appearance: Normal appearance.      Comments: Hard of hearing, wears hearing aids.   HENT:      Head: Normocephalic and atraumatic.      Nose: Nose normal.      Mouth/Throat:      Mouth: Mucous membranes are moist.   Eyes:      Extraocular Movements: Extraocular movements intact.      Pupils: Pupils are equal, round, and reactive to light.   Neck:      Musculoskeletal: Normal range of motion and neck supple.   Cardiovascular:      Rate and Rhythm: Normal rate and regular rhythm.      Pulses: Normal pulses.           Carotid pulses are 2+ on the right side and 2+ on the left side.       Radial pulses are 2+ on the right side and 2+ on the left side.        Femoral pulses are 2+ on the right side and 2+ on the left side.       Popliteal pulses are 2+ on the right side and 2+ on the left side.        Dorsalis pedis pulses are 2+ on the right side and 2+ on the left side.        Posterior tibial pulses are 2+ on the right side and 2+ on the left side.      Comments: Both feet are warm and well perfused.  Pulmonary:      Effort: Pulmonary effort is normal. No respiratory distress.   Abdominal:      General: There is no distension.      Palpations: Abdomen is soft. There is no mass.      Tenderness: There is no abdominal tenderness.   Musculoskeletal: Normal range of motion.      Right lower leg: No edema.      Left lower leg: No edema.   Skin:     General: Skin is warm and dry.      Capillary Refill: Capillary refill takes less than 2 seconds.   Neurological:      General: No focal deficit present.      Mental Status: He is alert and oriented to person, place, and time.   Psychiatric:         Mood and Affect: Mood normal.         Behavior: Behavior normal.         Thought Content: Thought content normal.         Judgment: Judgment normal.         DIAGNOSTIC " DATA:        Patient Active Problem List   Diagnosis   • Cellulitis of left foot   • PAD (peripheral artery disease) (CMS/[facility])   • Acute pain   • Essential hypertension   • Hyperlipidemia   • Atherosclerosis of native artery of left lower extremity with intermittent claudication (CMS/[facility])   • Atherosclerosis of native artery of extremity (CMS/[facility])         ICD-10-CM ICD-9-CM   1. PAD (peripheral artery disease) (CMS/[facility])  I73.9 443.9   2. Atherosclerosis of native artery of both lower extremities, with unspecified presence of clinical manifestation (CMS/[facility])  I70.203 440.20   3. Mixed hyperlipidemia  E78.2 272.2       Lab Frequency Next Occurrence   US Ankle / Brachial Indices Extremity Complete Once 06/01/2020   US Carotid Bilateral Once 03/27/2020   Follow Anesthesia Guidelines / Standing Orders Once 12/23/2019   Obtain Informed Consent Once 12/28/2019   Provide NPO Instructions to Patient Once 12/28/2019   Chlorhexidine Skin Prep Once 12/28/2019   US Ankle / Brachial Indices Extremity Complete Once 06/26/2021       PLAN: After thoroughly evaluating Gilberto Roberts, I believe the best course of action is to remain conservative from a vascular standpoint.  Overall he continues to do very well after his previous aortogram and left lower extremity angiogram with intervention.  He denies any claudication symptoms to the left lower extremity and clinically he has palpable pulses throughout the bilateral lower extremities.  As such at this point he should continue on his aspirin, Plavix, and statin, and I will see him back here in the office in 6 months with a repeat CATALINA/PVR for continued surveillance.  The patient is to continue taking their medications as previously discussed.   I did discuss vascular risk factors as they pertain to the progression of vascular disease including controlling hyperlipidemia. These factors remain stable. Patient's Body mass index is 33.3 kg/m². BMI is above normal parameters.  Recommendations include: educational material. This was all discussed in full with complete understanding.  Thank you for allowing me to participate in the care of your patient.  Please do not hesitate to call with any questions or concerns.  We will keep you aware of any further encounters with Gilberto Roberts.      Sincerely Yours,      Luis Billings MD

## 2021-01-29 RX ORDER — CLOPIDOGREL BISULFATE 75 MG/1
TABLET ORAL
Qty: 30 TABLET | Refills: 3 | Status: SHIPPED | OUTPATIENT
Start: 2021-01-29 | End: 2021-07-08

## 2021-02-03 RX ORDER — SIMVASTATIN 20 MG
TABLET ORAL
Qty: 30 TABLET | Refills: 3 | Status: SHIPPED | OUTPATIENT
Start: 2021-02-03 | End: 2021-05-12

## 2021-05-12 RX ORDER — SIMVASTATIN 20 MG
TABLET ORAL
Qty: 30 TABLET | Refills: 0 | Status: SHIPPED | OUTPATIENT
Start: 2021-05-12 | End: 2021-05-17 | Stop reason: SDUPTHER

## 2021-05-12 NOTE — TELEPHONE ENCOUNTER
Skylar Kendall called to request a refill on his medication. Patient is aware appointment must be kept on 5/17 with Jane Tinajero to receive further refills on his medications. Last office visit : 10/13/2020   Next office visit : 5/17/2021     Requested Prescriptions     Signed Prescriptions Disp Refills    simvastatin (ZOCOR) 20 MG tablet 30 tablet 0     Sig: TAKE ONE TABLET BY MOUTH EVERY NIGHT AT BEDTIME.      Authorizing Provider: Valencia Hollis     Ordering User: Gely Lawrence MA

## 2021-05-17 ENCOUNTER — OFFICE VISIT (OUTPATIENT)
Dept: PRIMARY CARE CLINIC | Age: 51
End: 2021-05-17
Payer: MEDICARE

## 2021-05-17 ENCOUNTER — HOSPITAL ENCOUNTER (OUTPATIENT)
Dept: GENERAL RADIOLOGY | Age: 51
Discharge: HOME | End: 2021-05-17
Payer: MEDICARE

## 2021-05-17 VITALS
DIASTOLIC BLOOD PRESSURE: 86 MMHG | SYSTOLIC BLOOD PRESSURE: 132 MMHG | HEIGHT: 68 IN | OXYGEN SATURATION: 98 % | BODY MASS INDEX: 31.98 KG/M2 | WEIGHT: 211 LBS | HEART RATE: 93 BPM | TEMPERATURE: 97.6 F

## 2021-05-17 DIAGNOSIS — F32.1 CURRENT MODERATE EPISODE OF MAJOR DEPRESSIVE DISORDER WITHOUT PRIOR EPISODE (HCC): ICD-10-CM

## 2021-05-17 DIAGNOSIS — M54.42 CHRONIC MIDLINE LOW BACK PAIN WITH BILATERAL SCIATICA: ICD-10-CM

## 2021-05-17 DIAGNOSIS — G89.29 CHRONIC MIDLINE LOW BACK PAIN WITH BILATERAL SCIATICA: ICD-10-CM

## 2021-05-17 DIAGNOSIS — R73.09 ELEVATED GLUCOSE: ICD-10-CM

## 2021-05-17 DIAGNOSIS — I73.9 PAD (PERIPHERAL ARTERY DISEASE) (HCC): ICD-10-CM

## 2021-05-17 DIAGNOSIS — M89.9 DISORDER OF BONE, UNSPECIFIED: ICD-10-CM

## 2021-05-17 DIAGNOSIS — R53.83 FATIGUE, UNSPECIFIED TYPE: ICD-10-CM

## 2021-05-17 DIAGNOSIS — Z12.5 ENCOUNTER FOR SCREENING FOR MALIGNANT NEOPLASM OF PROSTATE: ICD-10-CM

## 2021-05-17 DIAGNOSIS — M54.41 CHRONIC MIDLINE LOW BACK PAIN WITH BILATERAL SCIATICA: ICD-10-CM

## 2021-05-17 DIAGNOSIS — Z00.00 ROUTINE GENERAL MEDICAL EXAMINATION AT A HEALTH CARE FACILITY: ICD-10-CM

## 2021-05-17 DIAGNOSIS — Z00.00 ROUTINE GENERAL MEDICAL EXAMINATION AT A HEALTH CARE FACILITY: Primary | ICD-10-CM

## 2021-05-17 DIAGNOSIS — M25.50 POLYARTHRALGIA: ICD-10-CM

## 2021-05-17 DIAGNOSIS — E78.2 MIXED HYPERLIPIDEMIA: ICD-10-CM

## 2021-05-17 LAB
ALBUMIN SERPL-MCNC: 4.1 G/DL (ref 3.5–5.2)
ALP BLD-CCNC: 151 U/L (ref 40–130)
ALT SERPL-CCNC: 16 U/L (ref 5–41)
ANION GAP SERPL CALCULATED.3IONS-SCNC: 13 MMOL/L (ref 7–19)
AST SERPL-CCNC: 12 U/L (ref 5–40)
BASOPHILS ABSOLUTE: 0.1 K/UL (ref 0–0.2)
BASOPHILS RELATIVE PERCENT: 0.7 % (ref 0–1)
BILIRUB SERPL-MCNC: 0.5 MG/DL (ref 0.2–1.2)
BUN BLDV-MCNC: 10 MG/DL (ref 6–20)
C-REACTIVE PROTEIN: 0.8 MG/DL (ref 0–0.5)
CALCIUM SERPL-MCNC: 9.6 MG/DL (ref 8.6–10)
CHLORIDE BLD-SCNC: 104 MMOL/L (ref 98–111)
CHOLESTEROL, TOTAL: 199 MG/DL (ref 160–199)
CO2: 25 MMOL/L (ref 22–29)
CREAT SERPL-MCNC: 0.9 MG/DL (ref 0.5–1.2)
EOSINOPHILS ABSOLUTE: 0.2 K/UL (ref 0–0.6)
EOSINOPHILS RELATIVE PERCENT: 1.6 % (ref 0–5)
GFR AFRICAN AMERICAN: >59
GFR NON-AFRICAN AMERICAN: >60
GLUCOSE BLD-MCNC: 89 MG/DL (ref 74–109)
HBA1C MFR BLD: 5.5 %
HCT VFR BLD CALC: 53 % (ref 42–52)
HDLC SERPL-MCNC: 33 MG/DL (ref 55–121)
HEMOGLOBIN: 17.6 G/DL (ref 14–18)
IMMATURE GRANULOCYTES #: 0.1 K/UL
LDL CHOLESTEROL CALCULATED: 137 MG/DL
LYMPHOCYTES ABSOLUTE: 3.4 K/UL (ref 1.1–4.5)
LYMPHOCYTES RELATIVE PERCENT: 24.5 % (ref 20–40)
MCH RBC QN AUTO: 29.8 PG (ref 27–31)
MCHC RBC AUTO-ENTMCNC: 33.2 G/DL (ref 33–37)
MCV RBC AUTO: 89.7 FL (ref 80–94)
MONOCYTES ABSOLUTE: 0.9 K/UL (ref 0–0.9)
MONOCYTES RELATIVE PERCENT: 6.5 % (ref 0–10)
NEUTROPHILS ABSOLUTE: 9.1 K/UL (ref 1.5–7.5)
NEUTROPHILS RELATIVE PERCENT: 66 % (ref 50–65)
PDW BLD-RTO: 13.7 % (ref 11.5–14.5)
PLATELET # BLD: 350 K/UL (ref 130–400)
PMV BLD AUTO: 10.1 FL (ref 9.4–12.4)
POTASSIUM SERPL-SCNC: 4.3 MMOL/L (ref 3.5–5)
PROSTATE SPECIFIC ANTIGEN: 0.87 NG/ML (ref 0–4)
RBC # BLD: 5.91 M/UL (ref 4.7–6.1)
RHEUMATOID FACTOR: <10 IU/ML
SEDIMENTATION RATE, ERYTHROCYTE: 7 MM/HR (ref 0–15)
SODIUM BLD-SCNC: 142 MMOL/L (ref 136–145)
TOTAL PROTEIN: 7.4 G/DL (ref 6.6–8.7)
TRIGL SERPL-MCNC: 145 MG/DL (ref 0–149)
TSH SERPL DL<=0.05 MIU/L-ACNC: 1.45 UIU/ML (ref 0.27–4.2)
URIC ACID, SERUM: 7.3 MG/DL (ref 3.4–7)
VITAMIN D 25-HYDROXY: 19.2 NG/ML
WBC # BLD: 13.7 K/UL (ref 4.8–10.8)

## 2021-05-17 PROCEDURE — 83036 HEMOGLOBIN GLYCOSYLATED A1C: CPT | Performed by: NURSE PRACTITIONER

## 2021-05-17 PROCEDURE — 72100 X-RAY EXAM L-S SPINE 2/3 VWS: CPT

## 2021-05-17 PROCEDURE — 3017F COLORECTAL CA SCREEN DOC REV: CPT | Performed by: NURSE PRACTITIONER

## 2021-05-17 PROCEDURE — G0439 PPPS, SUBSEQ VISIT: HCPCS | Performed by: NURSE PRACTITIONER

## 2021-05-17 RX ORDER — FUROSEMIDE 20 MG/1
20 TABLET ORAL DAILY PRN
Qty: 20 TABLET | Refills: 0 | Status: CANCELLED | OUTPATIENT
Start: 2021-05-17

## 2021-05-17 RX ORDER — ESCITALOPRAM OXALATE 5 MG/1
TABLET ORAL
Qty: 60 TABLET | Refills: 1 | Status: SHIPPED | OUTPATIENT
Start: 2021-05-17 | End: 2021-08-07

## 2021-05-17 RX ORDER — SIMVASTATIN 20 MG
TABLET ORAL
Qty: 30 TABLET | Refills: 2 | Status: SHIPPED | OUTPATIENT
Start: 2021-05-17 | End: 2021-08-07

## 2021-05-17 ASSESSMENT — PATIENT HEALTH QUESTIONNAIRE - PHQ9
2. FEELING DOWN, DEPRESSED OR HOPELESS: 0
SUM OF ALL RESPONSES TO PHQ QUESTIONS 1-9: 0
SUM OF ALL RESPONSES TO PHQ9 QUESTIONS 1 & 2: 0

## 2021-05-17 NOTE — PROGRESS NOTES
Medicare Annual Wellness Visit  Name: Erasmo Garcia Date: 2021   MRN: [id number] Sex: Male   Age: 44 y.o. Ethnicity: Non-/Non    : 1970 Race: Adilene Pinon is here for Medicare AWV (doing ok)    Screenings for behavioral, psychosocial and functional/safety risks, and cognitive dysfunction are all negative except as indicated below. These results, as well as other patient data from the [address] form, are documented in Flowsheets linked to this Encounter. No Known Allergies      Prior to Visit Medications    Medication Sig Taking? Authorizing Provider   traMADol (ULTRAM) 50 MG tablet Take 1 tablet by mouth 2 times daily as needed for Pain for up to 30 days. Take lowest dose possible to manage pain Yes CATHY Greene NP   escitalopram (LEXAPRO) 5 MG tablet TAKE TWO TABLETS BY MOUTH NIGHTLY Yes CATHY Greene NP   simvastatin (ZOCOR) 20 MG tablet TAKE ONE TABLET BY MOUTH EVERY NIGHT AT BEDTIME.  Yes CATHY Greene NP   furosemide (LASIX) 20 MG tablet Take 1 tablet by mouth daily as needed (for swelling) Yes Marius Lesches, MD   clopidogrel (PLAVIX) 75 MG tablet Take 75 mg by mouth daily Yes Historical Provider, MD   LOW-DOSE ASPIRIN PO Take 81 mg by mouth Yes Historical Provider, MD   vitamin D (ERGOCALCIFEROL) 1.25 MG (27174 UT) CAPS capsule Take 1 capsule by mouth once a week  CATHY Greene NP         Past Medical History:   Diagnosis Date    Hyperlipidemia     Nerve damage     Bilateral ears, hard of hearing       Past Surgical History:   Procedure Laterality Date    ARTERIAL BYPASS SURGRY      L leg 2.5 weeks ago     DIAGNOSTIC CARDIAC CATH LAB PROCEDURE      HERNIA REPAIR      X2    OTHER SURGICAL HISTORY      infected gland         Family History   Problem Relation Age of Onset    High Blood Pressure Father     Diabetes Father        CareTeam (Including outside providers/suppliers regularly COVID-19 Vaccine (1) Never done    HIV screen  Never done    DTaP/Tdap/Td vaccine (1 - Tdap) Never done    Shingles Vaccine (1 of 2) Never done   [employer] Visit (AWV)  Never done    Flu vaccine (Season Ended) 09/01/2021    Lipid screen  05/17/2022    Potassium monitoring  05/17/2022    Creatinine monitoring  05/17/2022    Colon cancer screen colonoscopy  07/16/2028    Hepatitis A vaccine  Aged Out    Hepatitis B vaccine  Aged Out    Hib vaccine  Aged Out    Meningococcal (ACWY) vaccine  Aged Out    Pneumococcal 0-64 years Vaccine  Aged Out     Recommendations for [employer] Due: see orders and patient instructions/AVS.    Results for orders placed or performed in visit on 05/17/21   POCT glycosylated hemoglobin (Hb A1C)   Result Value Ref Range    Hemoglobin A1C 5.5 %       Recommended screening schedule for the next 5-10 years is provided to the patient in written form: see Patient Sujey Lockwood was seen today for medicare awv. Diagnoses and all orders for this visit:    Routine general medical examination at a health care facility  -     CBC Auto Differential; Future  -     Comprehensive Metabolic Panel; Future    Chronic midline low back pain with bilateral sciatica  -     XR LUMBAR SPINE (2-3 VIEWS); Future  -     traMADol (ULTRAM) 50 MG tablet; Take 1 tablet by mouth 2 times daily as needed for Pain for up to 30 days. Take lowest dose possible to manage pain    Current moderate episode of major depressive disorder without prior episode (HCC)  -     escitalopram (LEXAPRO) 5 MG tablet; TAKE TWO TABLETS BY MOUTH NIGHTLY    PAD (peripheral artery disease) (HCC)    Elevated glucose  -     POCT glycosylated hemoglobin (Hb A1C)    Fatigue, unspecified type  -     TSH without Reflex; Future  -     Vitamin D 25 Hydroxy; Future    Encounter for screening for malignant neoplasm of prostate  -     PSA screening;  Future    Mixed hyperlipidemia  -     simvastatin (ZOCOR) 20 MG tablet; TAKE ONE TABLET BY MOUTH EVERY NIGHT AT BEDTIME.  -     Lipid Panel; Future    Disorder of bone, unspecified   -     Vitamin D 25 Hydroxy; Future    Polyarthralgia  -     Rheumatoid Factor; Future  -     C-Reactive Protein; Future  -     Sedimentation Rate; Future  -     Uric Acid; Future  -     MADY Screen with Reflex;  Future

## 2021-05-18 ENCOUNTER — TELEPHONE (OUTPATIENT)
Dept: PRIMARY CARE CLINIC | Age: 51
End: 2021-05-18

## 2021-05-18 DIAGNOSIS — M54.42 CHRONIC MIDLINE LOW BACK PAIN WITH BILATERAL SCIATICA: Primary | ICD-10-CM

## 2021-05-18 DIAGNOSIS — R93.89 ABNORMAL X-RAY: ICD-10-CM

## 2021-05-18 DIAGNOSIS — G89.29 CHRONIC MIDLINE LOW BACK PAIN WITH BILATERAL SCIATICA: Primary | ICD-10-CM

## 2021-05-18 DIAGNOSIS — M47.816 SPONDYLOSIS OF LUMBAR SPINE: ICD-10-CM

## 2021-05-18 DIAGNOSIS — M51.36 DEGENERATIVE DISC DISEASE, LUMBAR: ICD-10-CM

## 2021-05-18 DIAGNOSIS — M54.41 CHRONIC MIDLINE LOW BACK PAIN WITH BILATERAL SCIATICA: Primary | ICD-10-CM

## 2021-05-18 RX ORDER — ERGOCALCIFEROL 1.25 MG/1
50000 CAPSULE ORAL WEEKLY
Qty: 12 CAPSULE | Refills: 0 | Status: SHIPPED | OUTPATIENT
Start: 2021-05-18 | End: 2021-11-22 | Stop reason: SDUPTHER

## 2021-05-18 NOTE — TELEPHONE ENCOUNTER
Per Clayton Toledo per lab results    Requested Prescriptions     Pending Prescriptions Disp Refills    vitamin D (ERGOCALCIFEROL) 1.25 MG (48146 UT) CAPS capsule 12 capsule 0     Sig: Take 1 capsule by mouth once a week       Last Appointment Date: 5/17/2021  Next Appointment Date: 8/18/2021    No Known Allergies

## 2021-05-19 LAB — ANA IGG, ELISA: NORMAL

## 2021-05-19 RX ORDER — TRAMADOL HYDROCHLORIDE 50 MG/1
50 TABLET ORAL 2 TIMES DAILY PRN
Qty: 60 TABLET | Refills: 0 | Status: SHIPPED | OUTPATIENT
Start: 2021-05-19 | End: 2021-06-18

## 2021-05-19 NOTE — TELEPHONE ENCOUNTER
Per provider LP she would like patient to follow up with neurology in regards to abnormal xr.  I have cancelled order for [facility] and place neuro referral

## 2021-05-20 ENCOUNTER — TELEPHONE (OUTPATIENT)
Dept: NEUROSURGERY | Age: 51
End: 2021-05-20

## 2021-05-20 NOTE — TELEPHONE ENCOUNTER
1st attempt to call patient to schedule appointment, left voicemail with call back number [phone]

## 2021-05-24 ENCOUNTER — TELEPHONE (OUTPATIENT)
Dept: NEUROSURGERY | Age: 51
End: 2021-05-24

## 2021-05-24 NOTE — TELEPHONE ENCOUNTER
2nd attempt to call patient to schedule appointment,left voicemail with call back number [phone]

## 2021-05-25 ENCOUNTER — TELEPHONE (OUTPATIENT)
Dept: NEUROSURGERY | Age: 51
End: 2021-05-25

## 2021-05-25 NOTE — TELEPHONE ENCOUNTER
Patient has been called and voicemail has been left, I have tried calling patient on 5-20-21 and 5-24-21 to schedule appointment with Dr. Kate Kebede

## 2021-06-14 ENCOUNTER — TELEPHONE (OUTPATIENT)
Dept: NEUROSURGERY | Age: 51
End: 2021-06-14

## 2021-06-14 NOTE — TELEPHONE ENCOUNTER
Patient called to cancel his appt with Dr Lisa Kahn this afternoon. Patient voiced that he is having transportation problems and will call back to reschedule. I voiced understanding.

## 2021-06-25 ENCOUNTER — TELEPHONE (OUTPATIENT)
Dept: PODIATRY | Facility: CLINIC | Age: 51
End: 2021-06-25

## 2021-06-28 ENCOUNTER — HOSPITAL ENCOUNTER (OUTPATIENT)
Dept: ULTRASOUND IMAGING | Facility: HOSPITAL | Age: 51
Discharge: HOME | End: 2021-06-28
Admitting: SURGERY

## 2021-06-28 ENCOUNTER — OFFICE VISIT (OUTPATIENT)
Dept: VASCULAR SURGERY | Facility: CLINIC | Age: 51
End: 2021-06-28

## 2021-06-28 VITALS
OXYGEN SATURATION: 98 % | WEIGHT: 201 LBS | BODY MASS INDEX: 30.46 KG/M2 | HEART RATE: 76 BPM | DIASTOLIC BLOOD PRESSURE: 52 MMHG | HEIGHT: 68 IN | SYSTOLIC BLOOD PRESSURE: 106 MMHG

## 2021-06-28 DIAGNOSIS — I10 ESSENTIAL HYPERTENSION: ICD-10-CM

## 2021-06-28 DIAGNOSIS — E78.2 MIXED HYPERLIPIDEMIA: ICD-10-CM

## 2021-06-28 DIAGNOSIS — I73.9 PAD (PERIPHERAL ARTERY DISEASE) (HCC): ICD-10-CM

## 2021-06-28 DIAGNOSIS — I70.203 ATHEROSCLEROSIS OF NATIVE ARTERY OF BOTH LOWER EXTREMITIES, WITH UNSPECIFIED PRESENCE OF CLINICAL MANIFESTATION (HCC): ICD-10-CM

## 2021-06-28 DIAGNOSIS — I70.212 ATHEROSCLEROSIS OF NATIVE ARTERY OF LEFT LOWER EXTREMITY WITH INTERMITTENT CLAUDICATION (HCC): Primary | ICD-10-CM

## 2021-06-28 PROCEDURE — 93923 UPR/LXTR ART STDY 3+ LVLS: CPT

## 2021-06-28 PROCEDURE — 99214 OFFICE O/P EST MOD 30 MIN: CPT | Performed by: SURGERY

## 2021-06-28 PROCEDURE — 93923 UPR/LXTR ART STDY 3+ LVLS: CPT | Performed by: SURGERY

## 2021-06-28 RX ORDER — TRAMADOL HYDROCHLORIDE 50 MG/1
TABLET ORAL
COMMUNITY
Start: 2021-05-24 | End: 2022-05-24 | Stop reason: SDUPTHER

## 2021-06-28 NOTE — PATIENT INSTRUCTIONS
For more information:    Quit Now [place]  1-800-QUIT-NOW  https://[place].quitlogix.org/en-[place]/  Steps to Quit Smoking  Smoking tobacco can be harmful to your health and can affect almost every organ in your body. Smoking puts you, and those around you, at risk for developing many serious chronic diseases. Quitting smoking is difficult, but it is one of the best things that you can do for your health. It is never too late to quit.  What are the benefits of quitting smoking?  When you quit smoking, you lower your risk of developing serious diseases and conditions, such as:  · Lung cancer or lung disease, such as COPD.  · Heart disease.  · Stroke.  · Heart attack.  · Infertility.  · Osteoporosis and bone fractures.  Additionally, symptoms such as coughing, wheezing, and shortness of breath may get better when you quit. You may also find that you get sick less often because your body is stronger at fighting off colds and infections. If you are pregnant, quitting smoking can help to reduce your chances of having a baby of low birth weight.  How do I get ready to quit?  When you decide to quit smoking, create a plan to make sure that you are successful. Before you quit:  · Pick a date to quit. Set a date within the next two weeks to give you time to prepare.  · Write down the reasons why you are quitting. Keep this list in places where you will see it often, such as on your bathroom mirror or in your car or wallet.  · Identify the people, places, things, and activities that make you want to smoke (triggers) and avoid them. Make sure to take these actions:  ¨ Throw away all cigarettes at home, at work, and in your car.  ¨ Throw away smoking accessories, such as ashtrays and lighters.  ¨ Clean your car and make sure to empty the ashtray.  ¨ Clean your home, including curtains and carpets.  · Tell your family, friends, and coworkers that you are quitting. Support from your loved ones can make quitting easier.  · Talk with  your health care provider about your options for quitting smoking.  · Find out what treatment options are covered by your health insurance.  What strategies can I use to quit smoking?  Talk with your healthcare provider about different strategies to quit smoking. Some strategies include:  · Quitting smoking altogether instead of gradually lessening how much you smoke over a period of time. Research shows that quitting “cold turkey” is more successful than gradually quitting.  · Attending in-person counseling to help you build problem-solving skills. You are more likely to have success in quitting if you attend several counseling sessions. Even short sessions of 10 minutes can be effective.  · Finding resources and support systems that can help you to quit smoking and remain smoke-free after you quit. These resources are most helpful when you use them often. They can include:  ¨ Online chats with a counselor.  ¨ Telephone quitlines.  ¨ Printed self-help materials.  ¨ Support groups or group counseling.  ¨ Text messaging programs.  ¨ Mobile phone applications.  · Taking medicines to help you quit smoking. (If you are pregnant or breastfeeding, talk with your health care provider first.) Some medicines contain nicotine and some do not. Both types of medicines help with cravings, but the medicines that include nicotine help to relieve withdrawal symptoms. Your health care provider may recommend:  ¨ Nicotine patches, gum, or lozenges.  ¨ Nicotine inhalers or sprays.  ¨ Non-nicotine medicine that is taken by mouth.  Talk with your health care provider about combining strategies, such as taking medicines while you are also receiving in-person counseling. Using these two strategies together makes you more likely to succeed in quitting than if you used either strategy on its own.  If you are pregnant or breastfeeding, talk with your health care provider about finding counseling or other support strategies to quit smoking. Do  not take medicine to help you quit smoking unless told to do so by your health care provider.  What things can I do to make it easier to quit?  Quitting smoking might feel overwhelming at first, but there is a lot that you can do to make it easier. Take these important actions:  · Reach out to your family and friends and ask that they support and encourage you during this time. Call telephone quitlines, reach out to support groups, or work with a counselor for support.  · Ask people who smoke to avoid smoking around you.  · Avoid places that trigger you to smoke, such as bars, parties, or smoke-break areas at work.  · Spend time around people who do not smoke.  · Lessen stress in your life, because stress can be a smoking trigger for some people. To lessen stress, try:  ¨ Exercising regularly.  ¨ Deep-breathing exercises.  ¨ Yoga.  ¨ Meditating.  ¨ Performing a body scan. This involves closing your eyes, scanning your body from head to toe, and noticing which parts of your body are particularly tense. Purposefully relax the muscles in those areas.  · Download or purchase mobile phone or tablet apps (applications) that can help you stick to your quit plan by providing reminders, tips, and encouragement. There are many free apps, such as QuitGuide from the CDC (Centers for Disease Control and Prevention). You can find other support for quitting smoking (smoking cessation) through smokefree.gov and other websites.  How will I feel when I quit smoking?  Within the first 24 hours of quitting smoking, you may start to feel some withdrawal symptoms. These symptoms are usually most noticeable 2-3 days after quitting, but they usually do not last beyond 2-3 weeks. Changes or symptoms that you might experience include:  · Mood swings.  · Restlessness, anxiety, or irritation.  · Difficulty concentrating.  · Dizziness.  · Strong cravings for sugary foods in addition to nicotine.  · Mild weight  gain.  · Constipation.  · Nausea.  · Coughing or a sore throat.  · Changes in how your medicines work in your body.  · A depressed mood.  · Difficulty sleeping (insomnia).  After the first 2-3 weeks of quitting, you may start to notice more positive results, such as:  · Improved sense of smell and taste.  · Decreased coughing and sore throat.  · Slower heart rate.  · Lower blood pressure.  · Clearer skin.  · The ability to breathe more easily.  · Fewer sick days.  Quitting smoking is very challenging for most people. Do not get discouraged if you are not successful the first time. Some people need to make many attempts to quit before they achieve long-term success. Do your best to stick to your quit plan, and talk with your health care provider if you have any questions or concerns.  This information is not intended to replace advice given to you by your health care provider. Make sure you discuss any questions you have with your health care provider.  Document Released: 12/12/2002 Document Revised: 08/15/2017 Document Reviewed: 05/03/2016  [employer] Interactive Patient Education © 2017 [employer]      BMI for Adults  What is BMI?  Body mass index (BMI) is a number that is calculated from a person's weight and height. BMI can help estimate how much of a person's weight is composed of fat. BMI does not measure body fat directly. Rather, it is an alternative to procedures that directly measure body fat, which can be difficult and expensive.  BMI can help identify people who may be at higher risk for certain medical problems.  What are BMI measurements used for?  BMI is used as a screening tool to identify possible weight problems. It helps determine whether a person is obese, overweight, a healthy weight, or underweight.  BMI is useful for:  · Identifying a weight problem that may be related to a medical condition or may increase the risk for medical problems.  · Promoting changes, such as changes in diet and  "exercise, to help reach a healthy weight. BMI screening can be repeated to see if these changes are working.  How is BMI calculated?  BMI involves measuring your weight in relation to your height. Both height and weight are measured, and the BMI is calculated from those numbers. This can be done either in English (U[place] or metric measurements. Note that charts and online BMI calculators are available to help you find your BMI quickly and easily without having to do these calculations yourself.  To calculate your BMI in English (U.S[place] measurements:    1. Measure your weight in pounds (lb).  2. Multiply the number of pounds by 703.  ? For example, for a person who weighs 180 lb, multiply that number by 703, which equals 126,540.  3. Measure your height in inches. Then multiply that number by itself to get a measurement called \"inches squared.\"  ? For example, for a person who is 70 inches tall, the \"inches squared\" measurement is 70 inches x 70 inches, which equals 4,900 inches squared.  4. Divide the total from step 2 (number of lb x 703) by the total from step 3 (inches squared): 126,540 ÷ 4,900 = 25.8. This is your BMI.  To calculate your BMI in metric measurements:  1. Measure your weight in kilograms (kg).  2. Measure your height in meters (m). Then multiply that number by itself to get a measurement called \"meters squared.\"  ? For example, for a person who is 1.75 m tall, the \"meters squared\" measurement is 1.75 m x 1.75 m, which is equal to 3.1 meters squared.  3. Divide the number of kilograms (your weight) by the meters squared number. In this example: 70 ÷ 3.1 = 22.6. This is your BMI.  What do the results mean?  BMI charts are used to identify whether you are underweight, normal weight, overweight, or obese. The following guidelines will be used:  · Underweight: BMI less than 18.5.  · Normal weight: BMI between 18.5 and 24.9.  · Overweight: BMI between 25 and 29.9.  · Obese: BMI of 30 or above.  Keep these " notes in mind:  · Weight includes both fat and muscle, so someone with a muscular build, such as an athlete, may have a BMI that is higher than 24.9. In cases like these, BMI is not an accurate measure of body fat.  · To determine if excess body fat is the cause of a BMI of 25 or higher, further assessments may need to be done by a health care provider.  · BMI is usually interpreted in the same way for men and women.  Where to find more information  For more information about BMI, including tools to quickly calculate your BMI, go to these websites:  · Centers for Disease Control and Prevention: www.cdc.gov  · American Heart Association: www.heart.org  · National Heart, Lung, and [employer] [place]: www.nhlbi.nih.gov  Summary  · Body mass index (BMI) is a number that is calculated from a person's weight and height.  · BMI may help estimate how much of a person's weight is composed of fat. BMI can help identify those who may be at higher risk for certain medical problems.  · BMI can be measured using English measurements or metric measurements.  · BMI charts are used to identify whether you are underweight, normal weight, overweight, or obese.  This information is not intended to replace advice given to you by your health care provider. Make sure you discuss any questions you have with your health care provider.  Document Revised: 09/09/2020 Document Reviewed: 07/17/2020  [employer] Patient Education © 2021 [employer]

## 2021-06-28 NOTE — PROGRESS NOTES
06/28/2021      Hernandez Marie MD  [address] [place] [address] DR [place] [address]        Gilberto LEYVA Armando  1970    Chief Complaint   Patient presents with   • Follow-up     6 month fu with testing-PAD- pt denies stroke symptoms- pt states legs are doing well.States his back is the problem, is having surgery soon.    • smoking status     everyday smoker   • Med Management     verbally reviewed meds with patient       Dear Hernandez Marie MD:    HPI    I had the pleasure of seeing your patient in the office today for follow up.  As you recall, the patient is a 51 y.o. male who we are currently following for known peripheral arterial disease.  He had previously undergone angiogram with intervention of the left lower extremity with atherectomy, angioplasty, and stenting of the SFA.  This was done in January of 2020.  He has done well since that time with no further complaints of claudication to the left lower extremity.  He returns today after having undergone repeat CATALINA/PVR for continued surveillance which I did review personally in the office today.  It shows no significant arterial insufficiency in the right lower extremity, and mild arterial insufficiency on the left with a value on the left of 0.83..  He is maintained on aspirin, Plavix, and a statin.  His only other complaint today continues to be chronic lower back issues and radiculopathy.  He reports he is following with the spine surgeon and likely has upcoming procedure to relieve this.  He otherwise has no other acute complaints today.      Review of Systems   Constitutional: Negative.  Negative for activity change, appetite change, chills, diaphoresis, fatigue and fever.   HENT: Negative.  Negative for congestion, sneezing, sore throat and trouble swallowing.    Eyes: Negative.  Negative for visual disturbance.   Respiratory: Negative.  Negative for chest tightness and shortness of breath.    Cardiovascular: Negative.  Negative for chest pain,  "palpitations and leg swelling.   Gastrointestinal: Negative.  Negative for abdominal distention, abdominal pain, nausea and vomiting.   Endocrine: Negative.    Genitourinary: Negative.    Musculoskeletal: Positive for arthralgias.   Skin: Negative.    Allergic/Immunologic: Negative.    Neurological: Negative.    Hematological: Negative.    Psychiatric/Behavioral: Negative.        /52 (BP Location: Right arm, Patient Position: Sitting, Cuff Size: Adult)   Pulse 76   Ht 172.7 cm (68\")   Wt 91.2 kg (201 lb)   SpO2 98%   BMI 30.56 kg/m²   Physical Exam  Vitals reviewed.   Constitutional:       Appearance: Normal appearance.      Comments: Hard of hearing, wears hearing aids.   HENT:      Head: Normocephalic and atraumatic.      Nose: Nose normal.      Mouth/Throat:      Mouth: Mucous membranes are moist.   Eyes:      Extraocular Movements: Extraocular movements intact.      Pupils: Pupils are equal, round, and reactive to light.   Cardiovascular:      Rate and Rhythm: Normal rate and regular rhythm.      Pulses: Normal pulses.           Carotid pulses are 2+ on the right side and 2+ on the left side.       Radial pulses are 2+ on the right side and 2+ on the left side.        Femoral pulses are 2+ on the right side and 2+ on the left side.       Popliteal pulses are 2+ on the right side and 2+ on the left side.        Dorsalis pedis pulses are 2+ on the right side and 2+ on the left side.        Posterior tibial pulses are 2+ on the right side and 2+ on the left side.      Comments: Both feet are warm and well perfused.  Pulmonary:      Effort: Pulmonary effort is normal. No respiratory distress.   Abdominal:      General: There is no distension.      Palpations: Abdomen is soft. There is no mass.      Tenderness: There is no abdominal tenderness.   Musculoskeletal:         General: Normal range of motion.      Cervical back: Normal range of motion and neck supple.      Right lower leg: No edema.      Left " lower leg: No edema.   Skin:     General: Skin is warm and dry.      Capillary Refill: Capillary refill takes less than 2 seconds.   Neurological:      General: No focal deficit present.      Mental Status: He is alert and oriented to person, place, and time.   Psychiatric:         Mood and Affect: Mood normal.         Behavior: Behavior normal.         Thought Content: Thought content normal.         Judgment: Judgment normal.         DIAGNOSTIC DATA:        Patient Active Problem List   Diagnosis   • Cellulitis of left foot   • PAD (peripheral artery disease) (CMS/[facility])   • Acute pain   • Essential hypertension   • Hyperlipidemia   • Atherosclerosis of native artery of left lower extremity with intermittent claudication (CMS/[facility])   • Atherosclerosis of native artery of extremity (CMS/HCC)         ICD-10-CM ICD-9-CM   1. Atherosclerosis of native artery of left lower extremity with intermittent claudication (CMS/HCC)  I70.212 440.21   2. Mixed hyperlipidemia  E78.2 272.2       Lab Frequency Next Occurrence   US Ankle / Brachial Indices Extremity Complete Once 06/01/2020   US Carotid Bilateral Once 03/27/2020   Follow Anesthesia Guidelines / Standing Orders Once 12/23/2019   Obtain Informed Consent Once 12/28/2019   Provide NPO Instructions to Patient Once 12/28/2019   Chlorhexidine Skin Prep Once 12/28/2019   US Ankle / Brachial Indices Extremity Complete Once 06/26/2021       PLAN: After thoroughly evaluating Gilberto Roberts, I believe the best course of action is to remain conservative from a vascular standpoint.  Overall he continues to do very well after his previous aortogram and left lower extremity angiogram with intervention.  He denies any claudication symptoms to the left lower extremity and clinically he has palpable pulses throughout the bilateral lower extremities.  As such at this point he should continue on his aspirin, Plavix, and statin, and I will see him back here in the office in 6 months with a  repeat CATALINA/PVR for continued surveillance.  The patient is to continue taking their medications as previously discussed. I did discuss vascular risk factors as they pertain to the progression of vascular disease including controlling pretension, and hyperlipidemia. These factors remain stable. Patient's Body mass index is 30.56 kg/m². BMI is above normal parameters. Recommendations include: educational material.  Unfortunately he does continue to smoke.  I did  extensively on smoking cessation, and the patient was advised of the continued risks of smoking.  I provided over 10 minutes counseling on this matter.This was all discussed in full with complete understanding.  Thank you for allowing me to participate in the care of your patient.  Please do not hesitate to call with any questions or concerns.  We will keep you aware of any further encounters with Gilberto Roberts.      Sincerely Yours,      Luis Billings MD

## 2021-07-06 ENCOUNTER — TELEPHONE (OUTPATIENT)
Dept: PRIMARY CARE CLINIC | Age: 51
End: 2021-07-06

## 2021-07-06 NOTE — TELEPHONE ENCOUNTER
Called and left message for patient to follow up at his convenience. In regards to home health that will all be set up after the patient has surgery. It is nothing that we can do prior.

## 2021-07-08 RX ORDER — CLOPIDOGREL BISULFATE 75 MG/1
TABLET ORAL
Qty: 30 TABLET | Refills: 3 | Status: SHIPPED | OUTPATIENT
Start: 2021-07-08

## 2021-07-21 ENCOUNTER — OFFICE VISIT (OUTPATIENT)
Dept: NEUROSURGERY | Age: 51
End: 2021-07-21
Payer: MEDICARE

## 2021-07-21 VITALS
BODY MASS INDEX: 30.31 KG/M2 | WEIGHT: 200 LBS | HEART RATE: 86 BPM | HEIGHT: 68 IN | DIASTOLIC BLOOD PRESSURE: 76 MMHG | SYSTOLIC BLOOD PRESSURE: 124 MMHG

## 2021-07-21 DIAGNOSIS — M48.062 LUMBAR STENOSIS WITH NEUROGENIC CLAUDICATION: Primary | ICD-10-CM

## 2021-07-21 PROCEDURE — G8427 DOCREV CUR MEDS BY ELIG CLIN: HCPCS | Performed by: NEUROLOGICAL SURGERY

## 2021-07-21 PROCEDURE — 3017F COLORECTAL CA SCREEN DOC REV: CPT | Performed by: NEUROLOGICAL SURGERY

## 2021-07-21 PROCEDURE — 4004F PT TOBACCO SCREEN RCVD TLK: CPT | Performed by: NEUROLOGICAL SURGERY

## 2021-07-21 PROCEDURE — 99203 OFFICE O/P NEW LOW 30 MIN: CPT | Performed by: NEUROLOGICAL SURGERY

## 2021-07-21 PROCEDURE — G8417 CALC BMI ABV UP PARAM F/U: HCPCS | Performed by: NEUROLOGICAL SURGERY

## 2021-07-21 NOTE — PROGRESS NOTES
[facility] Neurosurgery  Office Visit        Chief Complaint   Patient presents with    Consultation     neck pain. HISTORY OF PRESENT ILLNESS:      The patient is a 46 y.o. male who presents for neurosurgical evaluation of chronic lower back pain. This has been a longstanding problem for him that he states is getting significantly worse. His pain began at ~23years of age with an injury while lifting at work. He believes he herniated a disc but denies ever having surgery. He states he is currently receiving disability benefits due to his back injury and inability to work. He has also been diagnosed with advanced peripheral vascular disease and has undergone two stenting procedures on his left leg and he is currently taking ASA and Plavix. He has also been diagnosed with coronary artery disease. Regarding his lower back, he describes midline and paraspinal back pain that is worsened by movement and activity. He also describes pain, numbness and weakness in his legs with walking that requires frequent stops to rest.  He denies any focal lower extremity numbness or weakness. In the past, he states he has attempted physical therapy for his back but he states the manipulations and exercises were too painful for him and he was unable to participate. He is not currently taking any anti-inflammatory medications.       MEDICAL HISTORY:             Past Medical History:   Diagnosis Date    Hyperlipidemia     Nerve damage     Bilateral ears, hard of hearing       Past Surgical History:   Procedure Laterality Date    ARTERIAL BYPASS SURGRY      L leg 2.5 weeks ago     DIAGNOSTIC CARDIAC CATH LAB PROCEDURE      HERNIA REPAIR      X2    OTHER SURGICAL HISTORY      infected gland         Current Outpatient Medications:     vitamin D (ERGOCALCIFEROL) 1.25 MG (10023 UT) CAPS capsule, Take 1 capsule by mouth once a week, Disp: 12 capsule, Rfl: 0    escitalopram (LEXAPRO) 5 MG tablet, TAKE TWO TABLETS BY MOUTH NIGHTLY, Disp: 60 tablet, Rfl: 1    simvastatin (ZOCOR) 20 MG tablet, TAKE ONE TABLET BY MOUTH EVERY NIGHT AT BEDTIME., Disp: 30 tablet, Rfl: 2    furosemide (LASIX) 20 MG tablet, Take 1 tablet by mouth daily as needed (for swelling), Disp: 20 tablet, Rfl: 0    clopidogrel (PLAVIX) 75 MG tablet, Take 75 mg by mouth daily, Disp: , Rfl:     LOW-DOSE ASPIRIN PO, Take 81 mg by mouth, Disp: , Rfl:     Allergies:  Patient has no known allergies. Social History:   Social History     Tobacco Use   Smoking Status Former Smoker    Packs/day: 1.00    Years: 21.00    Pack years: 21.00    Quit date: 1997    Years since quittin.0   Smokeless Tobacco Current User     Social History     Substance and Sexual Activity   Alcohol Use Never    Comment: OCC         Family History:   Family History   Problem Relation Age of Onset    High Blood Pressure Father     Diabetes Father        REVIEW OF SYSTEMS:    Constitutional: Negative. HENT: Negative. Eyes: Negative. Respiratory: Negative. Cardiovascular: Negative. Gastrointestinal: Negative. Genitourinary: Negative. Musculoskeletal: Positive for back pain. Skin: Negative. Neurological: Negative. Endo/Heme/Allergies: Negative. Psychiatric/Behavioral: Negative. Review of Systems was obtained by the medical assistant and reviewed by myself. PHYSICAL EXAM:    Vitals:    21 1105   BP: 124/76   Pulse: 86       Constitutional: Appears well-developed and well-nourished.    Eyes - conjunctiva normal.  Pupils react to light  Ear, nose,throat -Normal pinna and tragus, No scars, or lesions over external nose or ears, no obvious atrophy of tongue  Neck- symmetric, trachea midline, no jugular vein distension  Respiration- chest wall symmetric, normal effort without use of accessory muscles  Musculoskeletal - no significant wasting of muscles noted, no bony deformities, symmetric bulk  Extremities- no clubbing, cyanosis or edema  Skin - warm, dry, and intact. No rash,erythema, or pallor. Psychiatric - mood, affect, and behavior appear normal.       NEUROLOGIC EXAM:    MENTAL STATUS: Alert, oriented, thought content appropriate    CRANIAL NERVES: PERRL, EOMI, symmetric facies, tongue midline    MOTOR:     Right Upper Extremity:    Deltoid: 5/5  Biceps: 5/5  Triceps: 5/5  Wrist Extension: 5/5  Finger Abduction: 5/5    Left Upper Extremity:    Deltoid: 5/5  Biceps: 5/5  Triceps: 5/5  Wrist Extension: 5/5  Finger Abduction: 5/5      Right Lower Extremity:    Hip Flexion: 5/5  Knee Extension: 5/5  Ankle Plantarflexion: 5/5  Ankle Dorsiflexion: 5/5      Left Lower Extremity:    Hip Flexion: 5/5  Knee Extension: 5/5  Ankle Plantarflexion: 5/5  Ankle Dorsiflexion: 5/5      SOMATOSENSORY:     Right Upper Extremity: normal light touch sensation  Left Upper Extremity: normal light touch sensation  Right Lower Extremity: normal light touch sensation  Left Lower Extremity: normal light touch sensation      REFLEXES:    Biceps: 2+  Patella: 2+    Lott's: Negative      COORDINATION and GAIT:    Gait: Antalgic      DATA:    Lab Results   Component Value Date    WBC 13.7 (H) 05/17/2021    HGB 17.6 05/17/2021    HCT 53.0 (H) 05/17/2021    MCV 89.7 05/17/2021     05/17/2021     Lab Results   Component Value Date     05/17/2021    K 4.3 05/17/2021     05/17/2021    CO2 25 05/17/2021    BUN 10 05/17/2021    CREATININE 0.9 05/17/2021    GLUCOSE 89 05/17/2021    CALCIUM 9.6 05/17/2021    PROT 7.4 05/17/2021    LABALBU 4.1 05/17/2021    BILITOT 0.5 05/17/2021    ALKPHOS 151 (H) 05/17/2021    AST 12 05/17/2021    ALT 16 05/17/2021    LABGLOM >60 05/17/2021    GFRAA >59 05/17/2021       IMAGING:    My interpretation of imaging studies: X-rays of the lumbar spine show preserved lumbar lordosis. There is multilevel degenerative disc disease and spondylosis without obvious fracture.             ASSESSMENT AND PLAN:  This is a 46 y.o. male

## 2021-11-22 ENCOUNTER — VIRTUAL VISIT (OUTPATIENT)
Dept: PRIMARY CARE CLINIC | Age: 51
End: 2021-11-22
Payer: MEDICARE

## 2021-11-22 DIAGNOSIS — F32.1 CURRENT MODERATE EPISODE OF MAJOR DEPRESSIVE DISORDER WITHOUT PRIOR EPISODE (HCC): ICD-10-CM

## 2021-11-22 DIAGNOSIS — E78.2 MIXED HYPERLIPIDEMIA: ICD-10-CM

## 2021-11-22 DIAGNOSIS — I25.110 CORONARY ARTERY DISEASE INVOLVING NATIVE CORONARY ARTERY OF NATIVE HEART WITH UNSTABLE ANGINA PECTORIS (HCC): ICD-10-CM

## 2021-11-22 DIAGNOSIS — Z74.09 IMPAIRED MOBILITY AND ADLS: Primary | ICD-10-CM

## 2021-11-22 DIAGNOSIS — I20.0 UNSTABLE ANGINA (HCC): ICD-10-CM

## 2021-11-22 DIAGNOSIS — M51.36 DDD (DEGENERATIVE DISC DISEASE), LUMBAR: ICD-10-CM

## 2021-11-22 DIAGNOSIS — I73.9 PAD (PERIPHERAL ARTERY DISEASE) (HCC): ICD-10-CM

## 2021-11-22 DIAGNOSIS — Z78.9 IMPAIRED MOBILITY AND ADLS: Primary | ICD-10-CM

## 2021-11-22 PROCEDURE — 3017F COLORECTAL CA SCREEN DOC REV: CPT | Performed by: FAMILY MEDICINE

## 2021-11-22 PROCEDURE — 4004F PT TOBACCO SCREEN RCVD TLK: CPT | Performed by: FAMILY MEDICINE

## 2021-11-22 PROCEDURE — G8428 CUR MEDS NOT DOCUMENT: HCPCS | Performed by: FAMILY MEDICINE

## 2021-11-22 PROCEDURE — G8484 FLU IMMUNIZE NO ADMIN: HCPCS | Performed by: FAMILY MEDICINE

## 2021-11-22 PROCEDURE — 99214 OFFICE O/P EST MOD 30 MIN: CPT | Performed by: FAMILY MEDICINE

## 2021-11-22 PROCEDURE — G8417 CALC BMI ABV UP PARAM F/U: HCPCS | Performed by: FAMILY MEDICINE

## 2021-11-22 RX ORDER — TRAMADOL HYDROCHLORIDE 50 MG/1
50 TABLET ORAL 2 TIMES DAILY PRN
Qty: 60 TABLET | Refills: 1 | Status: SHIPPED | OUTPATIENT
Start: 2021-11-22 | End: 2022-02-16

## 2021-11-22 RX ORDER — SIMVASTATIN 20 MG
20 TABLET ORAL NIGHTLY
Qty: 90 TABLET | Refills: 3 | Status: SHIPPED | OUTPATIENT
Start: 2021-11-22

## 2021-11-22 RX ORDER — CLOPIDOGREL BISULFATE 75 MG/1
75 TABLET ORAL DAILY
Qty: 90 TABLET | Refills: 3 | Status: SHIPPED | OUTPATIENT
Start: 2021-11-22

## 2021-11-22 RX ORDER — ERGOCALCIFEROL 1.25 MG/1
50000 CAPSULE ORAL WEEKLY
Qty: 12 CAPSULE | Refills: 3 | Status: SHIPPED | OUTPATIENT
Start: 2021-11-22

## 2021-11-22 RX ORDER — ESCITALOPRAM OXALATE 5 MG/1
TABLET ORAL
Qty: 60 TABLET | Refills: 3 | Status: SHIPPED | OUTPATIENT
Start: 2021-11-22 | End: 2022-01-26 | Stop reason: SDUPTHER

## 2021-11-22 ASSESSMENT — ENCOUNTER SYMPTOMS
WHEEZING: 0
ABDOMINAL PAIN: 0
DIARRHEA: 0
BACK PAIN: 1
VOMITING: 0
CHEST TIGHTNESS: 0
COUGH: 0
CONSTIPATION: 0
NAUSEA: 0
SHORTNESS OF BREATH: 0

## 2021-11-22 NOTE — PROGRESS NOTES
2021    TELEHEALTH EVALUATION -- Audio/Visual (During GKUAV-19 public health emergency)    HPI:    Dionne Raygoza (:  1970) has requested an audio/video evaluation for the following concern(s):    Patient presents today via video visit for concern of transportation issues. He notes he has not been able to get in with his cardiac doctor recently. He has a history of unstable angina from CAD of peripheral artery disease. He has been on aspirin and statin as well as Plavix in the past for these diseases but he notes he has not been able to be on them recently as he is not seen anyone for these diseases. He also has nonsurgical back pain that he is unable to get to any specialist to help with this. He is depressed and feeling anxious. He notes he feels like he is living in a assisted at this time as he is unable to drive himself. He is hearing impaired and also has other impairments as well. He did have Medicaid in the past but no longer has Medicaid and he notes that his transportation has been dropped because of this. Review of Systems   Constitutional: Negative for chills and fever. Respiratory: Negative for cough, chest tightness, shortness of breath and wheezing. Cardiovascular: Negative for chest pain, palpitations and leg swelling. Gastrointestinal: Negative for abdominal pain, constipation, diarrhea, nausea and vomiting. Genitourinary: Negative for difficulty urinating, dysuria and frequency. Musculoskeletal: Positive for back pain and gait problem. Psychiatric/Behavioral: Positive for dysphoric mood. Negative for self-injury and suicidal ideas. The patient is nervous/anxious. Prior to Visit Medications    Medication Sig Taking? Authorizing Provider   escitalopram (LEXAPRO) 5 MG tablet Take one tab nightly for 1 week, then increase to 2 tabs nightly.  Yes Naya Carrero MD   simvastatin (ZOCOR) 20 MG tablet Take 1 tablet by mouth nightly Yes Naya Carrero MD clopidogrel (PLAVIX) 75 MG tablet Take 1 tablet by mouth daily Yes Jenny Matthews MD   vitamin D (ERGOCALCIFEROL) 1.25 MG (10351 UT) CAPS capsule Take 1 capsule by mouth once a week Yes Jenny Matthews MD   traMADol (ULTRAM) 50 MG tablet Take 1 tablet by mouth 2 times daily as needed for Pain for up to 30 days.  Yes Jenny Matthews MD   furosemide (LASIX) 20 MG tablet Take 1 tablet by mouth daily as needed (for swelling)  Jenny Matthews MD   LOW-DOSE ASPIRIN PO Take 81 mg by mouth  Historical Provider, MD       Social History     Tobacco Use    Smoking status: Former Smoker     Packs/day: 1.00     Years: 21.00     Pack years: 21.00     Quit date: 1997     Years since quittin.3    Smokeless tobacco: Current User   Substance Use Topics    Alcohol use: Never     Comment: OCC    Drug use: No        No Known Allergies,   Past Medical History:   Diagnosis Date    Hyperlipidemia     Nerve damage     Bilateral ears, hard of hearing   ,   Past Surgical History:   Procedure Laterality Date    ARTERIAL BYPASS SURGRY      L leg 2.5 weeks ago     DIAGNOSTIC CARDIAC CATH LAB PROCEDURE      HERNIA REPAIR      X2    OTHER SURGICAL HISTORY      infected gland   ,   Social History     Tobacco Use    Smoking status: Former Smoker     Packs/day: 1.00     Years: 21.00     Pack years: 21.00     Quit date: 1997     Years since quittin.3    Smokeless tobacco: Current User   Substance Use Topics    Alcohol use: Never     Comment: OCC    Drug use: No   ,   Family History   Problem Relation Age of Onset    High Blood Pressure Father     Diabetes Father    ,   Immunization History   Administered Date(s) Administered    Influenza, Quadv, IM, PF (6 mo and older Fluzone, Flulaval, Fluarix, and 3 yrs and older Afluria) 10/23/2019    Pneumococcal Polysaccharide ([id number]) 2018   ,   Health Maintenance   Topic Date Due    Hepatitis C screen  Never done    COVID-19 Vaccine (1) Never done    HIV screen Never done    DTaP/Tdap/Td vaccine (1 - Tdap) Never done    Shingles Vaccine (1 of 2) Never done    Flu vaccine (1) 09/01/2021    Lipid screen  05/17/2022    Potassium monitoring  05/17/2022    Creatinine monitoring  05/17/2022    Annual Wellness Visit (AWV)  05/18/2022    Colon cancer screen colonoscopy  07/16/2028    Hepatitis A vaccine  Aged Out    Hepatitis B vaccine  Aged Out    Hib vaccine  Aged Out    Meningococcal (ACWY) vaccine  Aged Out    Pneumococcal 0-64 years Vaccine  Aged Out       PHYSICAL EXAMINATION:  [ INSTRUCTIONS:  \"[x]\" Indicates a positive item  \"[]\" Indicates a negative item  -- DELETE ALL ITEMS NOT EXAMINED]  Vital Signs: (As obtained by patient/caregiver or practitioner observation)    Blood pressure-  Heart rate-    Respiratory rate-    Temperature-  Pulse oximetry-     Constitutional: [x] Appears well-developed and well-nourished [] No apparent distress      [] Abnormal-   Mental status  [x] Alert and awake  [x] Oriented to person/place/time [x]Able to follow commands      Eyes:  EOM    [x]  Normal  [] Abnormal-  Sclera  [x]  Normal  [] Abnormal -         Discharge []  None visible  [] Abnormal -    HENT:   [x] Normocephalic, atraumatic.   [] Abnormal   [x] Mouth/Throat: Mucous membranes are moist.     External Ears [x] Normal  [] Abnormal-     Neck: [x] No visualized mass     Pulmonary/Chest: [x] Respiratory effort normal.  [x] No visualized signs of difficulty breathing or respiratory distress        [] Abnormal-      Musculoskeletal:   [x] Normal gait with no signs of ataxia         [x] Normal range of motion of neck        [] Abnormal-       Neurological:        [x] No Facial Asymmetry (Cranial nerve 7 motor function) (limited exam to video visit)          [x] No gaze palsy        [] Abnormal-         Skin:        [x] No significant exanthematous lesions or discoloration noted on facial skin         [] Abnormal-            Psychiatric:       [] Normal Affect [x] No Hallucinations        [x] Abnormal- depressed      Other pertinent observable physical exam findings-     ASSESSMENT/PLAN:    ICD-10-CM    1. Impaired mobility and ADLs  Z74.09     Z78.9    2. Current moderate episode of major depressive disorder without prior episode ([facility])  F32.1 escitalopram (LEXAPRO) 5 MG tablet   3. Mixed hyperlipidemia  E78.2 simvastatin (ZOCOR) 20 MG tablet   4. DDD (degenerative disc disease), lumbar  M51.36 traMADol (ULTRAM) 50 MG tablet   5. Unstable angina ([facility])  I20.0    6. PAD (peripheral artery disease) ([facility])  I73.9    7. Coronary artery disease involving native coronary artery of native heart with unstable angina pectoris ([facility]  I25.110        See the above issues with transportation. Referral to social work made, restart medicine. Patient needs to be back on pain medicine as well so he can avoid using any NSAIDs to help with his pain. He has no history of any misuse or abuse and I will restart him at this time. There are no signs of any abuse, misuse, or usage of the controlled substance in a way that is not directed. Orders Placed This Encounter   Medications    escitalopram (LEXAPRO) 5 MG tablet     Sig: Take one tab nightly for 1 week, then increase to 2 tabs nightly. Dispense:  60 tablet     Refill:  3    simvastatin (ZOCOR) 20 MG tablet     Sig: Take 1 tablet by mouth nightly     Dispense:  90 tablet     Refill:  3     Please deliver meds to home    clopidogrel (PLAVIX) 75 MG tablet     Sig: Take 1 tablet by mouth daily     Dispense:  90 tablet     Refill:  3    vitamin D (ERGOCALCIFEROL) 1.25 MG (15491 UT) CAPS capsule     Sig: Take 1 capsule by mouth once a week     Dispense:  12 capsule     Refill:  3    traMADol (ULTRAM) 50 MG tablet     Sig: Take 1 tablet by mouth 2 times daily as needed for Pain for up to 30 days.      Dispense:  60 tablet     Refill:  1     Reduce doses taken as pain becomes manageable       No orders of the defined types were placed in this encounter. Return in about 8 weeks (around 1/17/2022) for vv f/u social issues & restarting lexapro. Mariia Stanley is a 46 y.o. male being evaluated by a Virtual Visit (video visit) encounter to address concerns as mentioned above. A caregiver was present when appropriate. Due to this being a TeleHealth encounter (During HOIGJ-89 public health emergency), evaluation of the following organ systems was limited: Vitals/Constitutional/EENT/Resp/CV/GI//MS/Neuro/Skin/Heme-Lymph-Imm. Pursuant to the emergency declaration under the [address] [place] [address], [address] [place] [address] authority and the [employer] Act, this Virtual Visit was conducted with patient's (and/or legal guardian's) consent, to reduce the patient's risk of exposure to COVID-19 and provide necessary medical care. The patient (and/or legal guardian) has also been advised to contact this office for worsening conditions or problems, and seek emergency medical treatment and/or call 911 if deemed necessary. Services were provided through a video synchronous discussion virtually to substitute for in-person clinic visit. Patient and provider were located at their individual homes or provider at secure site for business. It is possible that material may be posted from a notepad that is used for a Dragon dictation device for dictating notes outside the EMR and I am the original author of all of this content. --Misty Ramos MD on 11/22/2021 at 8:18 AM    An electronic signature was used to authenticate this note.

## 2021-12-30 ENCOUNTER — TELEPHONE (OUTPATIENT)
Dept: VASCULAR SURGERY | Facility: CLINIC | Age: 51
End: 2021-12-30

## 2021-12-30 NOTE — TELEPHONE ENCOUNTER
Left a message for the patient to call back to see if he would like to reschedule his testing and appt in the office    Pt called back and stated that he didn't have a ride right now and to try to call back in a couple of months to see if he has transportation.

## 2022-01-16 ENCOUNTER — HOSPITAL ENCOUNTER (EMERGENCY)
Facility: HOSPITAL | Age: 52
Discharge: HOME | End: 2022-01-17
Attending: EMERGENCY MEDICINE | Admitting: EMERGENCY MEDICINE

## 2022-01-16 ENCOUNTER — APPOINTMENT (OUTPATIENT)
Dept: GENERAL RADIOLOGY | Facility: HOSPITAL | Age: 52
End: 2022-01-16

## 2022-01-16 DIAGNOSIS — R09.1 PLEURISY: Primary | ICD-10-CM

## 2022-01-16 LAB
BASOPHILS # BLD AUTO: 0.11 10*3/MM3 (ref 0–0.2)
BASOPHILS NFR BLD AUTO: 0.7 % (ref 0–1.5)
DEPRECATED RDW RBC AUTO: 41.9 FL (ref 37–54)
EOSINOPHIL # BLD AUTO: 0.31 10*3/MM3 (ref 0–0.4)
EOSINOPHIL NFR BLD AUTO: 2 % (ref 0.3–6.2)
ERYTHROCYTE [DISTWIDTH] IN BLOOD BY AUTOMATED COUNT: 13.4 % (ref 12.3–15.4)
HCT VFR BLD AUTO: 47.2 % (ref 37.5–51)
HGB BLD-MCNC: 16.4 G/DL (ref 13–17.7)
IMM GRANULOCYTES # BLD AUTO: 0.08 10*3/MM3 (ref 0–0.05)
IMM GRANULOCYTES NFR BLD AUTO: 0.5 % (ref 0–0.5)
LYMPHOCYTES # BLD AUTO: 3.22 10*3/MM3 (ref 0.7–3.1)
LYMPHOCYTES NFR BLD AUTO: 20.5 % (ref 19.6–45.3)
MCH RBC QN AUTO: 29.6 PG (ref 26.6–33)
MCHC RBC AUTO-ENTMCNC: 34.7 G/DL (ref 31.5–35.7)
MCV RBC AUTO: 85.2 FL (ref 79–97)
MONOCYTES # BLD AUTO: 1.15 10*3/MM3 (ref 0.1–0.9)
MONOCYTES NFR BLD AUTO: 7.3 % (ref 5–12)
NEUTROPHILS NFR BLD AUTO: 10.8 10*3/MM3 (ref 1.7–7)
NEUTROPHILS NFR BLD AUTO: 69 % (ref 42.7–76)
NRBC BLD AUTO-RTO: 0 /100 WBC (ref 0–0.2)
PLATELET # BLD AUTO: 318 10*3/MM3 (ref 140–450)
PMV BLD AUTO: 10.2 FL (ref 6–12)
RBC # BLD AUTO: 5.54 10*6/MM3 (ref 4.14–5.8)
WBC NRBC COR # BLD: 15.67 10*3/MM3 (ref 3.4–10.8)

## 2022-01-16 PROCEDURE — 85025 COMPLETE CBC W/AUTO DIFF WBC: CPT | Performed by: EMERGENCY MEDICINE

## 2022-01-16 PROCEDURE — 80053 COMPREHEN METABOLIC PANEL: CPT | Performed by: EMERGENCY MEDICINE

## 2022-01-16 PROCEDURE — 99284 EMERGENCY DEPT VISIT MOD MDM: CPT

## 2022-01-16 PROCEDURE — 84484 ASSAY OF TROPONIN QUANT: CPT | Performed by: EMERGENCY MEDICINE

## 2022-01-16 PROCEDURE — 93005 ELECTROCARDIOGRAM TRACING: CPT | Performed by: EMERGENCY MEDICINE

## 2022-01-16 PROCEDURE — 93010 ELECTROCARDIOGRAM REPORT: CPT | Performed by: INTERNAL MEDICINE

## 2022-01-16 PROCEDURE — 71045 X-RAY EXAM CHEST 1 VIEW: CPT

## 2022-01-16 RX ORDER — SODIUM CHLORIDE 0.9 % (FLUSH) 0.9 %
10 SYRINGE (ML) INJECTION AS NEEDED
Status: DISCONTINUED | OUTPATIENT
Start: 2022-01-16 | End: 2022-01-17 | Stop reason: HOSPADM

## 2022-01-16 RX ORDER — ASPIRIN 81 MG/1
324 TABLET, CHEWABLE ORAL ONCE
Status: DISCONTINUED | OUTPATIENT
Start: 2022-01-16 | End: 2022-01-17 | Stop reason: HOSPADM

## 2022-01-17 VITALS
HEIGHT: 68 IN | DIASTOLIC BLOOD PRESSURE: 94 MMHG | WEIGHT: 200 LBS | RESPIRATION RATE: 19 BRPM | HEART RATE: 87 BPM | BODY MASS INDEX: 30.31 KG/M2 | SYSTOLIC BLOOD PRESSURE: 143 MMHG | OXYGEN SATURATION: 97 % | TEMPERATURE: 98.5 F

## 2022-01-17 LAB
ALBUMIN SERPL-MCNC: 3.6 G/DL (ref 3.5–5.2)
ALBUMIN/GLOB SERPL: 1.1 G/DL
ALP SERPL-CCNC: 133 U/L (ref 39–117)
ALT SERPL W P-5'-P-CCNC: 16 U/L (ref 1–41)
ANION GAP SERPL CALCULATED.3IONS-SCNC: 8 MMOL/L (ref 5–15)
AST SERPL-CCNC: 10 U/L (ref 1–40)
BILIRUB SERPL-MCNC: 0.3 MG/DL (ref 0–1.2)
BUN SERPL-MCNC: 12 MG/DL (ref 6–20)
BUN/CREAT SERPL: 16.2 (ref 7–25)
CALCIUM SPEC-SCNC: 9.4 MG/DL (ref 8.6–10.5)
CHLORIDE SERPL-SCNC: 104 MMOL/L (ref 98–107)
CO2 SERPL-SCNC: 25 MMOL/L (ref 22–29)
CREAT SERPL-MCNC: 0.74 MG/DL (ref 0.76–1.27)
GFR SERPL CREATININE-BSD FRML MDRD: 111 ML/MIN/1.73
GLOBULIN UR ELPH-MCNC: 3.3 GM/DL
GLUCOSE SERPL-MCNC: 118 MG/DL (ref 65–99)
HOLD SPECIMEN: NORMAL
HOLD SPECIMEN: NORMAL
POTASSIUM SERPL-SCNC: 3.8 MMOL/L (ref 3.5–5.2)
PROT SERPL-MCNC: 6.9 G/DL (ref 6–8.5)
SODIUM SERPL-SCNC: 137 MMOL/L (ref 136–145)
TROPONIN T SERPL-MCNC: <0.01 NG/ML (ref 0–0.03)
WHOLE BLOOD HOLD SPECIMEN: NORMAL
WHOLE BLOOD HOLD SPECIMEN: NORMAL

## 2022-01-17 PROCEDURE — 93005 ELECTROCARDIOGRAM TRACING: CPT | Performed by: EMERGENCY MEDICINE

## 2022-01-17 RX ORDER — IBUPROFEN 800 MG/1
800 TABLET ORAL ONCE
Status: COMPLETED | OUTPATIENT
Start: 2022-01-17 | End: 2022-01-17

## 2022-01-17 RX ORDER — INDOMETHACIN 50 MG/1
50 CAPSULE ORAL
Qty: 30 CAPSULE | Refills: 0 | Status: SHIPPED | OUTPATIENT
Start: 2022-01-17 | End: 2022-10-05

## 2022-01-17 RX ADMIN — IBUPROFEN 800 MG: 800 TABLET, FILM COATED ORAL at 00:40

## 2022-01-17 NOTE — ED PROVIDER NOTES
Subjective   Patient is brought to emergency room by EMS with a complaint of chest pain.  He says it began last night.  He noticed whenever he laid on his right side that his left chest would hurt.  He says it was not too bad initially.  Whenever he lay over to his left side it was not seem as painful.  As the day today has gone on and is seem to grow worse.  He suddenly had a severe pain about 830 tonight was a sharp stabbing pain in the same spot.  The pain does not radiate anywhere.  He does not have any nausea or vomiting or diaphoresis.  However the pain was bad enough that he decided he should come in to get checked out.      History provided by:  Patient   used: No    Chest Pain  Pain location:  L chest  Pain quality: sharp    Pain radiates to:  Does not radiate  Pain severity:  Severe  Onset quality:  Gradual  Duration:  1 day  Timing:  Intermittent  Progression:  Worsening  Chronicity:  New  Context: movement    Context: not breathing, not drug use, not raising an arm, not at rest and not stress    Relieved by:  Nothing  Worsened by:  Movement  Ineffective treatments:  None tried  Associated symptoms: no altered mental status, no anorexia, no anxiety, no cough, no dizziness, no fever, no headache, no palpitations, no PND, no syncope and no vomiting    Risk factors: high cholesterol, hypertension and male sex    Risk factors: no coronary artery disease, no diabetes mellitus, no Pedro-Danlos syndrome, not pregnant, no smoking and no surgery        Review of Systems   Constitutional: Negative.  Negative for fever.   HENT: Negative.    Respiratory: Negative.  Negative for cough.    Cardiovascular: Positive for chest pain. Negative for palpitations, syncope and PND.   Gastrointestinal: Negative.  Negative for anorexia and vomiting.   Genitourinary: Negative.    Musculoskeletal: Negative.    Skin: Negative.    Neurological: Negative.  Negative for dizziness and headaches.   Hematological:  "Negative.    Psychiatric/Behavioral: Negative.    All other systems reviewed and are negative.      Past Medical History:   Diagnosis Date   • Anxiety    • Arthritis    • Essential hypertension 3/29/2019   • Head injury    • [place] (hard of hearing)    • Hyperlipidemia    • Injury of back        No Known Allergies    Past Surgical History:   Procedure Laterality Date   • AORTAGRAM Bilateral 2019    Procedure: AORTAGRAM, LEFT LEG ANGIOGRAM, ANGIOPLASTY/STENT PLACEMENT, ANGIOSEAL CLOSURE;  Surgeon: Luis Billings MD;  Location:  [facility];  Service: Vascular   • AORTAGRAM Left 2020    Procedure: AORTAGRAM, LEFT LOWER EXTREMITY ANGIOGRAM, ATHRECTOMY, BALLOON ANGIOPLASTY, STENT PLACEMENT, MYNX CLOSURE;  Surgeon: Luis Billings MD;  Location:  [facility];  Service: Vascular   • HEMORRHOIDECTOMY      x 3   • HERNIA REPAIR     • LEG SURGERY      as a child due to \"club foot\"   • NECK MASS EXCISION         Family History   Problem Relation Age of Onset   • COPD Mother         respiratory failure   • Dementia Father    • Diabetes Father        Social History     Socioeconomic History   • Marital status: Single   Tobacco Use   • Smoking status: Current Every Day Smoker     Packs/day: 0.50     Types: Cigarettes     Last attempt to quit: 2020     Years since quittin.0   • Smokeless tobacco: Former User     Types: Chew   • Tobacco comment: pt states it is too hard to quit, he is by himself.   Vaping Use   • Vaping Use: Never used   Substance and Sexual Activity   • Alcohol use: No   • Drug use: No   • Sexual activity: Defer       Prior to Admission medications    Medication Sig Start Date End Date Taking? Authorizing Provider   aspirin 81 MG EC tablet Take 1 tablet by mouth Daily. 19   Robert Lo MD   clopidogrel (PLAVIX) 75 MG tablet TAKE ONE TABLET BY MOUTH DAILY. 21   Luis Billings MD   escitalopram (LEXAPRO) 5 MG tablet  20   Provider, " MD Val   simvastatin (ZOCOR) 40 MG tablet Take 40 mg by mouth Every Night.    Provider, MD Val   traMADol (ULTRAM) 50 MG tablet  5/24/21   ProviderVal MD       Medications   sodium chloride 0.9 % flush 10 mL (has no administration in time range)   aspirin chewable tablet 324 mg (has no administration in time range)   ibuprofen (ADVIL,MOTRIN) tablet 800 mg (has no administration in time range)       Vitals:    01/16/22 2346   BP: 135/49   Pulse: 92   Resp:    Temp:    SpO2: 94%         Objective   Physical Exam  Vitals and nursing note reviewed.   Constitutional:       Appearance: He is well-developed.   HENT:      Head: Normocephalic and atraumatic.   Cardiovascular:      Rate and Rhythm: Normal rate and regular rhythm.   Pulmonary:      Effort: Pulmonary effort is normal.      Breath sounds: Normal breath sounds.   Chest:      Chest wall: No mass, tenderness or crepitus. There is no dullness to percussion.   Abdominal:      Palpations: Abdomen is soft.   Musculoskeletal:         General: Normal range of motion.   Skin:     General: Skin is warm.      Capillary Refill: Capillary refill takes less than 2 seconds.   Neurological:      General: No focal deficit present.      Mental Status: He is alert and oriented to person, place, and time.   Psychiatric:         Mood and Affect: Mood normal.         Behavior: Behavior normal.         Procedures         Lab Results (last 24 hours)     Procedure Component Value Units Date/Time    CBC & Differential [[id number]]  (Abnormal) Collected: 01/16/22 2338    Specimen: Blood Updated: 01/16/22 2346    Narrative:      The following orders were created for panel order CBC & Differential.  Procedure                               Abnormality         Status                     ---------                               -----------         ------                     CBC Auto Differential[[id number]]        Abnormal            Final result                 Please  view results for these tests on the individual orders.    Comprehensive Metabolic Panel [[id number]]  (Abnormal) Collected: 01/16/22 2338    Specimen: Blood Updated: 01/17/22 0006     Glucose 118 mg/dL      BUN 12 mg/dL      Creatinine 0.74 mg/dL      Sodium 137 mmol/L      Potassium 3.8 mmol/L      Comment: Slight hemolysis detected by analyzer. Results may be affected.        Chloride 104 mmol/L      CO2 25.0 mmol/L      Calcium 9.4 mg/dL      Total Protein 6.9 g/dL      Albumin 3.60 g/dL      ALT (SGPT) 16 U/L      AST (SGOT) 10 U/L      Alkaline Phosphatase 133 U/L      Total Bilirubin 0.3 mg/dL      eGFR Non African Amer 111 mL/min/1.73      Globulin 3.3 gm/dL      A/G Ratio 1.1 g/dL      BUN/Creatinine Ratio 16.2     Anion Gap 8.0 mmol/L     Narrative:      GFR Normal >60  Chronic Kidney Disease <60  Kidney Failure <15      Troponin [[id number]]  (Normal) Collected: 01/16/22 2338    Specimen: Blood Updated: 01/17/22 0003     Troponin T <0.010 ng/mL     Narrative:      Troponin T Reference Range:  <= 0.03 ng/mL-   Negative for AMI  >0.03 ng/mL-     Abnormal for myocardial necrosis.  Clinicians would have to utilize clinical acumen, EKG, Troponin and serial changes to determine if it is an Acute Myocardial Infarction or myocardial injury due to an underlying chronic condition.       Results may be falsely decreased if patient taking Biotin.      CBC Auto Differential [[id number]]  (Abnormal) Collected: 01/16/22 2338    Specimen: Blood Updated: 01/16/22 2346     WBC 15.67 10*3/mm3      RBC 5.54 10*6/mm3      Hemoglobin 16.4 g/dL      Hematocrit 47.2 %      MCV 85.2 fL      MCH 29.6 pg      MCHC 34.7 g/dL      RDW 13.4 %      RDW-SD 41.9 fl      MPV 10.2 fL      Platelets 318 10*3/mm3      Neutrophil % 69.0 %      Lymphocyte % 20.5 %      Monocyte % 7.3 %      Eosinophil % 2.0 %      Basophil % 0.7 %      Immature Grans % 0.5 %      Neutrophils, Absolute 10.80 10*3/mm3      Lymphocytes, Absolute 3.22 10*3/mm3       Monocytes, Absolute 1.15 10*3/mm3      Eosinophils, Absolute 0.31 10*3/mm3      Basophils, Absolute 0.11 10*3/mm3      Immature Grans, Absolute 0.08 10*3/mm3      nRBC 0.0 /100 WBC           XR Chest 1 View   ED Interpretation   NAD, haziness in left base probably atelectasis          ED Course  ED Course as of 01/17/22 0026   Mon Jan 17, 2022   0025 I told the patient his cardiac enzymes are perfectly normal.  His white blood cell count is little elevated but clinically insignificant.  His chest x-ray does not show an infiltrate or pneumonia or collapsed lung.  There is a haziness in left base that I think is atelectasis.  I think this is a pleuritic type of pain and we will treat as such.  He is discharged in stable condition. [TR]      ED Course User Index  [TR] Dany Aguilar Jr., MD          MDM  Number of Diagnoses or Management Options  Pleurisy: new and requires workup     Amount and/or Complexity of Data Reviewed  Clinical lab tests: ordered and reviewed  Tests in the radiology section of CPT®: ordered and reviewed  Tests in the medicine section of CPT®: ordered and reviewed  Independent visualization of images, tracings, or specimens: yes    Risk of Complications, Morbidity, and/or Mortality  Presenting problems: moderate  Diagnostic procedures: moderate  Management options: moderate    Patient Progress  Patient progress: stable      Final diagnoses:   Pleurisy          Dany Aguilar Jr., MD  01/17/22 0026

## 2022-01-18 LAB
QT INTERVAL: 324 MS
QTC INTERVAL: 394 MS

## 2022-01-26 ENCOUNTER — VIRTUAL VISIT (OUTPATIENT)
Dept: PRIMARY CARE CLINIC | Age: 52
End: 2022-01-26
Payer: MEDICARE

## 2022-01-26 DIAGNOSIS — Z71.85 VACCINE COUNSELING: ICD-10-CM

## 2022-01-26 DIAGNOSIS — I73.9 PAD (PERIPHERAL ARTERY DISEASE) (HCC): ICD-10-CM

## 2022-01-26 DIAGNOSIS — F32.1 CURRENT MODERATE EPISODE OF MAJOR DEPRESSIVE DISORDER WITHOUT PRIOR EPISODE (HCC): Primary | ICD-10-CM

## 2022-01-26 PROCEDURE — G8417 CALC BMI ABV UP PARAM F/U: HCPCS | Performed by: FAMILY MEDICINE

## 2022-01-26 PROCEDURE — 99213 OFFICE O/P EST LOW 20 MIN: CPT | Performed by: FAMILY MEDICINE

## 2022-01-26 PROCEDURE — G8484 FLU IMMUNIZE NO ADMIN: HCPCS | Performed by: FAMILY MEDICINE

## 2022-01-26 PROCEDURE — 3017F COLORECTAL CA SCREEN DOC REV: CPT | Performed by: FAMILY MEDICINE

## 2022-01-26 PROCEDURE — G8428 CUR MEDS NOT DOCUMENT: HCPCS | Performed by: FAMILY MEDICINE

## 2022-01-26 PROCEDURE — 4004F PT TOBACCO SCREEN RCVD TLK: CPT | Performed by: FAMILY MEDICINE

## 2022-01-26 RX ORDER — ESCITALOPRAM OXALATE 10 MG/1
10 TABLET ORAL DAILY
Qty: 90 TABLET | Refills: 1 | Status: SHIPPED | OUTPATIENT
Start: 2022-01-26

## 2022-01-26 ASSESSMENT — ENCOUNTER SYMPTOMS
SHORTNESS OF BREATH: 0
RHINORRHEA: 0
NAUSEA: 0
ABDOMINAL PAIN: 0
EYE ITCHING: 0
SORE THROAT: 0
COUGH: 0
COLOR CHANGE: 0

## 2022-01-26 NOTE — PROGRESS NOTES
2022    TELEHEALTH EVALUATION -- Audio/Visual (During [facility]-17 public health emergency)    HPI:    Katia Campos (:  1970) has requested an audio/video evaluation for the following concern(s):    Patient presents today via video visit for depression. He was in the ED. He had chest pain. He notes he feels better, it has resovled since then. Dx w/ pleurisy. He notes he doesn't trust the shot for vaccine. He feels better on the antidepressant. Notes no issues. Has PAD and notes no issues w/ it. Review of Systems   Constitutional: Negative for chills and fever. HENT: Negative for rhinorrhea and sore throat. Eyes: Negative for itching and visual disturbance. Respiratory: Negative for cough and shortness of breath. Cardiovascular: Negative for chest pain and palpitations. Gastrointestinal: Negative for abdominal pain and nausea. Endocrine: Negative for polydipsia and polyuria. Genitourinary: Negative for dysuria and frequency. Musculoskeletal: Negative for arthralgias and myalgias. Skin: Negative for color change and rash. Allergic/Immunologic: Negative for environmental allergies and food allergies. Neurological: Negative for dizziness and light-headedness. Hematological: Negative for adenopathy. Does not bruise/bleed easily. Psychiatric/Behavioral: Negative for dysphoric mood. The patient is not nervous/anxious. Prior to Visit Medications    Medication Sig Taking? Authorizing Provider   escitalopram (LEXAPRO) 10 MG tablet Take 1 tablet by mouth daily Take one tab nightly for 1 week, then increase to 2 tabs nightly.  Yes Parul Soto MD   simvastatin (ZOCOR) 20 MG tablet Take 1 tablet by mouth nightly  Parul Every, MD   clopidogrel (PLAVIX) 75 MG tablet Take 1 tablet by mouth daily  Parul Every, MD   vitamin D (ERGOCALCIFEROL) 1.25 MG (68385 UT) CAPS capsule Take 1 capsule by mouth once a week  Parul MD Charles   furosemide (LASIX) 20 MG tablet Take 1 tablet by mouth daily as needed (for swelling)  Sailaja White MD   LOW-DOSE ASPIRIN PO Take 81 mg by mouth  Historical Provider, MD       Social History     Tobacco Use    Smoking status: Former Smoker     Packs/day: 1.00     Years: 21.00     Pack years: 21.00     Quit date: 1997     Years since quittin.5    Smokeless tobacco: Current User   Substance Use Topics    Alcohol use: Never     Comment: OCC    Drug use: No        No Known Allergies,   Past Medical History:   Diagnosis Date    Hyperlipidemia     Nerve damage     Bilateral ears, hard of hearing   ,   Past Surgical History:   Procedure Laterality Date    ARTERIAL BYPASS SURGRY      L leg 2.5 weeks ago     DIAGNOSTIC CARDIAC CATH LAB PROCEDURE      HERNIA REPAIR      X2    OTHER SURGICAL HISTORY      infected gland   ,   Social History     Tobacco Use    Smoking status: Former Smoker     Packs/day: 1.00     Years: 21.00     Pack years: 21.00     Quit date: 1997     Years since quittin.5    Smokeless tobacco: Current User   Substance Use Topics    Alcohol use: Never     Comment: OCC    Drug use: No   ,   Family History   Problem Relation Age of Onset    High Blood Pressure Father     Diabetes Father    ,   Immunization History   Administered Date(s) Administered    Influenza, Quadv, IM, PF (6 mo and older Fluzone, Flulaval, Fluarix, and 3 yrs and older Afluria) 10/23/2019    Pneumococcal Polysaccharide ([id number]) 2018   ,   Health Maintenance   Topic Date Due    Hepatitis C screen  Never done    COVID-19 Vaccine (1) Never done    HIV screen  Never done    DTaP/Tdap/Td vaccine (1 - Tdap) Never done    Shingles Vaccine (1 of 2) Never done    Flu vaccine (1) 2021    Lipid screen  2022    Depression Monitoring  2022    Potassium monitoring  2022    Creatinine monitoring  2022    Annual Wellness Visit (AWV)  2022    Colon cancer screen colonoscopy  2028    Hepatitis A vaccine  Aged Out    Hepatitis B vaccine  Aged Out    Hib vaccine  Aged Out    Meningococcal (ACWY) vaccine  Aged Out    Pneumococcal 0-64 years Vaccine  Aged Out       PHYSICAL EXAMINATION:  [ INSTRUCTIONS:  \"[x]\" Indicates a positive item  \"[]\" Indicates a negative item  -- DELETE ALL ITEMS NOT EXAMINED]  Vital Signs: (As obtained by patient/caregiver or practitioner observation)    Blood pressure-  Heart rate-    Respiratory rate-    Temperature-  Pulse oximetry-     Constitutional: [x] Appears well-developed and well-nourished [] No apparent distress      [] Abnormal-   Mental status  [x] Alert and awake  [x] Oriented to person/place/time [x]Able to follow commands      Eyes:  EOM    [x]  Normal  [] Abnormal-  Sclera  [x]  Normal  [] Abnormal -         Discharge []  None visible  [] Abnormal -    HENT:   [x] Normocephalic, atraumatic. [] Abnormal   [x] Mouth/Throat: Mucous membranes are moist.     External Ears [x] Normal  [] Abnormal-     Neck: [x] No visualized mass     Pulmonary/Chest: [x] Respiratory effort normal.  [x] No visualized signs of difficulty breathing or respiratory distress        [] Abnormal-      Musculoskeletal:   [x] Normal gait with no signs of ataxia         [x] Normal range of motion of neck        [] Abnormal-       Neurological:        [x] No Facial Asymmetry (Cranial nerve 7 motor function) (limited exam to video visit)          [x] No gaze palsy        [] Abnormal-         Skin:        [x] No significant exanthematous lesions or discoloration noted on facial skin         [] Abnormal-            Psychiatric:       [x] Normal Affect [x] No Hallucinations        [] Abnormal-     Other pertinent observable physical exam findings-     ASSESSMENT/PLAN:    ICD-10-CM    1. Current moderate episode of major depressive disorder without prior episode ([facility])  F32.1 escitalopram (LEXAPRO) 10 MG tablet   2. PAD (peripheral artery disease) ([facility])  I73.9    3.  Vaccine counseling Z71.85        depresion improved. Vaccine counseling provided. Orders Placed This Encounter   Medications    escitalopram (LEXAPRO) 10 MG tablet     Sig: Take 1 tablet by mouth daily Take one tab nightly for 1 week, then increase to 2 tabs nightly. Dispense:  90 tablet     Refill:  1       No orders of the defined types were placed in this encounter. Return if symptoms worsen or fail to improve. Leslee Hoff is a 46 y.o. male being evaluated by a Virtual Visit (video visit) encounter to address concerns as mentioned above. A caregiver was present when appropriate. Due to this being a TeleHealth encounter (During [facility]-38 public health emergency), evaluation of the following organ systems was limited: Vitals/Constitutional/EENT/Resp/CV/GI//MS/Neuro/Skin/Heme-Lymph-Imm. Pursuant to the emergency declaration under the [address] [place] [address], [address] [place] [address] and the [employer] and [employer] Act, this Virtual Visit was conducted with patient's (and/or legal guardian's) consent, to reduce the patient's risk of exposure to COVID-19 and provide necessary medical care. The patient (and/or legal guardian) has also been advised to contact this office for worsening conditions or problems, and seek emergency medical treatment and/or call 911 if deemed necessary. Services were provided through a video synchronous discussion virtually to substitute for in-person clinic visit. Patient and provider were located at their individual homes or provider at secure site for business. It is possible that material may be posted from a notepad that is used for a Dragon dictation device for dictating notes outside the EMR and I am the original author of all of this content. --Salina Quinteros MD on 1/26/2022 at 3:10 PM    An electronic signature was used to authenticate this note.

## 2022-04-08 ENCOUNTER — TELEPHONE (OUTPATIENT)
Dept: VASCULAR SURGERY | Facility: CLINIC | Age: 52
End: 2022-04-08

## 2022-04-08 NOTE — TELEPHONE ENCOUNTER
Left VM for patient to return call to clinic to reschedule US and follow-up appt with Dr Billings at [phone].

## 2022-05-23 ENCOUNTER — OFFICE VISIT (OUTPATIENT)
Dept: FAMILY MEDICINE CLINIC | Facility: CLINIC | Age: 52
End: 2022-05-23

## 2022-05-23 ENCOUNTER — REFERRAL TRIAGE (OUTPATIENT)
Dept: CASE MANAGEMENT | Facility: OTHER | Age: 52
End: 2022-05-23

## 2022-05-23 VITALS
SYSTOLIC BLOOD PRESSURE: 145 MMHG | BODY MASS INDEX: 34.1 KG/M2 | DIASTOLIC BLOOD PRESSURE: 93 MMHG | WEIGHT: 225 LBS | OXYGEN SATURATION: 98 % | HEIGHT: 68 IN | HEART RATE: 72 BPM | RESPIRATION RATE: 20 BRPM | TEMPERATURE: 97.1 F

## 2022-05-23 DIAGNOSIS — Z12.5 PROSTATE CANCER SCREENING: ICD-10-CM

## 2022-05-23 DIAGNOSIS — Z11.59 NEED FOR HEPATITIS C SCREENING TEST: ICD-10-CM

## 2022-05-23 DIAGNOSIS — G89.29 CHRONIC BILATERAL LOW BACK PAIN WITHOUT SCIATICA: ICD-10-CM

## 2022-05-23 DIAGNOSIS — I73.9 PAD (PERIPHERAL ARTERY DISEASE): ICD-10-CM

## 2022-05-23 DIAGNOSIS — Z87.891 PERSONAL HISTORY OF NICOTINE DEPENDENCE: ICD-10-CM

## 2022-05-23 DIAGNOSIS — M54.50 CHRONIC BILATERAL LOW BACK PAIN WITHOUT SCIATICA: ICD-10-CM

## 2022-05-23 DIAGNOSIS — R73.09 ELEVATED GLUCOSE: ICD-10-CM

## 2022-05-23 DIAGNOSIS — D72.829 LEUKOCYTOSIS, UNSPECIFIED TYPE: ICD-10-CM

## 2022-05-23 DIAGNOSIS — F41.9 ANXIETY: Primary | ICD-10-CM

## 2022-05-23 DIAGNOSIS — H91.90 DEAFNESS, UNSPECIFIED LATERALITY: ICD-10-CM

## 2022-05-23 DIAGNOSIS — Z13.1 DIABETES MELLITUS SCREENING: ICD-10-CM

## 2022-05-23 DIAGNOSIS — F17.200 SMOKER: ICD-10-CM

## 2022-05-23 DIAGNOSIS — E78.2 MIXED HYPERLIPIDEMIA: ICD-10-CM

## 2022-05-23 PROBLEM — L03.116 CELLULITIS OF LEFT FOOT: Status: RESOLVED | Noted: 2019-03-29 | Resolved: 2022-05-23

## 2022-05-23 PROCEDURE — 99204 OFFICE O/P NEW MOD 45 MIN: CPT | Performed by: FAMILY MEDICINE

## 2022-05-23 RX ORDER — ASPIRIN 81 MG/1
81 TABLET ORAL DAILY
Qty: 90 TABLET | Refills: 3 | Status: SHIPPED | OUTPATIENT
Start: 2022-05-23

## 2022-05-23 RX ORDER — ESCITALOPRAM OXALATE 5 MG/1
5 TABLET ORAL DAILY
Qty: 90 TABLET | Refills: 3 | Status: SHIPPED | OUTPATIENT
Start: 2022-05-23

## 2022-05-23 RX ORDER — SIMVASTATIN 20 MG
20 TABLET ORAL NIGHTLY
Qty: 90 TABLET | Refills: 3 | Status: SHIPPED | OUTPATIENT
Start: 2022-05-23 | End: 2022-10-05 | Stop reason: SDUPTHER

## 2022-05-23 NOTE — PROGRESS NOTES
Subjective cc: est care for HTN  Gilberto Roberts is a 51 y.o. male.     History of Present Illness   Gilberto Roberts, date of birth 1970, is here to establish care. He has a history of hypertension, primarily when he gets anxious. The patient has not been on an antihypertensive medication. He does take escitalopram for anxiety and does feel that it helps, and continues to take cholesterol medicine. The patient also takes aspirin and Plavix due to having 3 stents in his left leg. He no longer sees Dr. Billings in [place], a vascular physician, due to transportation difficulties. He was able to come here through [employer] insurance where he gets 15 trips per year to a doctor's office, and he believes they will take him to [place].     The patient reports he has had 3 surgeries the past 3 to 4 years, and notes he gets blood in his stool intermittently. He is unsure if he has had a colonoscopy in the past. The patient has had 3 hemorrhoidectomies at [facility] and complains he was in pain for 3 to 4 days following that. He also had surgery for 3 hernias. He can feel some pain from the scar tissue when he bends over.     He takes indomethacin for arthritis in his back and also for multiple level degenerative disc and spondylosis. The patient reports he had nerve damage in his back when he was younger. He has been on tramadol as needed - would like a refill.     He has smoked approximately a pack a day for 30 or so years. The patient is agreeable to having a lung cancer screening CT scan. He used to drink a lot of alcohol in the past, but quit drinking in . His father  with dementia, and his mother  from Alzheimer's.     Weight currently in obesity category     A review of systems was performed, and pertinent findings are noted in the HPI.    The following portions of the patient's history were reviewed and updated as appropriate: allergies, current medications, past family history,  "past medical history, past social history, past surgical history and problem list.        Review of Systems    Objective   Blood pressure 145/93, pulse 72, temperature 97.1 °F (36.2 °C), temperature source Infrared, resp. rate 20, height 172.7 cm (68\"), weight 102 kg (225 lb), SpO2 98 %.  Physical Exam  Vitals and nursing note reviewed.   Constitutional:       General: He is not in acute distress.     Appearance: He is well-developed. He is ill-appearing. He is not diaphoretic.   HENT:      Head: Normocephalic and atraumatic.      Comments: Hearing loss      Right Ear: External ear normal.      Left Ear: External ear normal.      Nose: Nose normal.   Eyes:      General:         Right eye: No discharge.         Left eye: No discharge.      Conjunctiva/sclera: Conjunctivae normal.   Neck:      Thyroid: No thyromegaly.      Trachea: No tracheal deviation.   Cardiovascular:      Rate and Rhythm: Normal rate and regular rhythm.      Pulses: Normal pulses.      Heart sounds: Normal heart sounds.   Pulmonary:      Effort: Pulmonary effort is normal. No respiratory distress.      Breath sounds: Normal breath sounds. No stridor. No wheezing.   Chest:      Chest wall: No tenderness.   Abdominal:      General: There is no distension.      Palpations: Abdomen is soft.      Tenderness: There is no abdominal tenderness.   Musculoskeletal:      Cervical back: Normal range of motion.   Lymphadenopathy:      Cervical: No cervical adenopathy.   Skin:     General: Skin is warm and dry.   Neurological:      Mental Status: He is alert and oriented to person, place, and time.      Motor: Weakness present. No abnormal muscle tone.      Coordination: Coordination abnormal.      Gait: Gait abnormal.   Psychiatric:         Behavior: Behavior normal.         Thought Content: Thought content normal.         Judgment: Judgment normal.         Assessment & Plan   Problems Addressed this Visit        Cardiac and Vasculature    PAD (peripheral " artery disease) (HCC)    Relevant Medications    aspirin (aspirin) 81 MG EC tablet    simvastatin (ZOCOR) 20 MG tablet    Other Relevant Orders    Ambulatory Referral to Vascular Surgery    CBC (No Diff) (Completed)    Lipid Panel (Completed)    Ambulatory Referral to Case Management Gaps in Care    Hyperlipidemia    Relevant Medications    simvastatin (ZOCOR) 20 MG tablet    Other Relevant Orders    Lipid Panel (Completed)      Other Visit Diagnoses     Anxiety    -  Primary    Relevant Medications    escitalopram (LEXAPRO) 5 MG tablet    Deafness, unspecified laterality        Smoker        Relevant Orders     CT Chest Low Dose Cancer Screening WO    Personal history of nicotine dependence         Relevant Orders     CT Chest Low Dose Cancer Screening WO    Prostate cancer screening        Relevant Orders    PSA Screen (Completed)    Need for hepatitis C screening test        Relevant Orders    Hepatitis C Antibody (Completed)    Elevated glucose        Relevant Orders    Hemoglobin A1c (Completed)    Diabetes mellitus screening        Relevant Orders    Comprehensive Metabolic Panel (Completed)    Leukocytosis, unspecified type        Relevant Orders    CBC (No Diff) (Completed)    Chronic bilateral low back pain without sciatica        Relevant Medications    traMADol (ULTRAM) 50 MG tablet    Other Relevant Orders    ToxASSURE Select 13 (MW) - Urine, Clean Catch      Diagnoses       Codes Comments    Anxiety    -  Primary ICD-10-CM: F41.9  ICD-9-CM: 300.00     PAD (peripheral artery disease) (HCC)     ICD-10-CM: I73.9  ICD-9-CM: 443.9     Deafness, unspecified laterality     ICD-10-CM: H91.90  ICD-9-CM: 389.9     Smoker     ICD-10-CM: F17.200  ICD-9-CM: 305.1     Personal history of nicotine dependence      ICD-10-CM: Z87.891  ICD-9-CM: V15.82     Prostate cancer screening     ICD-10-CM: Z12.5  ICD-9-CM: V76.44     Need for hepatitis C screening test     ICD-10-CM: Z11.59  ICD-9-CM: V73.89     Elevated glucose      ICD-10-CM: R73.09  ICD-9-CM: 790.29     Diabetes mellitus screening     ICD-10-CM: Z13.1  ICD-9-CM: V77.1     Leukocytosis, unspecified type     ICD-10-CM: D72.829  ICD-9-CM: 288.60     Mixed hyperlipidemia     ICD-10-CM: E78.2  ICD-9-CM: 272.2     Chronic bilateral low back pain without sciatica     ICD-10-CM: M54.50, G89.29  ICD-9-CM: 724.2, 338.29         1. Anxiety: New to me, chronic for patient.   · The patient does well on Lexapro. Refill given on medication.     2. Peripheral arterial disease: New to me, chronic for patient.   · Currently not following with vascular surgery due to transportation issues.   · The patient does have set up so he can have 15 trips per year through [facility].   · Patient should continue on aspirin, Plavix.   · Referral placed for vascular surgery again.   · Encouraged to stay on statin.   · Advised to stop smoking.     3. Obesity:   · BMI is >= 30 and <= 34.9 (Class 1 obesity). The following options were offered after discussion: exercise counseling/recommendations and nutrition counseling/recommendations    4. Tobacco abuse:   Gilberto LEYVA Kieranzoila  reports that he has been smoking cigarettes. He has been smoking about 0.50 packs per day. He has quit using smokeless tobacco.  His smokeless tobacco use included chew.. I have educated him on the risk of diseases from using tobacco products such as cancer, COPD and heart disease.     I advised him to quit and he is not willing to quit.    I spent 3  minutes counseling the patient.   The patient advised to stop smoking.   Discussed lung cancer screening. Patient is agreeable to low-dose CT scanning. Ordered today.     5. Chronic lower back pain: New to me, chronic for patient.   · Ultimately, the patient would benefit from pain management; however, transportation is an issue.  · At this time the patient takes tramadol as needed. [facility] reviewed and patient does fill the prescription infrequently. Controlled contract discussed and  will be signed in office today.   · ToxAssure will be obtained.   · Refill given on medication.   · The patient to return in 3 months for follow-up.     6. Screening.   · We will get low-dose CT scanning for lung cancer screening.   · We will get lab testing today.   · The patient unsure if he has had a colonoscopy.   · He has had three hemorrhoid surgeries in the past; however, he is unsure about a colonoscopy. We will request records and update as appropriate.   · We will check PSA to include prostate cancer screening.           Transcribed from ambient dictation for Madison Ansari MD by Bernice Davies.  05/24/22   08:32 CDT    Patient verbalized consent to the visit recording.          This document has been electronically signed by Madison Ansari MD on May 24, 2022 17:59 CDT

## 2022-05-24 ENCOUNTER — PATIENT OUTREACH (OUTPATIENT)
Dept: CASE MANAGEMENT | Facility: OTHER | Age: 52
End: 2022-05-24

## 2022-05-24 LAB
ALBUMIN SERPL-MCNC: 4.1 G/DL (ref 3.5–5.2)
ALBUMIN/GLOB SERPL: 1.4 G/DL
ALP SERPL-CCNC: 127 U/L (ref 39–117)
ALT SERPL-CCNC: 19 U/L (ref 1–41)
AST SERPL-CCNC: 10 U/L (ref 1–40)
BILIRUB SERPL-MCNC: 0.4 MG/DL (ref 0–1.2)
BUN SERPL-MCNC: 10 MG/DL (ref 6–20)
BUN/CREAT SERPL: 12.2 (ref 7–25)
CALCIUM SERPL-MCNC: 9.7 MG/DL (ref 8.6–10.5)
CHLORIDE SERPL-SCNC: 102 MMOL/L (ref 98–107)
CHOLEST SERPL-MCNC: 214 MG/DL (ref 0–200)
CO2 SERPL-SCNC: 26.9 MMOL/L (ref 22–29)
CREAT SERPL-MCNC: 0.82 MG/DL (ref 0.76–1.27)
EGFRCR SERPLBLD CKD-EPI 2021: 105 ML/MIN/1.73
ERYTHROCYTE [DISTWIDTH] IN BLOOD BY AUTOMATED COUNT: 14.5 % (ref 12.3–15.4)
GLOBULIN SER CALC-MCNC: 2.9 GM/DL
GLUCOSE SERPL-MCNC: 68 MG/DL (ref 65–99)
HBA1C MFR BLD: 5.5 % (ref 4.8–5.6)
HCT VFR BLD AUTO: 51 % (ref 37.5–51)
HCV AB S/CO SERPL IA: <0.1 S/CO RATIO (ref 0–0.9)
HDLC SERPL-MCNC: 36 MG/DL (ref 40–60)
HGB BLD-MCNC: 17.8 G/DL (ref 13–17.7)
LDLC SERPL CALC-MCNC: 144 MG/DL (ref 0–100)
MCH RBC QN AUTO: 30.7 PG (ref 26.6–33)
MCHC RBC AUTO-ENTMCNC: 34.9 G/DL (ref 31.5–35.7)
MCV RBC AUTO: 87.9 FL (ref 79–97)
PLATELET # BLD AUTO: 302 10*3/MM3 (ref 140–450)
POTASSIUM SERPL-SCNC: 4.1 MMOL/L (ref 3.5–5.2)
PROT SERPL-MCNC: 7 G/DL (ref 6–8.5)
PSA SERPL-MCNC: 0.88 NG/ML (ref 0–4)
RBC # BLD AUTO: 5.8 10*6/MM3 (ref 4.14–5.8)
SODIUM SERPL-SCNC: 139 MMOL/L (ref 136–145)
TRIGL SERPL-MCNC: 187 MG/DL (ref 0–150)
VLDLC SERPL CALC-MCNC: 34 MG/DL (ref 5–40)
WBC # BLD AUTO: 13.03 10*3/MM3 (ref 3.4–10.8)

## 2022-05-24 RX ORDER — TRAMADOL HYDROCHLORIDE 50 MG/1
50 TABLET ORAL 2 TIMES DAILY PRN
Qty: 60 TABLET | Refills: 0 | Status: SHIPPED | OUTPATIENT
Start: 2022-05-24 | End: 2022-10-05

## 2022-05-24 NOTE — OUTREACH NOTE
AMBULATORY CASE MANAGEMENT NOTE    Name and Relationship of Patient/Support Person: Gilberto Roberts K - Self    Patient Outreach    Received PCP referral for transportation and home needs. Discussed purpose and benefits of HRCM program. Patient is agreeable and excited for enrollment.    Adult Patient Profile  Questions/Answers    Flowsheet Row Most Recent Value   Symptoms/Conditions Managed at Home cardiovascular   Barriers to Managing Health lack of transportation   Cardiovascular Symptoms/Conditions hypertension   Identifying Life Goals would like to go to get an exercise bike and go to the gym for exercise   Identifying Health Goals having a ride to appointments, getting more exercise, stopping or cutting back on smoking   Taking Medications Not Prescribed no   Missed Doses of Prescribed Medications During Past Week yes  [forgets doses]   Taken Prescribed Medications at Different Time or Schedule During Past Week no   Taken More or Less Medication Than Prescribed no   Barriers to Taking Medication as Prescribed forget to take it   Hearing Difficulty or Deaf yes   Hearing Management born speech impediment wears hearing aids   Wear Glasses or Blind no   Concentrating, Remembering or Making Decisions Difficulty yes   Difficulty Communicating no   Difficulty Eating/Swallowing no   Walking or Climbing Stairs Difficulty yes   Walking or Climbing Stairs stair climbing difficulty, requires equipment, ambulation difficulty, requires equipment   Dressing/Bathing Difficulty no   Doing Errands Independently Difficulty (such as shopping) no   Equipment Currently Used at Home cane, straight        Adult Wellness Assessment  Questions/Answers    Flowsheet Row Most Recent Value   Regular Exercise no        Social Work Assessment  Questions/Answers    Flowsheet Row Most Recent Value   People in Home alone   Current Living Arrangements other (see comments)  [ARIELLE johnson lives in a 5th wheel camper]   Provides Primary Care For no one    Equipment Currently Used at Home cane, straight        Send Education  Questions/Answers    Flowsheet Row Most Recent Value   Other Patient Education/Resources  --  [provided ACM contact information]        SDOH updated and reviewed with the patient during this program:  Financial Resource Strain: Not on file      Food Insecurity: No Food Insecurity   • Worried About Running Out of Food in the Last Year: Never true   • Ran Out of Food in the Last Year: Never true      Transportation Needs: Unmet Transportation Needs   • Lack of Transportation (Medical): Yes   • Lack of Transportation (Non-Medical): Yes      Housing Stability: Unknown   • Unable to Pay for Housing in the Last Year: No   • Number of Places Lived in the Last Year: Not on file   • Unstable Housing in the Last Year: No     Was receiving COVID-19 additional food stamp supplementation for 3 months of $230. However, this month the amount will be decreased but he is unsure exactly what he will draw.       CLAUDIA PAGAN  Ambulatory Case Management    5/24/2022, 15:21 CDT

## 2022-05-25 ENCOUNTER — TELEPHONE (OUTPATIENT)
Dept: FAMILY MEDICINE CLINIC | Facility: CLINIC | Age: 52
End: 2022-05-25

## 2022-05-25 ENCOUNTER — PATIENT OUTREACH (OUTPATIENT)
Dept: CASE MANAGEMENT | Facility: OTHER | Age: 52
End: 2022-05-25

## 2022-05-25 DIAGNOSIS — L60.0 INGROWN TOENAIL: Primary | ICD-10-CM

## 2022-05-25 NOTE — OUTREACH NOTE
AMBULATORY CASE MANAGEMENT NOTE    Name and Relationship of Patient/Support Person: Gilberto Roberts - Self    Care Coordination    Spoke with Nereida at [facility] Dual Completed Medicare Replacement on a 3 way call and she does not show the patient as active. She stated he may have an advantage plan and transferred ACM. Spoke with [employer] and he will mail a new card to the patient. Receives OTC [employer] Healthy Benefits $125/month that was mailed in January 2021 but the patient did not receive it. Representative stated another card was mailed on 5.20.22. He can download jalyn on Android phone by typing healthy and use the bar code in the store.     Care Coordination    Contacted Medicaid to inquire about a new ID card. Spoke with Devora and they are currently closed(on hold 50 minutes).    Care Coordination    Sending request to PCP to enter a podiatry referral for toenail/foot care.     CLAUDIA PAGAN  Ambulatory Case Management    5/25/2022, 14:49 CDT

## 2022-05-26 ENCOUNTER — PATIENT ROUNDING (BHMG ONLY) (OUTPATIENT)
Dept: FAMILY MEDICINE CLINIC | Facility: CLINIC | Age: 52
End: 2022-05-26

## 2022-05-26 ENCOUNTER — PATIENT OUTREACH (OUTPATIENT)
Dept: CASE MANAGEMENT | Facility: OTHER | Age: 52
End: 2022-05-26

## 2022-05-26 NOTE — PROGRESS NOTES
May 26, 2022    Hello, may I speak with Gilberto Roberts?    My name is Suzy      I am  with [facility]  [facility] [address] [phone].    Before we get started may I verify your date of birth? 1970    I am calling to officially welcome you to our practice and ask about your recent visit. Is this a good time to talk? yes    Tell me about your visit with us. What things went well?  went great    We're always looking for ways to make our patients' experiences even better. Do you have recommendations on ways we may improve?  no    Overall were you satisfied with your first visit to our practice? yes       I appreciate you taking the time to speak with me today. Is there anything else I can do for you? No- pt reports that he now has a  that is helping him with his appointment management.      Thank you, and have a great day.

## 2022-05-26 NOTE — OUTREACH NOTE
AMBULATORY CASE MANAGEMENT NOTE    Name and Relationship of Patient/Support Person: Gilberto Roberts K - Self    Patient Outreach    HRCM follow up.    Care Coordination    Called [place] Medicaid to request new ID card and to inquire about transportation benefits. After being on hold 45 minutes, call was disconnected. ACM called back(1.5 hour hold) Carmelo and he will mail the patients [employer] card to his home. Discussed transportation benefits and ACM was transferred to the department that handles transportation. Spoke with Bernice and he has Medicare Savings Plan(QMB). No extra coverage with this QMB. Informed patient that referral for podiatry has been placed.     Care Coordination    Referral made to wavier program at [facility] office.    Adult Patient Profile  Questions/Answers    Flowsheet Row Most Recent Value   Taking Medications Not Prescribed no   Missed Doses of Prescribed Medications During Past Week yes   Taken Prescribed Medications at Different Time or Schedule During Past Week yes   Taken More or Less Medication Than Prescribed no   Barriers to Taking Medication as Prescribed forget to take it  [medications are delivered from pharmacy and he is able to manage his medications]          CLAUDIA PAGAN  Ambulatory Case Management    5/26/2022, 09:58 CDT

## 2022-06-01 LAB — DRUGS UR: NORMAL

## 2022-06-02 ENCOUNTER — HOSPITAL ENCOUNTER (OUTPATIENT)
Dept: CT IMAGING | Facility: HOSPITAL | Age: 52
End: 2022-06-02

## 2022-06-08 ENCOUNTER — APPOINTMENT (OUTPATIENT)
Dept: CT IMAGING | Facility: HOSPITAL | Age: 52
End: 2022-06-08

## 2022-06-10 ENCOUNTER — PATIENT OUTREACH (OUTPATIENT)
Dept: CASE MANAGEMENT | Facility: OTHER | Age: 52
End: 2022-06-10

## 2022-06-10 NOTE — OUTREACH NOTE
AMBULATORY CASE MANAGEMENT NOTE    Name and Relationship of Patient/Support Person: Gilberto Roberts K - Self    Patient Outreach    Received text from patient that he is needing help with obtaining the number for transportation to schedule his ride for a MD appointment next week.     Patient Outreach    Received text from patient that his friend gave him the contact number but they hung up on him due to not being able to understand him.    Care Coordination    Spoke with Radha at [email or web address] transportation [phone] and she was unable to schedule the appointment due to not being within the 3 business day window.     Care Coordination    Spoke with Yani at  [facility] to reschedule CT scan for 6.20.22 @ 1:15 PM.    Care Coordination    Spoke with Sonal at [email or web address] transportation  to schedule ride for CT scan on 6.20.22 @ 1:15 PM. They will  @ 11 45 and could either be 15 minutes before or after 11:45 AM. The reservation # for  is [id number]. The number to call to be picked up after the test is [phone]. Patient requested the numbers be texted to his phone.         CLAUDIA PAGAN  Ambulatory Case Management    6/10/2022, 14:07 CDT

## 2022-06-14 ENCOUNTER — APPOINTMENT (OUTPATIENT)
Dept: CT IMAGING | Facility: HOSPITAL | Age: 52
End: 2022-06-14

## 2022-06-14 ENCOUNTER — TELEPHONE (OUTPATIENT)
Dept: FAMILY MEDICINE CLINIC | Facility: CLINIC | Age: 52
End: 2022-06-14

## 2022-06-14 DIAGNOSIS — D72.829 LEUKOCYTOSIS, UNSPECIFIED TYPE: Primary | ICD-10-CM

## 2022-06-14 NOTE — TELEPHONE ENCOUNTER
----- Message from Madison Ansari MD sent at 6/12/2022  6:11 PM CDT -----  WBC and hemoglobin are elevated - I would recommend consult with heme/onc for further evaluation - please pend ref if agreeable   Renal function normal   Glucose normal   Liver function normal   Ha1c in normal range   Hep c screening normal   PSA is normal   Cholesterol levels above goal - rec low cholesterol diet - continue on cholesterol meidcation

## 2022-06-20 ENCOUNTER — HOSPITAL ENCOUNTER (OUTPATIENT)
Dept: CT IMAGING | Facility: HOSPITAL | Age: 52
Discharge: HOME | End: 2022-06-20
Admitting: FAMILY MEDICINE

## 2022-06-20 PROCEDURE — 71271 CT THORAX LUNG CANCER SCR C-: CPT

## 2022-06-21 ENCOUNTER — PATIENT OUTREACH (OUTPATIENT)
Dept: CASE MANAGEMENT | Facility: OTHER | Age: 52
End: 2022-06-21

## 2022-06-21 NOTE — OUTREACH NOTE
AMBULATORY CASE MANAGEMENT NOTE    Name and Relationship of Patient/Support Person: Gilberto Roberts K - Jose Manuel    Patient Outreach    Received call requesting assistance scheduling transportation to appointments.   Care Coordination    Called [employer] transportation [phone] and spoke with Esthela to schedule a ride for the 6.28.22 appointment.  will be 10-15 minutes before or after 7:30 AM. The reservation #[id number]. The number to call to be picked up from the office is [phone]. Scheduled ride for the 7.7.22 appointment.  will be 9:45-10:15. The resevation #[id number]. He will call [phone] to be picked up from the office.    Care Coordination    ACM realized that the 7.7.22 appointment address was different than what was provided to the [employer] transportation. Called back and spoke with Avril to change facility address for the appointment.  time will be 10:00-10:45 with the same reservation #[id number]. Patient requested all the information be texted to his phone.     CLAUDIA PAGAN  Ambulatory Case Management    6/21/2022, 14:13 CDT

## 2022-06-27 ENCOUNTER — TELEPHONE (OUTPATIENT)
Dept: VASCULAR SURGERY | Facility: CLINIC | Age: 52
End: 2022-06-27

## 2022-06-27 ENCOUNTER — DOCUMENTATION (OUTPATIENT)
Dept: CT IMAGING | Facility: HOSPITAL | Age: 52
End: 2022-06-27

## 2022-06-27 NOTE — TELEPHONE ENCOUNTER
Outbound call to reschedule appt cancelled from 06/03/2022. Pt stated wants to wait to reschedule this appointment till a later date.  Will call when he is ready to reschedule

## 2022-06-28 ENCOUNTER — LAB (OUTPATIENT)
Dept: LAB | Facility: HOSPITAL | Age: 52
End: 2022-06-28

## 2022-06-28 ENCOUNTER — CONSULT (OUTPATIENT)
Dept: ONCOLOGY | Facility: CLINIC | Age: 52
End: 2022-06-28

## 2022-06-28 VITALS
BODY MASS INDEX: 34.33 KG/M2 | DIASTOLIC BLOOD PRESSURE: 88 MMHG | HEART RATE: 86 BPM | HEIGHT: 68 IN | OXYGEN SATURATION: 96 % | SYSTOLIC BLOOD PRESSURE: 162 MMHG | RESPIRATION RATE: 16 BRPM | TEMPERATURE: 97.4 F | WEIGHT: 226.5 LBS

## 2022-06-28 DIAGNOSIS — D58.2 ELEVATED HEMOGLOBIN: ICD-10-CM

## 2022-06-28 DIAGNOSIS — I73.9 PAD (PERIPHERAL ARTERY DISEASE): ICD-10-CM

## 2022-06-28 DIAGNOSIS — Z72.0 TOBACCO USE: ICD-10-CM

## 2022-06-28 DIAGNOSIS — D72.829 LEUKOCYTOSIS, UNSPECIFIED TYPE: Primary | ICD-10-CM

## 2022-06-28 DIAGNOSIS — D72.829 LEUKOCYTOSIS, UNSPECIFIED TYPE: ICD-10-CM

## 2022-06-28 LAB
ALBUMIN SERPL-MCNC: 4.2 G/DL (ref 3.5–5.2)
ALBUMIN/GLOB SERPL: 1.4 G/DL
ALP SERPL-CCNC: 126 U/L (ref 39–117)
ALT SERPL W P-5'-P-CCNC: 21 U/L (ref 1–41)
ANION GAP SERPL CALCULATED.3IONS-SCNC: 7 MMOL/L (ref 5–15)
AST SERPL-CCNC: 16 U/L (ref 1–40)
BASOPHILS # BLD AUTO: 0.12 10*3/MM3 (ref 0–0.2)
BASOPHILS NFR BLD AUTO: 0.9 % (ref 0–1.5)
BILIRUB SERPL-MCNC: 0.6 MG/DL (ref 0–1.2)
BUN SERPL-MCNC: 9 MG/DL (ref 6–20)
BUN/CREAT SERPL: 10.3 (ref 7–25)
CALCIUM SPEC-SCNC: 10.1 MG/DL (ref 8.6–10.5)
CHLORIDE SERPL-SCNC: 105 MMOL/L (ref 98–107)
CO2 SERPL-SCNC: 30 MMOL/L (ref 22–29)
CREAT SERPL-MCNC: 0.87 MG/DL (ref 0.76–1.27)
CYTOLOGIST CVX/VAG CYTO: NORMAL
DEPRECATED RDW RBC AUTO: 43.8 FL (ref 37–54)
EGFRCR SERPLBLD CKD-EPI 2021: 102.5 ML/MIN/1.73
EOSINOPHIL # BLD AUTO: 0.36 10*3/MM3 (ref 0–0.4)
EOSINOPHIL NFR BLD AUTO: 2.8 % (ref 0.3–6.2)
ERYTHROCYTE [DISTWIDTH] IN BLOOD BY AUTOMATED COUNT: 13.6 % (ref 12.3–15.4)
ERYTHROCYTE [SEDIMENTATION RATE] IN BLOOD: 28 MM/HR (ref 0–20)
GLOBULIN UR ELPH-MCNC: 3.1 GM/DL
GLUCOSE SERPL-MCNC: 96 MG/DL (ref 65–99)
HCT VFR BLD AUTO: 51.9 % (ref 37.5–51)
HGB BLD-MCNC: 17.4 G/DL (ref 13–17.7)
IMM GRANULOCYTES # BLD AUTO: 0.1 10*3/MM3 (ref 0–0.05)
IMM GRANULOCYTES NFR BLD AUTO: 0.8 % (ref 0–0.5)
LDH SERPL-CCNC: 188 U/L (ref 135–225)
LYMPHOCYTES # BLD AUTO: 2.9 10*3/MM3 (ref 0.7–3.1)
LYMPHOCYTES NFR BLD AUTO: 22.6 % (ref 19.6–45.3)
MCH RBC QN AUTO: 29.9 PG (ref 26.6–33)
MCHC RBC AUTO-ENTMCNC: 33.5 G/DL (ref 31.5–35.7)
MCV RBC AUTO: 89.3 FL (ref 79–97)
MONOCYTES # BLD AUTO: 0.9 10*3/MM3 (ref 0.1–0.9)
MONOCYTES NFR BLD AUTO: 7 % (ref 5–12)
NEUTROPHILS NFR BLD AUTO: 65.9 % (ref 42.7–76)
NEUTROPHILS NFR BLD AUTO: 8.43 10*3/MM3 (ref 1.7–7)
NRBC BLD AUTO-RTO: 0 /100 WBC (ref 0–0.2)
PATH INTERP BLD-IMP: NORMAL
PLATELET # BLD AUTO: 306 10*3/MM3 (ref 140–450)
PMV BLD AUTO: 10.2 FL (ref 6–12)
POTASSIUM SERPL-SCNC: 4 MMOL/L (ref 3.5–5.2)
PROT SERPL-MCNC: 7.3 G/DL (ref 6–8.5)
RBC # BLD AUTO: 5.81 10*6/MM3 (ref 4.14–5.8)
SODIUM SERPL-SCNC: 142 MMOL/L (ref 136–145)
WBC NRBC COR # BLD: 12.81 10*3/MM3 (ref 3.4–10.8)

## 2022-06-28 PROCEDURE — 83615 LACTATE (LD) (LDH) ENZYME: CPT

## 2022-06-28 PROCEDURE — 85652 RBC SED RATE AUTOMATED: CPT

## 2022-06-28 PROCEDURE — 85060 BLOOD SMEAR INTERPRETATION: CPT

## 2022-06-28 PROCEDURE — 80053 COMPREHEN METABOLIC PANEL: CPT

## 2022-06-28 PROCEDURE — 85025 COMPLETE CBC W/AUTO DIFF WBC: CPT

## 2022-06-28 PROCEDURE — 36415 COLL VENOUS BLD VENIPUNCTURE: CPT

## 2022-06-28 PROCEDURE — 99214 OFFICE O/P EST MOD 30 MIN: CPT | Performed by: NURSE PRACTITIONER

## 2022-06-28 NOTE — PROGRESS NOTES
"[facility] GROUP HEMATOLOGY & ONCOLOGY  [address]  [facility] [place] [address] [phone]    Patient Name: Gilberto [place]  Encounter Date: 06/28/2022  YOB: 1970  Patient Number: [id number]    Initial Note    REASON FOR CONSULTATION: Patient states \" something's wrong with my blood cells \".    HISTORY OF PRESENT ILLNESS: Gilberto [place] is a 52 y.o. male referred by Madison Ansari MD for diagnostic and management recommendations for elevated hgb. History is obtained from patient. History is considered to be accurate.    He has health history significant for HTN, Back pain, anxiety, arthritis, PAD s/p stent placement in left leg.      Previous labs reviewed  May 23, 2022:  WBC 13.03, Hgb 17.8, Hct 51.0, Plt 302  Jan 16, 2022:   WBC 15.67, Hgb 16.4, Hct 47.2, Plt 318  May 17, 2021:  WBC 13.7, Hgb 17.6, Hct 53.0, Plt 350    Low Dose CT Chest for Cancer Screening  Nodules with a very low likelihood of becoming a clinically active cancer due to size or lack of growth. Continue annual screening with low dose CT in 12 months.    Colonoscopy July 2018.  R/t rectal bleeding.  Normal.  States he has rectal bleeding r/t hemorrhoids   Smokes 1.5 PPD x 30 years. Denies drinking or drug use    Pt states he has  who helps with him.  States Significant decreased hearing bilaterally which is congenital.      He denies any unexplained weight loss, fever, night sweats or acute illnesses    LABS    Lab Results - Last 18 Months   Lab Units 06/28/22  0924 05/23/22  1130 01/16/22  2338 05/17/21  1539   HEMOGLOBIN g/dL 17.4 17.8* 16.4 17.6   HEMATOCRIT % 51.9* 51.0 47.2 53.0*   MCV fL 89.3 87.9 85.2 89.7   WBC 10*3/mm3 12.81* 13.03* 15.67* 13.7*   RDW % 13.6 14.5 13.4 13.7   MPV fL 10.2  --  10.2 10.1   PLATELETS 10*3/mm3 306 302 318 350   IMM GRAN % % 0.8*  --  0.5  --    NEUTROS ABS 10*3/mm3 8.43*  --  10.80* 9.1*   LYMPHS ABS 10*3/mm3 2.90  --  " 3.22* 3.4   MONOS ABS 10*3/mm3 0.90  --  1.15* 0.90   EOS ABS 10*3/mm3 0.36  --  0.31 0.20   BASOS ABS 10*3/mm3 0.12  --  0.11 0.10   IMMATURE GRANS (ABS) 10*3/mm3 0.10*  --  0.08* 0.1   NRBC /100 WBC 0.0  --  0.0  --        Lab Results - Last 18 Months   Lab Units 06/28/22  0924 05/23/22  1130 01/16/22  2338 05/17/21  1539   GLUCOSE mg/dL 96 68 118* 89   SODIUM mmol/L 142 139 137 142   POTASSIUM mmol/L 4.0 4.1 3.8 4.3   TOTAL CO2 mmol/L  --  26.9  --  25   CO2 mmol/L 30.0*  --  25.0  --    CHLORIDE mmol/L 105 102 104 104   ANION GAP mmol/L 7.0  --  8.0 13   CREATININE mg/dL 0.87 0.82 0.74* 0.9   BUN mg/dL 9 10 12 10   BUN / CREAT RATIO  10.3 12.2 16.2  --    CALCIUM mg/dL 10.1 9.7 9.4 9.6   EGFR IF NONAFRICN AM mL/min/1.73  --   --  111 >60   ALK PHOS U/L 126* 127* 133* 151*   TOTAL PROTEIN g/dL 7.3  --  6.9 7.4   ALT (SGPT) U/L 21 19 16 16   AST (SGOT) U/L 16 10 10 12   BILIRUBIN mg/dL 0.6 0.4 0.3 0.5   ALBUMIN g/dL 4.20 4.10 3.60 4.1   GLOBULIN gm/dL 3.1  --  3.3  --        Lab Results - Last 18 Months   Lab Units 06/28/22  0924 05/17/21  1539   URIC ACID mg/dL  --  7.3*   LDH U/L 188  --        Lab Results - Last 18 Months   Lab Units 05/17/21  1539   TSH uIU/mL 1.450         PAST MEDICAL HISTORY:  ALLERGIES:  No Known Allergies  CURRENT MEDICATIONS:  Outpatient Encounter Medications as of 6/28/2022   Medication Sig Dispense Refill   • aspirin (aspirin) 81 MG EC tablet Take 1 tablet by mouth Daily. 90 tablet 3   • clopidogrel (PLAVIX) 75 MG tablet TAKE ONE TABLET BY MOUTH DAILY. (Patient taking differently: Take 75 mg by mouth Daily.) 30 tablet 3   • escitalopram (LEXAPRO) 5 MG tablet Take 1 tablet by mouth Daily. 90 tablet 3   • indomethacin (INDOCIN) 50 MG capsule Take 1 capsule by mouth 3 (Three) Times a Day With Meals. 30 capsule 0   • simvastatin (ZOCOR) 20 MG tablet Take 1 tablet by mouth Every Night. 90 tablet 3   • traMADol (ULTRAM) 50 MG tablet Take 1 tablet by mouth 2 (Two) Times a Day As Needed for  Moderate Pain . 60 tablet 0     No facility-administered encounter medications on file as of 2022.     ADULT ILLNESSES:  Patient Active Problem List   Diagnosis Code   • PAD (peripheral artery disease) ([facility]) I73.9   • Acute pain R52   • Essential hypertension I10   • Hyperlipidemia E78.5   • Atherosclerosis of native artery of left lower extremity with intermittent claudication ([facility]) I70.212   • Atherosclerosis of native artery of extremity ([facility]) I70.209       HEALTH MAINTENANCE ITEMS:  Health Maintenance Due   Topic Date Due   • COVID-19 Vaccine (1) Never done   • ZOSTER VACCINE (1 of 2) Never done   • ANNUAL WELLNESS VISIT  Never done       <no information>  Last Completed Colonoscopy          COLORECTAL CANCER SCREENING (COLONOSCOPY - Every 10 Years) Next due on 2018  SCANNED - COLONOSCOPY              Immunization History   Administered Date(s) Administered   • Flu Vaccine Quad PF >36MO 10/04/2020   • FluLaval/Fluarix/Fluzone >6 10/23/2019   • Influenza TIV (IM) 2008   • Pneumococcal Conjugate 13-Valent (PCV13) 10/04/2020   • Pneumococcal Polysaccharide (PPSV23) 2018   • Tdap 2017     Last Completed Mammogram     This patient has no relevant Health Maintenance data.            FAMILY HISTORY:  Family History   Problem Relation Age of Onset   • COPD Mother         respiratory failure   • Dementia Father    • Diabetes Father      SOCIAL HISTORY:  Social History     Socioeconomic History   • Marital status: Single   Tobacco Use   • Smoking status: Current Every Day Smoker     Packs/day: 0.50     Years: 33.00     Pack years: 16.50     Types: Cigarettes     Last attempt to quit: 2020     Years since quittin.4   • Smokeless tobacco: Former User     Types: Chew   • Tobacco comment: pt states it is too hard to quit, he is by himself.   Vaping Use   • Vaping Use: Never used   Substance and Sexual Activity   • Alcohol use: No   • Drug use: No   • Sexual activity: Defer  "      REVIEW OF SYSTEMS:  Review of Systems   Constitutional: Positive for fatigue. Negative for activity change, appetite change, fever, unexpected weight gain and unexpected weight loss.   HENT: Negative for dental problem, facial swelling, swollen glands and trouble swallowing.         Congenital hearing loss, uses hearing aids    Eyes: Negative for double vision and discharge.   Respiratory: Negative for cough, shortness of breath and wheezing.    Cardiovascular: Negative for chest pain, palpitations and leg swelling.   Gastrointestinal: Positive for blood in stool (Hemorrhoids Has had mutliple surgeries.  Wears briefs r/t the bleeding.). Negative for abdominal pain, nausea and vomiting.   Endocrine: Negative.    Genitourinary: Negative for dysuria and hematuria.   Musculoskeletal: Negative for arthralgias and myalgias.   Skin: Negative for rash, skin lesions and wound.        States he has pimples on his back often   Allergic/Immunologic: Negative for immunocompromised state.   Neurological: Negative for speech difficulty, light-headedness, headache, memory problem and confusion.   Hematological: Negative for adenopathy.   Psychiatric/Behavioral: Negative for self-injury and suicidal ideas. The patient is nervous/anxious.        /88   Pulse 86   Temp 97.4 °F (36.3 °C) (Temporal)   Resp 16   Ht 172.7 cm (68\")   Wt 103 kg (226 lb 8 oz)   SpO2 96%   BMI 34.44 kg/m²  Body surface area is 2.16 meters squared.    Pain Score    06/28/22 0845   PainSc: 0-No pain       Physical Exam:  Physical Exam  Constitutional:       Appearance: Normal appearance.   HENT:      Head: Normocephalic and atraumatic.   Cardiovascular:      Rate and Rhythm: Normal rate and regular rhythm.   Pulmonary:      Effort: Pulmonary effort is normal.      Breath sounds: Normal breath sounds.   Abdominal:      General: Bowel sounds are normal.      Palpations: Abdomen is soft.   Musculoskeletal:      Right lower leg: No edema.      " Left lower leg: No edema.   Skin:     General: Skin is warm and dry.   Neurological:      Mental Status: He is alert and oriented to person, place, and time.   Psychiatric:         Attention and Perception: Attention normal.         Mood and Affect: Mood normal.         Judgment: Judgment normal.         Gilberto [place] reports a pain score of 0.  No intervention indicated.      ASSESSMENT / PLAN:    1. Leukocytosis, unspecified type    2. Elevated hemoglobin (HCC)    3. Tobacco use    4. PAD (peripheral artery disease) (HCC)       1.  Leukocytosis / Elevated Hemoglobin  May 23, 2022:  WBC 13.03, Hgb 17.8, Hct 51.0, Plt 302  Jan 16, 2022:   WBC 15.67, Hgb 16.4, Hct 47.2, Plt 318  May 17, 2021:  WBC 13.7, Hgb 17.6, Hct 53.0, Plt 350  -Patient denies any acute illness, fever, night sweats, unexplained weight loss    2.  Peripheral artery disease  -Claudication to the left lower extremity s/p atherectomy, angioplasty, and stenting of the SFA in 2020  -On Plavix and ASA   -Managed by Dr. Billings, Vascular Surgery    3.  Tobacco use  -Patient smokes 1-1/2 packs/day and has for the past 30 years  - Smoking cessation advised    PLAN:   1.   regarding the reason for the referral.   2.   regarding leukocytosis, elevated Hgb and differential diagnosis considerations including tobacco use, myeloproliferative disorder   3.  Labs for CBC, CMP, LDH, JAK2, BCRABL, ESR, FLOW, FISH Peripheral blood smear,    4.  Continue current medications, follow up, treatment plans per PCP and any other provider.   5.  Return to office in 2 weeks to review labs   6.  Care discussed with patient.  Understanding expressed.  Patient agreeable with plan.  7.  Further recommendations pending.    Thank you for the referral.    I spent 32 minutes caring for Gilberto on this date of service. This time includes time spent by me in the following activities: preparing for the visit, reviewing tests, performing a medically appropriate examination  and/or evaluation, counseling and educating the patient/family/caregiver, ordering medications, tests, or procedures and documenting information in the medical record.     Conchita Winslow, APRN  06/28/2022

## 2022-07-01 ENCOUNTER — PATIENT ROUNDING (BHMG ONLY) (OUTPATIENT)
Dept: ONCOLOGY | Facility: CLINIC | Age: 52
End: 2022-07-01

## 2022-07-01 ENCOUNTER — PATIENT OUTREACH (OUTPATIENT)
Dept: CASE MANAGEMENT | Facility: OTHER | Age: 52
End: 2022-07-01

## 2022-07-01 LAB — REF LAB TEST METHOD: NORMAL

## 2022-07-01 NOTE — OUTREACH NOTE
AMBULATORY CASE MANAGEMENT NOTE    Name and Relationship of Patient/Support Person: Gilberto [place] - Jose Manuel    Patient Outreach    Received call from patient requesting assistanc scheduling transportation for 7.12.22 appointment.     Care Coordination    3 way call with [facility] transportation. They will pick him up between 7:00-7:30 AM on 7.12.22 and reference #[id number].  time is open and he will need to call [phone] for his ride home.         CLAUDIA PAGAN  Ambulatory Case Management    7/1/2022, 10:27 CDT

## 2022-07-01 NOTE — PROGRESS NOTES
July 1, 2022    Hello, may I speak with Gilberto [place]?    My name is DARRELL     I am  with [facility] HEMATOLOGY & ONCOLOGY  [address]  [place] [address] [phone].    Before we get started may I verify your date of birth? 1970    I am calling to officially welcome you to our practice and ask about your recent visit. Is this a good time to talk? YES     Tell me about your visit with us. What things went well? WENT VERY GOOD       We're always looking for ways to make our patients' experiences even better. Do you have recommendations on ways we may improve?  NO    Overall were you satisfied with your first visit to our practice? YES        I appreciate you taking the time to speak with me today. Is there anything else I can do for you?  NO      Thank you, and have a great day.

## 2022-07-05 LAB
REF LAB TEST METHOD: NORMAL
REF LAB TEST METHOD: NORMAL

## 2022-07-06 ENCOUNTER — TELEPHONE (OUTPATIENT)
Dept: PODIATRY | Facility: CLINIC | Age: 52
End: 2022-07-06

## 2022-07-06 NOTE — PROGRESS NOTES
[facility] - PODIATRY    Today's Date: 07/07/2022     Patient Name: Gilberto [place]  MRN: [id number]  CSN: [id number]  PCP: Madison Posada MD  Referring Provider: Madison Posada MD    SUBJECTIVE     Chief Complaint   Patient presents with   • Establish Care     Madison Posada MD 05/23/2022 DR POSADA-5/23/22-INGROWN TOENAIL- pt states nondiabetic nail and foot care, no pain in either left and right foot  -pt denies pain - pt presents nondiabetic nail and foot care, long, thick, and discolored; yellow nails      HPI: Gilberto [place], a 52 y.o.male, comes to clinic as a(n) new patient complaining of thickened, irregular toenails of both feet. Patient has h/o anxiety, arthritis, HTN, Head injury, HLD, DDD. Patient presents with complaints of long, thickened, irregular toenails of both feet. States that he previously saw Dr. Esqueda in [place] for nail care but has not been since the pandemic started. States that he has issues with his back and is unable to reach his feet. Takes Plavix daily and follows with Dr. Billings. States that he is embarrased to go to doctors due to his foot health. States that he wants to get someone (home health) to come to his house to help him wash his feet. Denies pain. Relates previous treatment(s) including care by podiatry. Denies any constitutional symptoms. No other pedal complaints at this time.    Past Medical History:   Diagnosis Date   • Anxiety    • Arthritis    • Essential hypertension 3/29/2019   • Head injury    • [place] (hard of hearing)    • Hyperlipidemia    • Injury of back      Past Surgical History:   Procedure Laterality Date   • AORTAGRAM Bilateral 4/2/2019    Procedure: AORTAGRAM, LEFT LEG ANGIOGRAM, ANGIOPLASTY/STENT PLACEMENT, ANGIOSEAL CLOSURE;  Surgeon: Luis Billings MD;  Location: [place] [address];  Service: Vascular   • AORTAGRAM Left 1/9/2020    Procedure: AORTAGRAM, LEFT LOWER EXTREMITY ANGIOGRAM, ATHRECTOMY, BALLOON  "ANGIOPLASTY, STENT PLACEMENT, MYNX CLOSURE;  Surgeon: Luis Billings MD;  Location: [facility] HYBRID OR 12;  Service: Vascular   • HEMORRHOIDECTOMY      x 3   • HERNIA REPAIR     • LEG SURGERY      as a child due to \"club foot\"   • NECK MASS EXCISION       Family History   Problem Relation Age of Onset   • COPD Mother         respiratory failure   • Dementia Father    • Diabetes Father      Social History     Socioeconomic History   • Marital status: Single   Tobacco Use   • Smoking status: Current Every Day Smoker     Packs/day: 0.50     Years: 33.00     Pack years: 16.50     Types: Cigarettes     Last attempt to quit: 2020     Years since quittin.5   • Smokeless tobacco: Former User     Types: Chew   • Tobacco comment: pt states it is too hard to quit, he is by himself.   Vaping Use   • Vaping Use: Never used   Substance and Sexual Activity   • Alcohol use: No   • Drug use: No   • Sexual activity: Defer     No Known Allergies  Current Outpatient Medications   Medication Sig Dispense Refill   • aspirin (aspirin) 81 MG EC tablet Take 1 tablet by mouth Daily. 90 tablet 3   • clopidogrel (PLAVIX) 75 MG tablet TAKE ONE TABLET BY MOUTH DAILY. (Patient taking differently: Take 75 mg by mouth Daily.) 30 tablet 3   • escitalopram (LEXAPRO) 5 MG tablet Take 1 tablet by mouth Daily. 90 tablet 3   • indomethacin (INDOCIN) 50 MG capsule Take 1 capsule by mouth 3 (Three) Times a Day With Meals. 30 capsule 0   • simvastatin (ZOCOR) 20 MG tablet Take 1 tablet by mouth Every Night. 90 tablet 3   • traMADol (ULTRAM) 50 MG tablet Take 1 tablet by mouth 2 (Two) Times a Day As Needed for Moderate Pain . 60 tablet 0     No current facility-administered medications for this visit.     Review of Systems   Constitutional: Negative for chills and fever.   HENT: Negative for congestion.    Respiratory: Negative for shortness of breath.    Cardiovascular: Positive for leg swelling. Negative for chest pain.   Gastrointestinal: " Negative for constipation, diarrhea, nausea and vomiting.   Musculoskeletal: Positive for arthralgias, back pain and gait problem. Negative for myalgias.   Skin: Negative for wound.   Neurological: Negative for numbness.   Hematological: Bruises/bleeds easily.       OBJECTIVE     Vitals:    07/07/22 1113   BP: 141/88       PHYSICAL EXAM  GEN:   Accompanied by none.     Foot/Ankle Exam:       General:   Appearance: appears stated age and healthy and obesity    Orientation: AAOx3    Affect: appropriate    Gait: antalgic    Assistance: cane    Shoe Gear:  Casual shoes    VASCULAR      Right Foot Vascularity   Dorsalis pedis:  1+  Posterior tibial:  1+  Skin Temperature: warm    Edema Grading:  Non-pitting and trace  CFT:  4  Pedal Hair Growth:  Absent  Varicosities: spider veins       Left Foot Vascularity   Dorsalis pedis:  1+  Posterior tibial:  1+  Skin Temperature: warm    Edema Grading:  None, trace and non-pitting  CFT:  4  Pedal Hair Growth:  Absent  Varicosities: spider veins        NEUROLOGIC     Right Foot Neurologic   Normal sensation    Light touch sensation:  Normal  Vibratory sensation:  Normal  Hot/Cold sensation: normal    Protective Sensation using Mullens-Lester Monofilament:  10     Left Foot Neurologic   Normal sensation    Light touch sensation:  Normal  Vibratory sensation:  Normal  Hot/cold sensation: normal    Protective Sensation using Mullens-Lester Monofilament:  10     MUSCULOSKELETAL      Right Foot Musculoskeletal   Ecchymosis:  None  Tenderness: toenails    Arch:  Normal  Hallux valgus: Yes       Left Foot Musculoskeletal   Ecchymosis:  None  Tenderness: toenails    Arch:  Normal  Hallux valgus: Yes       MUSCLE STRENGTH     Right Foot Muscle Strength   Foot dorsiflexion:  4  Foot plantar flexion:  4  Foot inversion:  4  Foot eversion:  4     Left Foot Muscle Strength   Foot dorsiflexion:  4  Foot plantar flexion:  4  Foot inversion:  4  Foot eversion:  4     RANGE OF MOTION       Right Foot Range of Motion   Foot and ankle ROM within normal limits       Left Foot Range of Motion   Foot and ankle ROM within normal limits       DERMATOLOGIC     Right Foot Dermatologic   Skin: skin intact    Nails: onychomycosis, abnormally thick, subungual debris and dystrophic nails    Nails comment:  1-5     Left Foot Dermatologic   Skin: skin intact    Nails: onychomycosis, abnormally thick, subungual debris and dystrophic nails    Nails comment:  1-5      RADIOLOGY/NUCLEAR:   CT Chest Low Dose Cancer Screening WO    Result Date: 6/20/2022  Narrative: EXAM/TECHNIQUE: CT the chest without IV contrast, low-dose protocol  INDICATION: Lung cancer screening, >= 20 pk-yr smoking history (Age >= 50y); F17.200-Nicotine dependence, unspecified, uncomplicated; Z87.891-Personal history of nicotine dependence  COMPARISON: None available.  DLP: 49.7 mGy cm. Automated exposure control was also utilized to decrease patient radiation dose.  FINDINGS:  5 mm RIGHT middle lobe pulmonary nodule, image 101. No other pulmonary nodule identified. Central airways are clear. No consolidation or pleural effusion. Mild centrilobular emphysema.  No enlarged thoracic lymph nodes. Main pulmonary artery is nondilated. Thoracic aorta is nonaneurysmal. No pericardial effusion. Mild coronary calcification.  No acute chest wall soft tissue abnormality. Bilateral gynecomastia is noted. Limited view of the upper abdomen is unremarkable. No acute osseous finding.      Impression:  5 mm RIGHT middle lobe pulmonary nodule. No other pulmonary nodule identified.  Lung-RADS 2: Benign Nodules with a very low likelihood of becoming a clinically active cancer due to size or lack of growth. Continue annual screening with low dose CT in 12 months. This report was finalized on 06/20/2022 13:19 by Dr. Fito Downs MD.      LABORATORY/CULTURE RESULTS:      PATHOLOGY RESULTS:       ASSESSMENT/PLAN     Diagnoses and all orders for this visit:    1.  Onychomycosis (Primary)    2. Onychogryphosis    3. PAD (peripheral artery disease) (HCC)    4. Antiplatelet or antithrombotic long-term use    5. Tobacco abuse    6. Obesity, Class I, BMI 30.0-34.9 (see actual BMI)      Comprehensive lower extremity examination and evaluation was performed.  Discussed findings and treatment plan including risks, benefits, and treatment options with patient in detail. Patient agreed with treatment plan.  After verbal consent obtained, nail(s) x10 debrided of length and thickness with nail nipper without incidence  Patient may maintain nails and calluses at home utilizing emery board or pumice stone between visits as needed  Reviewed at home diabetic foot care including daily foot checks   Tobacco cessation needed.   Continue vascular surgery follow-ups.   Advised that home health would not likely qualify him for care given lack of any wounds or significant immobility just to provide personal care.   BMI is >= 30 and <35. (Class 1 Obesity). The following options were offered after discussion;: exercise counseling/recommendations  An After Visit Summary was printed and given to the patient at discharge, including (if requested) any available informative/educational handouts regarding diagnosis, treatment, or medications. All questions were answered to patient/family satisfaction. Should symptoms fail to improve or worsen they agree to call or return to clinic or to go to the Emergency Department. Discussed the importance of following up with any needed screening tests/labs/specialist appointments and any requested follow-up recommended by me today. Importance of maintaining follow-up discussed and patient accepts that missed appointments can delay diagnosis and potentially lead to worsening of conditions.  Return in about 3 months (around 10/7/2022)., or sooner if acute issues arise.    Lab Frequency Next Occurrence   Follow Anesthesia Guidelines / Standing Orders Once 12/23/2019    Obtain Informed Consent Once 12/28/2019   Provide NPO Instructions to Patient Once 12/28/2019   Chlorhexidine Skin Prep Once 12/28/2019   US Ankle / Brachial Indices Extremity Complete Once 05/14/2022       This document has been electronically signed by KASEY Swanson on July 7, 2022 11:39 CDT

## 2022-07-07 ENCOUNTER — OFFICE VISIT (OUTPATIENT)
Dept: PODIATRY | Facility: CLINIC | Age: 52
End: 2022-07-07

## 2022-07-07 VITALS
SYSTOLIC BLOOD PRESSURE: 141 MMHG | HEIGHT: 68 IN | WEIGHT: 226 LBS | DIASTOLIC BLOOD PRESSURE: 88 MMHG | BODY MASS INDEX: 34.25 KG/M2

## 2022-07-07 DIAGNOSIS — L60.2 ONYCHOGRYPHOSIS: ICD-10-CM

## 2022-07-07 DIAGNOSIS — E66.9 OBESITY, CLASS I, BMI 30.0-34.9 (SEE ACTUAL BMI): ICD-10-CM

## 2022-07-07 DIAGNOSIS — B35.1 ONYCHOMYCOSIS: Primary | ICD-10-CM

## 2022-07-07 DIAGNOSIS — I73.9 PAD (PERIPHERAL ARTERY DISEASE): ICD-10-CM

## 2022-07-07 DIAGNOSIS — Z72.0 TOBACCO ABUSE: ICD-10-CM

## 2022-07-07 DIAGNOSIS — Z79.02 ANTIPLATELET OR ANTITHROMBOTIC LONG-TERM USE: ICD-10-CM

## 2022-07-07 PROCEDURE — 11721 DEBRIDE NAIL 6 OR MORE: CPT | Performed by: NURSE PRACTITIONER

## 2022-07-07 PROCEDURE — 99213 OFFICE O/P EST LOW 20 MIN: CPT | Performed by: NURSE PRACTITIONER

## 2022-07-12 ENCOUNTER — OFFICE VISIT (OUTPATIENT)
Dept: ONCOLOGY | Facility: CLINIC | Age: 52
End: 2022-07-12

## 2022-07-12 VITALS
HEIGHT: 68 IN | OXYGEN SATURATION: 97 % | HEART RATE: 89 BPM | SYSTOLIC BLOOD PRESSURE: 138 MMHG | BODY MASS INDEX: 33.52 KG/M2 | TEMPERATURE: 97.4 F | WEIGHT: 221.2 LBS | RESPIRATION RATE: 16 BRPM | DIASTOLIC BLOOD PRESSURE: 84 MMHG

## 2022-07-12 DIAGNOSIS — D58.2 ELEVATED HEMOGLOBIN: ICD-10-CM

## 2022-07-12 DIAGNOSIS — D72.829 LEUKOCYTOSIS, UNSPECIFIED TYPE: Primary | ICD-10-CM

## 2022-07-12 DIAGNOSIS — Z72.0 TOBACCO USE: ICD-10-CM

## 2022-07-12 PROCEDURE — 99195 PHLEBOTOMY: CPT | Performed by: NURSE PRACTITIONER

## 2022-07-12 PROCEDURE — 99214 OFFICE O/P EST MOD 30 MIN: CPT | Performed by: NURSE PRACTITIONER

## 2022-07-12 NOTE — PROGRESS NOTES
[facility] ON[facility] GROUP HEMATOLOGY & ONCOLOGY  [address]  [place] [address] [phone]    Patient Name: Gilberto Roberts  Encounter Date: 07/12/2022  YOB: 1970  Patient Number: [id number]    PROGRESS NOTE      HISTORY OF PRESENT ILLNESS: Gilberto Roberts is a 52 y.o. male was seen in our office on 06/28/22 for diagnostic and management recommendations for elevated hgb. History is obtained from patient. History is considered to be accurate.    He has health history significant for HTN, Back pain, anxiety, arthritis, PAD s/p stent placement in left leg.      He was worked up and results include WBC 12.81, hemoglobin 17.4, hematocrit 51.9, platelet count 306.    Pathologist review of peripheral smear showedLeukocytosis with absolute neutrophilia. Increased hemoglobin/hematocrit.  Comment: Persistent elevation of hematocrit/hemoglobin and white blood cells without clinical cause suggests the possibility of a myeloproliferative neoplasm.    Negative for BCR ABL rearrangement  Negative for JAK2 V6 17F mutation  FISH negative for CLL    Patient smokes 1.5 PPD x 30 years which is likely etiology of his leukocytosis and polycythemia.        LABS    Lab Results - Last 18 Months   Lab Units 06/28/22  0924 05/23/22  1130 01/16/22  2338 05/17/21  1539   HEMOGLOBIN g/dL 17.4 17.8* 16.4 17.6   HEMATOCRIT % 51.9* 51.0 47.2 53.0*   MCV fL 89.3 87.9 85.2 89.7   WBC 10*3/mm3 12.81* 13.03* 15.67* 13.7*   RDW % 13.6 14.5 13.4 13.7   MPV fL 10.2  --  10.2 10.1   PLATELETS 10*3/mm3 306 302 318 350   IMM GRAN % % 0.8*  --  0.5  --    NEUTROS ABS 10*3/mm3 8.43*  --  10.80* 9.1*   LYMPHS ABS 10*3/mm3 2.90  --  3.22* 3.4   MONOS ABS 10*3/mm3 0.90  --  1.15* 0.90   EOS ABS 10*3/mm3 0.36  --  0.31 0.20   BASOS ABS 10*3/mm3 0.12  --  0.11 0.10   IMMATURE GRANS (ABS) 10*3/mm3 0.10*  --  0.08* 0.1   NRBC /100 WBC 0.0  --  0.0  --        Lab Results - Last 18 Months   Lab  Units 06/28/22  0924 05/23/22  1130 01/16/22  2338 05/17/21  1539   GLUCOSE mg/dL 96 68 118* 89   SODIUM mmol/L 142 139 137 142   POTASSIUM mmol/L 4.0 4.1 3.8 4.3   TOTAL CO2 mmol/L  --  26.9  --  25   CO2 mmol/L 30.0*  --  25.0  --    CHLORIDE mmol/L 105 102 104 104   ANION GAP mmol/L 7.0  --  8.0 13   CREATININE mg/dL 0.87 0.82 0.74* 0.9   BUN mg/dL 9 10 12 10   BUN / CREAT RATIO  10.3 12.2 16.2  --    CALCIUM mg/dL 10.1 9.7 9.4 9.6   EGFR IF NONAFRICN AM mL/min/1.73  --   --  111 >60   ALK PHOS U/L 126* 127* 133* 151*   TOTAL PROTEIN g/dL 7.3  --  6.9 7.4   ALT (SGPT) U/L 21 19 16 16   AST (SGOT) U/L 16 10 10 12   BILIRUBIN mg/dL 0.6 0.4 0.3 0.5   ALBUMIN g/dL 4.20 4.10 3.60 4.1   GLOBULIN gm/dL 3.1  --  3.3  --        Lab Results - Last 18 Months   Lab Units 06/28/22  0924 05/17/21  1539   URIC ACID mg/dL  --  7.3*   LDH U/L 188  --    REFERENCE LAB REPORT  See Attached Report  See Attached Report  See Attached Report  --        Lab Results - Last 18 Months   Lab Units 05/17/21  1539   TSH uIU/mL 1.450         PAST MEDICAL HISTORY:  ALLERGIES:  No Known Allergies  CURRENT MEDICATIONS:  Outpatient Encounter Medications as of 7/12/2022   Medication Sig Dispense Refill   • aspirin (aspirin) 81 MG EC tablet Take 1 tablet by mouth Daily. 90 tablet 3   • clopidogrel (PLAVIX) 75 MG tablet TAKE ONE TABLET BY MOUTH DAILY. (Patient taking differently: Take 75 mg by mouth Daily.) 30 tablet 3   • escitalopram (LEXAPRO) 5 MG tablet Take 1 tablet by mouth Daily. 90 tablet 3   • indomethacin (INDOCIN) 50 MG capsule Take 1 capsule by mouth 3 (Three) Times a Day With Meals. 30 capsule 0   • simvastatin (ZOCOR) 20 MG tablet Take 1 tablet by mouth Every Night. 90 tablet 3   • traMADol (ULTRAM) 50 MG tablet Take 1 tablet by mouth 2 (Two) Times a Day As Needed for Moderate Pain . 60 tablet 0     No facility-administered encounter medications on file as of 7/12/2022.     ADULT ILLNESSES:  Patient Active Problem List   Diagnosis  Code   • PAD (peripheral artery disease) ([facility]) I73.9   • Acute pain R52   • Essential hypertension I10   • Hyperlipidemia E78.5   • Atherosclerosis of native artery of left lower extremity with intermittent claudication ([facility]) I70.212   • Atherosclerosis of native artery of extremity ([facility]) I70.209       HEALTH MAINTENANCE ITEMS:  Health Maintenance Due   Topic Date Due   • COVID-19 Vaccine (1) Never done   • ZOSTER VACCINE (1 of 2) Never done   • ANNUAL WELLNESS VISIT  Never done       <no information>  Last Completed Colonoscopy          COLORECTAL CANCER SCREENING (COLONOSCOPY - Every 10 Years) Next due on 2018  SCANNED - COLONOSCOPY              Immunization History   Administered Date(s) Administered   • Flu Vaccine Quad PF >36MO 10/04/2020   • FluLaval/Fluarix/Fluzone >6 10/23/2019   • Influenza TIV (IM) 2008   • Pneumococcal Conjugate 13-Valent (PCV13) 10/04/2020   • Pneumococcal Polysaccharide (PPSV23) 2018   • Tdap 2017     Last Completed Mammogram     This patient has no relevant Health Maintenance data.            FAMILY HISTORY:  Family History   Problem Relation Age of Onset   • COPD Mother         respiratory failure   • Dementia Father    • Diabetes Father      SOCIAL HISTORY:  Social History     Socioeconomic History   • Marital status: Single   Tobacco Use   • Smoking status: Current Every Day Smoker     Packs/day: 0.50     Years: 33.00     Pack years: 16.50     Types: Cigarettes     Last attempt to quit: 2020     Years since quittin.5   • Smokeless tobacco: Former User     Types: Chew   • Tobacco comment: pt states it is too hard to quit, he is by himself.   Vaping Use   • Vaping Use: Never used   Substance and Sexual Activity   • Alcohol use: No   • Drug use: No   • Sexual activity: Defer       REVIEW OF SYSTEMS:  Review of Systems   Constitutional: Positive for fatigue. Negative for activity change, appetite change, fever, unexpected weight gain and  "unexpected weight loss.   HENT: Negative for dental problem, facial swelling, swollen glands and trouble swallowing.         Congenital hearing loss, uses hearing aids    Eyes: Negative for double vision and discharge.   Respiratory: Negative for cough, shortness of breath and wheezing.    Cardiovascular: Negative for chest pain, palpitations and leg swelling.   Gastrointestinal: Positive for blood in stool (Hemorrhoids Has had mutliple surgeries.  Wears briefs r/t the bleeding.). Negative for abdominal pain, nausea and vomiting.   Endocrine: Negative.    Genitourinary: Negative for dysuria and hematuria.   Musculoskeletal: Negative for arthralgias and myalgias.   Skin: Negative for rash, skin lesions and wound.        States he has pimples on his back often   Allergic/Immunologic: Negative for immunocompromised state.   Neurological: Negative for speech difficulty, light-headedness, headache, memory problem and confusion.   Hematological: Negative for adenopathy.   Psychiatric/Behavioral: Negative for self-injury and suicidal ideas. The patient is nervous/anxious.        /84   Pulse 89   Temp 97.4 °F (36.3 °C) (Temporal)   Resp 16   Ht 172.7 cm (68\")   Wt 100 kg (221 lb 3.2 oz)   SpO2 97%   BMI 33.63 kg/m²  Body surface area is 2.13 meters squared.    Pain Score    07/12/22 0809   PainSc: 0-No pain       Physical Exam:  Physical Exam  Constitutional:       Appearance: Normal appearance.   HENT:      Head: Normocephalic and atraumatic.   Cardiovascular:      Rate and Rhythm: Normal rate and regular rhythm.   Pulmonary:      Effort: Pulmonary effort is normal.      Breath sounds: Normal breath sounds.   Abdominal:      General: Bowel sounds are normal.      Palpations: Abdomen is soft.   Musculoskeletal:      Right lower leg: No edema.      Left lower leg: No edema.   Skin:     General: Skin is warm and dry.   Neurological:      Mental Status: He is alert and oriented to person, place, and time. "   Psychiatric:         Attention and Perception: Attention normal.         Mood and Affect: Mood normal.         Judgment: Judgment normal.         Gilberto Roberts reports a pain score of 0.  No intervention indicated.      ASSESSMENT / PLAN:    1. Leukocytosis, unspecified type    2. Elevated hemoglobin (HCC)    3. Tobacco use       1.  Leukocytosis / Elevated Hemoglobin  -All work-up for myeloproliferative neoplasms have been negative  - Patient smokes 1-1/2 packs/day and has for the past 30 years which is likely etiology of his leukocytosis and polycythemia  -Advised therapeutic phlebotomy to keep hematocrit less than 50  - Discussed this procedure with patient, risk, benefits and pt agrees to proceed.   -He is currently taking Plavix and ASA   -500 cc whole blood was removed today w/o s/s of hypovolemia or hypotension.  Pt was given oral hydration and advised to eat/drink well before next visit.       2.  Peripheral artery disease  -Claudication to the left lower extremity s/p atherectomy, angioplasty, and stenting of the SFA in 2020  -On Plavix and ASA   -Managed by Dr. Billings, Vascular Surgery    3.  Tobacco use  -Patient smokes 1-1/2 packs/day and has for the past 30 years  - Smoking cessation advised    PLAN:  -Advised therapeutic phlebotomy to keep hematocrit less than 50  - Patient will have CBC and therapeutic phlebotomy monthly as indicated  - Continue all follow-up, treatment plans, medications per PCP and any other providers  - Patient will return to clinic in 3 months for continued follow-up.  He will have CBC, CMP drawn before his visit.  - Patient voices understanding and agrees to treatment plan.    I spent 22 minutes caring for Gilberto on this date of service. This time includes time spent by me in the following activities: preparing for the visit, reviewing tests, performing a medically appropriate examination and/or evaluation, counseling and educating the patient/family/caregiver, ordering  medications, tests, or procedures, documenting information in the medical record and care coordination. PT HAD THERAPEUTIC PHLEBOTOMY TODAY.       Conchita Winslow, APRN  07/12/2022

## 2022-07-15 ENCOUNTER — PATIENT OUTREACH (OUTPATIENT)
Dept: CASE MANAGEMENT | Facility: OTHER | Age: 52
End: 2022-07-15

## 2022-07-15 NOTE — OUTREACH NOTE
AMBULATORY CASE MANAGEMENT NOTE    Name and Relationship of Patient/Support Person: Gilberto Roberts K - Self    Patient Outreach    HRCM follow up with care plan updated.     Care Coordination    Referral made to PADD office in [place] for [facility].    Send Education  Questions/Answers    Flowsheet Row Most Recent Value   Advanced Directives: Send Materials          CLAUDIA PAGAN  Ambulatory Case Management    7/15/2022, 13:26 CDT

## 2022-08-02 ENCOUNTER — PATIENT OUTREACH (OUTPATIENT)
Dept: CASE MANAGEMENT | Facility: OTHER | Age: 52
End: 2022-08-02

## 2022-08-02 NOTE — OUTREACH NOTE
AMBULATORY CASE MANAGEMENT NOTE    Name and Relationship of Patient/Support Person: Gilberto Roberts K - Self    Patient Outreach    Care Coordination    [facility] transportation to schedule appointments for this month. Spoke with Esther to schedule ride for 8.9.22. Patient will be picked up at 7:45 and may be 15 minutes before or after. Reference #[id number]. He will call [phone] for  ride home. Scheduled ride for 8.29.22 appointment.  time will be 1:30 and may be 15 minutes before or after. Reference #[id number] and he will call to picked up for ride home.         CLAUDIA PAGAN  Ambulatory Case Management    8/2/2022, 14:21 CDT

## 2022-08-09 ENCOUNTER — OFFICE VISIT (OUTPATIENT)
Dept: ONCOLOGY | Facility: CLINIC | Age: 52
End: 2022-08-09

## 2022-08-09 ENCOUNTER — LAB (OUTPATIENT)
Dept: LAB | Facility: HOSPITAL | Age: 52
End: 2022-08-09

## 2022-08-09 VITALS
SYSTOLIC BLOOD PRESSURE: 158 MMHG | HEIGHT: 68 IN | BODY MASS INDEX: 33.49 KG/M2 | RESPIRATION RATE: 16 BRPM | DIASTOLIC BLOOD PRESSURE: 78 MMHG | HEART RATE: 97 BPM | WEIGHT: 221 LBS | OXYGEN SATURATION: 96 % | TEMPERATURE: 97 F

## 2022-08-09 DIAGNOSIS — Z72.0 TOBACCO USE: ICD-10-CM

## 2022-08-09 DIAGNOSIS — D72.829 LEUKOCYTOSIS, UNSPECIFIED TYPE: Primary | ICD-10-CM

## 2022-08-09 DIAGNOSIS — I73.9 PAD (PERIPHERAL ARTERY DISEASE): ICD-10-CM

## 2022-08-09 DIAGNOSIS — D58.2 ELEVATED HEMOGLOBIN: ICD-10-CM

## 2022-08-09 LAB
ALBUMIN SERPL-MCNC: 4.2 G/DL (ref 3.5–5.2)
ALBUMIN/GLOB SERPL: 1.4 G/DL
ALP SERPL-CCNC: 123 U/L (ref 39–117)
ALT SERPL W P-5'-P-CCNC: 19 U/L (ref 1–41)
ANION GAP SERPL CALCULATED.3IONS-SCNC: 7 MMOL/L (ref 5–15)
AST SERPL-CCNC: 14 U/L (ref 1–40)
BILIRUB SERPL-MCNC: 0.4 MG/DL (ref 0–1.2)
BUN SERPL-MCNC: 16 MG/DL (ref 6–20)
BUN/CREAT SERPL: 19 (ref 7–25)
CALCIUM SPEC-SCNC: 10.5 MG/DL (ref 8.6–10.5)
CHLORIDE SERPL-SCNC: 104 MMOL/L (ref 98–107)
CO2 SERPL-SCNC: 30 MMOL/L (ref 22–29)
CREAT SERPL-MCNC: 0.84 MG/DL (ref 0.76–1.27)
DEPRECATED RDW RBC AUTO: 43.8 FL (ref 37–54)
EGFRCR SERPLBLD CKD-EPI 2021: 103.6 ML/MIN/1.73
ERYTHROCYTE [DISTWIDTH] IN BLOOD BY AUTOMATED COUNT: 13.2 % (ref 12.3–15.4)
GLOBULIN UR ELPH-MCNC: 3 GM/DL
GLUCOSE SERPL-MCNC: 85 MG/DL (ref 65–99)
HCT VFR BLD AUTO: 49.8 % (ref 37.5–51)
HGB BLD-MCNC: 16 G/DL (ref 13–17.7)
HOLD SPECIMEN: NORMAL
MCH RBC QN AUTO: 29 PG (ref 26.6–33)
MCHC RBC AUTO-ENTMCNC: 32.1 G/DL (ref 31.5–35.7)
MCV RBC AUTO: 90.2 FL (ref 79–97)
PLATELET # BLD AUTO: 331 10*3/MM3 (ref 140–450)
PMV BLD AUTO: 10.6 FL (ref 6–12)
POTASSIUM SERPL-SCNC: 3.8 MMOL/L (ref 3.5–5.2)
PROT SERPL-MCNC: 7.2 G/DL (ref 6–8.5)
RBC # BLD AUTO: 5.52 10*6/MM3 (ref 4.14–5.8)
SODIUM SERPL-SCNC: 141 MMOL/L (ref 136–145)
WBC NRBC COR # BLD: 12.61 10*3/MM3 (ref 3.4–10.8)

## 2022-08-09 PROCEDURE — 36415 COLL VENOUS BLD VENIPUNCTURE: CPT

## 2022-08-09 PROCEDURE — 80053 COMPREHEN METABOLIC PANEL: CPT

## 2022-08-09 PROCEDURE — 99195 PHLEBOTOMY: CPT | Performed by: NURSE PRACTITIONER

## 2022-08-09 PROCEDURE — 85027 COMPLETE CBC AUTOMATED: CPT

## 2022-08-09 PROCEDURE — 99214 OFFICE O/P EST MOD 30 MIN: CPT | Performed by: NURSE PRACTITIONER

## 2022-08-09 NOTE — PROGRESS NOTES
[facility] GROUP HEMATOLOGY & ONCOLOGY  [address]  [place] [address] [phone]    Patient Name: Gilberto Roberts  Encounter Date: 08/09/2022  YOB: 1970  Patient Number: [id number]    PROGRESS NOTE    HISTORY OF PRESENT ILLNESS: Gilberto Roberts is a 52 y.o. male was seen in our office on 06/28/22 for diagnostic and management secondary polycythemia. Advised therapeutic phlebotomy to keep hematocrit less than 50    Diagnostic Workup   Pathologist review of peripheral smear showedLeukocytosis with absolute neutrophilia. Increased hemoglobin/hematocrit.  Comment: Persistent elevation of hematocrit/hemoglobin and white blood cells without clinical cause suggests the possibility of a myeloproliferative neoplasm.  Negative for BCR ABL rearrangement  Negative for JAK2 V6 17F mutation  FISH negative for CLL    INTERVAL HISTORY  Patient presents to clinic today for continued follow-up.  He has no acute complaints today.  Patient had labs drawn this morning they were reviewed with patient.  WBC 12.61, hemoglobin 16.0, hematocrit 49.8, platelet count 331.  His goal is to keep hematocrit less than 50 and hematocrit is very close at 49.8, we will proceed with therapeutic phlebotomy today.      LABS    Lab Results - Last 18 Months   Lab Units 08/09/22  0826 06/28/22  0924 05/23/22  1130 01/16/22  2338 05/17/21  1539   HEMOGLOBIN g/dL 16.0 17.4 17.8* 16.4 17.6   HEMATOCRIT % 49.8 51.9* 51.0 47.2 53.0*   MCV fL 90.2 89.3 87.9 85.2 89.7   WBC 10*3/mm3 12.61* 12.81* 13.03* 15.67* 13.7*   RDW % 13.2 13.6 14.5 13.4 13.7   MPV fL 10.6 10.2  --  10.2 10.1   PLATELETS 10*3/mm3 331 306 302 318 350   IMM GRAN % %  --  0.8*  --  0.5  --    NEUTROS ABS 10*3/mm3  --  8.43*  --  10.80* 9.1*   LYMPHS ABS 10*3/mm3  --  2.90  --  3.22* 3.4   MONOS ABS 10*3/mm3  --  0.90  --  1.15* 0.90   EOS ABS 10*3/mm3  --  0.36  --  0.31 0.20   BASOS ABS 10*3/mm3  --  0.12  --   0.11 0.10   IMMATURE GRANS (ABS) 10*3/mm3  --  0.10*  --  0.08* 0.1   NRBC /100 WBC  --  0.0  --  0.0  --        Lab Results - Last 18 Months   Lab Units 08/09/22  0826 06/28/22  0924 05/23/22  1130 01/16/22  2338 05/17/21  1539   GLUCOSE mg/dL 85 96 68 118* 89   SODIUM mmol/L 141 142 139 137 142   POTASSIUM mmol/L 3.8 4.0 4.1 3.8 4.3   TOTAL CO2 mmol/L  --   --  26.9  --  25   CO2 mmol/L 30.0* 30.0*  --  25.0  --    CHLORIDE mmol/L 104 105 102 104 104   ANION GAP mmol/L 7.0 7.0  --  8.0 13   CREATININE mg/dL 0.84 0.87 0.82 0.74* 0.9   BUN mg/dL 16 9 10 12 10   BUN / CREAT RATIO  19.0 10.3 12.2 16.2  --    CALCIUM mg/dL 10.5 10.1 9.7 9.4 9.6   EGFR IF NONAFRICN AM mL/min/1.73  --   --   --  111 >60   ALK PHOS U/L 123* 126* 127* 133* 151*   TOTAL PROTEIN g/dL 7.2 7.3  --  6.9 7.4   ALT (SGPT) U/L 19 21 19 16 16   AST (SGOT) U/L 14 16 10 10 12   BILIRUBIN mg/dL 0.4 0.6 0.4 0.3 0.5   ALBUMIN g/dL 4.20 4.20 4.10 3.60 4.1   GLOBULIN gm/dL 3.0 3.1  --  3.3  --        Lab Results - Last 18 Months   Lab Units 06/28/22  0924 05/17/21  1539   URIC ACID mg/dL  --  7.3*   LDH U/L 188  --    REFERENCE LAB REPORT  See Attached Report  See Attached Report  See Attached Report  --        Lab Results - Last 18 Months   Lab Units 05/17/21  1539   TSH uIU/mL 1.450         PAST MEDICAL HISTORY:  ALLERGIES:  No Known Allergies  CURRENT MEDICATIONS:  Outpatient Encounter Medications as of 8/9/2022   Medication Sig Dispense Refill   • aspirin (aspirin) 81 MG EC tablet Take 1 tablet by mouth Daily. 90 tablet 3   • clopidogrel (PLAVIX) 75 MG tablet TAKE ONE TABLET BY MOUTH DAILY. (Patient taking differently: Take 75 mg by mouth Daily.) 30 tablet 3   • escitalopram (LEXAPRO) 5 MG tablet Take 1 tablet by mouth Daily. 90 tablet 3   • indomethacin (INDOCIN) 50 MG capsule Take 1 capsule by mouth 3 (Three) Times a Day With Meals. 30 capsule 0   • simvastatin (ZOCOR) 20 MG tablet Take 1 tablet by mouth Every Night. 90 tablet 3   • traMADol  (ULTRAM) 50 MG tablet Take 1 tablet by mouth 2 (Two) Times a Day As Needed for Moderate Pain . 60 tablet 0     No facility-administered encounter medications on file as of 2022.     ADULT ILLNESSES:  Patient Active Problem List   Diagnosis Code   • PAD (peripheral artery disease) ([facility]) I73.9   • Acute pain R52   • Essential hypertension I10   • Hyperlipidemia E78.5   • Atherosclerosis of native artery of left lower extremity with intermittent claudication ([facility]) I70.212   • Atherosclerosis of native artery of extremity ([facility]) I70.209       HEALTH MAINTENANCE ITEMS:  Health Maintenance Due   Topic Date Due   • COVID-19 Vaccine (1) Never done   • ZOSTER VACCINE (1 of 2) Never done   • ANNUAL WELLNESS VISIT  Never done       <no information>  Last Completed Colonoscopy          COLORECTAL CANCER SCREENING (COLONOSCOPY - Every 10 Years) Next due on 2018  SCANNED - COLONOSCOPY              Immunization History   Administered Date(s) Administered   • Flu Vaccine Quad PF >36MO 10/04/2020   • FluLaval/Fluarix/Fluzone >6 10/23/2019   • Influenza TIV (IM) 2008   • Pneumococcal Conjugate 13-Valent (PCV13) 10/04/2020   • Pneumococcal Polysaccharide (PPSV23) 2018   • Tdap 2017     Last Completed Mammogram     This patient has no relevant Health Maintenance data.            FAMILY HISTORY:  Family History   Problem Relation Age of Onset   • COPD Mother         respiratory failure   • Dementia Father    • Diabetes Father      SOCIAL HISTORY:  Social History     Socioeconomic History   • Marital status: Single   Tobacco Use   • Smoking status: Current Every Day Smoker     Packs/day: 0.50     Years: 33.00     Pack years: 16.50     Types: Cigarettes     Last attempt to quit: 2020     Years since quittin.6   • Smokeless tobacco: Former User     Types: Chew   • Tobacco comment: pt states it is too hard to quit, he is by himself.   Vaping Use   • Vaping Use: Never used   Substance and Sexual  "Activity   • Alcohol use: No   • Drug use: No   • Sexual activity: Defer       REVIEW OF SYSTEMS:  Review of Systems   Constitutional: Positive for fatigue. Negative for activity change, appetite change, fever, unexpected weight gain and unexpected weight loss.   HENT: Negative for dental problem, facial swelling, swollen glands and trouble swallowing.         Congenital hearing loss, uses hearing aids    Eyes: Negative for double vision and discharge.   Respiratory: Negative for cough, shortness of breath and wheezing.    Cardiovascular: Negative for chest pain, palpitations and leg swelling.   Gastrointestinal: Negative for abdominal pain, nausea and vomiting. Blood in stool: Hemorrhoids Has had mutliple surgeries.  Wears briefs r/t the bleeding.   Endocrine: Negative.    Genitourinary: Negative for dysuria and hematuria.   Musculoskeletal: Negative for arthralgias and myalgias.   Skin: Negative for rash, skin lesions and wound.        States he has pimples on his back often   Allergic/Immunologic: Negative for immunocompromised state.   Neurological: Negative for speech difficulty, light-headedness, headache, memory problem and confusion.   Hematological: Negative for adenopathy.   Psychiatric/Behavioral: Negative for self-injury and suicidal ideas. The patient is nervous/anxious.        /78   Pulse 97   Temp 97 °F (36.1 °C) (Temporal)   Resp 16   Ht 172.7 cm (68\")   Wt 100 kg (221 lb)   SpO2 96%   BMI 33.60 kg/m²  Body surface area is 2.13 meters squared.    Pain Score    08/09/22 0836   PainSc: 0-No pain       Physical Exam:  Physical Exam  Constitutional:       Appearance: Normal appearance.   HENT:      Head: Normocephalic and atraumatic.   Cardiovascular:      Rate and Rhythm: Normal rate and regular rhythm.   Pulmonary:      Effort: Pulmonary effort is normal.      Breath sounds: Normal breath sounds.   Abdominal:      General: Bowel sounds are normal.      Palpations: Abdomen is soft. "   Musculoskeletal:         General: Normal range of motion.      Right lower leg: No edema.      Left lower leg: No edema.   Skin:     General: Skin is warm and dry.   Neurological:      Mental Status: He is alert and oriented to person, place, and time.   Psychiatric:         Attention and Perception: Attention normal.         Mood and Affect: Mood normal.         Judgment: Judgment normal.         Gilberto Roberts reports a pain score of 0.  No intervention indicated.      ASSESSMENT / PLAN:    1. Leukocytosis, unspecified type    2. Elevated hemoglobin (HCC)    3. PAD (peripheral artery disease) (HCC)    4. Tobacco use       1.  Leukocytosis / Elevated Hemoglobin  -All work-up for myeloproliferative neoplasms have been negative  - Patient smokes 1-1/2 packs/day and has for the past 30 years which is likely etiology of his leukocytosis and polycythemia  -Advised therapeutic phlebotomy to keep hematocrit less than 50  -He is currently taking Plavix and ASA   -500 cc whole blood was removed today without signs or symptoms of hypovolemia or hypotension.      2.  Peripheral artery disease  -Claudication to the left lower extremity s/p atherectomy, angioplasty, and stenting of the SFA in 2020  -Continue Plavix and ASA  -Managed by Dr. Billings, Vascular Surgery    3.  Tobacco abuse  -Patient smokes 1-1/2 packs/day and has for the past 30 years  -Smoking cessation advised    PLAN:  -Advised therapeutic phlebotomy to keep hematocrit less than 50  - Continue all follow-up, treatment plans, medications per PCP and any other providers  - Patient will return to clinic in 2 months for continued follow-up.  He will have CBC, CMP drawn before his visit.  - Patient voices understanding and agrees to treatment plan.      PT HAD THERAPEUTIC PHLEBOTOMY TODAY.       Conchita Winslow, APRN  08/09/2022

## 2022-08-15 ENCOUNTER — PATIENT OUTREACH (OUTPATIENT)
Dept: CASE MANAGEMENT | Facility: OTHER | Age: 52
End: 2022-08-15

## 2022-08-15 LAB — REF LAB TEST METHOD: NORMAL

## 2022-08-15 NOTE — OUTREACH NOTE
AMBULATORY CASE MANAGEMENT NOTE    Name and Relationship of Patient/Support Person: Gilberto Roberts K - Self    Patient reports being in [facility] ED 8.12.22 with triple vision. His BP while in ED was 214/124 and then went to 145 systolic. He does not have a BP cuff at home but has a scale. He has not had an eye exam in 4-5 years and glasses are broken. Requested assistance scheduling transportation to PCP appointment on 8.18.22.     Patient Outreach    [facility] transportation @ [phone] to schedule a ride for 8.18.22. Spoke with Avril. Will  830 to 9:00 will call for return call  ref [id number]    CLAUDIA PAGAN  Ambulatory Case Management    8/15/2022, 15:26 CDT

## 2022-08-23 ENCOUNTER — PATIENT OUTREACH (OUTPATIENT)
Dept: CASE MANAGEMENT | Facility: OTHER | Age: 52
End: 2022-08-23

## 2022-08-23 NOTE — OUTREACH NOTE
AMBULATORY CASE MANAGEMENT NOTE    Name and Relationship of Patient/Support Person: Gilberto Roberts K - Self    Patient Outreach    Spoke with patient to discuss scheduling an eye exam and he is agreeable.    Care Coordination    Spoke with Мария [facility] MA to inquire about vision benefits. Мария is with the supplement portion of the insurance and ACM was transferred to the insurance portion @  [phone]. Got [facility] care message from automated system and providers are as follows: roviders are Aramis Varghese [phone], Dr. Pichardo [phone], Noel Alcaraz [phone], Dr. Meraz [phone], Nicanor Fagan [phone], Renaldo Ye [phone], Michelle Chacko [phone], and Tony Johnson [phone].    Care Coordination    Spoke with [facility] to schedule appointment for 9.6.22 @ 2:30 PM.     Patient Outreach    Spoke with patient to inform of above information. He has questions about copay and glasses coverage.    Care Coordination    Spoke with Diamante at [facility] all vision issues such as glaucoma and cataracts are covered. He has an optometry service on his plan that allows for 1 exam per year with no copy and no coinsurance. There is a $300 eye wear credit towards glasses or contacts.    Care Coordination    Spoke with Jamal at [facility] transportation to schedule eye appointment transportation.  will be 15 minutes before or after 1:45 PM. Return time has been left open and the patient will call [phone] for his ride home. Reference #[id number].    CLAUDIA PAGAN  Ambulatory Case Management    8/23/2022, 13:35 CDT

## 2022-09-01 ENCOUNTER — PATIENT OUTREACH (OUTPATIENT)
Dept: CASE MANAGEMENT | Facility: OTHER | Age: 52
End: 2022-09-01

## 2022-09-01 NOTE — OUTREACH NOTE
AMBULATORY CASE MANAGEMENT NOTE    Name and Relationship of Patient/Support Person: Armando Gilberto K - Self    Patient Outreach    Care Coordination    3 way call [facility] for dental benefits. Spoke with Nereida and he is covered for regular wisdom tooth extractions. He will need a dental exam to determine which extraction is required. He is covered for regular extractions. He is covered for a set of dentures every 5 years. List of providers are as follows:    FAP: Elias Durbin ([id number]) @ Elias Durbin DMD, [facility] ([id number]), [address] [place] [address], [phone]  FAP: Bob Esquivel ([id number]) @ [facility] ([id number]), [address] [place] [address], [phone]  FAP: Timur Fournier ([id number]) @ [facility] ([id number]), [address] [place] [address], [phone]  FAP: Pritesh Guerrero ([id number]) @ Devin Connors DMD ([id number]), [address] [place] [address], [phone]  FAP: John Caldwell ([id number]) @ [facility] ([id number]), [address] [place] [address], [phone]  FAP: Jeremiah Bird ([id number]) @ [place] [facility] ([id number]), [address] [place] [address], [phone]  FAP: Anthony Lopez ([id number]) @ [place] [facility] ([id number]), [facility] [place] [address], [phone]  FAP: Remigio Shetty ([id number]) @ [facility] ([id number]), [address] [place] [address], [phone]  FAP: Shahid Smyth ([id number]) @ SHAHID SMYTH ([id number]), [address] [place] [address], [phone]  FAP: John Caldwell ([id number]) @ [facility] ([id number]), [address] [facility] [address] [place] [address], [phone]        Care Coordination    Spoke with [facility] and they do not accept patients insurance.     Care Coordination    VM message left for [facility] to inquire if they accept patients insurance.    Patient Outreach    VM message left for [facility](Dr. Timur Fournier) to inquire if they accept patients  insurance.     Care Coordination    Spoke with Maria Eugenia at [facility] and she will call to see if they accept patients insurance.     Care Coordination    Received call from Maria Eugenia at [facility] and they are not in network.    Care Coordination    Message left with [facility] [phone] to inquire about getting an appointment.     Care Coordination    Spoke with Gema at  Dentistry program and they do not accept patients insurance.     Care Coordination    Spoke with [facility] and they do not accept patients insurance.     Care Coordination    Spoke with at Dr. Elias Gonzales office and they stated he will need an exam and xray which is $40.  stated his insurance does not have a good Internet platform to check benefits and she did not have time.     Care Coordination    Spoke with Martha at [facility] and she will contact his dental insurance to inquire about benefits.     Care Coordination    Spoke with Dr. Agustin's office and they do not accept patients insurance.     Care Coordination    Called [facility] to schedule ride for his PCP appointment for 9.30.22. Spoke with Ty and he would not schedule ride until 2 weeks prior to appointment.    Care Coordination    Received return call from Maddi at [facility]. She ran patients information and no medicare savings plans are accepted.     Care Coordination    Received return call from Martha at [facility] and they do not accept the patients insurance.     CLAUDIA PAGAN  Ambulatory Case Management    9/1/2022, 10:20 CDT

## 2022-09-19 ENCOUNTER — PATIENT OUTREACH (OUTPATIENT)
Dept: CASE MANAGEMENT | Facility: OTHER | Age: 52
End: 2022-09-19

## 2022-09-19 NOTE — OUTREACH NOTE
AMBULATORY CASE MANAGEMENT NOTE    Name and Relationship of Patient/Support Person: KieranzoilaGilberto Self    Patient Outreach    Spoke with patient and he has filled out waiver paperwork. He will request medical records to send with the paperwork from PCP at his next appointment. Discussed more secure housing such as an apartment instead of the camper where he currently resides. However, he is not interested in alternate housing at this time.     Care Coordination    Spoke with [facility] to discuss dental options due to list of providers previously provided do not accept the insurance. [facility] was told the patient did not have Medicaid. [facility] will contact    Care Coordination     Spoke with Umu at the dental portion of the plan. She will outreach to 3 of the previous providers that [facility] had attempted to contact and see if they will accept the insurance. She stated the plan is a PPO. Umu stated she spoke with Aleyda at [facility] in [place] and they will accept the patients insurance. The first visit is free and if there are any issues they office will submit a plan of action for prior authorization. The [place] location phone number is [phone].    Care Coordination    Spoke with Charito Ross in [place] [phone] to schedule an appointment for 9.26.22 @ 3:30. Patient will need to arrive at 3:15 with ID card and insurance card. Address is [address] [place] [address] [address] and provider is Dr. Ramos.    Care Coordination    Called [facility] transportation and spoke with Geovanni  to schedule dental and PCP appointments. Scheduled ride for dental appointment and he will be picked up @ 2:30 PM and this could be 15 before or after that time. The confirmation #[id number]. Patient will call [phone] for ride home. Scheduled ride for PCP appointment on 9.30.22 and he will be picked up @ 10:00 AM and this could be 15 before or after that time. The confirmation #[id number]. Patient will call for ride home.     CLAUDIA  S  Ambulatory Case Management    9/19/2022, 11:48 CDT

## 2022-09-27 ENCOUNTER — PATIENT OUTREACH (OUTPATIENT)
Dept: CASE MANAGEMENT | Facility: OTHER | Age: 52
End: 2022-09-27

## 2022-09-27 NOTE — OUTREACH NOTE
AMBULATORY CASE MANAGEMENT NOTE    Name and Relationship of Patient/Support Person: Gilberto Roberts K - Self    Patient Outreach    HRCM follow up and care plan updated.    Care Coordination    Spoke with [facility] to get name of provider that will perform wisdom teeth extraction next week. The oral surgeon is Dr. Esquivel. Patient will have local anaesthetic.     Care Coordination    Contacted [facility] transportation to schedule oral surgeon appointment for 10.6.22 @1:00 PM. Spoke with Adeola. Patient 15 minutes before or after 12:00 PM. Confirmation #[id number] for the ride going and he is to call for .         CLAUDIA PAGAN  Ambulatory Case Management    9/27/2022, 11:29 CDT

## 2022-09-30 ENCOUNTER — PATIENT OUTREACH (OUTPATIENT)
Dept: CASE MANAGEMENT | Facility: OTHER | Age: 52
End: 2022-09-30

## 2022-09-30 NOTE — OUTREACH NOTE
AMBULATORY CASE MANAGEMENT NOTE    Name and Relationship of Patient/Support Person: Gilberto Roberts - Jose Manuel    Patient Outreach    Received call from patient and he has not been picked by his bus transportation for his PCP appointment today.    Care Coordination    Called [facility] transportation line to inquire about his ride. Spoke with Hesham and his ride is being arranged with his local taxi company. He will be picked up for his appointment.     Care Coordination    Contacted [facility] and spoke with Renetta. AC requested that they let PCP office know patient will keep appointment but is running late due to his transportation.     Patient Outreach    Received text from patient and he reports he was told to reschedule his PCP appointment.    Care Coordination    Contacted [facility] transportation and spoke with Martin. ACM explained to Martin that cancelling the PCP appointment today is not acceptable and he needs to be picked for the appointment. AC informed Martin the PCP office is aware the patient will be a few minutes late. Martin stated that the appointment has been cancelled. Martin inquired about the number [phone]. [facility] tried to contact the number and the message stated they were [employer]. The number was no longer a working number. AC informed Martin that this was not acceptable for the patients appointment to be cancelled. Martin stated he will contact PCP office to see if a different time is available for today. Martin stated he spoke with PCP and they have 11:30 open today. However, unsure that transportation can be arranged this quickly. Martin will contact PCP office and cancel today's appointment and reschedule. [facility] requested that this not count against his ride limit due to it not being his fault. Patient has 39 rides remaining. PCP appointment has been resc10.5.22 at 7:45 AM.  will be 6:45 AM and  Confirmation #[id number].    CLAUDIA PAGAN  Ambulatory Case Management    9/30/2022, 10:17 CDT

## 2022-10-03 ENCOUNTER — PATIENT OUTREACH (OUTPATIENT)
Dept: CASE MANAGEMENT | Facility: OTHER | Age: 52
End: 2022-10-03

## 2022-10-03 NOTE — OUTREACH NOTE
AMBULATORY CASE MANAGEMENT NOTE    Name and Relationship of Patient/Support Person: Gilberto Roberts K - Self    Patient Outreach    Discussed with patient need to schedule Hematology appointment on 10.10.22.    Care Coordination    3 way call with [facility] to schedule 10.10.22 @ 9:30 AM Hematology appointment. Spoke with Fawn. He will be picked up at 8:00 and it can be 15 minutes before or after the scheduled time. Confirmation #[id number] and the patient will call for his ride home.         CLAUDIA PAGAN  Ambulatory Case Management    10/3/2022, 10:55 CDT   Sorry! It was diaper area!

## 2022-10-05 ENCOUNTER — OFFICE VISIT (OUTPATIENT)
Dept: FAMILY MEDICINE CLINIC | Facility: CLINIC | Age: 52
End: 2022-10-05

## 2022-10-05 VITALS
WEIGHT: 223.8 LBS | SYSTOLIC BLOOD PRESSURE: 140 MMHG | HEART RATE: 71 BPM | RESPIRATION RATE: 18 BRPM | TEMPERATURE: 97.8 F | DIASTOLIC BLOOD PRESSURE: 92 MMHG | BODY MASS INDEX: 33.92 KG/M2 | OXYGEN SATURATION: 99 % | HEIGHT: 68 IN

## 2022-10-05 VITALS
OXYGEN SATURATION: 99 % | DIASTOLIC BLOOD PRESSURE: 92 MMHG | HEART RATE: 71 BPM | WEIGHT: 223 LBS | RESPIRATION RATE: 18 BRPM | BODY MASS INDEX: 33.8 KG/M2 | HEIGHT: 68 IN | SYSTOLIC BLOOD PRESSURE: 140 MMHG | TEMPERATURE: 97.8 F

## 2022-10-05 DIAGNOSIS — Z00.00 MEDICARE ANNUAL WELLNESS VISIT, SUBSEQUENT: Primary | ICD-10-CM

## 2022-10-05 DIAGNOSIS — Z23 NEED FOR INFLUENZA VACCINATION: ICD-10-CM

## 2022-10-05 DIAGNOSIS — F17.210 CIGARETTE SMOKER: ICD-10-CM

## 2022-10-05 DIAGNOSIS — H90.5 CONGENITAL HEARING DISORDER OF BOTH EARS: ICD-10-CM

## 2022-10-05 DIAGNOSIS — I73.9 PAD (PERIPHERAL ARTERY DISEASE): ICD-10-CM

## 2022-10-05 DIAGNOSIS — F32.1 CURRENT MODERATE EPISODE OF MAJOR DEPRESSIVE DISORDER WITHOUT PRIOR EPISODE: ICD-10-CM

## 2022-10-05 DIAGNOSIS — D75.1 SECONDARY POLYCYTHEMIA: ICD-10-CM

## 2022-10-05 DIAGNOSIS — R29.91 ABNORMAL FINDING OF FOOT: ICD-10-CM

## 2022-10-05 DIAGNOSIS — E78.5 HYPERLIPIDEMIA, UNSPECIFIED HYPERLIPIDEMIA TYPE: ICD-10-CM

## 2022-10-05 DIAGNOSIS — I10 ESSENTIAL HYPERTENSION: Primary | ICD-10-CM

## 2022-10-05 PROBLEM — M20.10 HALLUX VALGUS WITH BUNIONS: Status: ACTIVE | Noted: 2019-04-12

## 2022-10-05 PROBLEM — M47.815 SPONDYLOSIS OF THORACOLUMBAR REGION WITHOUT MYELOPATHY OR RADICULOPATHY: Status: ACTIVE | Noted: 2018-07-24

## 2022-10-05 PROBLEM — I20.0 UNSTABLE ANGINA: Status: ACTIVE | Noted: 2022-10-05

## 2022-10-05 PROBLEM — T14.8XXA NERVE DAMAGE: Status: ACTIVE | Noted: 2022-10-05

## 2022-10-05 PROBLEM — M21.619 HALLUX VALGUS WITH BUNIONS: Status: ACTIVE | Noted: 2019-04-12

## 2022-10-05 PROBLEM — E78.00 PURE HYPERCHOLESTEROLEMIA: Status: ACTIVE | Noted: 2018-07-17

## 2022-10-05 PROCEDURE — 90686 IIV4 VACC NO PRSV 0.5 ML IM: CPT | Performed by: FAMILY MEDICINE

## 2022-10-05 PROCEDURE — 99214 OFFICE O/P EST MOD 30 MIN: CPT | Performed by: FAMILY MEDICINE

## 2022-10-05 PROCEDURE — 1159F MED LIST DOCD IN RCRD: CPT | Performed by: FAMILY MEDICINE

## 2022-10-05 PROCEDURE — G0439 PPPS, SUBSEQ VISIT: HCPCS | Performed by: FAMILY MEDICINE

## 2022-10-05 PROCEDURE — G0008 ADMIN INFLUENZA VIRUS VAC: HCPCS | Performed by: FAMILY MEDICINE

## 2022-10-05 RX ORDER — SIMVASTATIN 40 MG
40 TABLET ORAL NIGHTLY
Qty: 90 TABLET | Refills: 3 | Status: SHIPPED | OUTPATIENT
Start: 2022-10-05

## 2022-10-05 NOTE — PROGRESS NOTES
Subjective cc: HTN  Gilberto Roberts is a 52 y.o. male.   Gilberto Roberts is a 52-year-old male who presents today for a follow-up. He reports he has been having dizzy episodes since 2008. The patient did visit an ophthalmologist and received different glasses. He states that since the change in eyewear the dizzy episodes have improved. He does not know when they will happen again. The patient has been taking Lexapro for anxiety, and he feels it is working well for him so far. At his last visit, it was recommended the patient schedule with a vascular doctor, but he was unable to make it to his appointments and when asked about rescheduling, he wanted to wait.    He confirms that he takes aspirin, Plavix, and cholesterol medication. The patient is not on any hypertension medication at this time. He reports he was given a blood pressure watch at [facility]. His cholesterol levels are a little bit higher than normal. The patient is currently taking 20mg of simvastatin at night. He denies any side effects from the medication.     The patient saw the hematologist and has an appointment on 10/10/2022. He was diagnosed with secondary polycythemia and recommended phlebotomy to keep his levels less than 50 for his hematocrit.    He is still smoking a pack of cigarettes per day.    The patient reports he unable to reach down to wash and care for his feet. He is requesting home care assistance to complete foot care.    He reports his health is about the same this year compared to last year. The patient feels his mental health is alright, but does not want to further discuss it.    The patient was in the emergency department in 01/2022.    He reports his insurance will not cover a doctor at [facility] and he needs to get a new doctor for his buttocks. The patient reports he has had 3 hemorrhoidectomies in the last 3 to 4 years. His last colonoscopy was in 07/2018.    The patient is update with his pneumonia and tetanus  "vaccines . His lab work is up to date. The patient will get his influenza vaccine today. He does not have an advanced directive or a living will.      History of Present Illness     The following portions of the patient's history were reviewed and updated as appropriate: allergies, current medications, past family history, past medical history, past social history, past surgical history and problem list.        Review of Systems    Objective   Blood pressure 140/92, pulse 71, temperature 97.8 °F (36.6 °C), resp. rate 18, height 172.7 cm (68\"), weight 102 kg (223 lb 12.8 oz), SpO2 99 %.  Physical Exam  Vitals and nursing note reviewed.   Constitutional:       General: He is not in acute distress.     Appearance: He is well-developed. He is obese. He is not diaphoretic.   HENT:      Head: Normocephalic and atraumatic.      Right Ear: External ear normal.      Left Ear: External ear normal.      Nose: Nose normal.   Eyes:      General:         Right eye: No discharge.         Left eye: No discharge.      Conjunctiva/sclera: Conjunctivae normal.   Neck:      Thyroid: No thyromegaly.      Trachea: No tracheal deviation.   Cardiovascular:      Rate and Rhythm: Normal rate and regular rhythm.      Heart sounds: Normal heart sounds.   Pulmonary:      Effort: Pulmonary effort is normal. No respiratory distress.      Breath sounds: Normal breath sounds. No stridor. No wheezing.   Chest:      Chest wall: No tenderness.   Abdominal:      General: There is no distension.      Palpations: Abdomen is soft.      Tenderness: There is no abdominal tenderness.   Musculoskeletal:      Cervical back: Normal range of motion.   Lymphadenopathy:      Cervical: No cervical adenopathy.   Skin:     General: Skin is warm and dry.   Neurological:      Mental Status: He is alert and oriented to person, place, and time.      Motor: No abnormal muscle tone.      Coordination: Coordination normal.      Gait: Gait abnormal.   Psychiatric:         " Behavior: Behavior normal.         Thought Content: Thought content normal.         Judgment: Judgment normal.      Comments: Pt deaf - affects his speech         Assessment & Plan   Problems Addressed this Visit        Cardiac and Vasculature    PAD (peripheral artery disease) (HCC)    Relevant Medications    simvastatin (ZOCOR) 40 MG tablet    Essential hypertension - Primary    Hyperlipidemia    Relevant Medications    simvastatin (ZOCOR) 40 MG tablet       ENT    Congenital hearing disorder of both ears       Mental Health    Current moderate episode of major depressive disorder without prior episode (HCC)       Tobacco    Cigarette smoker      Other Visit Diagnoses     Need for influenza vaccination        Relevant Orders    FluLaval/Fluzone >6 mos (7090-9091) (Completed)    Secondary polycythemia        Abnormal finding of foot          Diagnoses       Codes Comments    Essential hypertension    -  Primary ICD-10-CM: I10  ICD-9-CM: 401.9     Need for influenza vaccination     ICD-10-CM: Z23  ICD-9-CM: V04.81     Hyperlipidemia, unspecified hyperlipidemia type     ICD-10-CM: E78.5  ICD-9-CM: 272.4     PAD (peripheral artery disease) (HCC)     ICD-10-CM: I73.9  ICD-9-CM: 443.9     Congenital hearing disorder of both ears     ICD-10-CM: H90.5  ICD-9-CM: 389.9     Current moderate episode of major depressive disorder without prior episode (HCC)     ICD-10-CM: F32.1  ICD-9-CM: 296.22     Cigarette smoker     ICD-10-CM: F17.210  ICD-9-CM: 305.1     Secondary polycythemia     ICD-10-CM: D75.1  ICD-9-CM: 289.0     Abnormal finding of foot     ICD-10-CM: R29.91  ICD-9-CM: 793.7           1. Peripheral arterial disease: Chronic, stable.  - Patient is compliant with taking his medication of aspirin, Plavix, and simvastatin.  - Recommend we increase the dose of his simvastatin from 20 mg to 40 mg daily.  - Patient has previously been referred to vascular surgery for reevaluation, however, he was unable to make it to his  appointments and when asked about rescheduling, he wanted to wait.  - At this time, we will wait on doing an additional vascular surgery referral.    2. Hypertension: Chronic, controlled.  - Patient is not currently on medication.  - His blood pressure is at goal.  - We will continue to monitor closely.    3. Hyperlipidemia: Chronic, uncontrolled.  - Repeat lipid panel which is above goal.  - We will increase dose of simvastatin from 20 mg to 40 mg.    4. Congenital hearing deficit bilaterally: Chronic, stable.  - The patient does wear hearing aids.  - His hearing disorder does affect his speech.    5. Anxiety and depression: Chronic, stable.  - Continue on Lexapro.    6. Smoker:  Gilberto Roberts  reports that he has been smoking cigarettes. He has a 16.50 pack-year smoking history. He has quit using smokeless tobacco.  His smokeless tobacco use included chew.. I have educated him on the risk of diseases from using tobacco products such as cancer, COPD and heart disease.     I advised him to quit and he is not willing to quit.    I spent 2 minutes counseling the patient.    7. Secondary polycythemia: Chronic, stable.  - He is following with hematology.  - They are doing phlebotomy for the patient.  - Encouraged to keep follow-up as scheduled next week.    8. Foot care:  - The patient has been seen by podiatry for foot care.  - He reports he has difficulty bending down to wash and care for his feet.  - With his vascular disease, it is an extremely important to keep good care of his feet.  - He has seen podiatry in our office and is currently requesting home care assistance to complete foot care.  - I did review patient's paperwork while in office today.  - It appears that these are forms that he will have to fill out for his caregiver.  - It does not appear that physician information is needed at this time.  - We will provide our contact information if needed.  - I do feel that patient would benefit from home health  care services if available to him.  - We had previously made a case management appointment to try to assist with needs.            This document has been electronically signed by Madison Ansari MD on October 5, 2022 12:56 CDT

## 2022-10-05 NOTE — PROGRESS NOTES
.  The ABCs of the Annual Wellness Visit  Subsequent Medicare Wellness Visit    Chief Complaint   Patient presents with   • Medicare Wellness-subsequent     Patient here for medicare wellness exam.       Subjective    History of Present Illness:  Gilberto Roberts is a 52 y.o. male who presents for a Subsequent Medicare Wellness Visit.    The following portions of the patient's history were reviewed and   updated as appropriate: allergies, current medications, past family history, past medical history, past social history, past surgical history and problem list.    Compared to one year ago, the patient feels his physical   health is the same.    Compared to one year ago, the patient feels his mental   health is the same.    Recent Hospitalizations:  He was not admitted to the hospital during the last year.       Current Medical Providers:  Patient Care Team:  Madison Ansari MD as PCP - General (Family Medicine)  Li Palacio RN as Ambulatory  ([facility])    Outpatient Medications Prior to Visit   Medication Sig Dispense Refill   • aspirin (aspirin) 81 MG EC tablet Take 1 tablet by mouth Daily. 90 tablet 3   • clopidogrel (PLAVIX) 75 MG tablet TAKE ONE TABLET BY MOUTH DAILY. (Patient taking differently: Take 75 mg by mouth Daily.) 30 tablet 3   • escitalopram (LEXAPRO) 5 MG tablet Take 1 tablet by mouth Daily. 90 tablet 3   • simvastatin (ZOCOR) 40 MG tablet Take 1 tablet by mouth Every Night. 90 tablet 3     No facility-administered medications prior to visit.       No opioid medication identified on active medication list. I have reviewed chart for other potential  high risk medication/s and harmful drug interactions in the elderly.          Aspirin is on active medication list. Aspirin use is indicated based on review of current medical condition/s. Pros and cons of this therapy have been discussed today. Benefits of this medication outweigh potential harm.  Patient has been encouraged  "to continue taking this medication.  .      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  ACP discussion was held with the patient during this visit. Patient does not have an advance directive, information provided.          Objective    Vitals:    10/05/22 1300   BP: 140/92   BP Location: Right arm   Patient Position: Sitting   Cuff Size: Adult   Pulse: 71   Resp: 18   Temp: 97.8 °F (36.6 °C)   TempSrc: Infrared   SpO2: 99%   Weight: 101 kg (223 lb)   Height: 172.7 cm (68\")   PainSc: 0-No pain     BMI is >= 30 and <35. (Class 1 Obesity). The following options were offered after discussion;: exercise counseling/recommendations and nutrition counseling/recommendations     BMI Readings from Last 1 Encounters:   10/05/22 33.91 kg/m²   BMI is above normal parameters. Recommendations include: exercise counseling and nutrition counseling    Does the patient have evidence of cognitive impairment? No    Physical Exam  Vitals and nursing note reviewed.   Constitutional:       Appearance: He is well-developed.   HENT:      Head: Normocephalic and atraumatic.      Right Ear: External ear normal.      Left Ear: External ear normal.   Cardiovascular:      Rate and Rhythm: Normal rate and regular rhythm.      Heart sounds: Normal heart sounds.   Pulmonary:      Effort: Pulmonary " effort is normal. No respiratory distress.      Breath sounds: Normal breath sounds.   Abdominal:      Palpations: Abdomen is soft.      Tenderness: There is no abdominal tenderness. There is no guarding.   Musculoskeletal:         General: Normal range of motion.      Cervical back: Normal range of motion and neck supple.   Lymphadenopathy:      Cervical: No cervical adenopathy.   Skin:     General: Skin is warm.   Neurological:      Mental Status: He is alert and oriented to person, place, and time.   Psychiatric:         Behavior: Behavior normal.                 HEALTH RISK ASSESSMENT    Smoking Status:  Social History     Tobacco Use   Smoking Status Current Every Day Smoker   • Packs/day: 0.50   • Years: 33.00   • Pack years: 16.50   • Types: Cigarettes   • Last attempt to quit:    • Years since quittin.7   Smokeless Tobacco Former User   • Types: Chew   Tobacco Comment    pt states it is too hard to quit, he is by himself.     Alcohol Consumption:  Social History     Substance and Sexual Activity   Alcohol Use No     Fall Risk Screen:    STEADI Fall Risk Assessment has not been completed.    Depression Screening:  PHQ-2/PHQ-9 Depression Screening 10/5/2022   Little Interest or Pleasure in Doing Things 0-->not at all   Feeling Down, Depressed or Hopeless 0-->not at all   PHQ-9: Brief Depression Severity Measure Score 0       Health Habits and Functional and Cognitive Screening:  Functional & Cognitive Status 10/5/2022   Do you have difficulty preparing food and eating? No   Do you have difficulty bathing yourself, getting dressed or grooming yourself? No   Do you have difficulty using the toilet? No   Do you have difficulty moving around from place to place? Yes   Do you have trouble with steps or getting out of a bed or a chair? Yes   Current Diet Well Balanced Diet   Dental Exam Up to date   Eye Exam Up to date   Exercise (times per week) 4 times per week   Current Exercises Include House Cleaning    Do you need help using the phone?  No   Are you deaf or do you have serious difficulty hearing?  No   Do you need help with transportation? Yes   Do you need help shopping? No   Do you need help preparing meals?  No   Do you need help with housework?  No   Do you need help with laundry? No   Do you need help taking your medications? No   Do you need help managing money? No   Do you ever drive or ride in a car without wearing a seat belt? No   Have you felt unusual stress, anger or loneliness in the last month? Yes   Who do you live with? Alone   If you need help, do you have trouble finding someone available to you? No   Have you been bothered in the last four weeks by sexual problems? No   Do you have difficulty concentrating, remembering or making decisions? No       Age-appropriate Screening Schedule:  Refer to the list below for future screening recommendations based on patient's age, sex and/or medical conditions. Orders for these recommended tests are listed in the plan section. The patient has been provided with a written plan.    Health Maintenance   Topic Date Due   • ZOSTER VACCINE (1 of 2) Never done   • LIPID PANEL  05/23/2023   • TDAP/TD VACCINES (2 - Td or Tdap) 02/08/2027   • INFLUENZA VACCINE  Completed              Assessment & Plan   CMS Preventative Services Quick Reference  Risk Factors Identified During Encounter  Cardiovascular Disease  Chronic Pain   Dementia/Memory   Depression/Dysphoria  Immunizations Discussed/Encouraged (specific Immunizations; Tdap, Influenza, Prevnar 20 (Pneumococcal 20-valent conjugate), Shingrix and COVID19  Obesity/Overweight   The above risks/problems have been discussed with the patient.  Follow up actions/plans if indicated are seen below in the Assessment/Plan Section.  Pertinent information has been shared with the patient in the After Visit Summary.    Diagnoses and all orders for this visit:    1. Medicare annual wellness visit, subsequent (Primary)          Follow Up:   Return in about 1 year (around 10/5/2023), or if symptoms worsen or fail to improve, for Medicare Wellness.     An After Visit Summary and PPPS were made available to the patient.        I spent 30 minutes caring for Gilberto on this date of service. This time includes time spent by me in the following activities:preparing for the visit, reviewing tests, obtaining and/or reviewing a separately obtained history, performing a medically appropriate examination and/or evaluation , counseling and educating the patient/family/caregiver, ordering medications, tests, or procedures, referring and communicating with other health care professionals , documenting information in the medical record, independently interpreting results and communicating that information with the patient/family/caregiver and care coordination           Transcribed from ambient dictation for Madison Ansari MD by Rachel Guerra.  10/05/22   12:10 CDT    Patient verbalized consent to the visit recording.  I have personally performed the services described in this document as transcribed by the above individual, and it is both accurate and complete.          This document has been electronically signed by Madison Ansari MD on October 5, 2022 13:49 CDT

## 2022-10-05 NOTE — PATIENT INSTRUCTIONS
Medicare Wellness  Personal Prevention Plan of Service     Date of Office Visit:    Encounter Provider:  Madison Ansari MD  Place of Service:  [facility]  Patient Name: Gilberto Roberts  :  1970    As part of the Medicare Wellness portion of your visit today, we are providing you with this personalized preventive plan of services (PPPS). This plan is based upon recommendations of the [place] Preventive Services Task Force (USPSTF) and the Advisory Committee on Immunization Practices (ACIP).    This lists the preventive care services that should be considered, and provides dates of when you are due. Items listed as completed are up-to-date and do not require any further intervention.    Health Maintenance   Topic Date Due    COVID-19 Vaccine (1) Never done    ZOSTER VACCINE (1 of 2) Never done    LIPID PANEL  2023    ANNUAL WELLNESS VISIT  10/05/2023    TDAP/TD VACCINES (2 - Td or Tdap) 2027    COLORECTAL CANCER SCREENING  2028    Pneumococcal Vaccine 0-64 (3 - PPSV23 or PCV20) 2033    HEPATITIS C SCREENING  Completed    INFLUENZA VACCINE  Completed       No orders of the defined types were placed in this encounter.      Return in about 1 year (around 10/5/2023), or if symptoms worsen or fail to improve, for Medicare Wellness.

## 2022-10-10 ENCOUNTER — PATIENT OUTREACH (OUTPATIENT)
Dept: CASE MANAGEMENT | Facility: OTHER | Age: 52
End: 2022-10-10

## 2022-10-10 ENCOUNTER — LAB (OUTPATIENT)
Dept: LAB | Facility: HOSPITAL | Age: 52
End: 2022-10-10

## 2022-10-10 ENCOUNTER — OFFICE VISIT (OUTPATIENT)
Dept: ONCOLOGY | Facility: CLINIC | Age: 52
End: 2022-10-10

## 2022-10-10 VITALS
RESPIRATION RATE: 16 BRPM | HEIGHT: 68 IN | SYSTOLIC BLOOD PRESSURE: 168 MMHG | OXYGEN SATURATION: 97 % | TEMPERATURE: 97.2 F | DIASTOLIC BLOOD PRESSURE: 108 MMHG | BODY MASS INDEX: 34.22 KG/M2 | HEART RATE: 52 BPM | WEIGHT: 225.8 LBS

## 2022-10-10 DIAGNOSIS — D58.2 ELEVATED HEMOGLOBIN: Primary | ICD-10-CM

## 2022-10-10 DIAGNOSIS — D58.2 ELEVATED HEMOGLOBIN: ICD-10-CM

## 2022-10-10 DIAGNOSIS — R74.8 ELEVATED ALKALINE PHOSPHATASE LEVEL: ICD-10-CM

## 2022-10-10 DIAGNOSIS — Z72.0 TOBACCO USE: ICD-10-CM

## 2022-10-10 DIAGNOSIS — D72.829 LEUKOCYTOSIS, UNSPECIFIED TYPE: ICD-10-CM

## 2022-10-10 DIAGNOSIS — I73.9 PAD (PERIPHERAL ARTERY DISEASE): ICD-10-CM

## 2022-10-10 DIAGNOSIS — D72.829 LEUKOCYTOSIS, UNSPECIFIED TYPE: Primary | ICD-10-CM

## 2022-10-10 LAB
ALBUMIN SERPL-MCNC: 4.1 G/DL (ref 3.5–5.2)
ALBUMIN/GLOB SERPL: 1.1 G/DL
ALP SERPL-CCNC: 152 U/L (ref 39–117)
ALT SERPL W P-5'-P-CCNC: 20 U/L (ref 1–41)
ANION GAP SERPL CALCULATED.3IONS-SCNC: 9 MMOL/L (ref 5–15)
AST SERPL-CCNC: 15 U/L (ref 1–40)
BASOPHILS # BLD AUTO: 0.1 10*3/MM3 (ref 0–0.2)
BASOPHILS NFR BLD AUTO: 0.7 % (ref 0–1.5)
BILIRUB SERPL-MCNC: 0.3 MG/DL (ref 0–1.2)
BUN SERPL-MCNC: 12 MG/DL (ref 6–20)
BUN/CREAT SERPL: 13.3 (ref 7–25)
CALCIUM SPEC-SCNC: 10.1 MG/DL (ref 8.6–10.5)
CHLORIDE SERPL-SCNC: 102 MMOL/L (ref 98–107)
CO2 SERPL-SCNC: 29 MMOL/L (ref 22–29)
CREAT SERPL-MCNC: 0.9 MG/DL (ref 0.76–1.27)
DEPRECATED RDW RBC AUTO: 43 FL (ref 37–54)
EGFRCR SERPLBLD CKD-EPI 2021: 101.5 ML/MIN/1.73
EOSINOPHIL # BLD AUTO: 0.36 10*3/MM3 (ref 0–0.4)
EOSINOPHIL NFR BLD AUTO: 2.7 % (ref 0.3–6.2)
ERYTHROCYTE [DISTWIDTH] IN BLOOD BY AUTOMATED COUNT: 14 % (ref 12.3–15.4)
GLOBULIN UR ELPH-MCNC: 3.6 GM/DL
GLUCOSE SERPL-MCNC: 80 MG/DL (ref 65–99)
HCT VFR BLD AUTO: 48.5 % (ref 37.5–51)
HGB BLD-MCNC: 15.5 G/DL (ref 13–17.7)
HOLD SPECIMEN: NORMAL
IMM GRANULOCYTES # BLD AUTO: 0.13 10*3/MM3 (ref 0–0.05)
IMM GRANULOCYTES NFR BLD AUTO: 1 % (ref 0–0.5)
LYMPHOCYTES # BLD AUTO: 3.13 10*3/MM3 (ref 0.7–3.1)
LYMPHOCYTES NFR BLD AUTO: 23.3 % (ref 19.6–45.3)
MCH RBC QN AUTO: 27.1 PG (ref 26.6–33)
MCHC RBC AUTO-ENTMCNC: 32 G/DL (ref 31.5–35.7)
MCV RBC AUTO: 84.8 FL (ref 79–97)
MONOCYTES # BLD AUTO: 0.89 10*3/MM3 (ref 0.1–0.9)
MONOCYTES NFR BLD AUTO: 6.6 % (ref 5–12)
NEUTROPHILS NFR BLD AUTO: 65.7 % (ref 42.7–76)
NEUTROPHILS NFR BLD AUTO: 8.81 10*3/MM3 (ref 1.7–7)
NRBC BLD AUTO-RTO: 0 /100 WBC (ref 0–0.2)
PLATELET # BLD AUTO: 369 10*3/MM3 (ref 140–450)
PMV BLD AUTO: 10.4 FL (ref 6–12)
POTASSIUM SERPL-SCNC: 3.8 MMOL/L (ref 3.5–5.2)
PROT SERPL-MCNC: 7.7 G/DL (ref 6–8.5)
RBC # BLD AUTO: 5.72 10*6/MM3 (ref 4.14–5.8)
SODIUM SERPL-SCNC: 140 MMOL/L (ref 136–145)
WBC NRBC COR # BLD: 13.42 10*3/MM3 (ref 3.4–10.8)

## 2022-10-10 PROCEDURE — 80053 COMPREHEN METABOLIC PANEL: CPT

## 2022-10-10 PROCEDURE — 85025 COMPLETE CBC W/AUTO DIFF WBC: CPT

## 2022-10-10 PROCEDURE — 36415 COLL VENOUS BLD VENIPUNCTURE: CPT

## 2022-10-10 PROCEDURE — 99214 OFFICE O/P EST MOD 30 MIN: CPT | Performed by: NURSE PRACTITIONER

## 2022-10-10 NOTE — OUTREACH NOTE
AMBULATORY CASE MANAGEMENT NOTE    Name and Relationship of Patient/Support Person: Gilberto Roberts K - Self    Patient Outreach    Received call from patient that his taxi has not picked him up for his appointment this morning. Patient stated he received a call last HS asking if they could pick him up at 8:30 AM today and he was agreeable. Patient reports that over 1.5 ago he received a call from the taxi company stating they would pick him up in 15 minutes. ACM asked patient to provide phone number of the [employer] company. Patient placed ACM on hold and when he came back on the line he stated the cab was there to pick him up. ACM suggested he go on to the appointment and ACM will update providers office.     Care Coordination    Spoke Zeny with [employer] to update on transportation situation. Kaylyn at providers office has rescheduled appointment for 11:30 AM today. She stated for patient to go get labs drawn and then come to the office for the appointment. Patient updated.     Patient Outreach    He was seen by dentist 10.6.22 for tooth extraction. He was given shots but was not numbing appropriately and his BP was slightly elevated. The dentist decided that it would be best for the tooth to be extracted by an oral surgeon. He is unsure of the surgeons name and is unsure if he will have to have someone with him for the procedure.    Care Coordination    Spoke with at [facility]. The surgeon is Dr. Brito. He will be given an oral sedative that will cost $208. They will not know if insurance will cover the cost until it is billed out. [facility] stated the patient could make monthly payments on the sedative cost. His appointment is 10.26.22 @ 9:00 AM.    CLAUDIA PAGAN  Ambulatory Case Management    10/10/2022, 09:50 CDT

## 2022-10-11 ENCOUNTER — TELEPHONE (OUTPATIENT)
Dept: FAMILY MEDICINE CLINIC | Facility: CLINIC | Age: 52
End: 2022-10-11

## 2022-10-11 NOTE — TELEPHONE ENCOUNTER
Caller: Gilberto Roberts    Relationship: Self    Best call back number: [phone]    Who are you requesting to speak with (clinical staff, provider,  specific staff member): CLINICAL    What was the call regarding: PATIENT REQUESTING CALLBACK FROM DR. POSADA REGARDING KIDNEY FAILURE//WHITE BLOOD CELLS.    Do you require a callback: YES

## 2022-10-11 NOTE — TELEPHONE ENCOUNTER
Called pt back to inquire- pt reports no symptoms, just that last few drs apts that he has been to BP has been elevated. Pt reports that he doesn't monitor BP at home, and doesn't have a monitor. Advised pt that if symptoms develop such as SOB, chest pain, arm pain, back, jaw pain, headache to report to ED for evaluation. Pt verbalized understanding. Pt wanted to know about kidney failure reported on labs [facility] at [facility]. Pt had apt with hemo yesterday, no mention of kidney failure on labs or in ov note (incomplete at this time). Please advise.

## 2022-10-12 ENCOUNTER — PATIENT OUTREACH (OUTPATIENT)
Dept: CASE MANAGEMENT | Facility: OTHER | Age: 52
End: 2022-10-12

## 2022-10-12 NOTE — OUTREACH NOTE
AMBULATORY CASE MANAGEMENT NOTE    Name and Relationship of Patient/Support Person: Gilberto Roberts K - Self    Patient Outreach    Received call from patient requesting assistance with scheduling a ride to a new PCP that is on 10.17.22.    Care Coordination    Called [facility] transportation to schedule ride for PCP appointment on 10.17.22. Spoke with Gel. He will be  @ 9:45 AM. The confirmation #[id number] for the ride going and he will call for ride home. She took the compliant of [employer] service with the last 2 visits and the request to use [employer] that he has used in the past.         CLAUDIA PAGAN  Ambulatory Case Management    10/12/2022, 15:56 CDT

## 2022-10-13 NOTE — TELEPHONE ENCOUNTER
Spoke with pt yesterday to notify- pt verbalized understanding on labs, pt scheduled next week as pt has to give ride assistance a 3 day advance notification to arrange transportation for apt. Pt reports that he has no active symptoms, but advised that if pt develops symptoms to report to ED. Pt verbalized understanding.

## 2022-10-17 ENCOUNTER — OFFICE VISIT (OUTPATIENT)
Dept: FAMILY MEDICINE CLINIC | Facility: CLINIC | Age: 52
End: 2022-10-17

## 2022-10-17 VITALS
HEART RATE: 91 BPM | WEIGHT: 227.8 LBS | DIASTOLIC BLOOD PRESSURE: 90 MMHG | TEMPERATURE: 97.5 F | RESPIRATION RATE: 16 BRPM | OXYGEN SATURATION: 98 % | SYSTOLIC BLOOD PRESSURE: 160 MMHG | BODY MASS INDEX: 34.53 KG/M2 | HEIGHT: 68 IN

## 2022-10-17 DIAGNOSIS — I10 ESSENTIAL HYPERTENSION: Primary | ICD-10-CM

## 2022-10-17 PROCEDURE — 99213 OFFICE O/P EST LOW 20 MIN: CPT | Performed by: FAMILY MEDICINE

## 2022-10-17 RX ORDER — LISINOPRIL 10 MG/1
10 TABLET ORAL DAILY
Qty: 30 TABLET | Refills: 2 | Status: SHIPPED | OUTPATIENT
Start: 2022-10-17 | End: 2023-01-24

## 2022-10-17 NOTE — PATIENT INSTRUCTIONS
"Hypertension, Adult  High blood pressure (hypertension) is when the force of blood pumping through the arteries is too strong. The arteries are the blood vessels that carry blood from the heart throughout the body. Hypertension forces the heart to work harder to pump blood and may cause arteries to become narrow or stiff. Untreated or uncontrolled hypertension can cause a heart attack, heart failure, a stroke, kidney disease, and other problems.  A blood pressure reading consists of a higher number over a lower number. Ideally, your blood pressure should be below 120/80. The first (\"top\") number is called the systolic pressure. It is a measure of the pressure in your arteries as your heart beats. The second (\"bottom\") number is called the diastolic pressure. It is a measure of the pressure in your arteries as the heart relaxes.  What are the causes?  The exact cause of this condition is not known. There are some conditions that result in or are related to high blood pressure.  What increases the risk?  Some risk factors for high blood pressure are under your control. The following factors may make you more likely to develop this condition:  Smoking.  Having type 2 diabetes mellitus, high cholesterol, or both.  Not getting enough exercise or physical activity.  Being overweight.  Having too much fat, sugar, calories, or salt (sodium) in your diet.  Drinking too much alcohol.  Some risk factors for high blood pressure may be difficult or impossible to change. Some of these factors include:  Having chronic kidney disease.  Having a family history of high blood pressure.  Age. Risk increases with age.  Race. You may be at higher risk if you are .  Gender. Men are at higher risk than women before age 45. After age 65, women are at higher risk than men.  Having obstructive sleep apnea.  Stress.  What are the signs or symptoms?  High blood pressure may not cause symptoms. Very high blood pressure " (hypertensive crisis) may cause:  Headache.  Anxiety.  Shortness of breath.  Nosebleed.  Nausea and vomiting.  Vision changes.  Severe chest pain.  Seizures.  How is this diagnosed?  This condition is diagnosed by measuring your blood pressure while you are seated, with your arm resting on a flat surface, your legs uncrossed, and your feet flat on the floor. The cuff of the blood pressure monitor will be placed directly against the skin of your upper arm at the level of your heart. It should be measured at least twice using the same arm. Certain conditions can cause a difference in blood pressure between your right and left arms.  Certain factors can cause blood pressure readings to be lower or higher than normal for a short period of time:  When your blood pressure is higher when you are in a health care provider's office than when you are at home, this is called white coat hypertension. Most people with this condition do not need medicines.  When your blood pressure is higher at home than when you are in a health care provider's office, this is called masked hypertension. Most people with this condition may need medicines to control blood pressure.  If you have a high blood pressure reading during one visit or you have normal blood pressure with other risk factors, you may be asked to:  Return on a different day to have your blood pressure checked again.  Monitor your blood pressure at home for 1 week or longer.  If you are diagnosed with hypertension, you may have other blood or imaging tests to help your health care provider understand your overall risk for other conditions.  How is this treated?  This condition is treated by making healthy lifestyle changes, such as eating healthy foods, exercising more, and reducing your alcohol intake. Your health care provider may prescribe medicine if lifestyle changes are not enough to get your blood pressure under control, and if:  Your systolic blood pressure is above  130.  Your diastolic blood pressure is above 80.  Your personal target blood pressure may vary depending on your medical conditions, your age, and other factors.  Follow these instructions at home:  Eating and drinking    Eat a diet that is high in fiber and potassium, and low in sodium, added sugar, and fat. An example eating plan is called the DASH (Dietary Approaches to Stop Hypertension) diet. To eat this way:  Eat plenty of fresh fruits and vegetables. Try to fill one half of your plate at each meal with fruits and vegetables.  Eat whole grains, such as whole-wheat pasta, brown rice, or whole-grain bread. Fill about one fourth of your plate with whole grains.  Eat or drink low-fat dairy products, such as skim milk or low-fat yogurt.  Avoid fatty cuts of meat, processed or cured meats, and poultry with skin. Fill about one fourth of your plate with lean proteins, such as fish, chicken without skin, beans, eggs, or tofu.  Avoid pre-made and processed foods. These tend to be higher in sodium, added sugar, and fat.  Reduce your daily sodium intake. Most people with hypertension should eat less than 1,500 mg of sodium a day.  Do not drink alcohol if:  Your health care provider tells you not to drink.  You are pregnant, may be pregnant, or are planning to become pregnant.  If you drink alcohol:  Limit how much you use to:  0-1 drink a day for women.  0-2 drinks a day for men.  Be aware of how much alcohol is in your drink. In the [place] one drink equals one 12 oz bottle of beer (355 mL), one 5 oz glass of wine (148 mL), or one 1½ oz glass of hard liquor (44 mL).  Lifestyle    Work with your health care provider to maintain a healthy body weight or to lose weight. Ask what an ideal weight is for you.  Get at least 30 minutes of exercise most days of the week. Activities may include walking, swimming, or biking.  Include exercise to strengthen your muscles (resistance exercise), such as Pilates or lifting weights, as  part of your weekly exercise routine. Try to do these types of exercises for 30 minutes at least 3 days a week.  Do not use any products that contain nicotine or tobacco, such as cigarettes, e-cigarettes, and chewing tobacco. If you need help quitting, ask your health care provider.  Monitor your blood pressure at home as told by your health care provider.  Keep all follow-up visits as told by your health care provider. This is important.  Medicines  Take over-the-counter and prescription medicines only as told by your health care provider. Follow directions carefully. Blood pressure medicines must be taken as prescribed.  Do not skip doses of blood pressure medicine. Doing this puts you at risk for problems and can make the medicine less effective.  Ask your health care provider about side effects or reactions to medicines that you should watch for.  Contact a health care provider if you:  Think you are having a reaction to a medicine you are taking.  Have headaches that keep coming back (recurring).  Feel dizzy.  Have swelling in your ankles.  Have trouble with your vision.  Get help right away if you:  Develop a severe headache or confusion.  Have unusual weakness or numbness.  Feel faint.  Have severe pain in your chest or abdomen.  Vomit repeatedly.  Have trouble breathing.  Summary  Hypertension is when the force of blood pumping through your arteries is too strong. If this condition is not controlled, it may put you at risk for serious complications.  Your personal target blood pressure may vary depending on your medical conditions, your age, and other factors. For most people, a normal blood pressure is less than 120/80.  Hypertension is treated with lifestyle changes, medicines, or a combination of both. Lifestyle changes include losing weight, eating a healthy, low-sodium diet, exercising more, and limiting alcohol.  This information is not intended to replace advice given to you by your health care  "provider. Make sure you discuss any questions you have with your health care provider.  Document Revised: 08/28/2019 Document Reviewed: 08/28/2019  [employer] Patient Education © 2022 [employer]  DASH Eating Plan  DASH stands for Dietary Approaches to Stop Hypertension. The DASH eating plan is a healthy eating plan that has been shown to:  Reduce high blood pressure (hypertension).  Reduce your risk for type 2 diabetes, heart disease, and stroke.  Help with weight loss.  What are tips for following this plan?  Reading food labels  Check food labels for the amount of salt (sodium) per serving. Choose foods with less than 5 percent of the Daily Value of sodium. Generally, foods with less than 300 milligrams (mg) of sodium per serving fit into this eating plan.  To find whole grains, look for the word \"whole\" as the first word in the ingredient list.  Shopping  Buy products labeled as \"low-sodium\" or \"no salt added.\"  Buy fresh foods. Avoid canned foods and pre-made or frozen meals.  Cooking  Avoid adding salt when cooking. Use salt-free seasonings or herbs instead of table salt or sea salt. Check with your health care provider or pharmacist before using salt substitutes.  Do not mitchell foods. Cook foods using healthy methods such as baking, boiling, grilling, roasting, and broiling instead.  Cook with heart-healthy oils, such as olive, canola, avocado, soybean, or sunflower oil.  Meal planning    Eat a balanced diet that includes:  4 or more servings of fruits and 4 or more servings of vegetables each day. Try to fill one-half of your plate with fruits and vegetables.  6-8 servings of whole grains each day.  Less than 6 oz (170 g) of lean meat, poultry, or fish each day. A 3-oz (85-g) serving of meat is about the same size as a deck of cards. One egg equals 1 oz (28 g).  2-3 servings of low-fat dairy each day. One serving is 1 cup (237 mL).  1 serving of nuts, seeds, or beans 5 times each week.  2-3 servings of " heart-healthy fats. Healthy fats called omega-3 fatty acids are found in foods such as walnuts, flaxseeds, fortified milks, and eggs. These fats are also found in cold-water fish, such as sardines, salmon, and mackerel.  Limit how much you eat of:  Canned or prepackaged foods.  Food that is high in trans fat, such as some fried foods.  Food that is high in saturated fat, such as fatty meat.  Desserts and other sweets, sugary drinks, and other foods with added sugar.  Full-fat dairy products.  Do not salt foods before eating.  Do not eat more than 4 egg yolks a week.  Try to eat at least 2 vegetarian meals a week.  Eat more home-cooked food and less restaurant, buffet, and fast food.  Lifestyle  When eating at a restaurant, ask that your food be prepared with less salt or no salt, if possible.  If you drink alcohol:  Limit how much you use to:  0-1 drink a day for women who are not pregnant.  0-2 drinks a day for men.  Be aware of how much alcohol is in your drink. In the [place] one drink equals one 12 oz bottle of beer (355 mL), one 5 oz glass of wine (148 mL), or one 1½ oz glass of hard liquor (44 mL).  General information  Avoid eating more than 2,300 mg of salt a day. If you have hypertension, you may need to reduce your sodium intake to 1,500 mg a day.  Work with your health care provider to maintain a healthy body weight or to lose weight. Ask what an ideal weight is for you.  Get at least 30 minutes of exercise that causes your heart to beat faster (aerobic exercise) most days of the week. Activities may include walking, swimming, or biking.  Work with your health care provider or dietitian to adjust your eating plan to your individual calorie needs.  What foods should I eat?  Fruits  All fresh, dried, or frozen fruit. Canned fruit in natural juice (without added sugar).  Vegetables  Fresh or frozen vegetables (raw, steamed, roasted, or grilled). Low-sodium or reduced-sodium tomato and vegetable juice.  Low-sodium or reduced-sodium tomato sauce and tomato paste. Low-sodium or reduced-sodium canned vegetables.  Grains  Whole-grain or whole-wheat bread. Whole-grain or whole-wheat pasta. Brown rice. Oatmeal. Quinoa. Bulgur. Whole-grain and low-sodium cereals. Edita bread. Low-fat, low-sodium crackers. Whole-wheat flour tortillas.  Meats and other proteins  Skinless chicken or turkey. Ground chicken or turkey. Pork with fat trimmed off. Fish and seafood. Egg whites. Dried beans, peas, or lentils. Unsalted nuts, nut butters, and seeds. Unsalted canned beans. Lean cuts of beef with fat trimmed off. Low-sodium, lean precooked or cured meat, such as sausages or meat loaves.  Dairy  Low-fat (1%) or fat-free (skim) milk. Reduced-fat, low-fat, or fat-free cheeses. Nonfat, low-sodium ricotta or cottage cheese. Low-fat or nonfat yogurt. Low-fat, low-sodium cheese.  Fats and oils  Soft margarine without trans fats. Vegetable oil. Reduced-fat, low-fat, or light mayonnaise and salad dressings (reduced-sodium). Canola, safflower, olive, avocado, soybean, and sunflower oils. Avocado.  Seasonings and condiments  Herbs. Spices. Seasoning mixes without salt.  Other foods  Unsalted popcorn and pretzels. Fat-free sweets.  The items listed above may not be a complete list of foods and beverages you can eat. Contact a dietitian for more information.  What foods should I avoid?  Fruits  Canned fruit in a light or heavy syrup. Fried fruit. Fruit in cream or butter sauce.  Vegetables  Creamed or fried vegetables. Vegetables in a cheese sauce. Regular canned vegetables (not low-sodium or reduced-sodium). Regular canned tomato sauce and paste (not low-sodium or reduced-sodium). Regular tomato and vegetable juice (not low-sodium or reduced-sodium). Pickles. Olives.  Grains  Baked goods made with fat, such as croissants, muffins, or some breads. Dry pasta or rice meal packs.  Meats and other proteins  Fatty cuts of meat. Ribs. Fried meat. Lawler.  Bologna, salami, and other precooked or cured meats, such as sausages or meat loaves. Fat from the back of a pig (fatback). Bratwurst. Salted nuts and seeds. Canned beans with added salt. Canned or smoked fish. Whole eggs or egg yolks. Chicken or turkey with skin.  Dairy  Whole or 2% milk, cream, and half-and-half. Whole or full-fat cream cheese. Whole-fat or sweetened yogurt. Full-fat cheese. Nondairy creamers. Whipped toppings. Processed cheese and cheese spreads.  Fats and oils  Butter. Stick margarine. Lard. Shortening. Ghee. Lawler fat. Tropical oils, such as coconut, palm kernel, or palm oil.  Seasonings and condiments  Onion salt, garlic salt, seasoned salt, table salt, and sea salt. Worcestershire sauce. Tartar sauce. Barbecue sauce. Teriyaki sauce. Soy sauce, including reduced-sodium. Steak sauce. Canned and packaged gravies. Fish sauce. Oyster sauce. Cocktail sauce. Store-bought horseradish. Ketchup. Mustard. Meat flavorings and tenderizers. Bouillon cubes. Hot sauces. Pre-made or packaged marinades. Pre-made or packaged taco seasonings. Relishes. Regular salad dressings.  Other foods  Salted popcorn and pretzels.  The items listed above may not be a complete list of foods and beverages you should avoid. Contact a dietitian for more information.  Where to find more information  National Heart, Lung, and Blood [place]: www.nhlbi.nih.gov  American Heart Association: www.heart.org  Academy of Nutrition and Dietetics: www.eatright.org  National Kidney Foundation: www.kidney.org  Summary  The DASH eating plan is a healthy eating plan that has been shown to reduce high blood pressure (hypertension). It may also reduce your risk for type 2 diabetes, heart disease, and stroke.  When on the DASH eating plan, aim to eat more fresh fruits and vegetables, whole grains, lean proteins, low-fat dairy, and heart-healthy fats.  With the DASH eating plan, you should limit salt (sodium) intake to 2,300 mg a day. If you have  hypertension, you may need to reduce your sodium intake to 1,500 mg a day.  Work with your health care provider or dietitian to adjust your eating plan to your individual calorie needs.  This information is not intended to replace advice given to you by your health care provider. Make sure you discuss any questions you have with your health care provider.  Document Revised: 11/20/2020 Document Reviewed: 11/20/2020  Elsevier Patient Education © 2022 [employer].

## 2022-10-17 NOTE — PROGRESS NOTES
"Subjective cc: HTN   Gilberto Roberts is a 52 y.o. male who presents with uncontrolled HTN.  Not currently on BP treatment   Agreeable to try medication      History of Present Illness     The following portions of the patient's history were reviewed and updated as appropriate: allergies, current medications, past family history, past medical history, past social history, past surgical history and problem list.        Review of Systems    Objective   Blood pressure 160/90, pulse 91, temperature 97.5 °F (36.4 °C), temperature source Infrared, resp. rate 16, height 172.7 cm (68\"), weight 103 kg (227 lb 12.8 oz), SpO2 98 %.  Physical Exam  Vitals and nursing note reviewed.   Constitutional:       General: He is not in acute distress.     Appearance: Normal appearance. He is obese. He is not toxic-appearing.   HENT:      Right Ear: External ear normal.      Left Ear: External ear normal.   Cardiovascular:      Rate and Rhythm: Tachycardia present.   Pulmonary:      Effort: Pulmonary effort is normal. No respiratory distress.   Neurological:      Mental Status: He is alert and oriented to person, place, and time.      Motor: Weakness present.      Gait: Gait abnormal.   Psychiatric:         Mood and Affect: Mood normal.         Thought Content: Thought content normal.         Judgment: Judgment normal.         Assessment & Plan   Problems Addressed this Visit        Cardiac and Vasculature    Essential hypertension - Primary    Relevant Medications    lisinopril (PRINIVIL,ZESTRIL) 10 MG tablet   Diagnoses       Codes Comments    Essential hypertension    -  Primary ICD-10-CM: I10  ICD-9-CM: 401.9         PLAN:     #1 HTN: chronic, uncontrolled, will start on medication, recheck in 1 month, advised on risks/benefits of medication           This document has been electronically signed by Madison Ansari MD on October 17, 2022 13:35 CDT             "

## 2022-10-27 ENCOUNTER — PATIENT OUTREACH (OUTPATIENT)
Dept: CASE MANAGEMENT | Facility: OTHER | Age: 52
End: 2022-10-27

## 2022-10-27 NOTE — OUTREACH NOTE
AMBULATORY CASE MANAGEMENT NOTE    Name and Relationship of Patient/Support Person: Gilberto Roberts K - Self    Patient Outreach    HRCM follow up.    Care Coordination    Message left for [facility] to return call to discuss denture process.    Care Coordination    Called transportation line with [facility] to schedule ride for his appointment on 11.8.22. Spoke with Neel. Patient will be picked up at 7:30 AM and the  can 15 minutes before or after this time. Reference #[id number]. Requested patient use [employer] service.     CLAUDIA PAGAN  Ambulatory Case Management    10/27/2022, 10:23 CDT

## 2022-10-31 ENCOUNTER — PATIENT OUTREACH (OUTPATIENT)
Dept: CASE MANAGEMENT | Facility: OTHER | Age: 52
End: 2022-10-31

## 2022-10-31 NOTE — OUTREACH NOTE
AMBULATORY CASE MANAGEMENT NOTE    Name and Relationship of Patient/Support Person: Iron with [facility]    Care Coordination    Iron stated the estimate they received from insurance shows the dentures being covered. She is able to schedule a fitting appointment at his convenience.      Patient Outreach    Spoke with patient to discuss scheduling denture fitting. Patient reports he is not feeling very well due to allergy flare up and requested to contact ACM to schedule when he is feeling better.     CLAUDIA PAGAN  Ambulatory Case Management    10/31/2022, 13:54 CDT

## 2022-11-08 ENCOUNTER — TELEPHONE (OUTPATIENT)
Dept: FAMILY MEDICINE CLINIC | Facility: CLINIC | Age: 52
End: 2022-11-08

## 2022-11-08 ENCOUNTER — PATIENT OUTREACH (OUTPATIENT)
Dept: CASE MANAGEMENT | Facility: OTHER | Age: 52
End: 2022-11-08

## 2022-11-08 NOTE — OUTREACH NOTE
AMBULATORY CASE MANAGEMENT NOTE    Name and Relationship of Patient/Support Person: Armando Gilberto K - Self    Patient Outreach    Received call from patient and he continues to have worsening cough, congestion, and nasal drainage for 1.5 weeks. He denied fever but is unable to sleep due to the drainage. He requested assistance with cancelling podiatory appointment today. He has already cancelled the cab ride.     Care Coordination    Spoke with Marco Antonio at [facility] to cancel podiatry appointment today.     Care Coordination    Spoke Donna at [facility] to cancel appointment with KEVIN Ventura for today. Patient will call back to reschedule.     Care Coordination    Spoke with Traci at [facility] to request message be sent to PCP staff regarding above symptoms and requested they contact the patient.     CLAUDIA PAGAN  Ambulatory Case Management    11/8/2022, 08:25 CST

## 2022-11-08 NOTE — TELEPHONE ENCOUNTER
Li from [facility] case management called on behalf of Gilberto he is  really sick and is out of medication. Lots of drainage a not running fever. Cough, congestion and out of Claritin D.  If someone could call him and let him know if he needs and appointment or if there is anything else he needs to take or can do please.

## 2022-11-10 ENCOUNTER — OFFICE VISIT (OUTPATIENT)
Dept: FAMILY MEDICINE CLINIC | Facility: CLINIC | Age: 52
End: 2022-11-10

## 2022-11-10 VITALS
OXYGEN SATURATION: 99 % | TEMPERATURE: 98.6 F | BODY MASS INDEX: 34.1 KG/M2 | HEART RATE: 92 BPM | RESPIRATION RATE: 18 BRPM | HEIGHT: 68 IN | DIASTOLIC BLOOD PRESSURE: 72 MMHG | SYSTOLIC BLOOD PRESSURE: 117 MMHG | WEIGHT: 225 LBS

## 2022-11-10 DIAGNOSIS — J40 BRONCHITIS: ICD-10-CM

## 2022-11-10 DIAGNOSIS — J30.2 SEASONAL ALLERGIES: ICD-10-CM

## 2022-11-10 DIAGNOSIS — J01.00 SUBACUTE MAXILLARY SINUSITIS: ICD-10-CM

## 2022-11-10 DIAGNOSIS — R05.9 COUGH, UNSPECIFIED TYPE: Primary | ICD-10-CM

## 2022-11-10 DIAGNOSIS — E66.01 MORBID (SEVERE) OBESITY DUE TO EXCESS CALORIES: ICD-10-CM

## 2022-11-10 DIAGNOSIS — Z72.0 TOBACCO ABUSE: ICD-10-CM

## 2022-11-10 DIAGNOSIS — Z72.0 DECLINED SMOKING CESSATION: ICD-10-CM

## 2022-11-10 PROBLEM — M20.10 HALLUX VALGUS WITH BUNIONS: Status: ACTIVE | Noted: 2019-04-12

## 2022-11-10 PROBLEM — I73.9 PAD (PERIPHERAL ARTERY DISEASE): Status: ACTIVE | Noted: 2020-08-03

## 2022-11-10 PROBLEM — M21.619 HALLUX VALGUS WITH BUNIONS: Status: ACTIVE | Noted: 2019-04-12

## 2022-11-10 PROBLEM — I25.110 CORONARY ARTERY DISEASE INVOLVING NATIVE CORONARY ARTERY OF NATIVE HEART WITH UNSTABLE ANGINA PECTORIS: Status: ACTIVE | Noted: 2020-08-03

## 2022-11-10 PROBLEM — I20.0 UNSTABLE ANGINA (HCC): Status: ACTIVE | Noted: 2020-08-03

## 2022-11-10 LAB
EXPIRATION DATE: NORMAL
FLUAV AG UPPER RESP QL IA.RAPID: NOT DETECTED
FLUBV AG UPPER RESP QL IA.RAPID: NOT DETECTED
INTERNAL CONTROL: NORMAL
Lab: NORMAL
SARS-COV-2 AG UPPER RESP QL IA.RAPID: NOT DETECTED

## 2022-11-10 PROCEDURE — 87428 SARSCOV & INF VIR A&B AG IA: CPT | Performed by: NURSE PRACTITIONER

## 2022-11-10 PROCEDURE — 99214 OFFICE O/P EST MOD 30 MIN: CPT | Performed by: NURSE PRACTITIONER

## 2022-11-10 RX ORDER — LORATADINE AND PSEUDOEPHEDRINE SULFATE 10; 240 MG/1; MG/1
1 TABLET, EXTENDED RELEASE ORAL DAILY
Qty: 30 TABLET | Refills: 5 | Status: SHIPPED | OUTPATIENT
Start: 2022-11-10 | End: 2023-01-23

## 2022-11-10 RX ORDER — AZITHROMYCIN 250 MG/1
TABLET, FILM COATED ORAL
Qty: 6 TABLET | Refills: 0 | Status: SHIPPED | OUTPATIENT
Start: 2022-11-10 | End: 2022-11-21

## 2022-11-10 RX ORDER — DEXTROMETHORPHAN HYDROBROMIDE AND PROMETHAZINE HYDROCHLORIDE 15; 6.25 MG/5ML; MG/5ML
5 SYRUP ORAL 4 TIMES DAILY PRN
Qty: 120 ML | Refills: 0 | Status: SHIPPED | OUTPATIENT
Start: 2022-11-10 | End: 2022-11-21

## 2022-11-10 RX ORDER — PREDNISONE 20 MG/1
20 TABLET ORAL DAILY
Qty: 5 TABLET | Refills: 0 | Status: SHIPPED | OUTPATIENT
Start: 2022-11-10 | End: 2022-11-15

## 2022-11-10 NOTE — PROGRESS NOTES
Chief Complaint  Cough    Subjective        Gilberto Roberts presents to [facility]  History of Present Illness  Presents for Productive cough, congestion, stuffy head, sore throat for the last 12 days and it is getting progressively worse.  Has been using nyguil nighttime cough med and it is not helping.  Denies fever, chills, nausea, vomiting or diarrhea.  Says he feels horrible.  Rates overall 8/10 generalized all over body.  He says that he has speaking impediment and talks different.  This started as allergies and he didn't have any claritin d to take   Cough  This is a new problem. The current episode started 1 to 4 weeks ago. The problem has been gradually worsening. The problem occurs constantly. The cough is productive of purulent sputum. Associated symptoms include a sore throat, shortness of breath and wheezing. Pertinent negatives include no chills, ear congestion, ear pain or fever. The symptoms are aggravated by dust, lying down, pollens and fumes. He has tried rest and cool air for the symptoms. The treatment provided mild relief. His past medical history is significant for bronchitis.   URI   The current episode started 1 to 4 weeks ago. The problem has been gradually worsening. There has been no fever. Associated symptoms include congestion, coughing, sinus pain, sneezing, a sore throat and wheezing. Pertinent negatives include no ear pain, joint pain, joint swelling, nausea or neck pain. He has tried acetaminophen for the symptoms. The treatment provided mild relief.   Sore Throat   This is a new problem. The current episode started yesterday. The problem has been gradually worsening. There has been no fever. The pain is at a severity of 8/10. The pain is severe. Associated symptoms include congestion, coughing, a hoarse voice and shortness of breath. Pertinent negatives include no ear pain or neck pain. He has had no exposure to strep or mono. He has tried  "acetaminophen for the symptoms. The treatment provided mild relief.     The following portions of the patient's history were reviewed and updated as appropriate: allergies, current medications, past family history, past medical history, past social history, past surgical history and problem list.    Objective   Vital Signs:  /72 (BP Location: Right arm, Patient Position: Sitting, Cuff Size: Adult)   Pulse 92   Temp 98.6 °F (37 °C) (Infrared)   Resp 18   Ht 172.7 cm (68\")   Wt 102 kg (225 lb)   SpO2 99%   BMI 34.21 kg/m²   Estimated body mass index is 34.21 kg/m² as calculated from the following:    Height as of this encounter: 172.7 cm (68\").    Weight as of this encounter: 102 kg (225 lb).    BMI is >= 30 and <35. (Class 1 Obesity). The following options were offered after discussion;: exercise counseling/recommendations and nutrition counseling/recommendations      Physical Exam  Vitals and nursing note reviewed.   Constitutional:       General: He is not in acute distress.     Appearance: He is well-developed. He is obese. He is ill-appearing. He is not diaphoretic.   HENT:      Head: Normocephalic and atraumatic.      Right Ear: Hearing, tympanic membrane, ear canal and external ear normal.      Left Ear: Hearing, tympanic membrane, ear canal and external ear normal.      Nose:      Right Sinus: Maxillary sinus tenderness present.      Left Sinus: Maxillary sinus tenderness present.      Mouth/Throat:      Mouth: Mucous membranes are moist.      Pharynx: Uvula midline. Posterior oropharyngeal erythema present.      Tonsils: No tonsillar exudate.   Eyes:      General: Lids are normal. Lids are everted, no foreign bodies appreciated.      Conjunctiva/sclera: Conjunctivae normal.      Pupils: Pupils are equal, round, and reactive to light.   Neck:      Thyroid: No thyromegaly.      Trachea: Trachea normal.   Cardiovascular:      Rate and Rhythm: Normal rate and regular rhythm.      Heart sounds: Normal " heart sounds, S1 normal and S2 normal.   Pulmonary:      Effort: Pulmonary effort is normal.      Breath sounds: Examination of the right-upper field reveals wheezing. Examination of the left-upper field reveals wheezing. Wheezing present.   Abdominal:      General: Bowel sounds are normal.      Palpations: Abdomen is soft.   Musculoskeletal:         General: Normal range of motion.      Cervical back: Full passive range of motion without pain, normal range of motion and neck supple.   Lymphadenopathy:      Head:      Right side of head: No submental, submandibular or tonsillar adenopathy.      Left side of head: No submental, submandibular or tonsillar adenopathy.   Skin:     General: Skin is warm and dry.      Capillary Refill: Capillary refill takes less than 2 seconds.   Neurological:      Mental Status: He is alert and oriented to person, place, and time.      Sensory: Sensation is intact.      Coordination: Coordination is intact.      Gait: Gait is intact.   Psychiatric:         Attention and Perception: Attention and perception normal.         Mood and Affect: Mood and affect normal.         Speech: Speech normal.         Behavior: Behavior normal. Behavior is cooperative.         Thought Content: Thought content normal.         Cognition and Memory: Cognition normal.         Judgment: Judgment normal.        Result Review :                Assessment and Plan   Diagnoses and all orders for this visit:    1. Cough, unspecified type (Primary)  -     POCT SARS-CoV-2 Antigen TARA + Flu  -     promethazine-dextromethorphan (PROMETHAZINE-DM) 6.25-15 MG/5ML syrup; Take 5 mL by mouth 4 (Four) Times a Day As Needed for Cough.  Dispense: 120 mL; Refill: 0  -     predniSONE (DELTASONE) 20 MG tablet; Take 1 tablet by mouth Daily for 5 days.  Dispense: 5 tablet; Refill: 0    2. Subacute maxillary sinusitis  -     azithromycin (Zithromax) 250 MG tablet; Take 2 tablets the first day, then 1 tablet daily for 4 days.   Dispense: 6 tablet; Refill: 0    3. Seasonal allergies  -     loratadine-pseudoephedrine (Claritin-D 24 Hour)  MG per 24 hr tablet; Take 1 tablet by mouth Daily.  Dispense: 30 tablet; Refill: 5  -     azithromycin (Zithromax) 250 MG tablet; Take 2 tablets the first day, then 1 tablet daily for 4 days.  Dispense: 6 tablet; Refill: 0    4. Bronchitis  -     promethazine-dextromethorphan (PROMETHAZINE-DM) 6.25-15 MG/5ML syrup; Take 5 mL by mouth 4 (Four) Times a Day As Needed for Cough.  Dispense: 120 mL; Refill: 0  -     predniSONE (DELTASONE) 20 MG tablet; Take 1 tablet by mouth Daily for 5 days.  Dispense: 5 tablet; Refill: 0    5. Tobacco abuse    6. Declined smoking cessation    POCT SARS-CoV-2 Antigen TARA + Flu  Order: [id number]   Status: Final result      Visible to patient: No (scheduled for 11/11/2022  1:27 AM)      Next appt: 11/21/2022 at 11:00 AM in Family Medicine (Madison Ansari MD)      Dx: Cough, unspecified type     Specimen Information: Swab    0 Result Notes  Component Ref Range & Units 11:26    SARS Antigen Not Detected, Presumptive Negative Not Detected    Influenza A Antigen TARA Not Detected Not Detected    Influenza B Antigen TARA Not Detected Not Detected    Internal Control Passed Passed    Lot Number  [id number]    Expiration Date  6,012,023               Specimen Collected: 11/10/22 11:26 CST Last Resulted: 11/10/22 11:26 CST                    Follow Up   Return in about 1 week (around 11/17/2022), or if symptoms worsen or fail to improve.  Patient was given instructions and counseling regarding his condition or for health maintenance advice. Please see specific information pulled into the AVS if appropriate.     Electronically signed by Diamante Hunt, KELSEA, APRN, 11/29/22, 7:23 PM CST.

## 2022-11-14 ENCOUNTER — PATIENT OUTREACH (OUTPATIENT)
Dept: CASE MANAGEMENT | Facility: OTHER | Age: 52
End: 2022-11-14

## 2022-11-14 NOTE — OUTREACH NOTE
"AMBULATORY CASE MANAGEMENT NOTE    Name and Relationship of Patient/Support Person: Gilberto Roberts K - Self    Patient Outreach    CM follow up. Patient had visit with PCP 11.10.22. He continues to have productive cough with \"dark\" sputum. Discussed smoking cessation and provided suggestions for quitting. He smokes approximately 4-5 cigarettes daily. He did not receive the Claritin D that was sent on at his 11.10.22 office appointment. He will contact his pharmacy to see if it is available for delivery at this time. [facility] will schedule transportation to PCP appointment 11.21.22.    Care Coordination    Contacted [facility] transportation to schedule ride for 11.21.22. Spoke with Jamal.  time is @ 10:00 AM and can be 15 minutes before or after  time. Patient will call for ride home.  Reference #[id number].    Patient Outreach    Patient updated of the above information.         CLAUDIA PAGAN  Ambulatory Case Management    11/14/2022, 13:38 CST  "

## 2022-11-21 ENCOUNTER — OFFICE VISIT (OUTPATIENT)
Dept: FAMILY MEDICINE CLINIC | Facility: CLINIC | Age: 52
End: 2022-11-21

## 2022-11-21 VITALS
WEIGHT: 222.8 LBS | SYSTOLIC BLOOD PRESSURE: 109 MMHG | DIASTOLIC BLOOD PRESSURE: 69 MMHG | HEART RATE: 92 BPM | RESPIRATION RATE: 16 BRPM | BODY MASS INDEX: 33.77 KG/M2 | TEMPERATURE: 97.8 F | OXYGEN SATURATION: 98 % | HEIGHT: 68 IN

## 2022-11-21 DIAGNOSIS — M25.532 LEFT WRIST PAIN: ICD-10-CM

## 2022-11-21 DIAGNOSIS — J30.2 SEASONAL ALLERGIES: ICD-10-CM

## 2022-11-21 DIAGNOSIS — I10 ESSENTIAL HYPERTENSION: Primary | ICD-10-CM

## 2022-11-21 PROCEDURE — 99213 OFFICE O/P EST LOW 20 MIN: CPT | Performed by: FAMILY MEDICINE

## 2022-11-21 NOTE — PROGRESS NOTES
"Subjective cc: HTN   Gilberto Roberts is a 52 y.o. male.     History of Present Illness     The patient presents today for a follow-up on hypertension.     He reports his blood pressure is better. The patient states he has a blood pressure cuff at home. He reports he is doing a lot better on his current blood pressure medication.    He is unable to move his wrist down. He reports it is starting to bother his wrist because he cannot raise or hold anything. The patient would like to see an orthopedic doctor.    The following portions of the patient's history were reviewed and updated as appropriate: allergies, current medications, past family history, past medical history, past social history, past surgical history and problem list.        Review of Systems    Objective   Blood pressure 109/69, pulse 92, temperature 97.8 °F (36.6 °C), temperature source Infrared, resp. rate 16, height 172.7 cm (68\"), weight 101 kg (222 lb 12.8 oz), SpO2 98 %.  Physical Exam  Vitals and nursing note reviewed.   Constitutional:       General: He is not in acute distress.     Appearance: Normal appearance. He is not toxic-appearing.   HENT:      Head: Normocephalic and atraumatic.      Right Ear: External ear normal.      Left Ear: External ear normal.   Cardiovascular:      Rate and Rhythm: Tachycardia present.      Pulses: Normal pulses.   Pulmonary:      Effort: Pulmonary effort is normal. No respiratory distress.   Skin:     General: Skin is warm and dry.   Neurological:      Mental Status: He is alert and oriented to person, place, and time.      Motor: Weakness present.      Gait: Gait abnormal.   Psychiatric:         Mood and Affect: Mood normal.         Behavior: Behavior normal.         Thought Content: Thought content normal.         Judgment: Judgment normal.         Assessment & Plan   Problems Addressed this Visit        Cardiac and Vasculature    Essential hypertension - Primary   Other Visit Diagnoses     Seasonal allergies "        Left wrist pain        Relevant Orders    Ambulatory Referral to Orthopedic Surgery      Diagnoses       Codes Comments    Essential hypertension    -  Primary ICD-10-CM: I10  ICD-9-CM: 401.9     Seasonal allergies     ICD-10-CM: J30.2  ICD-9-CM: 477.9     Left wrist pain     ICD-10-CM: M25.532  ICD-9-CM: 719.43         1. Hypertension: Chronic, improving.  - Continue on current medications.    2. Left wrist pain: New, needs further evaluation.  - Referral to orthopedics.    3. Seasonal allergies: chronic, refill on meidcation     Transcribed from ambient dictation for Madison Ansari MD by Laura Swan.  11/21/22   12:29 CST    Patient or patient representative verbalized consent to the visit recording.  I have personally performed the services described in this document as transcribed by the above individual, and it is both accurate and complete.            This document has been electronically signed by Madison Ansari MD on November 21, 2022 17:28 CST

## 2022-12-01 ENCOUNTER — PATIENT OUTREACH (OUTPATIENT)
Dept: CASE MANAGEMENT | Facility: OTHER | Age: 52
End: 2022-12-01

## 2022-12-01 NOTE — OUTREACH NOTE
AMBULATORY CASE MANAGEMENT NOTE    Name and Relationship of Patient/Support Person: Gilberto Roberts K - Self    Patient Outreach    Patient called concerned about open enrollment. He has been unable to get his  on the phone and when he has spoken with the insruance company they are unable to understand him. The agent is Tony [phone]. Patient was contacted earlier by his insurance and will receive a $50 gift card in the mail within the next couple of weeks.     Care Coordination    Attempted to contacted  Tony but after extended hold time was ended.     Care Coordination    Contacted [employer] and spoke with Petty. The new [employer] will be the new insurance card that is being mailed to his home. The healthy benefit card will replace the blue card and he can use the card towards utility assistance. ACM transfered to [employer] and spoke with Zay. Zay reports that patient will automatically re-enroll with his current plan. Patient will not need to contact Medicaid because he would receive a termination letter if his benefits were ending. [facility] but does not know the time or provider. ACM will contact [facility] for information so it can be provided to transportation company to schedule his ride.    Care Coordination    Contacted [facility] and the appointment is 12.19.22 @ 1:00 with Abner Esposito.     Care Coordination    Contacted [facility] and spoke with Iron to schedule denture fitting. Scheduled appointment for 12.8.22 @ 5:00 PM.     Care Coordination    Contacted [employer] transportation to schedule 12.8.22 @ 5:00PM to [facility]. Reservation # [id number] and  time is between 3:45 and 4:15 PM.     Patient Outreach    Spoke with patient to inform of the above information.         CLAUDIA PAGAN  Ambulatory Case Management    12/1/2022, 11:00 CST

## 2022-12-06 ENCOUNTER — TELEPHONE (OUTPATIENT)
Dept: FAMILY MEDICINE CLINIC | Facility: CLINIC | Age: 52
End: 2022-12-06

## 2022-12-06 NOTE — TELEPHONE ENCOUNTER
Neris a NP with [facility] called to report that patient needs a new cane. Spoke with patient. Patient reports that he would like to use [facility]. Please review and advise for an order.

## 2022-12-08 DIAGNOSIS — R26.9 IMPAIRED GAIT: ICD-10-CM

## 2022-12-08 DIAGNOSIS — Z91.81 AT HIGH RISK FOR FALLS: Primary | ICD-10-CM

## 2022-12-12 ENCOUNTER — PATIENT OUTREACH (OUTPATIENT)
Dept: CASE MANAGEMENT | Facility: OTHER | Age: 52
End: 2022-12-12

## 2022-12-12 NOTE — OUTREACH NOTE
AMBULATORY CASE MANAGEMENT NOTE    Name and Relationship of Patient/Support Person: [facility] transportation - Provider    Care Coordination    Contacted [facility] transportation and spoke with Lilia to schedule ride for 12.19.22 @ 1:00 at [facility].  time is 11:30 AM and it will be 15 minutes before or after this time.  Reference #[id number].     Patient Outreach    Spoke with patient to inform of the above information. He did not keep the dental appointment on 12.8.22 due to no reminder form provider office or ride  verification call.     Care Coordination    Spoke with Iron at [facility] to reschedule appointment for 12.21.22 @ 3:00.     Care Coordination    Spoke with [facility] transportation and spoke with Manuel to schedule ride for [facility] on 12.21.22 @ 3:00.  time will be 1:45 PM and can be 15 minutes before or after this time. Reference #[id number].        CLAUDIA PAGAN  Ambulatory Case Management    12/12/2022, 10:43 CST

## 2022-12-27 ENCOUNTER — PATIENT OUTREACH (OUTPATIENT)
Dept: CASE MANAGEMENT | Facility: OTHER | Age: 52
End: 2022-12-27

## 2022-12-27 NOTE — OUTREACH NOTE
AMBULATORY CASE MANAGEMENT NOTE    Name and Relationship of Patient/Support Person: Gilberto Roberts - Jose Manuel    Patient Outreach    HRCM follow up. Patient had to cancel dental appointment on 12.21.22. ACM received email from Iron with [facility] that a new appointment has been made for 12.29.22 @ 12:30 PM. Patient needs ride scheduled for the new dental appointment.     Care Coordination    Spoke with [facility] transportation to schedule appointment for 12.29.22 @12:30 PM. Spoke with Triston.  He will be picked up at 11:30 AM and can be 15 minutes before or after that time. The reference #[id number]    Patient Outreach    Informed patient of the above information.         CLAUDIA PAGAN  Ambulatory Case Management    12/27/2022, 11:20 CST

## 2022-12-29 DIAGNOSIS — I10 ESSENTIAL HYPERTENSION: ICD-10-CM

## 2022-12-29 RX ORDER — LISINOPRIL 10 MG/1
10 TABLET ORAL DAILY
Qty: 30 TABLET | Refills: 2 | OUTPATIENT
Start: 2022-12-29

## 2022-12-29 NOTE — TELEPHONE ENCOUNTER
Refill too soon     Rx Refill Note  Requested Prescriptions     Pending Prescriptions Disp Refills   • lisinopril (PRINIVIL,ZESTRIL) 10 MG tablet [Pharmacy Med Name: LISINOPRIL 10 MG TAB 10 Tablet] 30 tablet 2     Sig: TAKE 1 TABLET BY MOUTH DAILY.      Last office visit with prescribing clinician: 11/21/2022   Next office visit with prescribing clinician: 1/23/2023                         Would you like a call back once the refill request has been completed: [] Yes [] No    If the office needs to give you a call back, can they leave a voicemail: [] Yes [] No    Erika Yu MA  12/29/22, 16:54 CST

## 2023-01-03 ENCOUNTER — PATIENT OUTREACH (OUTPATIENT)
Dept: CASE MANAGEMENT | Facility: OTHER | Age: 53
End: 2023-01-03
Payer: MEDICARE

## 2023-01-03 NOTE — OUTREACH NOTE
AMBULATORY CASE MANAGEMENT NOTE    Name and Relationship of Patient/Support Person: Gilberto Roberts K - Self    Patient Outreach    Patient kept his [facility] appointment for his denture impressions. His appointment for denture fitting is 1.13.23 @ 11:00.    Care Coordination    Contacted [facility] transportation to schedule appointment for 1.13.23 @ 11:00 and spoke with Jesse. He will be picked at 10:00 on 1.13.23 and can be 15 minutes before or after that time. He will call for a ride home. The confirmation #[id number]. Scheduled appointment for 1.10.23 @ 1:00. He will be picked up @ 12:00 on 1.10.23 and can be 15 minutes before or after that time. The reference # [id number].        CLAUDIA PAGAN  Ambulatory Case Management    1/3/2023, 11:29 CST

## 2023-01-05 DIAGNOSIS — D72.829 LEUKOCYTOSIS, UNSPECIFIED TYPE: Primary | ICD-10-CM

## 2023-01-06 ENCOUNTER — APPOINTMENT (OUTPATIENT)
Dept: CT IMAGING | Facility: HOSPITAL | Age: 53
End: 2023-01-06
Payer: MEDICARE

## 2023-01-06 ENCOUNTER — HOSPITAL ENCOUNTER (EMERGENCY)
Facility: HOSPITAL | Age: 53
Discharge: HOME | End: 2023-01-06
Admitting: EMERGENCY MEDICINE
Payer: MEDICARE

## 2023-01-06 VITALS
OXYGEN SATURATION: 97 % | RESPIRATION RATE: 18 BRPM | WEIGHT: 215 LBS | HEIGHT: 68 IN | BODY MASS INDEX: 32.58 KG/M2 | TEMPERATURE: 98.3 F | DIASTOLIC BLOOD PRESSURE: 69 MMHG | HEART RATE: 83 BPM | SYSTOLIC BLOOD PRESSURE: 125 MMHG

## 2023-01-06 DIAGNOSIS — K64.4 EXTERNAL HEMORRHOID: Primary | ICD-10-CM

## 2023-01-06 LAB
ALBUMIN SERPL-MCNC: 4.1 G/DL (ref 3.5–5.2)
ALBUMIN/GLOB SERPL: 1.2 G/DL
ALP SERPL-CCNC: 128 U/L (ref 39–117)
ALT SERPL W P-5'-P-CCNC: 22 U/L (ref 1–41)
ANION GAP SERPL CALCULATED.3IONS-SCNC: 12 MMOL/L (ref 5–15)
AST SERPL-CCNC: 15 U/L (ref 1–40)
BASOPHILS # BLD AUTO: 0.1 10*3/MM3 (ref 0–0.2)
BASOPHILS NFR BLD AUTO: 0.7 % (ref 0–1.5)
BILIRUB SERPL-MCNC: 0.4 MG/DL (ref 0–1.2)
BUN SERPL-MCNC: 11 MG/DL (ref 6–20)
BUN/CREAT SERPL: 13.6 (ref 7–25)
CALCIUM SPEC-SCNC: 9.5 MG/DL (ref 8.6–10.5)
CHLORIDE SERPL-SCNC: 103 MMOL/L (ref 98–107)
CO2 SERPL-SCNC: 24 MMOL/L (ref 22–29)
CREAT SERPL-MCNC: 0.81 MG/DL (ref 0.76–1.27)
D-LACTATE SERPL-SCNC: 1.9 MMOL/L (ref 0.5–2)
DEPRECATED RDW RBC AUTO: 48.4 FL (ref 37–54)
DEVELOPER EXPIRATION DATE: ABNORMAL
DEVELOPER LOT NUMBER: 225
EGFRCR SERPLBLD CKD-EPI 2021: 104.8 ML/MIN/1.73
EOSINOPHIL # BLD AUTO: 0.26 10*3/MM3 (ref 0–0.4)
EOSINOPHIL NFR BLD AUTO: 1.7 % (ref 0.3–6.2)
ERYTHROCYTE [DISTWIDTH] IN BLOOD BY AUTOMATED COUNT: 16.3 % (ref 12.3–15.4)
EXPIRATION DATE: ABNORMAL
FECAL OCCULT BLOOD SCREEN, POC: POSITIVE
FLUAV RNA RESP QL NAA+PROBE: NOT DETECTED
FLUBV RNA RESP QL NAA+PROBE: NOT DETECTED
GLOBULIN UR ELPH-MCNC: 3.4 GM/DL
GLUCOSE SERPL-MCNC: 94 MG/DL (ref 65–99)
HCT VFR BLD AUTO: 48.7 % (ref 37.5–51)
HGB BLD-MCNC: 15.4 G/DL (ref 13–17.7)
IMM GRANULOCYTES # BLD AUTO: 0.06 10*3/MM3 (ref 0–0.05)
IMM GRANULOCYTES NFR BLD AUTO: 0.4 % (ref 0–0.5)
LYMPHOCYTES # BLD AUTO: 3.25 10*3/MM3 (ref 0.7–3.1)
LYMPHOCYTES NFR BLD AUTO: 21.8 % (ref 19.6–45.3)
Lab: ABNORMAL
MCH RBC QN AUTO: 26.4 PG (ref 26.6–33)
MCHC RBC AUTO-ENTMCNC: 31.6 G/DL (ref 31.5–35.7)
MCV RBC AUTO: 83.5 FL (ref 79–97)
MONOCYTES # BLD AUTO: 1.01 10*3/MM3 (ref 0.1–0.9)
MONOCYTES NFR BLD AUTO: 6.8 % (ref 5–12)
NEGATIVE CONTROL: NEGATIVE
NEUTROPHILS NFR BLD AUTO: 10.24 10*3/MM3 (ref 1.7–7)
NEUTROPHILS NFR BLD AUTO: 68.6 % (ref 42.7–76)
NRBC BLD AUTO-RTO: 0 /100 WBC (ref 0–0.2)
PLATELET # BLD AUTO: 361 10*3/MM3 (ref 140–450)
PMV BLD AUTO: 10.5 FL (ref 6–12)
POSITIVE CONTROL: POSITIVE
POTASSIUM SERPL-SCNC: 4.1 MMOL/L (ref 3.5–5.2)
PROCALCITONIN SERPL-MCNC: 0.06 NG/ML (ref 0–0.25)
PROT SERPL-MCNC: 7.5 G/DL (ref 6–8.5)
RBC # BLD AUTO: 5.83 10*6/MM3 (ref 4.14–5.8)
RSV RNA NPH QL NAA+NON-PROBE: NOT DETECTED
SARS-COV-2 RNA RESP QL NAA+PROBE: NOT DETECTED
SODIUM SERPL-SCNC: 139 MMOL/L (ref 136–145)
WBC NRBC COR # BLD: 14.92 10*3/MM3 (ref 3.4–10.8)

## 2023-01-06 PROCEDURE — 82270 OCCULT BLOOD FECES: CPT | Performed by: PHYSICIAN ASSISTANT

## 2023-01-06 PROCEDURE — 83605 ASSAY OF LACTIC ACID: CPT | Performed by: PHYSICIAN ASSISTANT

## 2023-01-06 PROCEDURE — 25010000002 ONDANSETRON PER 1 MG: Performed by: PHYSICIAN ASSISTANT

## 2023-01-06 PROCEDURE — 25010000002 IOPAMIDOL 61 % SOLUTION: Performed by: PHYSICIAN ASSISTANT

## 2023-01-06 PROCEDURE — 99283 EMERGENCY DEPT VISIT LOW MDM: CPT

## 2023-01-06 PROCEDURE — 87637 SARSCOV2&INF A&B&RSV AMP PRB: CPT | Performed by: PHYSICIAN ASSISTANT

## 2023-01-06 PROCEDURE — 80053 COMPREHEN METABOLIC PANEL: CPT | Performed by: PHYSICIAN ASSISTANT

## 2023-01-06 PROCEDURE — 84145 PROCALCITONIN (PCT): CPT | Performed by: PHYSICIAN ASSISTANT

## 2023-01-06 PROCEDURE — 85025 COMPLETE CBC W/AUTO DIFF WBC: CPT | Performed by: PHYSICIAN ASSISTANT

## 2023-01-06 PROCEDURE — 96374 THER/PROPH/DIAG INJ IV PUSH: CPT

## 2023-01-06 PROCEDURE — 74177 CT ABD & PELVIS W/CONTRAST: CPT

## 2023-01-06 PROCEDURE — 87040 BLOOD CULTURE FOR BACTERIA: CPT | Performed by: PHYSICIAN ASSISTANT

## 2023-01-06 PROCEDURE — 36415 COLL VENOUS BLD VENIPUNCTURE: CPT

## 2023-01-06 RX ORDER — HYDROCORTISONE ACETATE 25 MG/1
25 SUPPOSITORY RECTAL 2 TIMES DAILY
Status: DISCONTINUED | OUTPATIENT
Start: 2023-01-06 | End: 2023-01-06 | Stop reason: HOSPADM

## 2023-01-06 RX ORDER — SODIUM CHLORIDE 0.9 % (FLUSH) 0.9 %
10 SYRINGE (ML) INJECTION AS NEEDED
Status: DISCONTINUED | OUTPATIENT
Start: 2023-01-06 | End: 2023-01-06 | Stop reason: HOSPADM

## 2023-01-06 RX ORDER — ONDANSETRON 2 MG/ML
4 INJECTION INTRAMUSCULAR; INTRAVENOUS ONCE
Status: COMPLETED | OUTPATIENT
Start: 2023-01-06 | End: 2023-01-06

## 2023-01-06 RX ORDER — DOCUSATE SODIUM 100 MG/1
100 CAPSULE, LIQUID FILLED ORAL 2 TIMES DAILY
Qty: 10 CAPSULE | Refills: 0 | Status: SHIPPED | OUTPATIENT
Start: 2023-01-06 | End: 2023-01-11

## 2023-01-06 RX ORDER — HYDROCORTISONE ACETATE 25 MG/1
25 SUPPOSITORY RECTAL 2 TIMES DAILY
Qty: 14 SUPPOSITORY | Refills: 0 | Status: SHIPPED | OUTPATIENT
Start: 2023-01-06 | End: 2023-01-13

## 2023-01-06 RX ADMIN — SODIUM CHLORIDE 1000 ML: 9 INJECTION, SOLUTION INTRAVENOUS at 14:45

## 2023-01-06 RX ADMIN — ONDANSETRON 4 MG: 2 INJECTION INTRAMUSCULAR; INTRAVENOUS at 14:45

## 2023-01-06 RX ADMIN — HYDROCORTISONE ACETATE 25 MG: 25 SUPPOSITORY RECTAL at 14:43

## 2023-01-06 RX ADMIN — IOPAMIDOL 100 ML: 612 INJECTION, SOLUTION INTRAVENOUS at 15:55

## 2023-01-06 NOTE — ED PROVIDER NOTES
Subjective   History of Present Illness    Patient is a 52-year-old male presenting to ED with painful hemorrhoid.  PMH significant for daily use Plavix and aspirin, history hemorrhoid surgery x3, hypertension, Coronary artery disease.  Patient states he has a history of 3 previous hemorrhoid surgeries performed in [facility] for which the most recent was in November 2021.  Patient states that he intermittently has discomfort with his hemorrhoids however he can typically control it.  Patient woke up at 5:00 this morning with increased pain in his rectum and after trying to have a bowel movement has had significant pain, swelling, and began noticing blood dripping around his kitchen since.  Patient denies any significant anterior abdominal pain.  Patient states that he feels he can no longer have a bowel movement or bear down due to the pain in the region.  Patient reports he has been urinating without difficulty has had no fevers, chills, diaphoresis, nausea, vomiting, and denies any change in his chronic constipation.  Patient states prior to the hemorrhoid this morning he has had no recent blood in his stools including black/tarry/bloody stools.  Patient denies use of any medications prior to arrival and presents at this time for further evaluation.    Records reviewed show patient last seen in the ED on 1/16/2022 for pleurisy.    Patient was recently seen outpatient at oncology yesterday for monitoring of leukocytosis.    Previously documented visits for hemorrhoids.    Review of Systems   Constitutional: Negative.  Negative for chills, diaphoresis and fever.   HENT: Negative.    Eyes: Negative.    Respiratory: Negative.    Cardiovascular: Negative.    Gastrointestinal: Positive for blood in stool (bright red), constipation (no change in chronic) and rectal pain. Negative for abdominal pain, diarrhea, nausea and vomiting.   Genitourinary: Negative.  Negative for decreased urine volume and difficulty  "urinating.   Musculoskeletal: Negative.    Skin: Negative.    Neurological: Negative.  Negative for dizziness, syncope and light-headedness.   Psychiatric/Behavioral: Negative.    All other systems reviewed and are negative.      Past Medical History:   Diagnosis Date   • Anxiety    • Arthritis    • Coronary artery disease involving native coronary artery of native heart with unstable angina pectoris ([facility]) 8/3/2020   • Current moderate episode of major depressive disorder without prior episode ([facility]) 5/17/2021   • Essential hypertension 3/29/2019   • Head injury    • [place] (hard of hearing)    • Hyperlipidemia    • Injury of back        No Known Allergies    Past Surgical History:   Procedure Laterality Date   • AORTAGRAM Bilateral 4/2/2019    Procedure: AORTAGRAM, LEFT LEG ANGIOGRAM, ANGIOPLASTY/STENT PLACEMENT, ANGIOSEAL CLOSURE;  Surgeon: Luis Billings MD;  Location: [facility] HYBRID OR 12;  Service: Vascular   • AORTAGRAM Left 1/9/2020    Procedure: AORTAGRAM, LEFT LOWER EXTREMITY ANGIOGRAM, ATHRECTOMY, BALLOON ANGIOPLASTY, STENT PLACEMENT, MYNX CLOSURE;  Surgeon: Luis Billings MD;  Location: [facility] HYBRID OR 12;  Service: Vascular   • HEMORRHOIDECTOMY      x 3   • HERNIA REPAIR     • LEG SURGERY      as a child due to \"club foot\"   • NECK MASS EXCISION         Family History   Problem Relation Age of Onset   • COPD Mother         respiratory failure   • Dementia Father    • Diabetes Father        Social History     Socioeconomic History   • Marital status: Single   Tobacco Use   • Smoking status: Every Day     Packs/day: 0.50     Years: 33.00     Pack years: 16.50     Types: Cigarettes     Last attempt to quit: 2020     Years since quitting: 3.0   • Smokeless tobacco: Former     Types: Chew   • Tobacco comments:     pt states it is too hard to quit, he is by himself.   Vaping Use   • Vaping Use: Never used   Substance and Sexual Activity   • Alcohol use: No   • Drug use: No   • Sexual activity: " Defer           Objective   Physical Exam  Vitals and nursing note reviewed. Exam conducted with a chaperone present (Chaperone present for entire examination, Doreen YEN).   Constitutional:       General: He is in acute distress (appears uncomfortable).      Appearance: Normal appearance. He is well-developed, well-groomed and overweight. He is not toxic-appearing or diaphoretic.   HENT:      Head: Normocephalic.      Mouth/Throat:      Mouth: Mucous membranes are moist.      Pharynx: Oropharynx is clear.   Eyes:      Conjunctiva/sclera: Conjunctivae normal.      Pupils: Pupils are equal, round, and reactive to light.      Comments: No conjunctival pallor   Cardiovascular:      Rate and Rhythm: Regular rhythm. Tachycardia present.   Pulmonary:      Effort: Pulmonary effort is normal.      Breath sounds: Normal breath sounds.   Abdominal:      General: Bowel sounds are normal.      Palpations: Abdomen is soft.   Genitourinary:     Rectum: Guaiac result positive. External hemorrhoid (large, swollen, tender hemroid on right half of rectum. No evidence of thrombosis.) present. No anal fissure.      Comments: Unable to assess rectal tone due to size of external hemorrhoid and patient's inability to tolerate digital examination due to pain  Musculoskeletal:         General: Normal range of motion.      Cervical back: Normal range of motion and neck supple.   Skin:     General: Skin is warm and dry.      Coloration: Skin is not jaundiced or pale.   Neurological:      Mental Status: He is alert and oriented to person, place, and time.   Psychiatric:         Mood and Affect: Mood normal.         Behavior: Behavior normal. Behavior is cooperative.         Procedures           ED Course                                           Medical Decision Making  Amount and/or Complexity of Data Reviewed  External Data Reviewed: labs, radiology and notes.  Labs: ordered. Decision-making details documented in ED Course.  Radiology:  ordered. Decision-making details documented in ED Course.  ECG/medicine tests: ordered. Decision-making details documented in ED Course.      Risk  OTC drugs.  Prescription drug management.          Patient is a 52-year-old male presenting to ED with painful hemorrhoid.  PMH significant for daily use Plavix and aspirin, history hemorrhoid surgery x3, hypertension, Coronary artery disease.  Work-up revealed leukocytosis 14.92 with no further CBC abnormalities.  CMP unremarkable with no electro disturbances, normal renal hepatic function.  Lactic acid and procalcitonin WNL.  COVID testing negative.  Blood cultures were obtained.  CT imaging of the abdomen and pelvis showed: No acute abnormality of the abdomen or pelvis, no significant findings to suggest internal hemorrhoids.  Examination revealed large tender external hemorrhoid on the left side of the rectum with no evidence of thrombosis.  Patient was given Anusol and had some mild improvement in his pain.  Patient was given a fluid bolus secondary to contrast as well as dose of Zofran to tolerate the contrast.  Throughout evaluation patient's vital signs improved with his initial tachycardia resolving and otherwise his vital signs are stable including afebrile state.  Discussed with patient need for continued outpatient use of Anusol, warm water sitz bath's, as well as follow-up with his surgeon or given information for surgeon on-call for further evaluation and monitoring outpatient to discuss treatment options.  Discussed strict return precautions and need for immediate return to ED should he develop any new or worsening symptoms patient is appreciative with no further questions, concerns, needs at this time and is stable for discharge.    Final diagnoses:   External hemorrhoid       ED Disposition  ED Disposition     ED Disposition   Discharge    Condition   Stable    Comment   --             Madison Ansari MD  [address]  [address]  [phone]    Schedule an appointment as soon as possible for a visit in 2 days      Con Martinez MD  [address]  [place] [address]  [phone]    Schedule an appointment as soon as possible for a visit in 2 days      [facility] Emergency Department  [address] [place] [id number] [phone]    As needed         Medication List      New Prescriptions    docusate sodium 100 MG capsule  Commonly known as: COLACE  Take 1 capsule by mouth 2 (Two) Times a Day for 5 days.     hydrocortisone 25 MG suppository  Commonly known as: ANUSOL-HC  Insert 1 suppository into the rectum 2 (Two) Times a Day for 7 days.           Where to Get Your Medications      These medications were sent to [facility] - [address] - [phone]  - [phone] [address] [address]    Phone: [phone]   · docusate sodium 100 MG capsule  · hydrocortisone 25 MG suppository          Iron Arita PA-C  01/06/23 [id number]

## 2023-01-06 NOTE — DISCHARGE INSTRUCTIONS
Today you have evidence of an external hemorrhoid but no internal hemorrhoids.  Please use the Anusol daily for 7 days.  Please also make sure to increase hydration with water, use the stool softeners, and increase fiber in your diet to help loosen up your stools.  Please contact your previous surgeon or see information below for surgeon on call to schedule follow-up within the next 48 hours for definitive treatment.  Please follow-up with the surgeon or your primary care provider within the next 48 hours however should you develop any new or worsening symptoms please return to the ER for further evaluation.

## 2023-01-10 ENCOUNTER — LAB (OUTPATIENT)
Dept: LAB | Facility: HOSPITAL | Age: 53
End: 2023-01-10
Payer: MEDICARE

## 2023-01-10 ENCOUNTER — OFFICE VISIT (OUTPATIENT)
Dept: ONCOLOGY | Facility: CLINIC | Age: 53
End: 2023-01-10
Payer: MEDICARE

## 2023-01-10 VITALS
RESPIRATION RATE: 16 BRPM | HEIGHT: 68 IN | DIASTOLIC BLOOD PRESSURE: 78 MMHG | HEART RATE: 106 BPM | BODY MASS INDEX: 32.92 KG/M2 | TEMPERATURE: 97.6 F | WEIGHT: 217.2 LBS | OXYGEN SATURATION: 96 % | SYSTOLIC BLOOD PRESSURE: 138 MMHG

## 2023-01-10 DIAGNOSIS — L08.9 SKIN INFECTION: ICD-10-CM

## 2023-01-10 DIAGNOSIS — Z72.0 TOBACCO USE: ICD-10-CM

## 2023-01-10 DIAGNOSIS — R74.8 ELEVATED ALKALINE PHOSPHATASE LEVEL: ICD-10-CM

## 2023-01-10 DIAGNOSIS — D72.829 LEUKOCYTOSIS, UNSPECIFIED TYPE: Primary | ICD-10-CM

## 2023-01-10 DIAGNOSIS — D58.2 ELEVATED HEMOGLOBIN: ICD-10-CM

## 2023-01-10 LAB
ALBUMIN SERPL-MCNC: 4.1 G/DL (ref 3.5–5.2)
ALBUMIN/GLOB SERPL: 1.3 G/DL
ALP SERPL-CCNC: 135 U/L (ref 39–117)
ALT SERPL W P-5'-P-CCNC: 22 U/L (ref 1–41)
ANION GAP SERPL CALCULATED.3IONS-SCNC: 10 MMOL/L (ref 5–15)
AST SERPL-CCNC: 15 U/L (ref 1–40)
BASOPHILS # BLD AUTO: 0.1 10*3/MM3 (ref 0–0.2)
BASOPHILS NFR BLD AUTO: 0.6 % (ref 0–1.5)
BILIRUB SERPL-MCNC: 0.4 MG/DL (ref 0–1.2)
BUN SERPL-MCNC: 9 MG/DL (ref 6–20)
BUN/CREAT SERPL: 10.2 (ref 7–25)
CALCIUM SPEC-SCNC: 9.2 MG/DL (ref 8.6–10.5)
CHLORIDE SERPL-SCNC: 104 MMOL/L (ref 98–107)
CO2 SERPL-SCNC: 26 MMOL/L (ref 22–29)
CREAT SERPL-MCNC: 0.88 MG/DL (ref 0.76–1.27)
DEPRECATED RDW RBC AUTO: 50.2 FL (ref 37–54)
EGFRCR SERPLBLD CKD-EPI 2021: 102.2 ML/MIN/1.73
EOSINOPHIL # BLD AUTO: 0.23 10*3/MM3 (ref 0–0.4)
EOSINOPHIL NFR BLD AUTO: 1.4 % (ref 0.3–6.2)
ERYTHROCYTE [DISTWIDTH] IN BLOOD BY AUTOMATED COUNT: 16.5 % (ref 12.3–15.4)
GLOBULIN UR ELPH-MCNC: 3.1 GM/DL
GLUCOSE SERPL-MCNC: 91 MG/DL (ref 65–99)
HCT VFR BLD AUTO: 48.2 % (ref 37.5–51)
HGB BLD-MCNC: 14.8 G/DL (ref 13–17.7)
HOLD SPECIMEN: NORMAL
IMM GRANULOCYTES # BLD AUTO: 0.11 10*3/MM3 (ref 0–0.05)
IMM GRANULOCYTES NFR BLD AUTO: 0.7 % (ref 0–0.5)
LYMPHOCYTES # BLD AUTO: 2.6 10*3/MM3 (ref 0.7–3.1)
LYMPHOCYTES NFR BLD AUTO: 16.3 % (ref 19.6–45.3)
MCH RBC QN AUTO: 26.1 PG (ref 26.6–33)
MCHC RBC AUTO-ENTMCNC: 30.7 G/DL (ref 31.5–35.7)
MCV RBC AUTO: 85 FL (ref 79–97)
MONOCYTES # BLD AUTO: 1.07 10*3/MM3 (ref 0.1–0.9)
MONOCYTES NFR BLD AUTO: 6.7 % (ref 5–12)
NEUTROPHILS NFR BLD AUTO: 11.86 10*3/MM3 (ref 1.7–7)
NEUTROPHILS NFR BLD AUTO: 74.3 % (ref 42.7–76)
NRBC BLD AUTO-RTO: 0 /100 WBC (ref 0–0.2)
PLATELET # BLD AUTO: 395 10*3/MM3 (ref 140–450)
PMV BLD AUTO: 10.7 FL (ref 6–12)
POTASSIUM SERPL-SCNC: 4.2 MMOL/L (ref 3.5–5.2)
PROT SERPL-MCNC: 7.2 G/DL (ref 6–8.5)
RBC # BLD AUTO: 5.67 10*6/MM3 (ref 4.14–5.8)
SODIUM SERPL-SCNC: 140 MMOL/L (ref 136–145)
WBC NRBC COR # BLD: 15.97 10*3/MM3 (ref 3.4–10.8)

## 2023-01-10 PROCEDURE — 99214 OFFICE O/P EST MOD 30 MIN: CPT | Performed by: NURSE PRACTITIONER

## 2023-01-10 PROCEDURE — 36415 COLL VENOUS BLD VENIPUNCTURE: CPT

## 2023-01-10 PROCEDURE — 85025 COMPLETE CBC W/AUTO DIFF WBC: CPT

## 2023-01-10 PROCEDURE — 80053 COMPREHEN METABOLIC PANEL: CPT

## 2023-01-10 RX ORDER — CLINDAMYCIN HYDROCHLORIDE 300 MG/1
300 CAPSULE ORAL 2 TIMES DAILY
Qty: 20 CAPSULE | Refills: 0 | Status: SHIPPED | OUTPATIENT
Start: 2023-01-10 | End: 2023-01-23

## 2023-01-10 NOTE — PROGRESS NOTES
[facility]  [address] [address]  [facility] [address] [phone]    Patient Name: Gilberto Roberts  Encounter Date: 01/10/2023  YOB: 1970  Patient Number: [id number]    PROGRESS NOTE    HISTORY OF PRESENT ILLNESS: Gilberto Roberts is a 52 y.o. male was seen in our office on 06/28/22 for diagnostic and management secondary polycythemia. Advised therapeutic phlebotomy to keep hematocrit less than 50    Diagnostic Workup   Pathologist review of peripheral smear showedLeukocytosis with absolute neutrophilia. Increased hemoglobin/hematocrit.  Comment: Persistent elevation of hematocrit/hemoglobin and white blood cells without clinical cause suggests the possibility of a myeloproliferative neoplasm.  Negative for BCR ABL rearrangement  Negative for JAK2 V6 17F mutation  FISH negative for CLL    INTERVAL HISTORY  Patient presents to clinic today for continued follow-up.   His last phlebotomy was 08/09/22.      Pt complains of \"pimple\" to right chest.  He denies fever.     LABS    Lab Results - Last 18 Months   Lab Units 01/10/23  1257 01/06/23  1435 10/10/22  1050 08/09/22  0826 06/28/22  0924 05/23/22  1130 01/16/22  2338   HEMOGLOBIN g/dL 14.8 15.4 15.5 16.0 17.4 17.8* 16.4   HEMATOCRIT % 48.2 48.7 48.5 49.8 51.9* 51.0 47.2   MCV fL 85.0 83.5 84.8 90.2 89.3 87.9 85.2   WBC 10*3/mm3 15.97* 14.92* 13.42* 12.61* 12.81* 13.03* 15.67*   RDW % 16.5* 16.3* 14.0 13.2 13.6 14.5 13.4   MPV fL 10.7 10.5 10.4 10.6 10.2  --  10.2   PLATELETS 10*3/mm3 395 361 369 331 306 302 318   IMM GRAN % % 0.7* 0.4 1.0*  --  0.8*  --  0.5   NEUTROS ABS 10*3/mm3 11.86* 10.24* 8.81*  --  8.43*  --  10.80*   LYMPHS ABS 10*3/mm3 2.60 3.25* 3.13*  --  2.90  --  3.22*   MONOS ABS 10*3/mm3 1.07* 1.01* 0.89  --  0.90  --  1.15*   EOS ABS 10*3/mm3 0.23 0.26 0.36  --  0.36  --  0.31   BASOS ABS 10*3/mm3 0.10 0.10 0.10  --  0.12  --  0.11   IMMATURE GRANS  (ABS) 10*3/mm3 0.11* 0.06* 0.13*  --  0.10*  --  0.08*   NRBC /100 WBC 0.0 0.0 0.0  --  0.0  --  0.0       Lab Results - Last 18 Months   Lab Units 01/10/23  1257 01/06/23  1435 10/10/22  1050 08/09/22  0826 06/28/22  0924 05/23/22  1130 01/16/22  2338   GLUCOSE mg/dL 91 94 80 85 96 68 118*   SODIUM mmol/L 140 139 140 141 142 139 137   POTASSIUM mmol/L 4.2 4.1 3.8 3.8 4.0 4.1 3.8   TOTAL CO2 mmol/L  --   --   --   --   --  26.9  --    CO2 mmol/L 26.0 24.0 29.0 30.0* 30.0*  --  25.0   CHLORIDE mmol/L 104 103 102 104 105 102 104   ANION GAP mmol/L 10.0 12.0 9.0 7.0 7.0  --  8.0   CREATININE mg/dL 0.88 0.81 0.90 0.84 0.87 0.82 0.74*   BUN mg/dL 9 11 12 16 9 10 12   BUN / CREAT RATIO  10.2 13.6 13.3 19.0 10.3 12.2 16.2   CALCIUM mg/dL 9.2 9.5 10.1 10.5 10.1 9.7 9.4   EGFR IF NONAFRICN AM mL/min/1.73  --   --   --   --   --   --  111   ALK PHOS U/L 135* 128* 152* 123* 126* 127* 133*   TOTAL PROTEIN g/dL 7.2 7.5 7.7 7.2 7.3  --  6.9   ALT (SGPT) U/L 22 22 20 19 21 19 16   AST (SGOT) U/L 15 15 15 14 16 10 10   BILIRUBIN mg/dL 0.4 0.4 0.3 0.4 0.6 0.4 0.3   ALBUMIN g/dL 4.1 4.1 4.10 4.20 4.20 4.10 3.60   GLOBULIN gm/dL 3.1 3.4 3.6 3.0 3.1  --  3.3       Lab Results - Last 18 Months   Lab Units 06/28/22  0924   LDH U/L 188   REFERENCE LAB REPORT  SEE ATTACHED REPORT  See Attached Report  See Attached Report  See Attached Report       No results for input(s): IRON, TIBC, LABIRON, FERRITIN, P4EMETZ, TSH, FOLATE in the last 41757 hours.    Invalid input(s): VITB12      PAST MEDICAL HISTORY:  ALLERGIES:  No Known Allergies  CURRENT MEDICATIONS:  Outpatient Encounter Medications as of 1/10/2023   Medication Sig Dispense Refill   • aspirin (aspirin) 81 MG EC tablet Take 1 tablet by mouth Daily. 90 tablet 3   • clopidogrel (PLAVIX) 75 MG tablet TAKE ONE TABLET BY MOUTH DAILY. (Patient taking differently: Take 75 mg by mouth Daily.) 30 tablet 3   • docusate sodium (COLACE) 100 MG capsule Take 1 capsule by mouth 2 (Two) Times a Day  for 5 days. 10 capsule 0   • escitalopram (LEXAPRO) 5 MG tablet Take 1 tablet by mouth Daily. 90 tablet 3   • hydrocortisone (ANUSOL-HC) 25 MG suppository Insert 1 suppository into the rectum 2 (Two) Times a Day for 7 days. 14 suppository 0   • lisinopril (PRINIVIL,ZESTRIL) 10 MG tablet Take 1 tablet by mouth Daily. 30 tablet 2   • loratadine-pseudoephedrine (Claritin-D 24 Hour)  MG per 24 hr tablet Take 1 tablet by mouth Daily. 30 tablet 5   • simvastatin (ZOCOR) 40 MG tablet Take 1 tablet by mouth Every Night. 90 tablet 3   • clindamycin (Cleocin) 300 MG capsule Take 1 capsule by mouth 2 (Two) Times a Day. 20 capsule 0     No facility-administered encounter medications on file as of 1/10/2023.     ADULT ILLNESSES:  Patient Active Problem List   Diagnosis Code   • PAD (peripheral artery disease) ([facility]) I73.9   • Acute pain R52   • Essential hypertension I10   • Hyperlipidemia E78.5   • Atherosclerosis of native artery of left lower extremity with intermittent claudication ([facility]) I70.212   • Atherosclerosis of native artery of extremity ([facility]) I70.209   • Cigarette smoker F17.210   • Congenital hearing disorder of both ears H90.5   • Current moderate episode of major depressive disorder without prior episode ([facility]) F32.1   • Hallux valgus with bunions M20.10, M21.619   • Hemorrhoids K64.9   • Nerve damage T14.8XXA   • Pure hypercholesterolemia E78.00   • Spondylosis of thoracolumbar region without myelopathy or radiculopathy M47.815   • Unstable angina ([facility]) I20.0   • Hallux valgus with bunions M20.10, M21.619   • Nerve damage T14.8XXA   • Coronary artery disease involving native coronary artery of native heart with unstable angina pectoris ([facility]) I25.110   • PAD (peripheral artery disease) ([facility]) I73.9   • Unstable angina ([facility]) I20.0       HEALTH MAINTENANCE ITEMS:  Health Maintenance Due   Topic Date Due   • COVID-19 Vaccine (1) Never done   • ZOSTER VACCINE (1 of 2) Never done       <no information>  Last  Completed Colonoscopy          COLORECTAL CANCER SCREENING (COLONOSCOPY - Every 10 Years) Next due on 7/16/2028 01/06/2023  Fecal Occult Blood component of POC Occult Blood Stool    07/16/2018  SCANNED - COLONOSCOPY              Immunization History   Administered Date(s) Administered   • Flu Vaccine Quad PF >36MO 10/04/2020   • FluLaval/Fluzone >6mos 10/23/2019, 10/05/2022   • Influenza TIV (IM) 01/31/2008   • Pneumococcal Conjugate 13-Valent (PCV13) 10/04/2020   • Pneumococcal Polysaccharide (PPSV23) 07/24/2018   • Tdap 02/08/2017     Last Completed Mammogram     This patient has no relevant Health Maintenance data.            FAMILY HISTORY:  Family History   Problem Relation Age of Onset   • COPD Mother         respiratory failure   • Dementia Father    • Diabetes Father      SOCIAL HISTORY:  Social History     Socioeconomic History   • Marital status: Single   Tobacco Use   • Smoking status: Every Day     Packs/day: 0.50     Years: 33.00     Pack years: 16.50     Types: Cigarettes     Last attempt to quit: 2020     Years since quitting: 3.0   • Smokeless tobacco: Former     Types: Chew   • Tobacco comments:     pt states it is too hard to quit, he is by himself.   Vaping Use   • Vaping Use: Never used   Substance and Sexual Activity   • Alcohol use: No   • Drug use: No   • Sexual activity: Defer       REVIEW OF SYSTEMS:  Review of Systems   Constitutional: Positive for fatigue. Negative for activity change, appetite change, fever, unexpected weight gain and unexpected weight loss.   HENT: Negative for dental problem, facial swelling, swollen glands and trouble swallowing.         Congenital hearing loss, uses hearing aids    Eyes: Negative for double vision and discharge.   Respiratory: Negative for cough, shortness of breath and wheezing.    Cardiovascular: Negative for chest pain, palpitations and leg swelling.   Gastrointestinal: Negative for abdominal pain, nausea and vomiting. Blood in stool:  Hemorrhoids Has had mutliple surgeries.  Wears briefs r/t the bleeding.   Endocrine: Negative.    Genitourinary: Negative for dysuria and hematuria.   Musculoskeletal: Negative for arthralgias and myalgias.   Skin: Negative for rash, skin lesions and wound.        States he has pimples on his back often   Allergic/Immunologic: Negative for immunocompromised state.   Neurological: Negative for speech difficulty, light-headedness, headache, memory problem and confusion.   Hematological: Negative for adenopathy.   Psychiatric/Behavioral: Negative for self-injury and suicidal ideas. The patient is nervous/anxious.        /78   Pulse 106   Temp 97.6 °F (36.4 °C)   Resp 16   Ht 172.7 cm (68\")   Wt 98.5 kg (217 lb 3.2 oz)   SpO2 96%   BMI 33.03 kg/m²  Body surface area is 2.12 meters squared.      Physical Exam  Constitutional:       Appearance: Normal appearance.   HENT:      Head: Normocephalic and atraumatic.   Cardiovascular:      Rate and Rhythm: Normal rate and regular rhythm.   Pulmonary:      Effort: Pulmonary effort is normal.      Breath sounds: Normal breath sounds.   Chest:      Comments: Abcess to right breast approx 6 cm wide.  Indurated, red, warm to touch, tender to touch     Abdominal:      General: Bowel sounds are normal.      Palpations: Abdomen is soft.   Musculoskeletal:         General: Normal range of motion.      Right lower leg: No edema.      Left lower leg: No edema.   Skin:     General: Skin is warm and dry.   Neurological:      Mental Status: He is alert and oriented to person, place, and time.   Psychiatric:         Attention and Perception: Attention normal.         Mood and Affect: Mood normal.         Judgment: Judgment normal.         Gilberto Roberts reports a pain score of 0.  No intervention indicated.      ASSESSMENT / PLAN:    1. Skin infection       1.  Leukocytosis / Elevated Hemoglobin  -All work-up for myeloproliferative neoplasms have been negative  - Patient smokes  1-1/2 packs/day and has for the past 30 years which is likely etiology of his leukocytosis and polycythemia  -Advised therapeutic phlebotomy to keep hematocrit less than 50  -He is currently taking Plavix and ASA   -Labs today with WBC 15.97 , hemoglobin 14.8, hematocrit 48.52,   -No phlebotomy indicated today  -No fever or night sweats, however pt does have abscess to right breast      2.  Peripheral artery disease  -Claudication to the left lower extremity s/p atherectomy, angioplasty, and stenting of the SFA in 2020   -Continue Plavix and ASA  -Continue follow up with Dr. Billings, Vascular Surgery    3.  Tobacco abuse  -Patient smokes 1-1/2 packs/day and has for the past 30 years  -Advised smoking cessation     4.  Elevated Alk Phos   -Alk Phos 135  -Fatty liver on 2019 scan   On Simvistastin  -Stable for observation     5.  Abscess to Right Breast  -5-6 cm widest dimension   -eRX Clinidamycin 300 mg PO BID x 10 days   -Advised patient must follow up with PCP  Dr Ansari if no improvement.         PLAN:  -Phlebotomy not indicated today.  -Goal is to keep Hct < 50  - Continue all follow-up, treatment plans, medications per PCP and any other providers  - Patient will return to clinic in 3 months for continued follow-up.  He will have CBC, CMP drawn before his visit.  - Patient voices understanding and agrees to treatment plan.            Conchita Winslow, APRN  01/10/2023

## 2023-01-11 LAB
BACTERIA SPEC AEROBE CULT: NORMAL
BACTERIA SPEC AEROBE CULT: NORMAL

## 2023-01-13 ENCOUNTER — PATIENT OUTREACH (OUTPATIENT)
Dept: CASE MANAGEMENT | Facility: OTHER | Age: 53
End: 2023-01-13
Payer: MEDICARE

## 2023-01-13 NOTE — OUTREACH NOTE
AMBULATORY CASE MANAGEMENT NOTE    Name and Relationship of Patient/Support Person: Gilberto Roberts K - Self    Patient Outreach    Received message from patient that he was contacted by [facility] last night and was told not to come in today.    Care Coordination    Contacted [facility] to cancel ride for today and spoke with Divehi.        CLAUDIA PAGAN  Ambulatory Case Management    1/13/2023, 08:10 CST

## 2023-01-16 ENCOUNTER — PATIENT OUTREACH (OUTPATIENT)
Dept: CASE MANAGEMENT | Facility: OTHER | Age: 53
End: 2023-01-16
Payer: MEDICARE

## 2023-01-16 NOTE — OUTREACH NOTE
AMBULATORY CASE MANAGEMENT NOTE    Name and Relationship of Patient/Support Person: Gilberto Roberts K - Self    Patient Outreach    Patient reports he did not understand the person that contacted him to cancel his last dental appointment. He thought they mentioned delivering his dentures to his home but was very unclear.    Care Coordination    Spoke with [facility] and the dentures are not in office at this time. They will call the patient when they are ready for fitting. They will call for an appointment once they arrive in the office. [facility] requested a 72 hour notice in order for transportation to be scheduled.     Care Coordination    Contacted [facility] to schedule transportation for appointment on 1.23.22 @ 11:00. Spoke with Meghan. Patient will be picked up @ 10:00 and it can be 15 minutes before or after this time. Patient will call for his return ride home. Reference #[id number].    Patient Outreach    Spoke with patient to inform of the above information.     CLAUDIA PAGAN  Ambulatory Case Management    1/16/2023, 11:09 CST

## 2023-01-17 NOTE — PROGRESS NOTES
[facility]  [address] [address]  Dept: [phone]  Dept Fax: [phone]    Ameya Ventura is a 44 y.o. male who presents today for his medicalconditions/complaints as noted below. Ameya Ventura is c/o of Pre-op Exam      HPI:         29-year-old male patient presents with concern for eye injury. Patient reportedly underwent traumatic eye injury. Currently already following with [facility]. Patient has been seeing eye specialist.  Lauren Ratliff surgery in October. Reports still having eye pain, tearing, visual loss. Is scheduled for left eyeball removal.    Type 2 diabetes last A1c 7.7 currently managed with metformin 1500 daily. Past Medical History:   Diagnosis Date    Eczema     GERD (gastroesophageal reflux disease)     Vitiligo         Current Outpatient Medications   Medication Sig Dispense Refill    metFORMIN (GLUCOPHAGE XR) 750 MG extended release tablet Take 2 tablets by mouth daily With food 60 tablet 3    Blood Glucose Monitoring Suppl (TRUE METRIX GO GLUCOSE METER) w/Device KIT 1 each by Does not apply route 3 times daily (before meals) 1 kit 0    blood glucose test strips (TRUE METRIX BLOOD GLUCOSE TEST) strip 1 each by In Vitro route 3 times daily As needed. 300 each 3    Lancets MISC 1 each by Does not apply route 2 times daily 300 each 1     No current facility-administered medications for this visit. No Known Allergies    Subjective:      Review of Systems   Constitutional:  Negative for chills and fatigue. HENT:  Negative for congestion, ear pain, sinus pain and sore throat. Eyes:  Positive for pain and visual disturbance. Respiratory:  Negative for cough and shortness of breath. Cardiovascular:  Negative for chest pain and palpitations. Gastrointestinal:  Negative for abdominal pain, diarrhea, nausea and vomiting.    Genitourinary:  Negative for penile pain and Follow-up on file. testicular pain. Musculoskeletal:  Negative for back pain, joint swelling and neck pain. Skin:  Negative for rash. Neurological:  Negative for dizziness and light-headedness. Hematological:  Does not bruise/bleed easily. All other systems reviewed and are negative.    :Objective     Physical Exam  Vitals and nursing note reviewed. Constitutional:       Appearance: Normal appearance. Cardiovascular:      Rate and Rhythm: Normal rate. Pulmonary:      Effort: Pulmonary effort is normal.      Breath sounds: Normal breath sounds. Skin:     General: Skin is warm and dry. Neurological:      General: No focal deficit present. Mental Status: He is alert and oriented to person, place, and time. /86 (Site: Right Upper Arm, Position: Sitting, Cuff Size: Medium Adult)   Pulse 96   Temp 97.4 °F (36.3 °C) (Tympanic)   Resp 16   Ht 5' 5\" (1.651 m)   Wt 175 lb (79.4 kg)   SpO2 98%   BMI 29.12 kg/m²     Lab Review   Orders Only on 12/13/2022   Component Date Value    Diabetic Retinopathy 10/31/2022 Negative    Hospital Outpatient Visit on 12/01/2022   Component Date Value    Hemoglobin A1C 12/01/2022 7.7 (A)     Estimated Avg Glucose 12/01/2022 174    Office Visit on 08/11/2022   Component Date Value    Hemoglobin A1C 08/11/2022 7.5        Assessment and Plan      1. Pre-op evaluation  -     EKG 12 Lead - Clinic Performed; Future  2. Blunt trauma of left eye, sequela  3. Uncontrolled type 2 diabetes mellitus with hyperglycemia (HCC)  -     metFORMIN (GLUCOPHAGE XR) 750 MG extended release tablet; Take 2 tablets by mouth daily With food, Disp-60 tablet, R-3Normal       EKG completed  Labs reviewed  Stable for upcoming procedure          No results found for this visit on 01/17/23. Return in about 3 months (around 4/17/2023), or if symptoms worsen or fail to improve.         Orders Placed This Encounter   Medications    metFORMIN (GLUCOPHAGE XR) 750 MG extended release tablet     Sig: Take 2 tablets by mouth daily With food     Dispense:  60 tablet     Refill:  3        Patient given educational materials - see patient instructions. Discussed use, benefit, and side effects of prescribed medications. All patientquestions answered. Pt voiced understanding. Patient given educational materials - see patient instructions. Discussed use, benefit, and side effects of prescribed medications. All patientquestions answered. Pt voiced understanding. This note was transcribed using dictation with Dragon services. Efforts were made to correct any errors but some words may be misinterpreted.     Patient assumes risks associated with failure to complete recommended testing and treatments in a timely manner    Electronically signed by MARBELLA Roa CNP on 1/17/2023at 12:45 PM

## 2023-01-23 ENCOUNTER — OFFICE VISIT (OUTPATIENT)
Dept: FAMILY MEDICINE CLINIC | Facility: CLINIC | Age: 53
End: 2023-01-23
Payer: MEDICARE

## 2023-01-23 ENCOUNTER — TELEPHONE (OUTPATIENT)
Dept: FAMILY MEDICINE CLINIC | Facility: CLINIC | Age: 53
End: 2023-01-23

## 2023-01-23 VITALS
BODY MASS INDEX: 32.67 KG/M2 | WEIGHT: 215.6 LBS | SYSTOLIC BLOOD PRESSURE: 94 MMHG | TEMPERATURE: 98 F | OXYGEN SATURATION: 98 % | DIASTOLIC BLOOD PRESSURE: 64 MMHG | HEIGHT: 68 IN | RESPIRATION RATE: 20 BRPM | HEART RATE: 71 BPM

## 2023-01-23 DIAGNOSIS — E78.5 HYPERLIPIDEMIA, UNSPECIFIED HYPERLIPIDEMIA TYPE: ICD-10-CM

## 2023-01-23 DIAGNOSIS — L08.9 SKIN INFECTION: Primary | ICD-10-CM

## 2023-01-23 DIAGNOSIS — I10 ESSENTIAL HYPERTENSION: ICD-10-CM

## 2023-01-23 DIAGNOSIS — I73.9 PAD (PERIPHERAL ARTERY DISEASE): ICD-10-CM

## 2023-01-23 DIAGNOSIS — L60.2 LONG TOENAIL: ICD-10-CM

## 2023-01-23 DIAGNOSIS — K64.9 HEMORRHOIDS, UNSPECIFIED HEMORRHOID TYPE: ICD-10-CM

## 2023-01-23 PROCEDURE — 99214 OFFICE O/P EST MOD 30 MIN: CPT | Performed by: FAMILY MEDICINE

## 2023-01-23 RX ORDER — CHLORHEXIDINE GLUCONATE 213 G/1000ML
SOLUTION TOPICAL DAILY PRN
Qty: 473 ML | Refills: 0 | Status: SHIPPED | OUTPATIENT
Start: 2023-01-23

## 2023-01-23 NOTE — PROGRESS NOTES
"Subjective cc: skin infection   Gilberto Roberts is a 52 y.o. male.     History of Present Illness     The patient presents today for an abscess under his right arm. He reports he had a bad hemorrhoid approximately 3 weeks ago. He states he did not have a good experience at that time. The patient reports his white blood cell count was elevated on 01/21/2023. He states the pus did drain out. The patient reports he can still feel the abscess, but it is not bad. He states the antibiotic helped. The patient reports he gets a lot of acne type skin issues on his back. He states he cannot pop them.    The patient reports he was seen in the ER for hemorrhoids. He states the next day it got better.    The patient reports he was told his right leg blood flow is flowing. He states he has been trying to get money for that, but he does not get his insurance. The patient reports he has seen a vascular doctor in the past. He states he would like to see Dr. Billings again.    The patient reports he needs to get his nails done.    The following portions of the patient's history were reviewed and updated as appropriate: allergies, current medications, past family history, past medical history, past social history, past surgical history and problem list.        Review of Systems    Objective   Blood pressure 94/64, pulse 71, temperature 98 °F (36.7 °C), temperature source Infrared, resp. rate 20, height 172.7 cm (68\"), weight 97.8 kg (215 lb 9.6 oz), SpO2 98 %.  Physical Exam  Vitals and nursing note reviewed.   Constitutional:       General: He is not in acute distress.     Appearance: He is not toxic-appearing.   Cardiovascular:      Rate and Rhythm: Normal rate.      Pulses: Normal pulses.   Pulmonary:      Effort: Pulmonary effort is normal. No respiratory distress.      Breath sounds: Normal breath sounds.   Chest:       Skin:     General: Skin is warm and dry.   Neurological:      Mental Status: He is alert and oriented to person, " place, and time.      Gait: Gait abnormal.   Psychiatric:         Mood and Affect: Mood normal.         Behavior: Behavior normal.         Thought Content: Thought content normal.         Judgment: Judgment normal.         Assessment & Plan   Problems Addressed this Visit        Cardiac and Vasculature    PAD (peripheral artery disease) ([facility])    Relevant Orders    Ambulatory Referral to Vascular Surgery    Essential hypertension    Hyperlipidemia       Gastrointestinal Abdominal     Hemorrhoids   Other Visit Diagnoses     Skin infection    -  Primary    Relevant Medications    chlorhexidine (Hibiclens) 4 % external liquid    Long toenail        Relevant Orders    Ambulatory Referral to Podiatry      Diagnoses       Codes Comments    Skin infection    -  Primary ICD-10-CM: L08.9  ICD-9-CM: 686.9     Essential hypertension     ICD-10-CM: I10  ICD-9-CM: 401.9     Hyperlipidemia, unspecified hyperlipidemia type     ICD-10-CM: E78.5  ICD-9-CM: 272.4     Hemorrhoids, unspecified hemorrhoid type     ICD-10-CM: K64.9  ICD-9-CM: 455.6     PAD (peripheral artery disease) ([facility])     ICD-10-CM: I73.9  ICD-9-CM: 443.9     Long toenail     ICD-10-CM: L60.2  ICD-9-CM: 703.8           1. Long toenail/foot care: Chronic.  - Referral to podiatry as patient has missed his last few appointments.    2. Skin infection: Patient appears to have a healing abscess under his right axilla. Patient has been on oral antibiotics. It no longer appears to be an abscess and instead it is just an area of induration. Recommend use of Hibiclens. Advised on warning signs to monitor for. Contact the office with any concerns.    3. Hypertension: Chronic, controlled.  - Continue with current medication.    4. Hyperlipidemia: Chronic, stable.  - Continue on simvastatin.    5. Hemorrhoids: Resolved.    6. Peripheral arterial disease: Chronic, uncontrolled.  - Referral to vascular surgery for reevaluation. Patient continues on statin, aspirin, and  Plavix.    Transcribed from ambient dictation for Madison Ansari MD by Kenton Woods, Quality .  01/23/23   13:27 CST    Patient or patient representative verbalized consent to the visit recording.  I have personally performed the services described in this document as transcribed by the above individual, and it is both accurate and complete.        This document has been electronically signed by Madison Ansari MD on January 28, 2023 09:50 CST

## 2023-01-24 DIAGNOSIS — I10 ESSENTIAL HYPERTENSION: ICD-10-CM

## 2023-01-24 RX ORDER — LISINOPRIL 10 MG/1
10 TABLET ORAL DAILY
Qty: 30 TABLET | Refills: 2 | Status: SHIPPED | OUTPATIENT
Start: 2023-01-24

## 2023-01-24 NOTE — TELEPHONE ENCOUNTER
Rx Refill Note  Requested Prescriptions     Pending Prescriptions Disp Refills   • lisinopril (PRINIVIL,ZESTRIL) 10 MG tablet [Pharmacy Med Name: LISINOPRIL 10 MG TAB 10 Tablet] 30 tablet 2     Sig: TAKE 1 TABLET BY MOUTH DAILY.      Last office visit with prescribing clinician: 1/23/2023    Next office visit with prescribing clinician: 4/6/2023                         Would you like a call back once the refill request has been completed: [] Yes [] No    If the office needs to give you a call back, can they leave a voicemail: [] Yes [] No    Erika Yu MA  01/24/23, 13:52 CST

## 2023-01-25 ENCOUNTER — PATIENT OUTREACH (OUTPATIENT)
Dept: CASE MANAGEMENT | Facility: OTHER | Age: 53
End: 2023-01-25
Payer: MEDICARE

## 2023-01-25 NOTE — OUTREACH NOTE
AMBULATORY CASE MANAGEMENT NOTE    Name and Relationship of Patient/Support Person: Gilberto Roberts K - Self    Patient Outreach    Received call from patient and he is needing assistance scheduling a Vascular provider appointment on 1.31.23 @ 11:30 AM.    Care Coordination    Contacted [facility] transportation and spoke with the automated system Debra to schedule appointment. Patient will be picked up @ 9:45-10:15 AM. Reference #[id number].     Patient Outreach    Informed patient of the above information.         CLAUDIA PAGAN  Ambulatory Case Management    1/25/2023, 11:53 CST

## 2023-01-30 ENCOUNTER — PATIENT OUTREACH (OUTPATIENT)
Dept: CASE MANAGEMENT | Facility: OTHER | Age: 53
End: 2023-01-30
Payer: MEDICARE

## 2023-01-30 DIAGNOSIS — I73.9 PAD (PERIPHERAL ARTERY DISEASE): Primary | ICD-10-CM

## 2023-01-30 NOTE — OUTREACH NOTE
AMBULATORY CASE MANAGEMENT NOTE    Name and Relationship of Patient/Support Person: KieranzoilaGilberto K - Self    Care Coordination    Received email from Iron at [facility] that they next step of the denture process has arrived at the office. They are needing to schedule his appointment.     Care Coordination    Spoke with Iron at [facility] to schedule appointment for 2.6.23 @ 2:30 PM.    Care Coordination    Spoke with [facility] transportation to schedule appointment on 2.6.23 @ 2:30 PM.  time will be between 1:15and 1:45. Reference #[id number]. Patient will call for return trip home    Patient Outreach    Informed patient of the above information.     Patient Outreach    Received message from patient that he was notified by his vascular provider office that they are cancelling his appointment for tomorrow. He requested assistance with cancelling his transportation to the appointment.     Patient Outreach    Spoke with patient and he did not understand the person that contacted him to cancel his appointment. He is unsure if they will call to reschedule of not.     Care Coordination    Contacted [facility] transportation to cancel ride for tomorrow. Spoke with Dajuan. She contacted the transportation company([employer]) and cancelled the ride.             CLAUDIA PAGAN  Ambulatory Case Management    1/30/2023, 09:50 CST

## 2023-01-31 ENCOUNTER — PATIENT OUTREACH (OUTPATIENT)
Dept: CASE MANAGEMENT | Facility: OTHER | Age: 53
End: 2023-01-31
Payer: MEDICARE

## 2023-01-31 NOTE — OUTREACH NOTE
AMBULATORY CASE MANAGEMENT NOTE    Name and Relationship of Patient/Support Person: Gilberto Roberts - Self    Care Coordination    Received call from patient and he was concerned that he has an appointment this Friday. However, patient does not have any appointments with  until 3.3.23. He did not completely understand the person giving him the appointment date/time yesterday. Patient appreciative of the clarification.         CLAUDIA PAGAN  Ambulatory Case Management    1/31/2023, 10:33 CST

## 2023-02-20 ENCOUNTER — PATIENT OUTREACH (OUTPATIENT)
Dept: CASE MANAGEMENT | Facility: OTHER | Age: 53
End: 2023-02-20
Payer: MEDICARE

## 2023-02-20 NOTE — OUTREACH NOTE
AMBULATORY CASE MANAGEMENT NOTE    Name and Relationship of Patient/Support Person: [facility] transportation - Provider    Care Coordination    Spoke with the automated [facility] transportation to schedule ride for appointment on 3.3.23 @ 11:00 AM. The confirmation #[id number].  will be between  9:15-9:45 AM.    Patient Outreach    Updated patient on above information.         Li PAGAN  Ambulatory Case Management    2/20/2023, 11:16 CST

## 2023-02-24 ENCOUNTER — PATIENT OUTREACH (OUTPATIENT)
Dept: CASE MANAGEMENT | Facility: OTHER | Age: 53
End: 2023-02-24
Payer: MEDICARE

## 2023-02-24 NOTE — OUTREACH NOTE
AMBULATORY CASE MANAGEMENT NOTE    Name and Relationship of Patient/Support Person: [facility] - Provider    Care Coordination    Received email from [facility] that patient has an appointment on Monday 2/27/23 at 2pm.     Care Coordination    Contacted [facility] to schedule transportation for appointment on 2.27.23 @ 2:00 PM. Spoke with Dany. They will not schedule the ride due to lack of 3 day notice.     Care Coordination    Email to [facility] requesting appointment be rescheduled.    Care Coordination    Received email from [facility] with new appointment scheduled for March 2 at 10:30 AM    Care Coordination    Contacted [facility] to schedule transportation for 3.2.23 @ 10:30 AM. Scheduled through automated system.  will be between 9:15-9:45 AM.  Reference #[id number]    Patient Outreach    Updated patient with ride information.     Li PAGAN  Ambulatory Case Management    2/24/2023, 09:57 CST

## 2023-02-27 ENCOUNTER — PATIENT OUTREACH (OUTPATIENT)
Dept: CASE MANAGEMENT | Facility: OTHER | Age: 53
End: 2023-02-27
Payer: MEDICARE

## 2023-02-27 NOTE — OUTREACH NOTE
AMBULATORY CASE MANAGEMENT NOTE    Name and Relationship of Patient/Support Person: [facility] - Provider    Care Coordination    Contacted [facility] transportation to schedule ride for 3.7.23 @ 2:30 PM. Ride scheduled via automated system. He will be picked up between 12:45-1:15 PM  Reference #[id number].    Patient Outreach    Updated patient on the above information.     Li PAGAN  Ambulatory Case Management    2/27/2023, 10:44 CST

## 2023-03-02 ENCOUNTER — TELEPHONE (OUTPATIENT)
Dept: VASCULAR SURGERY | Facility: CLINIC | Age: 53
End: 2023-03-02
Payer: MEDICARE

## 2023-03-02 NOTE — TELEPHONE ENCOUNTER
LEFT VM FOR PT TO RETURN CALL TO SEE IF TESTING NEEDED TO BE CANCELED OR IF OFFICE APT NEEDED TO BE PLACED BACK ON FOR TOMORROW.

## 2023-03-06 ENCOUNTER — TELEPHONE (OUTPATIENT)
Dept: PODIATRY | Facility: CLINIC | Age: 53
End: 2023-03-06
Payer: MEDICARE

## 2023-03-06 NOTE — TELEPHONE ENCOUNTER
Called patient regarding appt on 03/07/2023. Left message for patient to return call if any questions or concerns arise.

## 2023-03-21 NOTE — PATIENT INSTRUCTIONS
Personalized Preventive Plan for Rahat Gama - 11/6/2020  Medicare offers a range of preventive health benefits. Some of the tests and screenings are paid in full while other may be subject to a deductible, co-insurance, and/or copay. Some of these benefits include a comprehensive review of your medical history including lifestyle, illnesses that may run in your family, and various assessments and screenings as appropriate. After reviewing your medical record and screening and assessments performed today your provider may have ordered immunizations, labs, imaging, and/or referrals for you. A list of these orders (if applicable) as well as your Preventive Care list are included within your After Visit Summary for your review. Other Preventive Recommendations:    · A preventive eye exam performed by an eye specialist is recommended every 1-2 years to screen for glaucoma; cataracts, macular degeneration, and other eye disorders. · A preventive dental visit is recommended every 6 months. · Try to get at least 150 minutes of exercise per week or 10,000 steps per day on a pedometer . · Order or download the FREE \"Exercise & Physical Activity: Your Everyday Guide\" from [employer]. Call 8-730.133.2355 or search [employer] online. · You need 8882-2556 mg of calcium and 4379-7101 IU of vitamin D per day. It is possible to meet your calcium requirement with diet alone, but a vitamin D supplement is usually necessary to meet this goal.  · When exposed to the sun, use a sunscreen that protects against both UVA and UVB radiation with an SPF of 30 or greater. Reapply every 2 to 3 hours or after sweating, drying off with a towel, or swimming. · Always wear a seat belt when traveling in a car. Always wear a helmet when riding a bicycle or motorcycle. Respiratory

## 2023-03-30 ENCOUNTER — PATIENT OUTREACH (OUTPATIENT)
Dept: CASE MANAGEMENT | Facility: OTHER | Age: 53
End: 2023-03-30
Payer: MEDICARE

## 2023-03-30 NOTE — OUTREACH NOTE
AMBULATORY CASE MANAGEMENT NOTE    Name and Relationship of Patient/Support Person: [facility] Transportation - Provider    Care Coordination    Contacted [facility] transportation to schedule ride for 4.6.23 @ 9:30 AM. Spoke with Heena.  time will be 15 minutes before or after 8:30 AM. Patient will call for return trip home. Reference #[id number]    Patient Outreach    Provided patient with the above information.         Li PAGAN  Ambulatory Case Management    3/30/2023, 13:47 CDT

## 2023-04-13 ENCOUNTER — EXTERNAL PBMM DATA (OUTPATIENT)
Dept: PHARMACY | Facility: OTHER | Age: 53
End: 2023-04-13
Payer: MEDICARE

## 2023-05-11 ENCOUNTER — PATIENT OUTREACH (OUTPATIENT)
Dept: CASE MANAGEMENT | Facility: OTHER | Age: 53
End: 2023-05-11
Payer: MEDICARE

## 2023-05-11 NOTE — OUTREACH NOTE
AMBULATORY CASE MANAGEMENT NOTE    Name and Relationship of Patient/Support Person: Gilberto Roberts K - Jose Manuel    Patient Outreach    Received call from patient requesting assistance with appointment to PCP on 5.17.23.    Care Coordination    Contacted [facility] transportation to schedule ride for 5.17.23 2:15 PM appointment.  time will be 1:15-1:45 PM. Reservation # [id number]. Scheduled via automated system.      Patient Outreach    Informed patient of the above information.         Li PAGAN  Ambulatory Case Management    5/11/2023, 12:53 CDT

## 2023-05-17 ENCOUNTER — PATIENT OUTREACH (OUTPATIENT)
Dept: CASE MANAGEMENT | Facility: OTHER | Age: 53
End: 2023-05-17
Payer: MEDICARE

## 2023-05-17 ENCOUNTER — OFFICE VISIT (OUTPATIENT)
Dept: FAMILY MEDICINE CLINIC | Facility: CLINIC | Age: 53
End: 2023-05-17

## 2023-05-17 VITALS
RESPIRATION RATE: 18 BRPM | OXYGEN SATURATION: 98 % | BODY MASS INDEX: 29.4 KG/M2 | HEART RATE: 67 BPM | SYSTOLIC BLOOD PRESSURE: 137 MMHG | WEIGHT: 194 LBS | DIASTOLIC BLOOD PRESSURE: 81 MMHG | HEIGHT: 68 IN | TEMPERATURE: 98 F

## 2023-05-17 DIAGNOSIS — N52.9 ERECTILE DYSFUNCTION, UNSPECIFIED ERECTILE DYSFUNCTION TYPE: ICD-10-CM

## 2023-05-17 DIAGNOSIS — M54.50 CHRONIC BILATERAL LOW BACK PAIN WITHOUT SCIATICA: ICD-10-CM

## 2023-05-17 DIAGNOSIS — F41.9 ANXIETY: ICD-10-CM

## 2023-05-17 DIAGNOSIS — G89.29 CHRONIC BILATERAL LOW BACK PAIN WITHOUT SCIATICA: ICD-10-CM

## 2023-05-17 DIAGNOSIS — I10 ESSENTIAL HYPERTENSION: Primary | ICD-10-CM

## 2023-05-17 DIAGNOSIS — R63.4 WEIGHT LOSS: ICD-10-CM

## 2023-05-17 PROCEDURE — 3079F DIAST BP 80-89 MM HG: CPT | Performed by: FAMILY MEDICINE

## 2023-05-17 PROCEDURE — 99214 OFFICE O/P EST MOD 30 MIN: CPT | Performed by: FAMILY MEDICINE

## 2023-05-17 PROCEDURE — 3075F SYST BP GE 130 - 139MM HG: CPT | Performed by: FAMILY MEDICINE

## 2023-05-17 RX ORDER — ESCITALOPRAM OXALATE 10 MG/1
10 TABLET ORAL DAILY
Qty: 90 TABLET | Refills: 1 | Status: SHIPPED | OUTPATIENT
Start: 2023-05-17

## 2023-05-17 NOTE — OUTREACH NOTE
AMBULATORY CASE MANAGEMENT NOTE    Name and Relationship of Patient/Support Person: Gilberto Roberts K - Self    Patient Outreach    Received VM message from patient requesting ACM teach his girlfriend how to schedule his transportation for appointments.     Patient Outreach    Attempted to return patient call and left a VM message.     Patient Outreach    Received call from patient. Provided patient with phone number to schedule transportation appointment.    Li S  Ambulatory Case Management    5/17/2023, 13:42 CDT

## 2023-05-17 NOTE — PROGRESS NOTES
"Subjective cc: chronic back pain   Gilberto Roberts is a 52 y.o. male.     History of Present Illness     The patient presents today with complaints of back pain. He is accompanied by an adult female.    The patient requests a pain cream for the patient's back pain. The patient's back pain is worsening. He has seen orthopedics in the past. He was told 2 years ago they did not think surgery was an option for him. He has been to physical therapy in the past. He has a stationary bike at home. He is unable to bend over without his back swelling.     The patient has lost a significant amount of weight. He now has a girlfriend who is cooking for him. He is eating more, and he feels like he is in a better state of mind.    The patient is still taking Lexapro 5 mg every night. He feels the dose might need to be increased.     He is having trouble with his erectile dysfunction. He takes his medication before he goes to bed. The adult female inquired about Viagra for the patient.       The following portions of the patient's history were reviewed and updated as appropriate: allergies, current medications, past family history, past medical history, past social history, past surgical history, and problem list.        Review of Systems    Objective   Blood pressure 137/81, pulse 67, temperature 98 °F (36.7 °C), temperature source Infrared, resp. rate 18, height 172.7 cm (68\"), weight 88 kg (194 lb), SpO2 98 %.  Physical Exam  Vitals and nursing note reviewed.   Constitutional:       General: He is not in acute distress.     Appearance: Normal appearance. He is not toxic-appearing.   HENT:      Head: Normocephalic and atraumatic.      Right Ear: External ear normal.      Left Ear: External ear normal.   Cardiovascular:      Rate and Rhythm: Tachycardia present.      Pulses: Normal pulses.   Pulmonary:      Effort: Pulmonary effort is normal. No respiratory distress.   Musculoskeletal:         General: Tenderness present.   Skin:   "   General: Skin is warm and dry.   Neurological:      Mental Status: He is alert and oriented to person, place, and time.      Motor: Weakness present.      Gait: Gait abnormal.   Psychiatric:         Mood and Affect: Mood normal.         Behavior: Behavior normal.         Thought Content: Thought content normal.         Judgment: Judgment normal.       Assessment & Plan   Problems Addressed this Visit          Cardiac and Vasculature    Essential hypertension - Primary    Relevant Orders    Ambulatory Referral to Cardiology     Other Visit Diagnoses       Chronic bilateral low back pain without sciatica        Relevant Orders    Ambulatory Referral to Physical Therapy Evaluate and treat    XR Spine Lumbar 2 or 3 View (In Office)    Weight loss        Anxiety        Relevant Medications    escitalopram (LEXAPRO) 10 MG tablet    Erectile dysfunction, unspecified erectile dysfunction type        Relevant Orders    Ambulatory Referral to Cardiology          Diagnoses         Codes Comments    Essential hypertension    -  Primary ICD-10-CM: I10  ICD-9-CM: 401.9     Chronic bilateral low back pain without sciatica     ICD-10-CM: M54.50, G89.29  ICD-9-CM: 724.2, 338.29     Weight loss     ICD-10-CM: R63.4  ICD-9-CM: 783.21     Anxiety     ICD-10-CM: F41.9  ICD-9-CM: 300.00     Erectile dysfunction, unspecified erectile dysfunction type     ICD-10-CM: N52.9  ICD-9-CM: 607.84           1. Hypertension/Erectile dysfunction: Chronic.  - Patient's blood pressure is controlled.  - Patient is interested in sexual activity.  - Advised that I would recommend a cardiology consult to make sure that the patient's heart is in strong enough condition to tolerate an erectile dysfunction medication such as Viagra.  - If he is cleared from a cardiac standpoint, I am agreeable to write a prescription for medication.    2. Chronic bilateral lower back pain: Chronic, uncontrolled, getting worse.  - Will do an x-ray of his lumbar spine.  -  Will refer to physical therapy.  - Will prescribe a topical pain cream to help.    3. Anxiety and depression: Chronic, uncontrolled.  - Will increase dose of Lexapro.    4. Weight loss: New.  - Patient has been depressed and quit eating.  - He had lost a significant amount of weight; however, he reports he now has a girlfriend who is cooking for him.  - He is eating more, and he feels in a better state of mind.    Transcribed from ambient dictation for Madison Ansari MD by Laura Swan.  05/17/23   15:29 CDT    Patient or patient representative verbalized consent to the visit recording.  I have personally performed the services described in this document as transcribed by the above individual, and it is both accurate and complete.          This document has been electronically signed by Madison Ansari MD on June 2, 2023 18:14 CDT

## 2023-05-18 ENCOUNTER — PATIENT OUTREACH (OUTPATIENT)
Dept: CASE MANAGEMENT | Facility: OTHER | Age: 53
End: 2023-05-18
Payer: MEDICARE

## 2023-05-18 NOTE — OUTREACH NOTE
AMBULATORY CASE MANAGEMENT NOTE    Name and Relationship of Patient/Support Person: Gilberto Roberts K - Self    Patient Outreach    Received VM message from patient requesting a return call.    Patient Outreach    Patient reports that his new girlfriend was able to setup transportation to his dental appointment without difficulty. Patient is upset that his girlfriends insurance will not use the same [employer] company that provides his rides. ACM explained that she will have to talk with [employer] in order to resolve her issues.         Li PAGAN  Ambulatory Case Management    5/18/2023, 12:24 CDT

## 2023-05-19 ENCOUNTER — TELEPHONE (OUTPATIENT)
Dept: FAMILY MEDICINE CLINIC | Facility: CLINIC | Age: 53
End: 2023-05-19
Payer: MEDICARE

## 2023-05-19 NOTE — TELEPHONE ENCOUNTER
----- Message from Madison Ansari MD sent at 5/17/2023  2:35 PM CDT -----  Need order for topical pain cream from [facility]

## 2023-05-30 ENCOUNTER — LAB (OUTPATIENT)
Dept: LAB | Facility: HOSPITAL | Age: 53
End: 2023-05-30

## 2023-05-30 ENCOUNTER — OFFICE VISIT (OUTPATIENT)
Dept: CARDIOLOGY | Facility: CLINIC | Age: 53
End: 2023-05-30

## 2023-05-30 VITALS
DIASTOLIC BLOOD PRESSURE: 66 MMHG | HEIGHT: 68 IN | WEIGHT: 193 LBS | BODY MASS INDEX: 29.25 KG/M2 | HEART RATE: 64 BPM | OXYGEN SATURATION: 98 % | SYSTOLIC BLOOD PRESSURE: 110 MMHG

## 2023-05-30 DIAGNOSIS — F17.210 CIGARETTE SMOKER: ICD-10-CM

## 2023-05-30 DIAGNOSIS — N52.01 ERECTILE DYSFUNCTION DUE TO ARTERIAL INSUFFICIENCY: ICD-10-CM

## 2023-05-30 DIAGNOSIS — E78.2 MIXED HYPERLIPIDEMIA: ICD-10-CM

## 2023-05-30 DIAGNOSIS — I10 ESSENTIAL HYPERTENSION: Primary | ICD-10-CM

## 2023-05-30 DIAGNOSIS — I73.9 PAD (PERIPHERAL ARTERY DISEASE): ICD-10-CM

## 2023-05-30 DIAGNOSIS — I25.10 NONOBSTRUCTIVE ATHEROSCLEROSIS OF CORONARY ARTERY: ICD-10-CM

## 2023-05-30 PROBLEM — I25.110 CORONARY ARTERY DISEASE INVOLVING NATIVE CORONARY ARTERY OF NATIVE HEART WITH UNSTABLE ANGINA PECTORIS: Status: RESOLVED | Noted: 2020-08-03 | Resolved: 2023-05-30

## 2023-05-30 PROBLEM — I70.212 ATHEROSCLEROSIS OF NATIVE ARTERY OF LEFT LOWER EXTREMITY WITH INTERMITTENT CLAUDICATION: Status: RESOLVED | Noted: 2019-12-27 | Resolved: 2023-05-30

## 2023-05-30 PROBLEM — M21.619 HALLUX VALGUS WITH BUNIONS: Status: RESOLVED | Noted: 2019-04-12 | Resolved: 2023-05-30

## 2023-05-30 PROBLEM — I20.0 UNSTABLE ANGINA: Status: RESOLVED | Noted: 2020-08-03 | Resolved: 2023-05-30

## 2023-05-30 PROBLEM — E78.5 HYPERLIPIDEMIA: Status: RESOLVED | Noted: 2019-03-29 | Resolved: 2023-05-30

## 2023-05-30 PROBLEM — I70.209 ATHEROSCLEROSIS OF NATIVE ARTERY OF EXTREMITY: Status: RESOLVED | Noted: 2020-01-09 | Resolved: 2023-05-30

## 2023-05-30 PROBLEM — I20.0 UNSTABLE ANGINA: Status: RESOLVED | Noted: 2022-10-05 | Resolved: 2023-05-30

## 2023-05-30 PROBLEM — R52 ACUTE PAIN: Status: RESOLVED | Noted: 2019-03-29 | Resolved: 2023-05-30

## 2023-05-30 PROBLEM — M20.10 HALLUX VALGUS WITH BUNIONS: Status: RESOLVED | Noted: 2019-04-12 | Resolved: 2023-05-30

## 2023-05-30 LAB
CHOLEST SERPL-MCNC: 203 MG/DL (ref 0–200)
HDLC SERPL-MCNC: 39 MG/DL (ref 40–60)
LDLC SERPL CALC-MCNC: 134 MG/DL (ref 0–100)
LDLC/HDLC SERPL: 3.34 {RATIO}
TRIGL SERPL-MCNC: 168 MG/DL (ref 0–150)
VLDLC SERPL-MCNC: 30 MG/DL (ref 5–40)

## 2023-05-30 PROCEDURE — 93000 ELECTROCARDIOGRAM COMPLETE: CPT | Performed by: INTERNAL MEDICINE

## 2023-05-30 PROCEDURE — 1159F MED LIST DOCD IN RCRD: CPT | Performed by: INTERNAL MEDICINE

## 2023-05-30 PROCEDURE — 3074F SYST BP LT 130 MM HG: CPT | Performed by: INTERNAL MEDICINE

## 2023-05-30 PROCEDURE — 99204 OFFICE O/P NEW MOD 45 MIN: CPT | Performed by: INTERNAL MEDICINE

## 2023-05-30 PROCEDURE — 1160F RVW MEDS BY RX/DR IN RCRD: CPT | Performed by: INTERNAL MEDICINE

## 2023-05-30 PROCEDURE — 3078F DIAST BP <80 MM HG: CPT | Performed by: INTERNAL MEDICINE

## 2023-05-30 PROCEDURE — 80061 LIPID PANEL: CPT | Performed by: INTERNAL MEDICINE

## 2023-05-30 PROCEDURE — 36415 COLL VENOUS BLD VENIPUNCTURE: CPT | Performed by: INTERNAL MEDICINE

## 2023-05-30 RX ORDER — SILDENAFIL 100 MG/1
100 TABLET, FILM COATED ORAL DAILY PRN
Qty: 10 TABLET | Refills: 11 | Status: SHIPPED | OUTPATIENT
Start: 2023-05-30

## 2023-05-30 NOTE — LETTER
May 30, 2023     Madison Ansari MD  [address]  [place] [address]    Patient: Gilberto Roberts   YOB: 1970   Date of Visit: 5/30/2023       Dear Madison Ansari MD,    Thank you for referring Gilberto Roberts to me for evaluation. Below is a copy of my consult note.    If you have questions, please do not hesitate to call me. I look forward to following Gilberto along with you.         Sincerely,        Alexandro Pruitt MD        CC: No Recipients      Reason for Visit: Hypertension, erectile dysfunction.    HPI:  Gilberto Roberts is a 52 y.o. male is being seen for consultation today at the request of Madison Ansari MD for assistance with management of hypertension and erectile dysfunction.  He previously had a heart catheterization at [facility] on 3/5/2020 that showed moderate nonobstructive disease only up to 55% in his LAD and 50% in OM1.  Medical management was recommended.  He denies any chest pain, palpitations, dizziness, syncope, shortness of breath, PND, or orthopnea.  He walks with a cane and has difficulty with walking given his arthritis and chronic back pain.  He has a new girlfriend and reports having difficulty getting and maintaining an erection.  He does report getting erections while he sleeps.  He has a history of hernia surgery and is concerned that this could be contributing.    Previous Cardiac Testing and Procedures:  -[facility] (3/5/2020) 55% mid LAD stenosis with myocardial bridging, OSCAR II flow without significant obstruction, mild to moderate irregularities of dominant LCx, OM1 with proximal diffuse 50% stenosis, nondominant RCA with no significant stenosis  -[facility] (6/28/2021) no significant arterial insufficiency of the right lower extremity, mild arterial insufficiency of the left lower extremity    Lab data:  -Lipid panel (5/23/2022) total cholesterol 214, HDL 36, , triglycerides 187  -BMP (1/10/2023) creatinine 0.88, potassium 4.2, sodium 140    Patient  Active Problem List   Diagnosis   • Essential hypertension   • Cigarette smoker   • Congenital hearing disorder of both ears   • Current moderate episode of major depressive disorder without prior episode   • Hemorrhoids   • Nerve damage   • Mixed hyperlipidemia   • Spondylosis of thoracolumbar region without myelopathy or radiculopathy   • Hallux valgus with bunions   • PAD (peripheral artery disease)   • Nonobstructive atherosclerosis of coronary artery   • Erectile dysfunction due to arterial insufficiency       Social History     Tobacco Use   • Smoking status: Every Day     Packs/day: 0.50     Years: 33.00     Pack years: 16.50     Types: Cigarettes     Last attempt to quit: 2020     Years since quitting: 3.4   • Smokeless tobacco: Former     Types: Chew   • Tobacco comments:     pt states it is too hard to quit, he is by himself.   Vaping Use   • Vaping Use: Never used   Substance Use Topics   • Alcohol use: No   • Drug use: No       Family History   Problem Relation Age of Onset   • COPD Mother         respiratory failure   • Dementia Father    • Diabetes Father        The following portions of the patient's history were reviewed and updated as appropriate: allergies, current medications, past family history, past medical history, past social history, past surgical history, and problem list.      Current Outpatient Medications:   •  aspirin (aspirin) 81 MG EC tablet, Take 1 tablet by mouth Daily., Disp: 90 tablet, Rfl: 3  •  clopidogrel (PLAVIX) 75 MG tablet, TAKE ONE TABLET BY MOUTH DAILY. (Patient taking differently: Take 1 tablet by mouth Daily.), Disp: 30 tablet, Rfl: 3  •  escitalopram (LEXAPRO) 10 MG tablet, Take 1 tablet by mouth Daily., Disp: 90 tablet, Rfl: 1  •  lisinopril (PRINIVIL,ZESTRIL) 10 MG tablet, TAKE 1 TABLET BY MOUTH DAILY., Disp: 30 tablet, Rfl: 2  •  simvastatin (ZOCOR) 40 MG tablet, Take 1 tablet by mouth Every Night. (Patient taking differently: Take 20 mg by mouth Every Night.),  "Disp: 90 tablet, Rfl: 3  •  sildenafil (Viagra) 100 MG tablet, Take 1 tablet by mouth Daily As Needed for Erectile Dysfunction., Disp: 10 tablet, Rfl: 11    Review of Systems   Constitutional: Negative for chills and fever.   Cardiovascular:  Negative for chest pain, dyspnea on exertion, paroxysmal nocturnal dyspnea and syncope.   Respiratory:  Negative for cough and shortness of breath.    Skin:  Negative for rash.   Musculoskeletal:  Positive for arthritis and back pain.   Gastrointestinal:  Negative for abdominal pain and heartburn.   Genitourinary:  Positive for decreased libido (erectile dysfunction).   Neurological:  Positive for disturbances in coordination. Negative for dizziness and numbness.     Objective  /66 (BP Location: Left arm, Patient Position: Sitting, Cuff Size: Adult)   Pulse 64   Ht 172.7 cm (67.99\")   Wt 87.5 kg (193 lb)   SpO2 98%   BMI 29.35 kg/m²   Constitutional:       Appearance: Well-developed.   HENT:      Head: Normocephalic and atraumatic.   Pulmonary:      Effort: Pulmonary effort is normal.      Breath sounds: Normal breath sounds.   Cardiovascular:      Normal rate. Regular rhythm.      Murmurs: There is no murmur.      No gallop.  No click.   Edema:     Peripheral edema absent.   Skin:     General: Skin is warm and dry.   Neurological:      Mental Status: Alert and oriented to person, place, and time.       ECG 12 Lead    Date/Time: 5/30/2023 1:43 PM  Performed by: Alexandro Pruitt MD  Authorized by: Alexandro Pruitt MD   Comparison: compared with previous ECG from 1/16/2023  Similar to previous ECG  Rhythm: sinus rhythm  Rate: normal  Other findings: non-specific ST-T wave changes          ICD-10-CM ICD-9-CM   1. Essential hypertension  I10 401.9   2. Nonobstructive atherosclerosis of coronary artery  I25.10 414.00   3. Erectile dysfunction due to arterial insufficiency  N52.01 607.84   4. PAD (peripheral artery disease)  I73.9 443.9   5. Mixed hyperlipidemia  E78.2 " 272.2   6. Cigarette smoker  F17.210 305.1         Assessment/Plan:  1.  Essential hypertension: Blood pressures well controlled today.  Continue lisinopril.    2.  Nonobstructive coronary artery disease: LHC from 3/5/2020 at [facility] showed moderate nonobstructive disease up to 55% in the LAD.  He has no chest pain or other anginal symptoms.  Continue aspirin and simvastatin.    3.  Erectile dysfunction: Likely secondary to arterial insufficiency.  No contraindication to phosphodiesterase inhibitors.  Start Viagra.    4.  Peripheral arterial disease: Follows with Dr. Billings of vascular surgery.  Continue aspirin, Plavix, and simvastatin.    5.  Mixed hyperlipidemia: Lipid panel from 5/23/2022 showed elevated total cholesterol, LDL cholesterol, and triglycerides with low HDL.  Continue simvastatin and check a lipid panel.    6.  Tobacco use: Gilberto Roberts  reports that he has been smoking cigarettes. He has a 16.50 pack-year smoking history. He has quit using smokeless tobacco.  His smokeless tobacco use included chew.. I have educated him on the risk of diseases from using tobacco products such as cancer, COPD, and heart disease.  I advised him to quit and he is not willing to quit.  He does express some interest in quitting in the near future though.  I spent 4 minutes counseling the patient.

## 2023-05-30 NOTE — PROGRESS NOTES
Reason for Visit: Hypertension, erectile dysfunction.    HPI:  Gilberto Roberts is a 52 y.o. male is being seen for consultation today at the request of Madison Ansari MD for assistance with management of hypertension and erectile dysfunction.  He previously had a heart catheterization at [facility] on 3/5/2020 that showed moderate nonobstructive disease only up to 55% in his LAD and 50% in OM1.  Medical management was recommended.  He denies any chest pain, palpitations, dizziness, syncope, shortness of breath, PND, or orthopnea.  He walks with a cane and has difficulty with walking given his arthritis and chronic back pain.  He has a new girlfriend and reports having difficulty getting and maintaining an erection.  He does report getting erections while he sleeps.  He has a history of hernia surgery and is concerned that this could be contributing.    Previous Cardiac Testing and Procedures:  -[facility] (3/5/2020) 55% mid LAD stenosis with myocardial bridging, OSCAR II flow without significant obstruction, mild to moderate irregularities of dominant LCx, OM1 with proximal diffuse 50% stenosis, nondominant RCA with no significant stenosis  -[facility] (6/28/2021) no significant arterial insufficiency of the right lower extremity, mild arterial insufficiency of the left lower extremity    Lab data:  -Lipid panel (5/23/2022) total cholesterol 214, HDL 36, , triglycerides 187  -BMP (1/10/2023) creatinine 0.88, potassium 4.2, sodium 140    Patient Active Problem List   Diagnosis    Essential hypertension    Cigarette smoker    Congenital hearing disorder of both ears    Current moderate episode of major depressive disorder without prior episode    Hemorrhoids    Nerve damage    Mixed hyperlipidemia    Spondylosis of thoracolumbar region without myelopathy or radiculopathy    Hallux valgus with bunions    PAD (peripheral artery disease)    Nonobstructive atherosclerosis of coronary artery    Erectile dysfunction due to  arterial insufficiency       Social History     Tobacco Use    Smoking status: Every Day     Packs/day: 0.50     Years: 33.00     Pack years: 16.50     Types: Cigarettes     Last attempt to quit: 2020     Years since quitting: 3.4    Smokeless tobacco: Former     Types: Chew    Tobacco comments:     pt states it is too hard to quit, he is by himself.   Vaping Use    Vaping Use: Never used   Substance Use Topics    Alcohol use: No    Drug use: No       Family History   Problem Relation Age of Onset    COPD Mother         respiratory failure    Dementia Father     Diabetes Father        The following portions of the patient's history were reviewed and updated as appropriate: allergies, current medications, past family history, past medical history, past social history, past surgical history, and problem list.      Current Outpatient Medications:     aspirin (aspirin) 81 MG EC tablet, Take 1 tablet by mouth Daily., Disp: 90 tablet, Rfl: 3    clopidogrel (PLAVIX) 75 MG tablet, TAKE ONE TABLET BY MOUTH DAILY. (Patient taking differently: Take 1 tablet by mouth Daily.), Disp: 30 tablet, Rfl: 3    escitalopram (LEXAPRO) 10 MG tablet, Take 1 tablet by mouth Daily., Disp: 90 tablet, Rfl: 1    lisinopril (PRINIVIL,ZESTRIL) 10 MG tablet, TAKE 1 TABLET BY MOUTH DAILY., Disp: 30 tablet, Rfl: 2    simvastatin (ZOCOR) 40 MG tablet, Take 1 tablet by mouth Every Night. (Patient taking differently: Take 20 mg by mouth Every Night.), Disp: 90 tablet, Rfl: 3    sildenafil (Viagra) 100 MG tablet, Take 1 tablet by mouth Daily As Needed for Erectile Dysfunction., Disp: 10 tablet, Rfl: 11    Review of Systems   Constitutional: Negative for chills and fever.   Cardiovascular:  Negative for chest pain, dyspnea on exertion, paroxysmal nocturnal dyspnea and syncope.   Respiratory:  Negative for cough and shortness of breath.    Skin:  Negative for rash.   Musculoskeletal:  Positive for arthritis and back pain.   Gastrointestinal:  Negative for  "abdominal pain and heartburn.   Genitourinary:  Positive for decreased libido (erectile dysfunction).   Neurological:  Positive for disturbances in coordination. Negative for dizziness and numbness.     Objective   /66 (BP Location: Left arm, Patient Position: Sitting, Cuff Size: Adult)   Pulse 64   Ht 172.7 cm (67.99\")   Wt 87.5 kg (193 lb)   SpO2 98%   BMI 29.35 kg/m²   Constitutional:       Appearance: Well-developed.   HENT:      Head: Normocephalic and atraumatic.   Pulmonary:      Effort: Pulmonary effort is normal.      Breath sounds: Normal breath sounds.   Cardiovascular:      Normal rate. Regular rhythm.      Murmurs: There is no murmur.      No gallop.  No click.   Edema:     Peripheral edema absent.   Skin:     General: Skin is warm and dry.   Neurological:      Mental Status: Alert and oriented to person, place, and time.       ECG 12 Lead    Date/Time: 5/30/2023 1:43 PM  Performed by: Alexandro Pruitt MD  Authorized by: Alexandro Pruitt MD   Comparison: compared with previous ECG from 1/16/2023  Similar to previous ECG  Rhythm: sinus rhythm  Rate: normal  Other findings: non-specific ST-T wave changes          ICD-10-CM ICD-9-CM   1. Essential hypertension  I10 401.9   2. Nonobstructive atherosclerosis of coronary artery  I25.10 414.00   3. Erectile dysfunction due to arterial insufficiency  N52.01 607.84   4. PAD (peripheral artery disease)  I73.9 443.9   5. Mixed hyperlipidemia  E78.2 272.2   6. Cigarette smoker  F17.210 305.1         Assessment/Plan:  1.  Essential hypertension: Blood pressures well controlled today.  Continue lisinopril.    2.  Nonobstructive coronary artery disease: LHC from 3/5/2020 at [facility] showed moderate nonobstructive disease up to 55% in the LAD.  He has no chest pain or other anginal symptoms.  Continue aspirin and simvastatin.    3.  Erectile dysfunction: Likely secondary to arterial insufficiency.  No contraindication to phosphodiesterase inhibitors.  " Start Viagra.    4.  Peripheral arterial disease: Follows with Dr. Billings of vascular surgery.  Continue aspirin, Plavix, and simvastatin.    5.  Mixed hyperlipidemia: Lipid panel from 5/23/2022 showed elevated total cholesterol, LDL cholesterol, and triglycerides with low HDL.  Continue simvastatin and check a lipid panel.    6.  Tobacco use: Gilberto Roberts  reports that he has been smoking cigarettes. He has a 16.50 pack-year smoking history. He has quit using smokeless tobacco.  His smokeless tobacco use included chew.. I have educated him on the risk of diseases from using tobacco products such as cancer, COPD, and heart disease.  I advised him to quit and he is not willing to quit.  He does express some interest in quitting in the near future though.  I spent 4 minutes counseling the patient.

## 2023-05-31 ENCOUNTER — TELEPHONE (OUTPATIENT)
Dept: CARDIOLOGY | Facility: CLINIC | Age: 53
End: 2023-05-31

## 2023-05-31 NOTE — TELEPHONE ENCOUNTER
----- Message from Alexandro Pruitt MD sent at 5/31/2023  9:11 AM CDT -----  Please let him know that his total cholesterol, LDL cholesterol (bad cholesterol) and triglycerides are all elevated.  His HDL (good cholesterol) is low.  I would recommend him increase his simvastatin up to 40 mg.  The other option would be to change him to a stronger cholesterol medicine.

## 2023-06-13 DIAGNOSIS — I10 ESSENTIAL HYPERTENSION: ICD-10-CM

## 2023-06-13 RX ORDER — LISINOPRIL 10 MG/1
10 TABLET ORAL DAILY
Qty: 30 TABLET | Refills: 2 | Status: SHIPPED | OUTPATIENT
Start: 2023-06-13

## 2023-06-13 NOTE — TELEPHONE ENCOUNTER
Rx Refill Note  Requested Prescriptions     Pending Prescriptions Disp Refills    lisinopril (PRINIVIL,ZESTRIL) 10 MG tablet [Pharmacy Med Name: LISINOPRIL 10 MG TAB 10 Tablet] 30 tablet 2     Sig: TAKE 1 TABLET BY MOUTH DAILY.      Last office visit with prescribing clinician: 5/17/2023   Next office visit with prescribing clinician: 6/29/2023                         Would you like a call back once the refill request has been completed: [] Yes [] No    If the office needs to give you a call back, can they leave a voicemail: [] Yes [] No    Erika Yu MA  06/13/23, 15:06 CDT

## 2023-07-24 ENCOUNTER — OFFICE VISIT (OUTPATIENT)
Dept: CARDIOLOGY | Facility: CLINIC | Age: 53
End: 2023-07-24
Payer: MEDICARE

## 2023-07-24 VITALS
HEART RATE: 68 BPM | RESPIRATION RATE: 18 BRPM | OXYGEN SATURATION: 99 % | BODY MASS INDEX: 30.29 KG/M2 | WEIGHT: 193 LBS | DIASTOLIC BLOOD PRESSURE: 56 MMHG | HEIGHT: 67 IN | SYSTOLIC BLOOD PRESSURE: 96 MMHG

## 2023-07-24 DIAGNOSIS — Z72.0 TOBACCO USE: ICD-10-CM

## 2023-07-24 DIAGNOSIS — I10 ESSENTIAL HYPERTENSION: Primary | ICD-10-CM

## 2023-07-24 DIAGNOSIS — I25.10 NONOBSTRUCTIVE ATHEROSCLEROSIS OF CORONARY ARTERY: ICD-10-CM

## 2023-07-24 DIAGNOSIS — N52.01 ERECTILE DYSFUNCTION DUE TO ARTERIAL INSUFFICIENCY: ICD-10-CM

## 2023-07-24 DIAGNOSIS — E78.2 MIXED HYPERLIPIDEMIA: ICD-10-CM

## 2023-07-24 DIAGNOSIS — I73.9 PAD (PERIPHERAL ARTERY DISEASE): ICD-10-CM

## 2023-07-24 PROCEDURE — 99214 OFFICE O/P EST MOD 30 MIN: CPT | Performed by: INTERNAL MEDICINE

## 2023-07-24 PROCEDURE — 1159F MED LIST DOCD IN RCRD: CPT | Performed by: INTERNAL MEDICINE

## 2023-07-24 PROCEDURE — 1160F RVW MEDS BY RX/DR IN RCRD: CPT | Performed by: INTERNAL MEDICINE

## 2023-07-24 PROCEDURE — 3078F DIAST BP <80 MM HG: CPT | Performed by: INTERNAL MEDICINE

## 2023-07-24 PROCEDURE — 3074F SYST BP LT 130 MM HG: CPT | Performed by: INTERNAL MEDICINE

## 2023-07-24 RX ORDER — ATORVASTATIN CALCIUM 80 MG/1
80 TABLET, FILM COATED ORAL DAILY
Qty: 30 TABLET | Refills: 11 | Status: SHIPPED | OUTPATIENT
Start: 2023-07-24

## 2023-07-24 NOTE — PROGRESS NOTES
Reason for Visit: cardiovascular follow up.    HPI:  Gilberto Roberts is a 53 y.o. male is here today for follow-up.  Seen in consultation on 5/30/2023 for assistance with management of hypertension and erectile dysfunction.  He was started on Viagra.  He feels like this has helped.  He is still smoking and is not ready to quit.  He has no other new issues or complaints.  He denies any chest pain, palpitations, dizziness, syncope, PND, or orthopnea.    Previous Cardiac Testing and Procedures:  -[facility] (3/5/2020) 55% mid LAD stenosis with myocardial bridging, OSCAR II flow without significant obstruction, mild to moderate irregularities of dominant LCx, OM1 with proximal diffuse 50% stenosis, nondominant RCA with no significant stenosis  -[facility] (6/28/2021) no significant arterial insufficiency of the right lower extremity, mild arterial insufficiency of the left lower extremity     Lab data:  -Lipid panel (5/23/2022) total cholesterol 214, HDL 36, , triglycerides 187  -BMP (1/10/2023) creatinine 0.88, potassium 4.2, sodium 140  -Lipid panel (5/30/2023) total cholesterol 203, HDL 39, , triglycerides 168    Patient Active Problem List   Diagnosis    Essential hypertension    Tobacco use    Congenital hearing disorder of both ears    Current moderate episode of major depressive disorder without prior episode    Hemorrhoids    Nerve damage    Mixed hyperlipidemia    Spondylosis of thoracolumbar region without myelopathy or radiculopathy    Hallux valgus with bunions    PAD (peripheral artery disease)    Nonobstructive atherosclerosis of coronary artery    Erectile dysfunction due to arterial insufficiency       Social History     Tobacco Use    Smoking status: Every Day     Packs/day: 0.50     Years: 33.00     Pack years: 16.50     Types: Cigarettes     Last attempt to quit: 2020     Years since quitting: 3.5    Smokeless tobacco: Former     Types: Chew    Tobacco comments:     pt states it is too hard to quit,  "he is by himself.   Vaping Use    Vaping Use: Never used   Substance Use Topics    Alcohol use: No    Drug use: No       Family History   Problem Relation Age of Onset    COPD Mother         respiratory failure    Dementia Father     Diabetes Father        The following portions of the patient's history were reviewed and updated as appropriate: allergies, current medications, past family history, past medical history, past social history, past surgical history, and problem list.      Current Outpatient Medications:     aspirin (aspirin) 81 MG EC tablet, Take 1 tablet by mouth Daily., Disp: 90 tablet, Rfl: 3    clopidogrel (PLAVIX) 75 MG tablet, TAKE ONE TABLET BY MOUTH DAILY. (Patient taking differently: Take 1 tablet by mouth Daily.), Disp: 30 tablet, Rfl: 3    escitalopram (LEXAPRO) 10 MG tablet, Take 1 tablet by mouth Daily., Disp: 90 tablet, Rfl: 1    lisinopril (PRINIVIL,ZESTRIL) 10 MG tablet, TAKE 1 TABLET BY MOUTH DAILY., Disp: 30 tablet, Rfl: 2    sildenafil (Viagra) 100 MG tablet, Take 1 tablet by mouth Daily As Needed for Erectile Dysfunction., Disp: 10 tablet, Rfl: 11    atorvastatin (LIPITOR) 80 MG tablet, Take 1 tablet by mouth Daily., Disp: 30 tablet, Rfl: 11    Review of Systems   Constitutional: Negative for chills and fever.   Cardiovascular:  Negative for chest pain and paroxysmal nocturnal dyspnea.   Respiratory:  Negative for cough and shortness of breath.    Skin:  Negative for rash.   Gastrointestinal:  Negative for abdominal pain and heartburn.   Neurological:  Negative for dizziness and numbness.     Objective   BP 96/56 (BP Location: Right arm, Patient Position: Sitting)   Pulse 68   Resp 18   Ht 170.2 cm (67\")   Wt 87.5 kg (193 lb)   SpO2 99%   BMI 30.23 kg/m²   Constitutional:       Appearance: Well-developed.   HENT:      Head: Normocephalic and atraumatic.   Pulmonary:      Effort: Pulmonary effort is normal.      Breath sounds: Normal breath sounds.   Cardiovascular:      Normal " rate. Regular rhythm.      Murmurs: There is no murmur.      No gallop.  No click.   Edema:     Peripheral edema absent.   Skin:     General: Skin is warm and dry.   Neurological:      Mental Status: Alert and oriented to person, place, and time.   Psychiatric:         Speech: Speech is slurred.     Procedures      ICD-10-CM ICD-9-CM   1. Essential hypertension  I10 401.9   2. Nonobstructive atherosclerosis of coronary artery  I25.10 414.00   3. Erectile dysfunction due to arterial insufficiency  N52.01 607.84   4. PAD (peripheral artery disease)  I73.9 443.9   5. Mixed hyperlipidemia  E78.2 272.2   6. Tobacco use  Z72.0 305.1         Assessment/Plan:  1.  Essential hypertension: Blood pressure is low normal today.  Continue lisinopril and monitor.       2.  Nonobstructive coronary artery disease: Moderate nonobstructive disease on LHC from 3/5/2020 at [facility].  He remains chest pain-free.  Change simvastatin to atorvastatin 80 mg to help lipid-lowering.  Continue aspirin.   on smoking cessation.     3.  Erectile dysfunction: Arterial insufficiency felt to be contributing.  Continue Viagra.     4.  Peripheral arterial disease: Follows with vascular surgery.  Continue aspirin, Plavix, and statin.    5.  Mixed hyperlipidemia: Remains suboptimally controlled on repeat lipid panel from 5/30/2023.  Change simvastatin to atorvastatin 80 mg.       6.  Tobacco use: Gilberto Roberts  reports that he has been smoking cigarettes. He has a 16.50 pack-year smoking history. He has quit using smokeless tobacco.  His smokeless tobacco use included chew.. I have educated him on the risk of diseases from using tobacco products such as cancer, COPD, and heart disease.  I advised him to quit and he is not willing to quit.  I spent 3.5 minutes counseling the patient.

## 2023-08-07 ENCOUNTER — PATIENT OUTREACH (OUTPATIENT)
Dept: CASE MANAGEMENT | Facility: OTHER | Age: 53
End: 2023-08-07
Payer: MEDICARE

## 2023-08-07 NOTE — OUTREACH NOTE
AMBULATORY CASE MANAGEMENT NOTE    Name and Relationship of Patient/Support Person: Gilberto Roberts - Self    Patient Outreach    Received call from patient requesting the address of his lung CT scheduled for 8.10.23 in order to schedule his transportation.         Li PAGAN  Ambulatory Case Management    8/7/2023, 09:03 CDT

## 2023-08-09 DIAGNOSIS — I73.9 PAD (PERIPHERAL ARTERY DISEASE): ICD-10-CM

## 2023-08-09 DIAGNOSIS — F41.9 ANXIETY: ICD-10-CM

## 2023-08-09 RX ORDER — SIMVASTATIN 40 MG
40 TABLET ORAL NIGHTLY
Qty: 90 TABLET | Refills: 3 | OUTPATIENT
Start: 2023-08-09

## 2023-08-09 RX ORDER — ESCITALOPRAM OXALATE 10 MG/1
10 TABLET ORAL DAILY
Qty: 90 TABLET | Refills: 1 | OUTPATIENT
Start: 2023-08-09

## 2023-08-17 ENCOUNTER — APPOINTMENT (OUTPATIENT)
Dept: GENERAL RADIOLOGY | Facility: HOSPITAL | Age: 53
End: 2023-08-17
Payer: MEDICARE

## 2023-08-17 ENCOUNTER — APPOINTMENT (OUTPATIENT)
Dept: CT IMAGING | Facility: HOSPITAL | Age: 53
End: 2023-08-17
Payer: MEDICARE

## 2023-08-17 ENCOUNTER — HOSPITAL ENCOUNTER (EMERGENCY)
Facility: HOSPITAL | Age: 53
Discharge: HOME | End: 2023-08-17
Payer: MEDICARE

## 2023-08-17 VITALS
BODY MASS INDEX: 28.79 KG/M2 | DIASTOLIC BLOOD PRESSURE: 66 MMHG | RESPIRATION RATE: 20 BRPM | TEMPERATURE: 97.6 F | OXYGEN SATURATION: 96 % | WEIGHT: 190 LBS | SYSTOLIC BLOOD PRESSURE: 109 MMHG | HEIGHT: 68 IN | HEART RATE: 71 BPM

## 2023-08-17 DIAGNOSIS — M54.42 CHRONIC LEFT-SIDED LOW BACK PAIN WITH LEFT-SIDED SCIATICA: Primary | ICD-10-CM

## 2023-08-17 DIAGNOSIS — G89.29 CHRONIC LEFT-SIDED LOW BACK PAIN WITH LEFT-SIDED SCIATICA: Primary | ICD-10-CM

## 2023-08-17 LAB
BILIRUB UR QL STRIP: NEGATIVE
CLARITY UR: CLEAR
COLOR UR: YELLOW
GLUCOSE UR STRIP-MCNC: NEGATIVE MG/DL
HGB UR QL STRIP.AUTO: NEGATIVE
KETONES UR QL STRIP: NEGATIVE
LEUKOCYTE ESTERASE UR QL STRIP.AUTO: NEGATIVE
NITRITE UR QL STRIP: NEGATIVE
PH UR STRIP.AUTO: 6 [PH] (ref 5–8)
PROT UR QL STRIP: NEGATIVE
SP GR UR STRIP: 1.02 (ref 1–1.03)
UROBILINOGEN UR QL STRIP: NORMAL

## 2023-08-17 PROCEDURE — 72110 X-RAY EXAM L-2 SPINE 4/>VWS: CPT

## 2023-08-17 PROCEDURE — 96372 THER/PROPH/DIAG INJ SC/IM: CPT

## 2023-08-17 PROCEDURE — 25010000002 DEXAMETHASONE PER 1 MG

## 2023-08-17 PROCEDURE — 81003 URINALYSIS AUTO W/O SCOPE: CPT

## 2023-08-17 PROCEDURE — 99284 EMERGENCY DEPT VISIT MOD MDM: CPT

## 2023-08-17 PROCEDURE — 72131 CT LUMBAR SPINE W/O DYE: CPT

## 2023-08-17 PROCEDURE — 25010000002 KETOROLAC TROMETHAMINE PER 15 MG

## 2023-08-17 RX ORDER — KETOROLAC TROMETHAMINE 15 MG/ML
15 INJECTION, SOLUTION INTRAMUSCULAR; INTRAVENOUS ONCE
Status: COMPLETED | OUTPATIENT
Start: 2023-08-17 | End: 2023-08-17

## 2023-08-17 RX ORDER — DEXAMETHASONE SODIUM PHOSPHATE 10 MG/ML
10 INJECTION INTRAMUSCULAR; INTRAVENOUS ONCE
Status: COMPLETED | OUTPATIENT
Start: 2023-08-17 | End: 2023-08-17

## 2023-08-17 RX ORDER — CYCLOBENZAPRINE HCL 10 MG
5 TABLET ORAL ONCE
Status: COMPLETED | OUTPATIENT
Start: 2023-08-17 | End: 2023-08-17

## 2023-08-17 RX ADMIN — DEXAMETHASONE SODIUM PHOSPHATE 10 MG: 10 INJECTION INTRAMUSCULAR; INTRAVENOUS at 16:39

## 2023-08-17 RX ADMIN — CYCLOBENZAPRINE 5 MG: 10 TABLET, FILM COATED ORAL at 19:04

## 2023-08-17 RX ADMIN — KETOROLAC TROMETHAMINE 15 MG: 15 INJECTION, SOLUTION INTRAMUSCULAR; INTRAVENOUS at 19:04

## 2023-08-17 NOTE — ED PROVIDER NOTES
"Subjective   History of Present Illness  Patient is a 53-year-old male who presents emergency department with complaints of lower back pain.  He states he has chronic lower back pain.  States that he was sitting in the chair and he tried to stand up and the pain came on then.  He describes it as a stabbing pain in his lower back on the left side that radiates down his left leg.  He states he has had chronic back pain since he was a teenager.  He denies any loss of bowel or bladder.  No loss of sensation.  He denies any injury or falls.      Review of Systems   Musculoskeletal:  Positive for back pain.   All other systems reviewed and are negative.    Past Medical History:   Diagnosis Date    Anxiety     Arthritis     Coronary artery disease involving native coronary artery of native heart with unstable angina pectoris 8/3/2020    Current moderate episode of major depressive disorder without prior episode 5/17/2021    Essential hypertension 3/29/2019    Head injury     [place] (hard of hearing)     Hyperlipidemia     Injury of back        No Known Allergies    Past Surgical History:   Procedure Laterality Date    AORTAGRAM Bilateral 4/2/2019    Procedure: AORTAGRAM, LEFT LEG ANGIOGRAM, ANGIOPLASTY/STENT PLACEMENT, ANGIOSEAL CLOSURE;  Surgeon: Luis Billings MD;  Location: [facility] OR ;  Service: Vascular    AORTAGRAM Left 1/9/2020    Procedure: AORTAGRAM, LEFT LOWER EXTREMITY ANGIOGRAM, ATHRECTOMY, BALLOON ANGIOPLASTY, STENT PLACEMENT, MYNX CLOSURE;  Surgeon: Luis Billings MD;  Location: [place] [address];  Service: Vascular    HEMORRHOIDECTOMY      x 3    HERNIA REPAIR      LEG SURGERY      as a child due to \"club foot\"    NECK MASS EXCISION         Family History   Problem Relation Age of Onset    COPD Mother         respiratory failure    Dementia Father     Diabetes Father        Social History     Socioeconomic History    Marital status: Single   Tobacco Use    Smoking status: Every Day     " Packs/day: 0.50     Years: 33.00     Pack years: 16.50     Types: Cigarettes     Last attempt to quit: 2020     Years since quitting: 3.6    Smokeless tobacco: Former     Types: Chew    Tobacco comments:     pt states it is too hard to quit, he is by himself.   Vaping Use    Vaping Use: Never used   Substance and Sexual Activity    Alcohol use: No    Drug use: No    Sexual activity: Defer           Objective   Physical Exam  Vitals and nursing note reviewed.   Constitutional:       General: He is not in acute distress.     Appearance: Normal appearance. He is normal weight. He is not ill-appearing or toxic-appearing.   HENT:      Head: Normocephalic.   Cardiovascular:      Rate and Rhythm: Normal rate.      Pulses: Normal pulses.   Pulmonary:      Effort: Pulmonary effort is normal.   Abdominal:      General: Abdomen is flat. Bowel sounds are normal. There is no distension.      Palpations: Abdomen is soft.      Tenderness: There is no abdominal tenderness.   Musculoskeletal:         General: Normal range of motion.      Cervical back: Normal, normal range of motion and neck supple.      Thoracic back: Normal.      Lumbar back: Tenderness present. No swelling, edema, deformity, signs of trauma, lacerations or bony tenderness.      Comments: Tenderness on left side of lumbar region   Skin:     General: Skin is warm.   Neurological:      General: No focal deficit present.      Mental Status: He is alert and oriented to person, place, and time. Mental status is at baseline.      GCS: GCS eye subscore is 4. GCS verbal subscore is 5. GCS motor subscore is 6.      Sensory: Sensation is intact.      Deep Tendon Reflexes:      Reflex Scores:       Patellar reflexes are 2+ on the right side and 2+ on the left side.     Comments:  strength strong bilaterally.  Dorsiflexion and plantarflexion strong bilaterally.   Psychiatric:         Mood and Affect: Mood normal.         Behavior: Behavior normal.         Thought Content:  Thought content normal.         Judgment: Judgment normal.       Procedures           ED Course  ED Course as of 08/18/23 1748   Thu Aug 17, 2023   1931 On reevaluation, patient stated he was feeling somewhat better.  He was able to raise both of his legs and keep them elevated.  Denies any loss of sensation. [KR]      ED Course User Index  [KR] Thuy Espino APRN                                           Medical Decision Making  Gilberto Roberts is a very pleasant 53 y.o. male who presents to the ED for back pain.      Patient was non-toxic and not-ill appearing on arrival. Vital signs stable.     Patient's presentation raises suspicion for differentials including, but not limited to, chronic back pain, fracture, misalignment.     External (non-ED) record review: None    Given this, Gilberto was placed on the monitor. Laboratory studies and imaging studies were ordered including urinalysis, x-ray lumbar spine, CT lumbar spine without contrast.     Gilberto was given IM Decadron, IM Toradol, Flexeril for symptomatic relief.    Imaging was reviewed by radiologist. X-ray lumbar spine revealed Arthritic changes of the lumbar spine including facet overgrowth as well as degenerative disc disease and endplate spurring. No fracture or listhesis. Stable appearance of the lumbar spine from the previous exam. CT lumbar spine revealed No acute fracture or malalignment. Degenerative disc disease with endplate spurring is noted at the L1-L2 and L5-S1 level. There is no central stenosis. Foraminal narrowing is noted at the lower 2 lumbar disc levels as described above.    Urinalysis reviewed and is unremarkable. On re-evaluation, patient remained hemodynamically stable and appeared to be comfortable and in no acute distress. He was able to raise bilateral lower extremities and keep them elevated. Denied loss of sensation. Case discussed with Dr. Landin.    I discussed the urinalysis and imaging results with the patient during this visit  in the emergency department. I answered all the questions regarding the emergency department evaluation, diagnosis, and treatment plan. Advised to follow with primary care provider as soon as possible to reassess symptoms. Advised to return to ER for any new or worsening symptoms. The patient verbalized understanding of the discharge instructions and agreed with them.     Gilberto was discharged in stable condition.    Signed by:   KASEY Ward 8/17/2023 22:33 CDT     Dragon disclaimer:  Part of this note may be an electronic transcription/translation of spoken language to printed text using the Dragon Dictation System.    Problems Addressed:  Chronic left-sided low back pain with left-sided sciatica: complicated acute illness or injury    Amount and/or Complexity of Data Reviewed  Radiology: ordered.    Risk  Prescription drug management.        Final diagnoses:   Chronic left-sided low back pain with left-sided sciatica       ED Disposition  ED Disposition       ED Disposition   Discharge    Condition   Stable    Comment   --               Madison Ansari MD  [address] [address]  [phone]    Schedule an appointment as soon as possible for a visit in 1 day      [facility] EMERGENCY DEPARTMENT  [address] [place] [id number] [phone]  Go to   If symptoms worsen         Medication List      No changes were made to your prescriptions during this visit.            Thuy Espino, KASEY  08/18/23 3818

## 2023-08-18 NOTE — DISCHARGE INSTRUCTIONS
Today you were seen the emergency department for back pain.  Your x-ray and CT scan did not reveal anything acute.  Please follow with primary care provider soon as possible to reassess symptoms.  Please return to the emergency department for any new or worsening symptoms.

## 2023-09-11 ENCOUNTER — OFFICE VISIT (OUTPATIENT)
Dept: FAMILY MEDICINE CLINIC | Facility: CLINIC | Age: 53
End: 2023-09-11

## 2023-09-11 VITALS
OXYGEN SATURATION: 98 % | SYSTOLIC BLOOD PRESSURE: 107 MMHG | HEART RATE: 68 BPM | WEIGHT: 197.8 LBS | DIASTOLIC BLOOD PRESSURE: 72 MMHG | RESPIRATION RATE: 20 BRPM | HEIGHT: 68 IN | BODY MASS INDEX: 29.98 KG/M2 | TEMPERATURE: 97.9 F

## 2023-09-11 DIAGNOSIS — D58.2 ELEVATED HEMOGLOBIN: ICD-10-CM

## 2023-09-11 DIAGNOSIS — I73.9 PAD (PERIPHERAL ARTERY DISEASE): ICD-10-CM

## 2023-09-11 DIAGNOSIS — I10 ESSENTIAL HYPERTENSION: ICD-10-CM

## 2023-09-11 DIAGNOSIS — E66.09 CLASS 1 OBESITY DUE TO EXCESS CALORIES WITH SERIOUS COMORBIDITY AND BODY MASS INDEX (BMI) OF 30.0 TO 30.9 IN ADULT: ICD-10-CM

## 2023-09-11 DIAGNOSIS — E78.2 MIXED HYPERLIPIDEMIA: Primary | ICD-10-CM

## 2023-09-11 DIAGNOSIS — Z12.5 PROSTATE CANCER SCREENING: ICD-10-CM

## 2023-09-11 PROCEDURE — 99214 OFFICE O/P EST MOD 30 MIN: CPT | Performed by: FAMILY MEDICINE

## 2023-09-11 PROCEDURE — 3078F DIAST BP <80 MM HG: CPT | Performed by: FAMILY MEDICINE

## 2023-09-11 PROCEDURE — 3074F SYST BP LT 130 MM HG: CPT | Performed by: FAMILY MEDICINE

## 2023-09-11 RX ORDER — LISINOPRIL 10 MG/1
10 TABLET ORAL DAILY
Qty: 30 TABLET | Refills: 2 | Status: SHIPPED | OUTPATIENT
Start: 2023-09-11

## 2023-09-11 NOTE — PROGRESS NOTES
"Subjective   Gilberto Roberts is a 53 y.o. male who presents today for follow-up. He is accompanied by an adult female.     Hypertension  The patient's blood pressure today is 107/72 mmHg. He is currently taking lisinopril 10 mg and needs a refill.    Hyperlipidemia  The patient is currently taking atorvastatin 80 mg daily. His cardiologist prescribes this medication.    Peripheral vascular disease  The patient is currently taking aspirin daily. He is no longer taking Plavix. He was previously seeing a vascular doctor, but his last appointment was cancelled. A referral is placed to the vascular doctor.    Elevated hemoglobin/leukocytosis  The patient was seeing a hematologist in 01/2023. His work-up was negative. They wanted to continue with phlebotomy to keep his hematocrit low. A referral is made to the hematologist.    Obesity  The patient's weight has increased slightly. His BMI is 30 kg/m2. He weighs 197 pounds today. His heart rate is good, afebrile, oxygen saturation  is good.     A review of systems was performed and positive findings are noted in the HPI.    The following portions of the patient's history were reviewed and updated as appropriate: allergies, current medications, past family history, past medical history, past social history, past surgical history, and problem list.        Review of Systems    Objective   Blood pressure 107/72, pulse 68, temperature 97.9 °F (36.6 °C), temperature source Infrared, resp. rate 20, height 172.7 cm (68\"), weight 89.7 kg (197 lb 12.8 oz), SpO2 98 %.  Physical Exam  Vitals and nursing note reviewed.   Constitutional:       General: He is not in acute distress.     Appearance: He is well-developed. He is obese. He is not diaphoretic.   HENT:      Head: Normocephalic and atraumatic.      Right Ear: External ear normal.      Left Ear: External ear normal.      Nose: Nose normal.   Eyes:      General:         Right eye: No discharge.         Left eye: No discharge.      " Conjunctiva/sclera: Conjunctivae normal.   Neck:      Thyroid: No thyromegaly.      Trachea: No tracheal deviation.   Cardiovascular:      Rate and Rhythm: Normal rate and regular rhythm.      Pulses: Normal pulses.   Pulmonary:      Effort: Pulmonary effort is normal. No respiratory distress.      Breath sounds: Normal breath sounds. No stridor. No wheezing.   Chest:      Chest wall: No tenderness.   Musculoskeletal:      Cervical back: Normal range of motion.   Lymphadenopathy:      Cervical: No cervical adenopathy.   Skin:     General: Skin is warm and dry.   Neurological:      Mental Status: He is alert and oriented to person, place, and time.      Motor: Weakness present. No abnormal muscle tone.      Coordination: Coordination normal.      Gait: Gait abnormal.   Psychiatric:         Behavior: Behavior normal.         Thought Content: Thought content normal.         Judgment: Judgment normal.       BMI is >= 30 and <35. (Class 1 Obesity). The following options were offered after discussion;: exercise counseling/recommendations and nutrition counseling/recommendations       Assessment & Plan   Problems Addressed this Visit          Cardiac and Vasculature    PAD (peripheral artery disease)    Relevant Orders    Ambulatory Referral to Vascular Surgery    Mixed hyperlipidemia - Primary    Essential hypertension    Relevant Medications    lisinopril (PRINIVIL,ZESTRIL) 10 MG tablet    Other Relevant Orders    Comprehensive metabolic panel     Other Visit Diagnoses       Elevated hemoglobin        Relevant Orders    Ambulatory Referral to Hematology / Oncology    Prostate cancer screening        Relevant Orders    PSA SCREENING    Class 1 obesity due to excess calories with serious comorbidity and body mass index (BMI) of 30.0 to 30.9 in adult              Diagnoses         Codes Comments    Mixed hyperlipidemia    -  Primary ICD-10-CM: E78.2  ICD-9-CM: 272.2     Essential hypertension     ICD-10-CM: I10  ICD-9-CM:  401.9     PAD (peripheral artery disease)     ICD-10-CM: I73.9  ICD-9-CM: 443.9     Elevated hemoglobin     ICD-10-CM: D58.2  ICD-9-CM: 282.7     Prostate cancer screening     ICD-10-CM: Z12.5  ICD-9-CM: V76.44     Class 1 obesity due to excess calories with serious comorbidity and body mass index (BMI) of 30.0 to 30.9 in adult     ICD-10-CM: E66.09, Z68.30  ICD-9-CM: 278.00, V85.30           1. Hypertension: Chronic, controlled.  Continue on current medication.    2. Peripheral vascular disease: Chronic.  The patient is no longer following with vascular.   He is taking his aspirin and his statin; however, he has quit taking his Plavix.   We will re-refer back to vascular surgery for follow-up.    3. Hyperlipidemia: Chronic, stable.  Continue on high-dose statin at 80 mg daily.    4. Elevated hemoglobin/leukocytosis: Chronic.  The patient has been following with hematology oncology.   He is due for follow-up as his last appointment was canceled.   Recommended therapeutic phlebotomy to keep his hematocrit less than 50. Referral placed.    5. Obesity:  BMI is >= 30 and <35. (Class 1 Obesity). The following options were offered after discussion;: exercise counseling/recommendations and nutrition counseling/recommendations          Transcribed from ambient dictation for Madison Ansari MD by Bernice Davies.  09/11/23   08:42 CDT    Patient or patient representative verbalized consent to the visit recording.  I have personally performed the services described in this document as transcribed by the above individual, and it is both accurate and complete.          This document has been electronically signed by Madison Ansari MD on September 25, 2023 09:30 CDT

## 2023-09-12 LAB
ALBUMIN SERPL-MCNC: 4.4 G/DL (ref 3.5–5.2)
ALBUMIN/GLOB SERPL: 2 G/DL
ALP SERPL-CCNC: 134 U/L (ref 39–117)
ALT SERPL-CCNC: 34 U/L (ref 1–41)
AST SERPL-CCNC: 18 U/L (ref 1–40)
BILIRUB SERPL-MCNC: <0.2 MG/DL (ref 0–1.2)
BUN SERPL-MCNC: 16 MG/DL (ref 6–20)
BUN/CREAT SERPL: 17.4 (ref 7–25)
CALCIUM SERPL-MCNC: 10.1 MG/DL (ref 8.6–10.5)
CHLORIDE SERPL-SCNC: 105 MMOL/L (ref 98–107)
CO2 SERPL-SCNC: 25 MMOL/L (ref 22–29)
CREAT SERPL-MCNC: 0.92 MG/DL (ref 0.76–1.27)
EGFRCR SERPLBLD CKD-EPI 2021: 98.2 ML/MIN/1.73
GLOBULIN SER CALC-MCNC: 2.2 GM/DL
GLUCOSE SERPL-MCNC: 105 MG/DL (ref 65–99)
POTASSIUM SERPL-SCNC: 4.5 MMOL/L (ref 3.5–5.2)
PROT SERPL-MCNC: 6.6 G/DL (ref 6–8.5)
PSA SERPL-MCNC: 0.86 NG/ML (ref 0–4)
SODIUM SERPL-SCNC: 138 MMOL/L (ref 136–145)

## 2023-10-10 ENCOUNTER — CLINICAL SUPPORT (OUTPATIENT)
Dept: FAMILY MEDICINE CLINIC | Facility: CLINIC | Age: 53
End: 2023-10-10
Payer: MEDICARE

## 2023-10-10 DIAGNOSIS — Z23 FLU VACCINE NEED: Primary | ICD-10-CM

## 2023-10-10 NOTE — LETTER
"[facility]  Vaccine Consent Form    Patient Name:  Gilberto [facility]  Patient :  1970     Vaccine(s) Ordered    Fluzone >6 Months ([id number])        Screening Checklist  The following questions should be completed prior to vaccination. If you answer "yes" to any question, it does not necessarily mean you should not be vaccinated. It just means we may need to clarify or ask more questions. If a question is unclear, please ask your healthcare provider to explain it.    Yes No   Any fever or moderate to severe illness today (mild illness and/or antibiotic treatment are not contraindications)?     Do you have a history of a serious reaction to any previous vaccinations, such as anaphylaxis, encephalopathy within 7 days, Guillain-[place] syndrome within 6 weeks, seizure?     Have you received any live vaccine(s) in the past month (MMR, KITA)?     Do you have an anaphylactic allergy to latex (DTaP, DTaP-IPV, Hep A, Hep B, MenB, RV, Td, Tdap), baker's yeast (Hep B, HPV), or gelatin (KITA, MMR)?     Do you have an anaphylactic allergy to neomycin (Rabies, KITA, MMR, IPV, Hep A), polymyxin B (IPV), or streptomycin (IPV)?      Any cancer, leukemia, AIDS, or other immune system disorder? (KITA, MMR, RV)     Do you have a parent, brother, or sister with an immune system problem (if immune competence of vaccine recipient clinically verified, can proceed)? (MMR, KITA)     Any recent steroid treatments for >2 weeks, chemotherapy, or radiation treatment? (KITA, MMR)     Have you received antibody-containing blood transfusions or IVIG in the past 11 months (recommended interval is dependent on product)? (MMR, KITA)     Have you taken antiviral drugs (acyclovir, famciclovir, valacyclovir) in the last 24 or 48 hours, respectively (KITA)?      Are you pregnant or planning to become pregnant within 1 month? (KITA, MMR, HPV, IPV, MenB; For hep B- refer to Engerix-B)     For infants, have you ever been told your child has had " "intussusception or a medical emergency involving obstruction of the intestine (RV)? If not for an infant, can skip this question.         *Ordering Physician/APC should be consulted if "yes" is checked by the patient or guardian above.      I have received, read, and understand the Vaccine Information Statement (VIS) for each vaccine ordered above.  I have considered my health status as well as the health status of my close contacts.  I have taken the opportunity to discuss my vaccine questions with my health care provider.   I have requested that the ordered vaccine(s) be given to me.  I understand the benefits and risks of the vaccines.  I understand that I should remain in the clinic for 15 minutes after receiving the vaccine(s).  _________________________________________________________  Signature of Patient or Parent/Legal Guardian ____________________  Date     "

## 2023-10-12 DIAGNOSIS — F41.9 ANXIETY: ICD-10-CM

## 2023-10-12 DIAGNOSIS — I10 ESSENTIAL HYPERTENSION: ICD-10-CM

## 2023-10-12 RX ORDER — ESCITALOPRAM OXALATE 10 MG/1
10 TABLET ORAL DAILY
Qty: 90 TABLET | Refills: 1 | Status: SHIPPED | OUTPATIENT
Start: 2023-10-12

## 2023-10-12 RX ORDER — LISINOPRIL 10 MG/1
10 TABLET ORAL DAILY
Qty: 30 TABLET | Refills: 2 | OUTPATIENT
Start: 2023-10-12

## 2023-10-17 ENCOUNTER — OFFICE VISIT (OUTPATIENT)
Dept: FAMILY MEDICINE CLINIC | Facility: CLINIC | Age: 53
End: 2023-10-17
Payer: MEDICARE

## 2023-10-17 VITALS
HEIGHT: 68 IN | OXYGEN SATURATION: 97 % | WEIGHT: 199.2 LBS | TEMPERATURE: 97.8 F | DIASTOLIC BLOOD PRESSURE: 79 MMHG | HEART RATE: 70 BPM | SYSTOLIC BLOOD PRESSURE: 115 MMHG | BODY MASS INDEX: 30.19 KG/M2 | RESPIRATION RATE: 20 BRPM

## 2023-10-17 DIAGNOSIS — Z12.11 COLON CANCER SCREENING: ICD-10-CM

## 2023-10-17 DIAGNOSIS — R73.09 ELEVATED GLUCOSE: ICD-10-CM

## 2023-10-17 DIAGNOSIS — Z00.00 MEDICARE ANNUAL WELLNESS VISIT, SUBSEQUENT: Primary | ICD-10-CM

## 2023-10-17 LAB
EXPIRATION DATE: NORMAL
HBA1C MFR BLD: 5.7 % (ref 4.5–5.7)
Lab: NORMAL

## 2023-10-17 NOTE — PROGRESS NOTES
The ABCs of the Annual Wellness Visit  Subsequent Medicare Wellness Visit    Subjective      Gilberto Roberts is a 53 y.o. male who presents for a Subsequent Medicare Wellness Visit.    The following portions of the patient's history were reviewed and   updated as appropriate: allergies, current medications, past family history, past medical history, past social history, past surgical history, and problem list.    Compared to one year ago, the patient feels his physical   health is the same.    Compared to one year ago, the patient feels his mental   health is the same.    Recent Hospitalizations:  He was not admitted to the hospital during the last year.     He was seen in the emergency department in 2023 and had a hemorrhoid. He had a fecal occult blood test that came back positive at that time. He has had hemorrhoid surgery in the past. He had a colonoscopy with Dr. Lopez in 2018.    His PSA last month was normal. His kidney function, electrolytes, and liver numbers looked pretty good. In the spring, we had checked his cholesterol numbers, and they were a little bit high. He is still taking the cholesterol medication.    He is due for a lung cancer screening CT scan. He is a smoker,  his father  at 81 years old and smoked.    Current Medical Providers:  Patient Care Team:  Madison Ansari MD as PCP - General (Family Medicine)        Outpatient Medications Prior to Visit   Medication Sig Dispense Refill    aspirin (aspirin) 81 MG EC tablet Take 1 tablet by mouth Daily. 90 tablet 3    atorvastatin (LIPITOR) 80 MG tablet Take 1 tablet by mouth Daily. 30 tablet 11    clopidogrel (PLAVIX) 75 MG tablet TAKE ONE TABLET BY MOUTH DAILY. (Patient taking differently: Take 1 tablet by mouth Daily.) 30 tablet 3    escitalopram (LEXAPRO) 10 MG tablet TAKE 1 TABLET BY MOUTH DAILY. 90 tablet 1    lisinopril (PRINIVIL,ZESTRIL) 10 MG tablet Take 1 tablet by mouth Daily. 30 tablet 2    sildenafil (Viagra) 100 MG tablet  "Take 1 tablet by mouth Daily As Needed for Erectile Dysfunction. 10 tablet 11     No facility-administered medications prior to visit.       No opioid medication identified on active medication list. I have reviewed chart for other potential  high risk medication/s and harmful drug interactions in the elderly.        Aspirin is on active medication list. Aspirin use is indicated based on review of current medical condition/s. Pros and cons of this therapy have been discussed today. Benefits of this medication outweigh potential harm.  Patient has been encouraged to continue taking this medication.  .      Patient Active Problem List   Diagnosis    Essential hypertension    Tobacco use    Congenital hearing disorder of both ears    Current moderate episode of major depressive disorder without prior episode    Hemorrhoids    Nerve damage    Mixed hyperlipidemia    Spondylosis of thoracolumbar region without myelopathy or radiculopathy    Hallux valgus with bunions    PAD (peripheral artery disease)    Nonobstructive atherosclerosis of coronary artery    Erectile dysfunction due to arterial insufficiency     Advance Care Planning   Advance Care Planning     Advance Directive is not on file.  ACP discussion was held with the patient during this visit. Patient does not have an advance directive, information provided.     Objective    Vitals:    10/17/23 0823   BP: 115/79   BP Location: Left arm   Patient Position: Sitting   Cuff Size: Large Adult   Pulse: 70   Resp: 20   Temp: 97.8 °F (36.6 °C)   TempSrc: Infrared   SpO2: 97%   Weight: 90.4 kg (199 lb 3.2 oz)   Height: 172.7 cm (68\")   PainSc: 0-No pain     Estimated body mass index is 30.29 kg/m² as calculated from the following:    Height as of this encounter: 172.7 cm (68\").    Weight as of this encounter: 90.4 kg (199 lb 3.2 oz).           Does the patient have evidence of cognitive impairment?   No    Lab Results   Component Value Date    HGBA1C 5.7 10/17/2023      "     HEALTH RISK ASSESSMENT    Smoking Status:  Social History     Tobacco Use   Smoking Status Every Day    Packs/day: 0.50    Years: 33.00    Additional pack years: 0.00    Total pack years: 16.50    Types: Cigarettes    Last attempt to quit: 2020    Years since quitting: 3.8   Smokeless Tobacco Former    Types: Chew   Tobacco Comments    pt states it is too hard to quit, he is by himself.     Alcohol Consumption:  Social History     Substance and Sexual Activity   Alcohol Use No     Fall Risk Screen:    MANUEL Fall Risk Assessment was completed, and patient is at LOW risk for falls.Assessment completed on:10/17/2023    Depression Screening:      10/17/2023     8:24 AM   PHQ-2/PHQ-9 Depression Screening   Little Interest or Pleasure in Doing Things 0-->not at all   Feeling Down, Depressed or Hopeless 0-->not at all   PHQ-9: Brief Depression Severity Measure Score 0       Health Habits and Functional and Cognitive Screening:      10/17/2023     8:24 AM   Functional & Cognitive Status   Do you have difficulty preparing food and eating? No   Do you have difficulty bathing yourself, getting dressed or grooming yourself? No   Do you have difficulty using the toilet? No   Do you have difficulty moving around from place to place? No   Do you have trouble with steps or getting out of a bed or a chair? No   Current Diet Well Balanced Diet   Dental Exam Up to date   Eye Exam Up to date   Exercise (times per week) 4 times per week   Current Exercises Include Yard Work   Do you need help using the phone?  No   Are you deaf or do you have serious difficulty hearing?  No   Do you need help to go to places out of walking distance? No   Do you need help shopping? No   Do you need help preparing meals?  No   Do you need help with housework?  No   Do you need help with laundry? No   Do you need help taking your medications? No   Do you need help managing money? No   Do you ever drive or ride in a car without wearing a seat belt? No    Have you felt unusual stress, anger or loneliness in the last month? No   Who do you live with? Other   If you need help, do you have trouble finding someone available to you? No   Have you been bothered in the last four weeks by sexual problems? No   Do you have difficulty concentrating, remembering or making decisions? No       Age-appropriate Screening Schedule:  Refer to the list below for future screening recommendations based on patient's age, sex and/or medical conditions. Orders for these recommended tests are listed in the plan section. The patient has been provided with a written plan.    Health Maintenance   Topic Date Due    COVID-19 Vaccine (1) Never done    ZOSTER VACCINE (1 of 2) Never done    LIPID PANEL  05/30/2024    BMI FOLLOWUP  09/11/2024    ANNUAL WELLNESS VISIT  10/17/2024    TDAP/TD VACCINES (2 - Td or Tdap) 02/08/2027    COLORECTAL CANCER SCREENING  07/16/2028    Pneumococcal Vaccine 0-64 (3 - PPSV23 or PCV20) 06/14/2033    HEPATITIS C SCREENING  Completed    INFLUENZA VACCINE  Completed                  CMS Preventative Services Quick Reference  Risk Factors Identified During Encounter:    Fall Risk-High or Moderate: Discussed Fall Prevention in the home  Immunizations Discussed/Encouraged: Tdap, Influenza, Prevnar 20 (Pneumococcal 20-valent conjugate), Shingrix, and COVID19  Inactivity/Sedentary: Patient was advised to exercise at least 150 minutes a week per CDC recommendations.    The above risks/problems have been discussed with the patient.  Pertinent information has been shared with the patient in the After Visit Summary.    Diagnoses and all orders for this visit:    1. Medicare annual wellness visit, subsequent (Primary)    2. Elevated glucose  -     POC Glycosylated Hemoglobin (Hb A1C)    3. Colon cancer screening  -     Ambulatory Referral to Gastroenterology      Assessment/Plan:  1. Medicare wellness visit.  Reviewed and discussed his screenings. We will check hemoglobin A1c  while in office today.    Follow Up:   Next Medicare Wellness visit to be scheduled in 1 year.      An After Visit Summary and PPPS were made available to the patient.      Transcribed from ambient dictation for Madison Ansari MD by Katia Sarkar.  10/17/23   09:46 CDT    Patient or patient representative verbalized consent to the visit recording.  I have personally performed the services described in this document as transcribed by the above individual, and it is both accurate and complete.          This document has been electronically signed by Madison Ansari MD on November 4, 2023 15:46 CDT

## 2023-10-27 DIAGNOSIS — I73.9 PAD (PERIPHERAL ARTERY DISEASE): Primary | ICD-10-CM

## 2023-11-14 DIAGNOSIS — I73.9 PAD (PERIPHERAL ARTERY DISEASE): ICD-10-CM

## 2023-11-14 RX ORDER — SIMVASTATIN 40 MG
40 TABLET ORAL NIGHTLY
Qty: 90 TABLET | Refills: 3 | OUTPATIENT
Start: 2023-11-14

## 2023-12-05 ENCOUNTER — PATIENT OUTREACH (OUTPATIENT)
Dept: CASE MANAGEMENT | Facility: OTHER | Age: 53
End: 2023-12-05
Payer: MEDICARE

## 2023-12-05 NOTE — OUTREACH NOTE
AMBULATORY CASE MANAGEMENT NOTE    Name and Relationship of Patient/Support Person: Gilberto Roberts - Self    Patient Outreach    Received call from patient and he wishes to discuss his health insurance options. He currently has a plan that will no longer be accepted by  as of 1.1.24. Discussed with patient the other MA plans that will not be accepted by . Patient has opted to contact [employer] to discuss coverage.         Li PAGAN  Ambulatory Case Management    12/5/2023, 14:47 CST

## 2023-12-20 ENCOUNTER — HOSPITAL ENCOUNTER (OUTPATIENT)
Dept: ULTRASOUND IMAGING | Facility: HOSPITAL | Age: 53
Discharge: HOME | End: 2023-12-20
Payer: MEDICARE

## 2023-12-29 ENCOUNTER — TELEPHONE (OUTPATIENT)
Dept: VASCULAR SURGERY | Facility: CLINIC | Age: 53
End: 2023-12-29
Payer: MEDICARE

## 2024-01-04 DIAGNOSIS — I10 ESSENTIAL HYPERTENSION: ICD-10-CM

## 2024-01-05 RX ORDER — LISINOPRIL 10 MG/1
10 TABLET ORAL DAILY
Qty: 90 TABLET | Refills: 1 | Status: SHIPPED | OUTPATIENT
Start: 2024-01-05

## 2024-02-05 ENCOUNTER — PATIENT OUTREACH (OUTPATIENT)
Dept: CASE MANAGEMENT | Facility: OTHER | Age: 54
End: 2024-02-05
Payer: MEDICARE

## 2024-02-05 NOTE — OUTREACH NOTE
AMBULATORY CASE MANAGEMENT NOTE    Name and Relationship of Patient/Support Person: Gilberto Roberts K - Self    Patient Outreach    HRCM follow up. Patient is changing insurance to [employer]. Discussed contacting them to inquire about additional benefits available through the insurance. Rides will be free.         Li PAGAN  Ambulatory Case Management    2/5/2024, 13:16 CST

## 2024-02-27 DIAGNOSIS — F41.9 ANXIETY: ICD-10-CM

## 2024-02-27 RX ORDER — ESCITALOPRAM OXALATE 10 MG/1
10 TABLET ORAL DAILY
Qty: 90 TABLET | Refills: 1 | Status: SHIPPED | OUTPATIENT
Start: 2024-02-27

## 2024-02-27 NOTE — TELEPHONE ENCOUNTER
Rx Refill Note  Requested Prescriptions     Pending Prescriptions Disp Refills    escitalopram (LEXAPRO) 10 MG tablet [Pharmacy Med Name: ESCITALOPRAM 10 MG TABLET 10 Tablet] 90 tablet 1     Sig: TAKE 1 TABLET BY MOUTH DAILY.      Last office visit with prescribing clinician: 10/17/2023   Last telemedicine visit with prescribing clinician: Visit date not found   Next office visit with prescribing clinician: 4/11/2024                         Would you like a call back once the refill request has been completed: [] Yes [] No    If the office needs to give you a call back, can they leave a voicemail: [] Yes [] No    Maddi Diaz MA  02/27/24, 14:44 CST

## 2024-04-24 DIAGNOSIS — I10 ESSENTIAL HYPERTENSION: ICD-10-CM

## 2024-04-24 NOTE — TELEPHONE ENCOUNTER
Rx Refill Note  Requested Prescriptions     Pending Prescriptions Disp Refills    lisinopril (PRINIVIL,ZESTRIL) 10 MG tablet [Pharmacy Med Name: LISINOPRIL 10 MG TAB 10 Tablet] 90 tablet 1     Sig: TAKE 1 TABLET BY MOUTH DAILY      Last office visit with prescribing clinician: 10/17/2023   Last telemedicine visit with prescribing clinician: Visit date not found   Next office visit with prescribing clinician: 5/6/2024                         Would you like a call back once the refill request has been completed: [] Yes [] No    If the office needs to give you a call back, can they leave a voicemail: [] Yes [] No    Maddi Diaz MA  04/24/24, 12:08 CDT

## 2024-04-26 RX ORDER — LISINOPRIL 10 MG/1
10 TABLET ORAL DAILY
Qty: 90 TABLET | Refills: 1 | Status: SHIPPED | OUTPATIENT
Start: 2024-04-26

## 2024-07-18 RX ORDER — ATORVASTATIN CALCIUM 80 MG/1
80 TABLET, FILM COATED ORAL DAILY
Qty: 30 TABLET | Refills: 3 | Status: SHIPPED | OUTPATIENT
Start: 2024-07-18

## 2024-07-22 ENCOUNTER — TELEPHONE (OUTPATIENT)
Age: 54
End: 2024-07-22
Payer: MEDICARE

## 2024-07-25 ENCOUNTER — OFFICE VISIT (OUTPATIENT)
Dept: FAMILY MEDICINE CLINIC | Facility: CLINIC | Age: 54
End: 2024-07-25
Payer: MEDICARE

## 2024-07-25 VITALS
OXYGEN SATURATION: 99 % | WEIGHT: 207 LBS | RESPIRATION RATE: 18 BRPM | HEART RATE: 73 BPM | SYSTOLIC BLOOD PRESSURE: 86 MMHG | HEIGHT: 68 IN | TEMPERATURE: 98.2 F | BODY MASS INDEX: 31.37 KG/M2 | DIASTOLIC BLOOD PRESSURE: 54 MMHG

## 2024-07-25 DIAGNOSIS — R73.03 PRE-DIABETES: ICD-10-CM

## 2024-07-25 DIAGNOSIS — F41.9 ANXIETY: ICD-10-CM

## 2024-07-25 DIAGNOSIS — E78.2 MIXED HYPERLIPIDEMIA: Primary | ICD-10-CM

## 2024-07-25 DIAGNOSIS — I10 ESSENTIAL HYPERTENSION: ICD-10-CM

## 2024-07-25 PROCEDURE — 3074F SYST BP LT 130 MM HG: CPT | Performed by: FAMILY MEDICINE

## 2024-07-25 PROCEDURE — 99214 OFFICE O/P EST MOD 30 MIN: CPT | Performed by: FAMILY MEDICINE

## 2024-07-25 PROCEDURE — 3078F DIAST BP <80 MM HG: CPT | Performed by: FAMILY MEDICINE

## 2024-07-25 PROCEDURE — 1126F AMNT PAIN NOTED NONE PRSNT: CPT | Performed by: FAMILY MEDICINE

## 2024-07-25 PROCEDURE — G2211 COMPLEX E/M VISIT ADD ON: HCPCS | Performed by: FAMILY MEDICINE

## 2024-07-25 RX ORDER — ESCITALOPRAM OXALATE 10 MG/1
10 TABLET ORAL DAILY
Qty: 90 TABLET | Refills: 1 | Status: SHIPPED | OUTPATIENT
Start: 2024-07-25

## 2024-07-25 RX ORDER — LISINOPRIL 5 MG/1
5 TABLET ORAL DAILY
Qty: 90 TABLET | Refills: 1 | Status: SHIPPED | OUTPATIENT
Start: 2024-07-25

## 2024-07-25 NOTE — PROGRESS NOTES
"Subjective   Gilberto Roberts is a 54 y.o. male.     History of Present Illness  The patient presents for evaluation of multiple medical concerns. He is accompanied by an adult female.    He reports feeling well today. However, he has been experiencing episodes of low blood pressure, accompanied by feelings of weakness and fatigue.  He is compliant with his cholesterol lowering medication   He is due for re-eval of his ha1c for pre-DM   His weight continues to trend up - has not made dietary changes  Anxiety is controlled with lexapro    Results      The following portions of the patient's history were reviewed and updated as appropriate: allergies, current medications, past family history, past medical history, past social history, past surgical history, and problem list.        A review of systems was performed, and pertinent findings are noted in the HPI.    Objective   BP (!) 86/54 (BP Location: Left arm, Patient Position: Sitting, Cuff Size: Large Adult)   Pulse 73   Temp 98.2 °F (36.8 °C) (Infrared)   Resp 18   Ht 172.7 cm (68\") Comment: per patient  Wt 93.9 kg (207 lb)   SpO2 99%   BMI 31.47 kg/m²          Physical Exam  Vitals and nursing note reviewed.   Constitutional:       General: He is not in acute distress.     Appearance: Normal appearance. He is obese. He is not toxic-appearing.   HENT:      Right Ear: External ear normal.      Left Ear: External ear normal.      Nose: Nose normal.   Eyes:      General:         Right eye: No discharge.         Left eye: No discharge.      Conjunctiva/sclera: Conjunctivae normal.   Cardiovascular:      Rate and Rhythm: Normal rate and regular rhythm.      Pulses: Normal pulses.   Pulmonary:      Effort: Pulmonary effort is normal. No respiratory distress.   Skin:     General: Skin is warm and dry.   Neurological:      Mental Status: He is alert and oriented to person, place, and time. Mental status is at baseline.      Motor: Weakness present.      Gait: Gait " abnormal.   Psychiatric:         Mood and Affect: Mood normal.         Behavior: Behavior normal.         Thought Content: Thought content normal.         Judgment: Judgment normal.         Assessment & Plan   Problems Addressed this Visit          Cardiac and Vasculature    Essential hypertension    Relevant Medications    lisinopril (PRINIVIL,ZESTRIL) 5 MG tablet    Other Relevant Orders    Comprehensive metabolic panel    CBC No Differential    Mixed hyperlipidemia - Primary    Relevant Orders    Lipid panel     Other Visit Diagnoses       Pre-diabetes        Relevant Orders    Hemoglobin A1c    Anxiety        Relevant Medications    escitalopram (LEXAPRO) 10 MG tablet          Diagnoses         Codes Comments    Mixed hyperlipidemia    -  Primary ICD-10-CM: E78.2  ICD-9-CM: 272.2     Essential hypertension     ICD-10-CM: I10  ICD-9-CM: 401.9     Pre-diabetes     ICD-10-CM: R73.03  ICD-9-CM: 790.29     Anxiety     ICD-10-CM: F41.9  ICD-9-CM: 300.00             Assessment & Plan  1. Hypertension.  The patient's blood pressure is running on the low end of normal. I will decrease the lisinopril from 10 mg to 5 mg and patient can monitor his blood pressure to see if it is getting better.    2. Hyperlipidemia.  It is chronic. We will continue on cholesterol lowering medication. We will repeat lipid panel for further evaluation.    3. Prediabetes.  It is chronic, needs further evaluation. I will recheck hemoglobin A1c for further evaluation.    4. Anxiety.  It is chronic. He will continue on existing medication. Refill given.               Transcribed from ambient dictation for Madison Ansari MD by Madison Ansari MD.  07/25/24   10:10 CDT    Patient or patient representative verbalized consent for the use of Ambient Listening during the visit with  Madison Ansari MD for chart documentation. 7/25/2024  10:10 CDT          This document has been electronically signed by Madison Ansari MD on July 25,  2024 17:05 T

## 2024-07-26 LAB
ALBUMIN SERPL-MCNC: 4.4 G/DL (ref 3.5–5.2)
ALBUMIN/GLOB SERPL: 1.9 G/DL
ALP SERPL-CCNC: 141 U/L (ref 39–117)
ALT SERPL-CCNC: 30 U/L (ref 1–41)
AST SERPL-CCNC: 15 U/L (ref 1–40)
BILIRUB SERPL-MCNC: 0.4 MG/DL (ref 0–1.2)
BUN SERPL-MCNC: 15 MG/DL (ref 6–20)
BUN/CREAT SERPL: 16.3 (ref 7–25)
CALCIUM SERPL-MCNC: 9.7 MG/DL (ref 8.6–10.5)
CHLORIDE SERPL-SCNC: 104 MMOL/L (ref 98–107)
CHOLEST SERPL-MCNC: 143 MG/DL (ref 0–200)
CO2 SERPL-SCNC: 25.6 MMOL/L (ref 22–29)
CREAT SERPL-MCNC: 0.92 MG/DL (ref 0.76–1.27)
EGFRCR SERPLBLD CKD-EPI 2021: 97.6 ML/MIN/1.73
ERYTHROCYTE [DISTWIDTH] IN BLOOD BY AUTOMATED COUNT: 13.7 % (ref 12.3–15.4)
GLOBULIN SER CALC-MCNC: 2.3 GM/DL
GLUCOSE SERPL-MCNC: 56 MG/DL (ref 65–99)
HBA1C MFR BLD: 5.6 % (ref 4.8–5.6)
HCT VFR BLD AUTO: 49.8 % (ref 37.5–51)
HDLC SERPL-MCNC: 45 MG/DL (ref 40–60)
HGB BLD-MCNC: 16.4 G/DL (ref 13–17.7)
LDLC SERPL CALC-MCNC: 78 MG/DL (ref 0–100)
MCH RBC QN AUTO: 29.9 PG (ref 26.6–33)
MCHC RBC AUTO-ENTMCNC: 32.9 G/DL (ref 31.5–35.7)
MCV RBC AUTO: 90.9 FL (ref 79–97)
PLATELET # BLD AUTO: 276 10*3/MM3 (ref 140–450)
POTASSIUM SERPL-SCNC: 4.7 MMOL/L (ref 3.5–5.2)
PROT SERPL-MCNC: 6.7 G/DL (ref 6–8.5)
RBC # BLD AUTO: 5.48 10*6/MM3 (ref 4.14–5.8)
SODIUM SERPL-SCNC: 138 MMOL/L (ref 136–145)
TRIGL SERPL-MCNC: 107 MG/DL (ref 0–150)
VLDLC SERPL CALC-MCNC: 20 MG/DL (ref 5–40)
WBC # BLD AUTO: 13.17 10*3/MM3 (ref 3.4–10.8)

## 2024-08-05 DIAGNOSIS — D72.829 LEUKOCYTOSIS, UNSPECIFIED TYPE: Primary | ICD-10-CM

## 2024-08-14 DIAGNOSIS — F41.9 ANXIETY: ICD-10-CM

## 2024-08-14 DIAGNOSIS — I73.9 PAD (PERIPHERAL ARTERY DISEASE): ICD-10-CM

## 2024-08-14 RX ORDER — ESCITALOPRAM OXALATE 10 MG/1
10 TABLET ORAL DAILY
Qty: 90 TABLET | Refills: 1 | Status: SHIPPED | OUTPATIENT
Start: 2024-08-14

## 2024-08-14 RX ORDER — ASPIRIN 81 MG/1
81 TABLET, COATED ORAL DAILY
Qty: 90 TABLET | Refills: 3 | Status: SHIPPED | OUTPATIENT
Start: 2024-08-14

## 2024-08-15 DIAGNOSIS — D75.1 POLYCYTHEMIA: Primary | ICD-10-CM

## 2024-08-20 ENCOUNTER — OFFICE VISIT (OUTPATIENT)
Dept: ONCOLOGY | Facility: CLINIC | Age: 54
End: 2024-08-20
Payer: MEDICARE

## 2024-08-20 ENCOUNTER — LAB (OUTPATIENT)
Dept: LAB | Facility: HOSPITAL | Age: 54
End: 2024-08-20
Payer: MEDICARE

## 2024-08-20 VITALS
WEIGHT: 206 LBS | SYSTOLIC BLOOD PRESSURE: 116 MMHG | RESPIRATION RATE: 18 BRPM | HEIGHT: 68 IN | BODY MASS INDEX: 31.22 KG/M2 | OXYGEN SATURATION: 97 % | HEART RATE: 76 BPM | DIASTOLIC BLOOD PRESSURE: 82 MMHG | TEMPERATURE: 97.6 F

## 2024-08-20 DIAGNOSIS — D72.829 LEUKOCYTOSIS, UNSPECIFIED TYPE: ICD-10-CM

## 2024-08-20 DIAGNOSIS — I73.9 PAD (PERIPHERAL ARTERY DISEASE): ICD-10-CM

## 2024-08-20 DIAGNOSIS — D75.1 POLYCYTHEMIA: Primary | ICD-10-CM

## 2024-08-20 LAB
ALBUMIN SERPL-MCNC: 4.1 G/DL (ref 3.5–5.2)
ALBUMIN/GLOB SERPL: 1.3 G/DL
ALP SERPL-CCNC: 152 U/L (ref 39–117)
ALT SERPL W P-5'-P-CCNC: 17 U/L (ref 1–41)
ANION GAP SERPL CALCULATED.3IONS-SCNC: 9 MMOL/L (ref 5–15)
AST SERPL-CCNC: 13 U/L (ref 1–40)
BASOPHILS # BLD AUTO: 0.07 10*3/MM3 (ref 0–0.2)
BASOPHILS NFR BLD AUTO: 0.6 % (ref 0–1.5)
BILIRUB SERPL-MCNC: 0.5 MG/DL (ref 0–1.2)
BUN SERPL-MCNC: 13 MG/DL (ref 6–20)
BUN/CREAT SERPL: 16.9 (ref 7–25)
CALCIUM SPEC-SCNC: 9.5 MG/DL (ref 8.6–10.5)
CHLORIDE SERPL-SCNC: 102 MMOL/L (ref 98–107)
CO2 SERPL-SCNC: 27 MMOL/L (ref 22–29)
CREAT SERPL-MCNC: 0.77 MG/DL (ref 0.76–1.27)
DEPRECATED RDW RBC AUTO: 47.1 FL (ref 37–54)
EGFRCR SERPLBLD CKD-EPI 2021: 105.1 ML/MIN/1.73
EOSINOPHIL # BLD AUTO: 0.31 10*3/MM3 (ref 0–0.4)
EOSINOPHIL NFR BLD AUTO: 2.8 % (ref 0.3–6.2)
ERYTHROCYTE [DISTWIDTH] IN BLOOD BY AUTOMATED COUNT: 13.9 % (ref 12.3–15.4)
GLOBULIN UR ELPH-MCNC: 3.1 GM/DL
GLUCOSE SERPL-MCNC: 80 MG/DL (ref 65–99)
HCT VFR BLD AUTO: 50.8 % (ref 37.5–51)
HGB BLD-MCNC: 16.7 G/DL (ref 13–17.7)
HOLD SPECIMEN: NORMAL
IMM GRANULOCYTES # BLD AUTO: 0.05 10*3/MM3 (ref 0–0.05)
IMM GRANULOCYTES NFR BLD AUTO: 0.5 % (ref 0–0.5)
LYMPHOCYTES # BLD AUTO: 2.66 10*3/MM3 (ref 0.7–3.1)
LYMPHOCYTES NFR BLD AUTO: 24.2 % (ref 19.6–45.3)
MCH RBC QN AUTO: 30 PG (ref 26.6–33)
MCHC RBC AUTO-ENTMCNC: 32.9 G/DL (ref 31.5–35.7)
MCV RBC AUTO: 91.4 FL (ref 79–97)
MONOCYTES # BLD AUTO: 0.91 10*3/MM3 (ref 0.1–0.9)
MONOCYTES NFR BLD AUTO: 8.3 % (ref 5–12)
NEUTROPHILS NFR BLD AUTO: 6.99 10*3/MM3 (ref 1.7–7)
NEUTROPHILS NFR BLD AUTO: 63.6 % (ref 42.7–76)
NRBC BLD AUTO-RTO: 0 /100 WBC (ref 0–0.2)
PLATELET # BLD AUTO: 266 10*3/MM3 (ref 140–450)
PMV BLD AUTO: 9.9 FL (ref 6–12)
POTASSIUM SERPL-SCNC: 4.4 MMOL/L (ref 3.5–5.2)
PROT SERPL-MCNC: 7.2 G/DL (ref 6–8.5)
RBC # BLD AUTO: 5.56 10*6/MM3 (ref 4.14–5.8)
SODIUM SERPL-SCNC: 138 MMOL/L (ref 136–145)
WBC NRBC COR # BLD AUTO: 10.99 10*3/MM3 (ref 3.4–10.8)

## 2024-08-20 PROCEDURE — 36415 COLL VENOUS BLD VENIPUNCTURE: CPT

## 2024-08-20 PROCEDURE — 85025 COMPLETE CBC W/AUTO DIFF WBC: CPT

## 2024-08-20 PROCEDURE — 80053 COMPREHEN METABOLIC PANEL: CPT

## 2024-08-20 NOTE — PROGRESS NOTES
[facility] HEMATOLOGY & ONCOLOGY  [address]  [place] [address] [phone]    Patient Name: Gilberto Roberts  Encounter Date: 08/20/2024  YOB: 1970  Patient Number: [id number]    PROGRESS NOTE    HISTORY OF PRESENT ILLNESS: Gilberto Roberts is a 54 y.o. male was seen in our office on 06/28/22 for diagnostic and management secondary polycythemia. Advised therapeutic phlebotomy to keep hematocrit less than 50    Diagnostic Workup   Pathologist review of peripheral smear showedLeukocytosis with absolute neutrophilia. Increased hemoglobin/hematocrit.  Comment: Persistent elevation of hematocrit/hemoglobin and white blood cells without clinical cause suggests the possibility of a myeloproliferative neoplasm.  Negative for BCR ABL rearrangement  Negative for JAK2 V6 17F mutation  FISH negative for CLL     His last phlebotomy was 08/09/22.      INTERVAL HISTORY  Patient presents to clinic today for continued follow-up with girlfriend   He complains of bilateral leg pain.    He hasn't been on Plavix since last year.  He's not sure if he just ran out or if someone told him to stop it.     He had labs drawn and results were reviewed  with him in office.      LABS    Lab Results - Last 18 Months   Lab Units 08/20/24  0946 07/25/24  0918   HEMOGLOBIN g/dL 16.7 16.4   HEMATOCRIT % 50.8 49.8   MCV fL 91.4 90.9   WBC 10*3/mm3 10.99* 13.17*   RDW % 13.9 13.7   MPV fL 9.9  --    PLATELETS 10*3/mm3 266 276   IMM GRAN % % 0.5  --    NEUTROS ABS 10*3/mm3 6.99  --    LYMPHS ABS 10*3/mm3 2.66  --    MONOS ABS 10*3/mm3 0.91*  --    EOS ABS 10*3/mm3 0.31  --    BASOS ABS 10*3/mm3 0.07  --    IMMATURE GRANS (ABS) 10*3/mm3 0.05  --    NRBC /100 WBC 0.0  --        Lab Results - Last 18 Months   Lab Units 08/20/24  0946 07/25/24  0918 09/11/23  0718   GLUCOSE mg/dL 80 56* 105*   SODIUM mmol/L 138 138 138   POTASSIUM mmol/L 4.4 4.7 4.5   CO2 mmol/L 27.0  "25.6 25.0   CHLORIDE mmol/L 102 104 105   ANION GAP mmol/L 9.0  --   --    CREATININE mg/dL 0.77 0.92 0.92   BUN mg/dL 13 15 16   BUN / CREAT RATIO  16.9 16.3 17.4   CALCIUM mg/dL 9.5 9.7 10.1   ALK PHOS U/L 152* 141* 134*   TOTAL PROTEIN g/dL 7.2  --   --    ALT (SGPT) U/L 17 30 34   AST (SGOT) U/L 13 15 18   BILIRUBIN mg/dL 0.5 0.4 <0.2   ALBUMIN g/dL 4.1 4.4 4.4   GLOBULIN gm/dL 3.1  --   --        No results for input(s): \"MSPIKE\", \"KAPPALAMB\", \"IGLFLC\", \"URICACID\", \"FREEKAPPAL\", \"CEA\", \"LDH\", \"REFLABREPO\" in the last 09831 hours.      No results for input(s): \"IRON\", \"TIBC\", \"LABIRON\", \"FERRITIN\", \"O5FKMLS\", \"TSH\", \"FOLATE\" in the last 43011 hours.    Invalid input(s): \"VITB12\"      PAST MEDICAL HISTORY:  ALLERGIES:  No Known Allergies  CURRENT MEDICATIONS:  Outpatient Encounter Medications as of 8/20/2024   Medication Sig Dispense Refill    Aspirin Low Dose 81 MG EC tablet TAKE ONE TABLET BY MOUTH DAILY. 90 tablet 3    atorvastatin (LIPITOR) 80 MG tablet TAKE 1 TABLET BY MOUTH DAILY. 30 tablet 3    clopidogrel (PLAVIX) 75 MG tablet TAKE ONE TABLET BY MOUTH DAILY. (Patient taking differently: Take 1 tablet by mouth Daily.) 30 tablet 3    escitalopram (LEXAPRO) 10 MG tablet TAKE 1 TABLET BY MOUTH DAILY. 90 tablet 1    lisinopril (PRINIVIL,ZESTRIL) 5 MG tablet Take 1 tablet by mouth Daily. 90 tablet 1    sildenafil (Viagra) 100 MG tablet Take 1 tablet by mouth Daily As Needed for Erectile Dysfunction. 10 tablet 11     No facility-administered encounter medications on file as of 8/20/2024.     ADULT ILLNESSES:  Patient Active Problem List   Diagnosis Code    Essential hypertension I10    Tobacco use Z72.0    Congenital hearing disorder of both ears H90.5    Current moderate episode of major depressive disorder without prior episode F32.1    Hemorrhoids K64.9    Nerve damage T14.8XXA    Mixed hyperlipidemia E78.2    Spondylosis of thoracolumbar region without myelopathy or radiculopathy M47.815    Hallux valgus " with bunions M20.10, M21.619    PAD (peripheral artery disease) I73.9    Nonobstructive atherosclerosis of coronary artery I25.10    Erectile dysfunction due to arterial insufficiency N52.01       HEALTH MAINTENANCE ITEMS:  Health Maintenance Due   Topic Date Due    ZOSTER VACCINE (1 of 2) Never done    COVID-19 Vaccine (1 - - season) Never done    INFLUENZA VACCINE  2024    ANNUAL WELLNESS VISIT  10/17/2024       <no information>  Last Completed Colonoscopy            COLORECTAL CANCER SCREENING (COLONOSCOPY - Every 10 Years) Next due on 2023  Fecal Occult Blood component of POC Occult Blood Stool    2018  SCANNED - COLONOSCOPY                  Immunization History   Administered Date(s) Administered    Flu Vaccine Quad PF >36MO 10/04/2020    Fluzone (or Fluarix & Flulaval for VFC) >6mos 10/23/2019, 10/04/2020, 10/05/2022, 10/10/2023    Influenza TIV (IM) 2008    Pneumococcal Conjugate 13-Valent (PCV13) 10/04/2020    Pneumococcal Polysaccharide (PPSV23) 2018    Tdap 2017     Last Completed Mammogram       This patient has no relevant Health Maintenance data.              FAMILY HISTORY:  Family History   Problem Relation Age of Onset    COPD Mother         respiratory failure    Dementia Father     Diabetes Father      SOCIAL HISTORY:  Social History     Socioeconomic History    Marital status: Single   Tobacco Use    Smoking status: Every Day     Current packs/day: 0.00     Average packs/day: 0.5 packs/day for 33.0 years (16.5 ttl pk-yrs)     Types: Cigarettes     Start date:      Last attempt to quit: 2020     Years since quittin.6    Smokeless tobacco: Former     Types: Chew    Tobacco comments:     pt states it is too hard to quit, he is by himself.   Vaping Use    Vaping status: Never Used   Substance and Sexual Activity    Alcohol use: No    Drug use: No    Sexual activity: Defer       REVIEW OF SYSTEMS:  Review of Systems   Constitutional:   "Positive for fatigue. Negative for activity change, appetite change, fever, unexpected weight gain and unexpected weight loss.   HENT:  Negative for dental problem, facial swelling, swollen glands and trouble swallowing.         Congenital hearing loss, uses hearing aids    Eyes:  Negative for double vision and discharge.   Respiratory:  Negative for cough, shortness of breath and wheezing.    Cardiovascular:  Negative for chest pain, palpitations and leg swelling.   Gastrointestinal:  Negative for abdominal pain, nausea and vomiting. Blood in stool: Hemorrhoids Has had mutliple surgeries.  Wears briefs r/t the bleeding..  Endocrine: Negative.    Genitourinary:  Negative for dysuria and hematuria.   Musculoskeletal:  Negative for arthralgias and myalgias.   Skin:  Negative for rash, skin lesions and wound.        States he has pimples on his back often   Allergic/Immunologic: Negative for immunocompromised state.   Neurological:  Negative for speech difficulty, light-headedness, headache, memory problem and confusion.   Hematological:  Negative for adenopathy.   Psychiatric/Behavioral:  Negative for self-injury and suicidal ideas. The patient is nervous/anxious.        /82   Pulse 76   Temp 97.6 °F (36.4 °C)   Resp 18   Ht 172.7 cm (68\")   Wt 93.4 kg (206 lb)   SpO2 97%   BMI 31.32 kg/m²  Body surface area is 2.07 meters squared.      Physical Exam  Constitutional:       Appearance: Normal appearance.   HENT:      Head: Normocephalic and atraumatic.   Cardiovascular:      Rate and Rhythm: Normal rate and regular rhythm.   Pulmonary:      Effort: Pulmonary effort is normal.      Breath sounds: Normal breath sounds.   Chest:      Comments: Abcess to right breast approx 6 cm wide.  Indurated, red, warm to touch, tender to touch     Abdominal:      General: Bowel sounds are normal.      Palpations: Abdomen is soft.   Musculoskeletal:         General: Normal range of motion.      Right lower leg: No edema. "      Left lower leg: No edema.   Skin:     General: Skin is warm and dry.   Neurological:      Mental Status: He is alert and oriented to person, place, and time.   Psychiatric:         Attention and Perception: Attention normal.         Mood and Affect: Mood normal.         Judgment: Judgment normal.         Gilberto Roberts reports a pain score of 0.  No intervention indicated.      ASSESSMENT / PLAN:    1. Polycythemia    2. PAD (peripheral artery disease)    3. Leukocytosis, unspecified type         1.  Leukocytosis / Elevated Hemoglobin  -All work-up for myeloproliferative neoplasms have been negative  - Patient smokes 1-1/2 packs/day and has for the past 30 years which is likely etiology of his leukocytosis and polycythemia  -Advised therapeutic phlebotomy to keep hematocrit less than 50  -He is currently taking Plavix and ASA   -Labs today WBC 10.99, Hgb 16.7, Hct 50.8  -We will proceed with phlebotomy today.      2.  Peripheral artery disease  -Claudication to the left lower extremity s/p atherectomy, angioplasty, and stenting of the SFA in 2020   -Continue Plavix and ASA  -Continue follow up with Dr. Billings, Vascular Surgery    3.  Tobacco abuse  -Patient smokes 1-1/2 packs/day and has for the past 31 years  -Advised smoking cessation     4.  Elevated Alk Phos   -Alk Phos 152  -Fatty liver on 2019 scan   On Simvistastin  -Stable for observation     Pt is no longer taking Plavix.  He is not sure who told him to stop the Plavix.  Placed referral back to vascular so he can re-establish with them to determine his need.         PLAN:  -Phlebotomy   today.  -Goal is to keep Hct < 50  - Continue all follow-up, treatment plans, medications per PCP and any other providers  -Labs and phlebotomy in 3 months   - Patient will return to clinic in 6 months for continued follow-up.  He will have CBC, CMP drawn before his visit.  - Patient voices understanding and agrees to treatment plan.            Conchita Winslow,  APRN  08/20/2024

## 2024-09-05 ENCOUNTER — TELEPHONE (OUTPATIENT)
Dept: VASCULAR SURGERY | Facility: CLINIC | Age: 54
End: 2024-09-05
Payer: MEDICARE

## 2024-09-05 DIAGNOSIS — I73.9 PAD (PERIPHERAL ARTERY DISEASE): Primary | ICD-10-CM

## 2024-09-06 ENCOUNTER — OFFICE VISIT (OUTPATIENT)
Dept: VASCULAR SURGERY | Facility: CLINIC | Age: 54
End: 2024-09-06
Payer: MEDICARE

## 2024-09-06 VITALS
HEART RATE: 76 BPM | HEIGHT: 68 IN | DIASTOLIC BLOOD PRESSURE: 64 MMHG | OXYGEN SATURATION: 94 % | BODY MASS INDEX: 31.37 KG/M2 | WEIGHT: 207 LBS | SYSTOLIC BLOOD PRESSURE: 122 MMHG

## 2024-09-06 DIAGNOSIS — E78.2 MIXED HYPERLIPIDEMIA: ICD-10-CM

## 2024-09-06 DIAGNOSIS — Z72.0 TOBACCO USE: ICD-10-CM

## 2024-09-06 DIAGNOSIS — I10 ESSENTIAL HYPERTENSION: ICD-10-CM

## 2024-09-06 DIAGNOSIS — I73.9 PAD (PERIPHERAL ARTERY DISEASE): Primary | ICD-10-CM

## 2024-09-06 RX ORDER — CLOPIDOGREL BISULFATE 75 MG/1
75 TABLET ORAL DAILY
Qty: 30 TABLET | Refills: 6 | Status: SHIPPED | OUTPATIENT
Start: 2024-09-06

## 2024-09-06 NOTE — PROGRESS NOTES
08/23/2024      Conchita Winslow APRN  [address]  [place] [address]    Gilberto Roberts  1970    Chief Complaint   Patient presents with    NEW PATIENT     Previous [facility] patient referred from Conchita Winslow for PAD. Last seen 6/28/21. Patient complains of pain and stiffness of left leg. Stents placed by [facility] in 2020. Current smoker of 35 years, 1 1/2 packs daily.States that he hasn't taken Plavix in a year.        Dear KASEY Conway:      HPI  I had the pleasure of seeing your patient Gilberto Roberts in the office today.  Thank you kindly for this consultation.  As you recall, Gilberto Roberts is a 54 y.o.  male who you are currently following for routine health maintenance.  He is here today with complaints of pain and stiffness of the left leg.  He was a previous patient of Dr. Billings and had underwent aortogram, left lower extremity angiogram, balloon angioplasty, with stent placement in his left lower extremity on 1/9/2020.  He reports he has been off of Plavix for a year, because he ran out of the prescription.  He was last seen in office on 6/28/2021.  He denies any strokelike symptoms.  He is maintained on aspirin, and Lipitor.    Past Medical History:   Diagnosis Date    Anxiety     Arthritis     Coronary artery disease involving native coronary artery of native heart with unstable angina pectoris 8/3/2020    Current moderate episode of major depressive disorder without prior episode 5/17/2021    Essential hypertension 3/29/2019    Head injury     [place] (hard of hearing)     Hyperlipidemia     Injury of back        Past Surgical History:   Procedure Laterality Date    AORTAGRAM Bilateral 4/2/2019    Procedure: AORTAGRAM, LEFT LEG ANGIOGRAM, ANGIOPLASTY/STENT PLACEMENT, ANGIOSEAL CLOSURE;  Surgeon: Luis Billings MD;  Location: [facility] OR ;  Service: Vascular    AORTAGRAM Left 1/9/2020    Procedure: AORTAGRAM, LEFT LOWER EXTREMITY ANGIOGRAM,  "ATHRECTOMY, BALLOON ANGIOPLASTY, STENT PLACEMENT, MYNX CLOSURE;  Surgeon: Luis Billings MD;  Location: [facility] HYBRID OR 12;  Service: Vascular    HEMORRHOIDECTOMY      x 3    HERNIA REPAIR      LEG SURGERY      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   Vaping Use    Vaping status: Never Used   Substance and Sexual Activity    Alcohol use: No    Drug use: No    Sexual activity: Defer       No Known Allergies    Prior to Admission medications    Medication Sig Start Date End Date Taking? Authorizing Provider   Aspirin Low Dose 81 MG EC tablet TAKE ONE TABLET BY MOUTH DAILY. 24   Madison Ansari MD   atorvastatin (LIPITOR) 80 MG tablet TAKE 1 TABLET BY MOUTH DAILY. 24   Alexandro Pruitt MD   clopidogrel (PLAVIX) 75 MG tablet TAKE ONE TABLET BY MOUTH DAILY.  Patient taking differently: Take 1 tablet by mouth Daily. 21   Luis Billings MD   escitalopram (LEXAPRO) 10 MG tablet TAKE 1 TABLET BY MOUTH DAILY. 24   Madison Ansari MD   lisinopril (PRINIVIL,ZESTRIL) 5 MG tablet Take 1 tablet by mouth Daily. 24   Madison Ansari MD   sildenafil (Viagra) 100 MG tablet Take 1 tablet by mouth Daily As Needed for Erectile Dysfunction. 23   Alexandro Pruitt MD       Review of Systems   Constitutional: Negative.  Negative for diaphoresis and fever.   HENT: Negative.     Eyes: Negative.  " "  Respiratory: Negative.  Negative for shortness of breath and wheezing.    Cardiovascular: Negative.  Negative for chest pain and leg swelling.   Gastrointestinal: Negative.  Negative for abdominal pain.   Endocrine: Negative.    Genitourinary: Negative.    Musculoskeletal:  Positive for arthralgias.   Skin: Negative.    Allergic/Immunologic: Negative.    Neurological: Negative.  Negative for dizziness and weakness.   Hematological: Negative.    Psychiatric/Behavioral: Negative.         /64   Pulse 76   Ht 172.7 cm (68\")   Wt 93.9 kg (207 lb)   SpO2 94%   BMI 31.47 kg/m²       Physical Exam  Vitals and nursing note reviewed.   Constitutional:       General: He is not in acute distress.     Appearance: Normal appearance. He is obese. He is not diaphoretic.   HENT:      Head: Normocephalic. No right periorbital erythema or left periorbital erythema.      Right Ear: Decreased hearing noted.      Left Ear: Decreased hearing noted.      Ears:      Comments: Patient wears hearing aids     Nose: Nose normal.   Eyes:      General: No scleral icterus.     Pupils: Pupils are equal.   Cardiovascular:      Rate and Rhythm: Normal rate and regular rhythm.      Pulses: Normal pulses.           Femoral pulses are 2+ on the right side and 2+ on the left side.       Popliteal pulses are 2+ on the right side and 2+ on the left side.        Dorsalis pedis pulses are detected w/ Doppler on the right side and detected w/ Doppler on the left side.        Posterior tibial pulses are detected w/ Doppler on the right side and detected w/ Doppler on the left side.      Heart sounds: Normal heart sounds. No murmur heard.  Pulmonary:      Effort: Pulmonary effort is normal. No respiratory distress.      Breath sounds: Normal breath sounds.   Abdominal:      General: Bowel sounds are normal. There is no distension.      Palpations: Abdomen is soft.      Tenderness: There is no abdominal tenderness. There is no guarding. "   Musculoskeletal:         General: No swelling or tenderness. Normal range of motion.      Cervical back: Normal range of motion and neck supple.      Right lower leg: No edema.      Left lower leg: No edema.   Feet:      Right foot:      Skin integrity: Skin integrity normal.      Left foot:      Skin integrity: Skin integrity normal.   Skin:     General: Skin is warm and dry.      Findings: No erythema or rash.   Neurological:      General: No focal deficit present.      Mental Status: He is alert and oriented to person, place, and time. Mental status is at baseline.      Cranial Nerves: No cranial nerve deficit.      Gait: Gait normal.   Psychiatric:         Attention and Perception: Attention normal.         Mood and Affect: Mood normal.         Behavior: Behavior normal.         Thought Content: Thought content normal.         Judgment: Judgment normal.         No results found.    Patient Active Problem List   Diagnosis    Essential hypertension    Tobacco use    Congenital hearing disorder of both ears    Current moderate episode of major depressive disorder without prior episode    Hemorrhoids    Nerve damage    Mixed hyperlipidemia    Spondylosis of thoracolumbar region without myelopathy or radiculopathy    Hallux valgus with bunions    PAD (peripheral artery disease)    Nonobstructive atherosclerosis of coronary artery    Erectile dysfunction due to arterial insufficiency         ICD-10-CM ICD-9-CM   1. PAD (peripheral artery disease)  I73.9 443.9   2. Essential hypertension  I10 401.9   3. Mixed hyperlipidemia  E78.2 272.2   4. Tobacco use  Z72.0 305.1           Plan: After thoroughly evaluating Gilberto Roberts, I believe the best course of action is to remain conservative from vascular surgery standpoint.  He complains of left leg stiffness and pain with walking.  I will see him back in 3 weeks with noninvasive testing to include ABIs for further surveillance.  He has had previous stent placement to  that left lower extremity.  He is maintained on aspirin 81 mg daily, and Lipitor 80 mg daily.  He did stop taking Plavix as he ran out of refills.  I did prescribe Plavix for him to begin taking.  His blood pressure stable today in office.  I did discuss vascular risk factors as they pertain to the progression of vascular disease including controlling hypertension, hyperlipidemia, and smoking cessation.  His last lipid panel 1 month ago shows all values within normal limits.  Unfortunately, he is a daily smoker and has no desire to quit at this time.  The patient can continue taking their current medication regimen as previously planned.  This was all discussed in full with complete understanding.    Thank you for allowing me to participate in the care of your patient.  Please do not hesitate with any questions or concerns.  I will keep you aware of any further encounters with Gilberto Roberts.        Sincerely yours,         KASEY Guzman

## 2024-09-23 ENCOUNTER — HOSPITAL ENCOUNTER (OUTPATIENT)
Dept: ULTRASOUND IMAGING | Facility: HOSPITAL | Age: 54
Discharge: HOME | End: 2024-09-23
Admitting: NURSE PRACTITIONER
Payer: MEDICARE

## 2024-09-23 DIAGNOSIS — I73.9 PAD (PERIPHERAL ARTERY DISEASE): ICD-10-CM

## 2024-09-23 PROCEDURE — 93923 UPR/LXTR ART STDY 3+ LVLS: CPT | Performed by: SURGERY

## 2024-09-23 PROCEDURE — 93923 UPR/LXTR ART STDY 3+ LVLS: CPT

## 2024-10-04 ENCOUNTER — TELEPHONE (OUTPATIENT)
Dept: VASCULAR SURGERY | Facility: CLINIC | Age: 54
End: 2024-10-04
Payer: MEDICARE

## 2024-10-07 ENCOUNTER — OFFICE VISIT (OUTPATIENT)
Dept: VASCULAR SURGERY | Facility: CLINIC | Age: 54
End: 2024-10-07
Payer: MEDICARE

## 2024-10-07 VITALS
DIASTOLIC BLOOD PRESSURE: 74 MMHG | BODY MASS INDEX: 32.31 KG/M2 | OXYGEN SATURATION: 95 % | SYSTOLIC BLOOD PRESSURE: 136 MMHG | HEIGHT: 68 IN | WEIGHT: 213.2 LBS | HEART RATE: 83 BPM

## 2024-10-07 DIAGNOSIS — I73.9 PAD (PERIPHERAL ARTERY DISEASE): Primary | ICD-10-CM

## 2024-10-07 DIAGNOSIS — Z72.0 TOBACCO USE: ICD-10-CM

## 2024-10-07 DIAGNOSIS — E78.2 MIXED HYPERLIPIDEMIA: ICD-10-CM

## 2024-10-07 DIAGNOSIS — I10 ESSENTIAL HYPERTENSION: ICD-10-CM

## 2024-10-07 PROCEDURE — 3078F DIAST BP <80 MM HG: CPT | Performed by: NURSE PRACTITIONER

## 2024-10-07 PROCEDURE — 1159F MED LIST DOCD IN RCRD: CPT | Performed by: NURSE PRACTITIONER

## 2024-10-07 PROCEDURE — 3075F SYST BP GE 130 - 139MM HG: CPT | Performed by: NURSE PRACTITIONER

## 2024-10-07 PROCEDURE — 1160F RVW MEDS BY RX/DR IN RCRD: CPT | Performed by: NURSE PRACTITIONER

## 2024-10-07 PROCEDURE — 99214 OFFICE O/P EST MOD 30 MIN: CPT | Performed by: NURSE PRACTITIONER

## 2024-10-07 NOTE — PROGRESS NOTES
[facility] - PODIATRY    Today's Date: 10/08/2024     Patient Name: Gilberto Roberts  MRN: [id number]  CSN: [id number]  PCP: Madison Ansari MD  Referring Provider: No ref. provider found    SUBJECTIVE     Chief Complaint   Patient presents with    Follow-up     Madison Ansari MD 10/17/2024 NEW PROBLEM - ROUTINE NAIL CARE- pt states      HPI: Gilberto Roberts, a 54 y.o.male, comes to clinic as a(n) established patient complaining of thickened, irregular toenails of both feet . Patient has h/o anxiety, arthritis, HTN, Head injury, HLD, DDD .  Patient presents with complaints of long, thickened, irregular toenails of both feet. States that he previously saw Dr. Esqueda in [place] for nail care. States that he has issues with his back and is unable to reach his feet. Takes Plavix daily and follows with vascular surgery. Notes occasional LLE cramping. Admits pain at 2/10 level and described as aching and dull. Relates previous treatment(s) including care by podiatry . Denies any constitutional symptoms. No other pedal complaints at this time.    Past Medical History:   Diagnosis Date    Anxiety     Arthritis     Coronary artery disease involving native coronary artery of native heart with unstable angina pectoris 8/3/2020    Current moderate episode of major depressive disorder without prior episode 5/17/2021    Essential hypertension 3/29/2019    Head injury     [place] (hard of hearing)     Hyperlipidemia     Injury of back      Past Surgical History:   Procedure Laterality Date    AORTAGRAM Bilateral 4/2/2019    Procedure: AORTAGRAM, LEFT LEG ANGIOGRAM, ANGIOPLASTY/STENT PLACEMENT, ANGIOSEAL CLOSURE;  Surgeon: Luis Billings MD;  Location:  [facility];  Service: Vascular    AORTAGRAM Left 1/9/2020    Procedure: AORTAGRAM, LEFT LOWER EXTREMITY ANGIOGRAM, ATHRECTOMY, BALLOON ANGIOPLASTY, STENT PLACEMENT, MYNX CLOSURE;  Surgeon: Luis Billings MD;  Location:  [facility]  "12;  Service: Vascular    HEMORRHOIDECTOMY      x 3    HERNIA REPAIR      LEG SURGERY      as a child due to \"club foot\"    NECK MASS EXCISION       Family History   Problem Relation Age of Onset    COPD Mother         respiratory failure    Dementia Father     Diabetes Father      Social History     Socioeconomic History    Marital status: Single   Tobacco Use    Smoking status: Every Day     Current packs/day: 0.00     Average packs/day: 0.5 packs/day for 33.0 years (16.5 ttl pk-yrs)     Types: Cigarettes     Start date:      Last attempt to quit:      Years since quittin.7    Smokeless tobacco: Former     Types: Chew    Tobacco comments:     pt states it is too hard to quit, he is by himself.   Vaping Use    Vaping status: Never Used   Substance and Sexual Activity    Alcohol use: No    Drug use: No    Sexual activity: Defer     No Known Allergies  Current Outpatient Medications   Medication Sig Dispense Refill    Aspirin Low Dose 81 MG EC tablet TAKE ONE TABLET BY MOUTH DAILY. 90 tablet 3    atorvastatin (LIPITOR) 80 MG tablet TAKE 1 TABLET BY MOUTH DAILY. 30 tablet 3    clopidogrel (PLAVIX) 75 MG tablet Take 1 tablet by mouth Daily. 30 tablet 6    escitalopram (LEXAPRO) 10 MG tablet TAKE 1 TABLET BY MOUTH DAILY. 90 tablet 1    lisinopril (PRINIVIL,ZESTRIL) 5 MG tablet Take 1 tablet by mouth Daily. 90 tablet 1    sildenafil (Viagra) 100 MG tablet Take 1 tablet by mouth Daily As Needed for Erectile Dysfunction. 10 tablet 11     No current facility-administered medications for this visit.     Review of Systems   Constitutional:  Negative for chills and fever.   HENT:  Negative for congestion.    Respiratory:  Negative for shortness of breath.    Cardiovascular:  Positive for leg swelling. Negative for chest pain.   Gastrointestinal:  Negative for constipation, diarrhea, nausea and vomiting.   Musculoskeletal:  Positive for arthralgias, back pain and gait problem. Negative for myalgias.   Skin:  " Negative for wound.   Neurological:  Negative for numbness.   Hematological:  Bruises/bleeds easily.       OBJECTIVE     Vitals:    10/08/24 0844   BP: 120/68   Pulse: 78   SpO2: 97%         PHYSICAL EXAM  GEN:   Accompanied by none.     Foot/Ankle Exam    GENERAL  Appearance:  appears stated age and obese  Orientation:  AAOx3  Affect:  appropriate  Gait:  shuffling  Assistance:  independent  Right shoe gear: casual shoe  Left shoe gear: casual shoe    VASCULAR     Right Foot Vascularity   Dorsalis pedis:  1+  Posterior tibial:  2+  Skin temperature:  warm  Edema grading:  Non-pitting and trace  CFT:  4  Pedal hair growth:  Absent  Varicosities:  spider veins     Left Foot Vascularity   Dorsalis pedis:  1+  Posterior tibial:  1+  Skin temperature:  warm  Edema grading:  None, trace and non-pitting  CFT:  4  Pedal hair growth:  Absent  Varicosities:  spider veins     NEUROLOGIC     Right Foot Neurologic   Normal sensation    Light touch sensation: normal  Vibratory sensation: normal  Hot/Cold sensation: normal     Left Foot Neurologic   Normal sensation    Light touch sensation: normal  Vibratory sensation: normal  Hot/Cold sensation:  normal    MUSCULOSKELETAL     Right Foot Musculoskeletal   Ecchymosis:  none  Tenderness:  toenail problem    Arch:  Normal  Hallux valgus: Yes       Left Foot Musculoskeletal   Ecchymosis:  none  Tenderness:  toenail problem  Arch:  Normal  Hallux valgus: Yes      MUSCLE STRENGTH     Right Foot Muscle Strength   Foot dorsiflexion:  4  Foot plantar flexion:  4  Foot inversion:  4  Foot eversion:  4     Left Foot Muscle Strength   Foot dorsiflexion:  4  Foot plantar flexion:  4  Foot inversion:  4  Foot eversion:  4    RANGE OF MOTION     Right Foot Range of Motion   Foot and ankle ROM within normal limits       Left Foot Range of Motion   Foot and ankle ROM within normal limits      DERMATOLOGIC      Right Foot Dermatologic   Skin  Right foot skin is intact.   Nails  1.  Positive for  elongated, onychomycosis, abnormal thickness, subungual debris and dystrophic nail.  2.  Positive for elongated, onychomycosis, abnormal thickness and subungual debris.  3.  Positive for elongated, onychomycosis, abnormal thickness and subungual debris.  4.  Positive for elongated, onychomycosis, abnormal thickness and subungual debris.  5.  Positive for elongated, onychomycosis, abnormal thickness and subungual debris.  Nails comment:  1-5     Left Foot Dermatologic   Skin  Left foot skin is intact.   Nails comment:  1-5  Nails  1.  Positive for elongated, onychomycosis, abnormal thickness, subungual debris and dystrophic nail.  2.  Positive for elongated, onychomycosis, abnormal thickness and subungual debris.  3.  Positive for elongated, onychomycosis, abnormal thickness and subungual debris.  4.  Positive for elongated, onychomycosis, abnormally thick and subungual debris.  5.  Positive for elongated, onychomycosis, abnormally thick and subungual debris.      RADIOLOGY/NUCLEAR:  US Ankle / Brachial Indices Extremity Complete    Result Date: 9/24/2024  Narrative:  History: [facility]  Comments: Bilateral lower extremity arterial with multi-level pulse volume recordings and segmental pressures were performed at rest and stress.  The right ankle/brachial index is 0.98. The waveforms are biphasic without dampening. These findings are consistent with no significant arterial insufficiency of the right lower extremity at rest.  The left ankle/brachial index is 0.79. The waveforms are biphasic without dampening. These findings are consistent with mild arterial insufficiency of the left lower extremity at rest.      Impression: Impression: 1. No significant arterial insufficiency of the right lower extremity at rest. 2. Mild arterial insufficiency of the left lower extremity at rest.    This report was signed and finalized on 9/24/2024 12:39 PM by Dr. Carlos Hong MD.         LABORATORY/CULTURE RESULTS:      PATHOLOGY  RESULTS:       ASSESSMENT/PLAN     Diagnoses and all orders for this visit:    1. Onychomycosis (Primary)    2. Onychogryphosis    3. 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 Tobacco use    5. PAD (peripheral artery disease)    6. Atheroscler of native artery of left leg with intermit claudication        Comprehensive lower extremity examination and evaluation was performed.  Discussed findings and treatment plan including risks, benefits, and treatment options with patient in detail. Patient agreed with treatment plan.  After verbal consent obtained, nail(s) x10 debrided of length and thickness with nail nipper without incidence  Patient may maintain nails and calluses at home utilizing emery board or pumice stone between visits as needed  Reviewed at home diabetic foot care including daily foot checks   Tobacco cessation needed. Not ready to quit.  Continue vascular surgery follow-ups.   An After Visit Summary was printed and given to the patient at discharge, including (if requested) any available informative/educational handouts regarding diagnosis, treatment, or medications. All questions were answered to patient/family satisfaction. Should symptoms fail to improve or worsen they agree to call or return to clinic or to go to the Emergency Department. Discussed the importance of following up with any needed screening tests/labs/specialist appointments and any requested follow-up recommended by me today. Importance of maintaining follow-up discussed and patient accepts that missed appointments can delay diagnosis and potentially lead to worsening of conditions.  Return in about 3 months (around 1/8/2025) for Follow-up with Podiatry APRN, Schedule [facility] in [place]., or sooner if acute issues arise.    Lab Frequency Next Occurrence   Follow Anesthesia Guidelines / Standing Orders Once 12/23/2019   Obtain Informed Consent Once 12/28/2019   Provide NPO Instructions to  Patient Once 12/28/2019   Chlorhexidine Skin Prep Once 12/28/2019   US Ankle / Brachial Indices Extremity Complete Once 05/14/2022       This document has been electronically signed by Will Alexandre DPM on October 8, 2024 09:11 CDT

## 2024-10-07 NOTE — PROGRESS NOTES
"10/7/2024        Madison Ansari MD  [address]  [place] [address]      Gilberto Roberts  1970    Chief Complaint   Patient presents with    SET PAD     Left leg still has pain especially when walking. CATALINA 9/23/2024       Dear Madison Ansari MD        HPI  I had the pleasure of seeing your patient Gilberto Roberts in the office today.   As you recall, Gilberto Roberts is a 54 y.o.  male who you are currently following for routine health maintenance.  He is here for follow-up with testing.  He had complaints of pain and stiffness of the left leg.  Today he complains of bilateral hip pain with walking left worse than right.  He was previously seen by Dr. Billings and underwent aortogram, left lower extremity angiogram balloon angioplasty with stent placement in his left lower extremity on 1/9/2020.  He had been off of his Plavix for a year, because he ran out.  He denies any strokelike symptoms.  He is maintained on aspirin, Plavix, and Lipitor.      Review of Systems   Constitutional: Negative.  Negative for diaphoresis and fever.   HENT: Negative.     Eyes: Negative.    Respiratory: Negative.  Negative for shortness of breath and wheezing.    Cardiovascular: Negative.  Negative for chest pain and leg swelling.   Gastrointestinal: Negative.  Negative for abdominal pain.   Endocrine: Negative.    Genitourinary: Negative.    Musculoskeletal: Negative.  Positive for arthralgias.   Skin: Negative.    Allergic/Immunologic: Negative.    Neurological: Negative.  Negative for dizziness and weakness.   Hematological: Negative.    Psychiatric/Behavioral: Negative.         /74   Pulse 83   Ht 172.7 cm (68\")   Wt 96.7 kg (213 lb 3.2 oz)   SpO2 95%   BMI 32.42 kg/m²       Physical Exam  Vitals and nursing note reviewed.   Constitutional:       General: He is not in acute distress.     Appearance: Normal appearance. He is obese. He is not diaphoretic.   HENT:      Head: Normocephalic. No right " periorbital erythema or left periorbital erythema.      Right Ear: Decreased hearing noted.      Left Ear: Decreased hearing noted.      Ears:      Comments: Bilateral hearing aids     Nose: Nose normal.   Eyes:      General: No scleral icterus.     Pupils: Pupils are equal.   Cardiovascular:      Rate and Rhythm: Normal rate and regular rhythm.      Pulses: Normal pulses.      Heart sounds: Normal heart sounds. No murmur heard.  Pulmonary:      Effort: Pulmonary effort is normal. No respiratory distress.      Breath sounds: Normal breath sounds.   Abdominal:      General: Bowel sounds are normal. There is no distension.      Palpations: Abdomen is soft.      Tenderness: There is no abdominal tenderness. There is no guarding.   Musculoskeletal:         General: No swelling or tenderness. Normal range of motion.      Cervical back: Normal range of motion and neck supple.      Right lower leg: No edema.      Left lower leg: No edema.   Feet:      Right foot:      Skin integrity: Skin integrity normal.      Left foot:      Skin integrity: Skin integrity normal.   Skin:     General: Skin is warm and dry.      Findings: No erythema or rash.   Neurological:      General: No focal deficit present.      Mental Status: He is alert and oriented to person, place, and time. Mental status is at baseline.      Cranial Nerves: No cranial nerve deficit.      Gait: Gait normal.   Psychiatric:         Attention and Perception: Attention normal.         Mood and Affect: Mood normal.         Behavior: Behavior normal.         Thought Content: Thought content normal.         Judgment: Judgment normal.     DIAGNOSTIC DATA    US Ankle / Brachial Indices Extremity Complete    Result Date: 9/24/2024  Narrative:  History: PAD  Comments: Bilateral lower extremity arterial with multi-level pulse volume recordings and segmental pressures were performed at rest and stress.  The right ankle/brachial index is 0.98. The waveforms are biphasic  without dampening. These findings are consistent with no significant arterial insufficiency of the right lower extremity at rest.  The left ankle/brachial index is 0.79. The waveforms are biphasic without dampening. These findings are consistent with mild arterial insufficiency of the left lower extremity at rest.      Impression: Impression: 1. No significant arterial insufficiency of the right lower extremity at rest. 2. Mild arterial insufficiency of the left lower extremity at rest.    This report was signed and finalized on 9/24/2024 12:39 PM by Dr. Carlos Hong MD.       Patient Active Problem List   Diagnosis    Essential hypertension    Tobacco use    Congenital hearing disorder of both ears    Current moderate episode of major depressive disorder without prior episode    Hemorrhoids    Nerve damage    Mixed hyperlipidemia    Spondylosis of thoracolumbar region without myelopathy or radiculopathy    Hallux valgus with bunions    PAD (peripheral artery disease)    Nonobstructive atherosclerosis of coronary artery    Erectile dysfunction due to arterial insufficiency         ICD-10-CM ICD-9-CM   1. PAD (peripheral artery disease)  I73.9 443.9   2. Essential hypertension  I10 401.9   3. Mixed hyperlipidemia  E78.2 272.2   4. Tobacco use  Z72.0 305.1         Plan: After thoroughly evaluating Gilberto Roberts, I believe the best course of action is to remain conservative from vascular surgery standpoint.  He continues to complain of left leg pain with walking, and bilateral hip pain left worse than right.  We will see him back in 1 month with CTA of abdomen and pelvis for further surveillance.  I did review his ABIs which shows no arterial insufficiency to his right lower extremity and mild arterial insufficiency to his left lower extremity.  He should continue his aspirin 81 mg daily, Plavix 75 mg daily, and Lipitor 80 mg nightly in addition to his other medications. I did discuss vascular risk factors as  they pertain to the progression of vascular disease including controlling his hypertension, hyperlipidemia, and smoking cessation.  His blood pressure is stable today in office.  His last lipid panel shows all values within normal limits.  Unfortunately, he is a daily smoker and has no desire to quit smoking at this time.  This was all discussed in full with complete understanding.    Thank you for allowing me to participate in the care of your patient.  Please do not hesitate with any questions or concerns.  I will keep you aware of any further encounters with Gilberto Roberts.        Sincerely yours,         KASEY Guzman

## 2024-10-08 ENCOUNTER — OFFICE VISIT (OUTPATIENT)
Age: 54
End: 2024-10-08
Payer: MEDICARE

## 2024-10-08 VITALS
BODY MASS INDEX: 32.19 KG/M2 | HEART RATE: 78 BPM | HEIGHT: 68 IN | OXYGEN SATURATION: 97 % | SYSTOLIC BLOOD PRESSURE: 120 MMHG | WEIGHT: 212.4 LBS | DIASTOLIC BLOOD PRESSURE: 68 MMHG

## 2024-10-08 DIAGNOSIS — M47.815 SPONDYLOSIS OF THORACOLUMBAR REGION WITHOUT MYELOPATHY OR RADICULOPATHY: ICD-10-CM

## 2024-10-08 DIAGNOSIS — Z72.0 TOBACCO USE: ICD-10-CM

## 2024-10-08 DIAGNOSIS — L60.2 ONYCHOGRYPHOSIS: ICD-10-CM

## 2024-10-08 DIAGNOSIS — I70.212 ATHEROSCLER OF NATIVE ARTERY OF LEFT LEG WITH INTERMIT CLAUDICATION: ICD-10-CM

## 2024-10-08 DIAGNOSIS — B35.1 ONYCHOMYCOSIS: Primary | ICD-10-CM

## 2024-10-08 DIAGNOSIS — I73.9 PAD (PERIPHERAL ARTERY DISEASE): ICD-10-CM

## 2024-10-15 DIAGNOSIS — I10 ESSENTIAL HYPERTENSION: ICD-10-CM

## 2024-10-17 RX ORDER — LISINOPRIL 5 MG/1
5 TABLET ORAL DAILY
Qty: 90 TABLET | Refills: 1 | Status: SHIPPED | OUTPATIENT
Start: 2024-10-17

## 2024-10-30 ENCOUNTER — OFFICE VISIT (OUTPATIENT)
Dept: FAMILY MEDICINE CLINIC | Facility: CLINIC | Age: 54
End: 2024-10-30
Payer: MEDICARE

## 2024-10-30 VITALS
BODY MASS INDEX: 32.43 KG/M2 | HEART RATE: 92 BPM | OXYGEN SATURATION: 96 % | TEMPERATURE: 97.8 F | HEIGHT: 68 IN | SYSTOLIC BLOOD PRESSURE: 110 MMHG | WEIGHT: 214 LBS | DIASTOLIC BLOOD PRESSURE: 72 MMHG | RESPIRATION RATE: 18 BRPM

## 2024-10-30 VITALS
HEIGHT: 68 IN | OXYGEN SATURATION: 96 % | WEIGHT: 214 LBS | TEMPERATURE: 97.8 F | BODY MASS INDEX: 32.43 KG/M2 | RESPIRATION RATE: 18 BRPM | DIASTOLIC BLOOD PRESSURE: 72 MMHG | HEART RATE: 92 BPM | SYSTOLIC BLOOD PRESSURE: 110 MMHG

## 2024-10-30 DIAGNOSIS — E66.811 CLASS 1 OBESITY DUE TO EXCESS CALORIES WITH SERIOUS COMORBIDITY AND BODY MASS INDEX (BMI) OF 32.0 TO 32.9 IN ADULT: ICD-10-CM

## 2024-10-30 DIAGNOSIS — Z23 ENCOUNTER FOR VACCINATION: ICD-10-CM

## 2024-10-30 DIAGNOSIS — Z00.00 MEDICARE ANNUAL WELLNESS VISIT, INITIAL: Primary | ICD-10-CM

## 2024-10-30 DIAGNOSIS — N49.2 SCROTAL ABSCESS: ICD-10-CM

## 2024-10-30 DIAGNOSIS — I10 ESSENTIAL HYPERTENSION: Primary | ICD-10-CM

## 2024-10-30 DIAGNOSIS — E66.09 CLASS 1 OBESITY DUE TO EXCESS CALORIES WITH SERIOUS COMORBIDITY AND BODY MASS INDEX (BMI) OF 32.0 TO 32.9 IN ADULT: ICD-10-CM

## 2024-10-30 DIAGNOSIS — I73.9 PAD (PERIPHERAL ARTERY DISEASE): ICD-10-CM

## 2024-10-30 DIAGNOSIS — E78.2 MIXED HYPERLIPIDEMIA: ICD-10-CM

## 2024-10-30 PROCEDURE — 1126F AMNT PAIN NOTED NONE PRSNT: CPT | Performed by: FAMILY MEDICINE

## 2024-10-30 PROCEDURE — 99214 OFFICE O/P EST MOD 30 MIN: CPT | Performed by: FAMILY MEDICINE

## 2024-10-30 PROCEDURE — G0008 ADMIN INFLUENZA VIRUS VAC: HCPCS | Performed by: FAMILY MEDICINE

## 2024-10-30 PROCEDURE — 3078F DIAST BP <80 MM HG: CPT | Performed by: FAMILY MEDICINE

## 2024-10-30 PROCEDURE — 3074F SYST BP LT 130 MM HG: CPT | Performed by: FAMILY MEDICINE

## 2024-10-30 PROCEDURE — 90656 IIV3 VACC NO PRSV 0.5 ML IM: CPT | Performed by: FAMILY MEDICINE

## 2024-10-30 PROCEDURE — G0438 PPPS, INITIAL VISIT: HCPCS | Performed by: FAMILY MEDICINE

## 2024-10-30 RX ORDER — SULFAMETHOXAZOLE AND TRIMETHOPRIM 800; 160 MG/1; MG/1
1 TABLET ORAL 2 TIMES DAILY
Qty: 14 TABLET | Refills: 0 | Status: SHIPPED | OUTPATIENT
Start: 2024-10-30

## 2024-10-30 NOTE — PROGRESS NOTES
"Subjective   Gilberto Roberts is a 54 y.o. male.     History of Present Illness  The patient presents for evaluation of multiple medical concerns.    He reports experiencing a sensation of his hips locking up during walking, a condition that has persisted for a significant period.    He also mentions a recent visit to a podiatrist for toenail care.    Despite a low food intake, he has noticed weight gain.    He has no history of hospital admissions in the past year and does not have an advanced directive or living will.    He describes a cyst on his right testicle that was previously drained for 3 to 4 days. Currently, it is not draining, but he can feel a bump. He expresses concern about the nature of this bump.    Results  Laboratory Studies  A1c improved from prediabetes range. Cholesterol numbers improved. White blood cell count slightly elevated.    The following portions of the patient's history were reviewed and updated as appropriate: allergies, current medications, past family history, past medical history, past social history, past surgical history, and problem list.        A review of systems was performed, and pertinent findings are noted in the HPI.    Objective   /72 (BP Location: Left arm, Patient Position: Sitting, Cuff Size: Adult)   Pulse 92   Temp 97.8 °F (36.6 °C) (Infrared)   Resp 18   Ht 172.7 cm (68\")   Wt 97.1 kg (214 lb)   SpO2 96%   BMI 32.54 kg/m²          Physical Exam  Vitals and nursing note reviewed.   Constitutional:       General: He is not in acute distress.     Appearance: Normal appearance. He is obese. He is not toxic-appearing.   HENT:      Head: Normocephalic and atraumatic.      Right Ear: External ear normal.      Left Ear: External ear normal.   Cardiovascular:      Rate and Rhythm: Normal rate.   Pulmonary:      Effort: Pulmonary effort is normal. No respiratory distress.   Neurological:      Mental Status: He is alert and oriented to person, place, and time. "   Psychiatric:         Mood and Affect: Mood normal.         Behavior: Behavior normal.         Thought Content: Thought content normal.         Judgment: Judgment normal.         Assessment & Plan   Problems Addressed this Visit          Cardiac and Vasculature    Essential hypertension - Primary    Mixed hyperlipidemia    PAD (peripheral artery disease)     Other Visit Diagnoses       Encounter for vaccination        Relevant Orders    Fluzone >6mos ([id number]) (Completed)    Class 1 obesity due to excess calories with serious comorbidity and body mass index (BMI) of 32.0 to 32.9 in adult              Diagnoses         Codes Comments    Essential hypertension    -  Primary ICD-10-CM: I10  ICD-9-CM: 401.9     Encounter for vaccination     ICD-10-CM: Z23  ICD-9-CM: V05.9     Mixed hyperlipidemia     ICD-10-CM: E78.2  ICD-9-CM: 272.2     PAD (peripheral artery disease)     ICD-10-CM: I73.9  ICD-9-CM: 443.9     Class 1 obesity due to excess calories with serious comorbidity and body mass index (BMI) of 32.0 to 32.9 in adult     ICD-10-CM: E66.811, E66.09, Z68.32  ICD-9-CM: 278.00, V85.32             Assessment & Plan  1. Obesity.  His BMI is currently 32, placing him in the obesity category, which increases the risk for other health issues. He is advised to monitor his weight and find ways to reduce it, such as cutting back on calorie intake and incorporating more salads into his diet.    2. Prediabetes.  His A1c levels have improved since July 2024. He should continue with his current lifestyle modifications and monitor his blood sugar levels.    3. Hyperlipidemia.  His cholesterol levels have improved since July 2024, indicating that his cholesterol medication is effective. He should continue taking his current cholesterol medication.    4. Elevated white blood cell count.  His white blood cell count was slightly elevated, and he has a follow-up appointment with the hematologist scheduled for February 2025.    5.  Right testicular infection.  He has a small fluid pocket under the skin on his right testicle that has become infected. Antibiotics have been prescribed to treat the infection. He is advised to take the medication with food. If the infection does not improve with the antibiotics, a referral to a specialist may be necessary for further evaluation.    6. Health Maintenance.  A CT scan of the abdomen has been ordered by his vascular doctor to assess blood flow and circulation, scheduled for November 7, 2024. He has a cardiology appointment on November 6, 2024.    Follow-up  Return in 3 months for follow-up.               Transcribed from ambient dictation for Madison Ansari MD by Madison Ansari MD.  10/30/24   09:39 CDT    Patient or patient representative verbalized consent for the use of Ambient Listening during the visit with  Madison Ansari MD for chart documentation. 10/30/2024  09:39 CDT          This document has been electronically signed by Madison Ansari MD on November 14, 2024 12:40 CST

## 2024-11-06 ENCOUNTER — TELEPHONE (OUTPATIENT)
Dept: VASCULAR SURGERY | Facility: CLINIC | Age: 54
End: 2024-11-06
Payer: MEDICARE

## 2024-11-06 NOTE — TELEPHONE ENCOUNTER
Patient notified of his appointment with Malu Guerra NP of his testing at [facility] at 0815am and follow up with Malu Guerra NP at 10:45 am.

## 2024-11-07 ENCOUNTER — OFFICE VISIT (OUTPATIENT)
Dept: VASCULAR SURGERY | Facility: CLINIC | Age: 54
End: 2024-11-07
Payer: MEDICARE

## 2024-11-07 ENCOUNTER — HOSPITAL ENCOUNTER (OUTPATIENT)
Dept: CT IMAGING | Facility: HOSPITAL | Age: 54
Discharge: HOME | End: 2024-11-07
Admitting: NURSE PRACTITIONER
Payer: MEDICARE

## 2024-11-07 VITALS
DIASTOLIC BLOOD PRESSURE: 62 MMHG | BODY MASS INDEX: 32.83 KG/M2 | SYSTOLIC BLOOD PRESSURE: 128 MMHG | HEIGHT: 68 IN | WEIGHT: 216.6 LBS | OXYGEN SATURATION: 98 % | HEART RATE: 107 BPM

## 2024-11-07 DIAGNOSIS — Z51.81 ENCOUNTER FOR MONITORING ANTIPLATELET THERAPY: ICD-10-CM

## 2024-11-07 DIAGNOSIS — Z72.0 TOBACCO USE: ICD-10-CM

## 2024-11-07 DIAGNOSIS — E78.2 MIXED HYPERLIPIDEMIA: ICD-10-CM

## 2024-11-07 DIAGNOSIS — E66.09 CLASS 1 OBESITY DUE TO EXCESS CALORIES WITHOUT SERIOUS COMORBIDITY WITH BODY MASS INDEX (BMI) OF 32.0 TO 32.9 IN ADULT: ICD-10-CM

## 2024-11-07 DIAGNOSIS — Z79.02 ENCOUNTER FOR MONITORING ANTIPLATELET THERAPY: ICD-10-CM

## 2024-11-07 DIAGNOSIS — I10 ESSENTIAL HYPERTENSION: ICD-10-CM

## 2024-11-07 DIAGNOSIS — E66.811 CLASS 1 OBESITY DUE TO EXCESS CALORIES WITHOUT SERIOUS COMORBIDITY WITH BODY MASS INDEX (BMI) OF 32.0 TO 32.9 IN ADULT: ICD-10-CM

## 2024-11-07 DIAGNOSIS — Z01.818 PREOP TESTING: ICD-10-CM

## 2024-11-07 DIAGNOSIS — I73.9 PAD (PERIPHERAL ARTERY DISEASE): Primary | ICD-10-CM

## 2024-11-07 DIAGNOSIS — I73.9 PAD (PERIPHERAL ARTERY DISEASE): ICD-10-CM

## 2024-11-07 PROCEDURE — 25510000001 IOPAMIDOL PER 1 ML: Performed by: NURSE PRACTITIONER

## 2024-11-07 PROCEDURE — 74174 CTA ABD&PLVS W/CONTRAST: CPT

## 2024-11-07 RX ORDER — IOPAMIDOL 755 MG/ML
100 INJECTION, SOLUTION INTRAVASCULAR
Status: COMPLETED | OUTPATIENT
Start: 2024-11-07 | End: 2024-11-07

## 2024-11-07 RX ADMIN — IOPAMIDOL 100 ML: 755 INJECTION, SOLUTION INTRAVENOUS at 09:06

## 2024-11-07 NOTE — PROGRESS NOTES
"11/7/2024        Madison Ansari MD  [id number]  [facility] [address]      Gilberto Roberts  1970    Chief Complaint   Patient presents with    PAD (peripheral artery disease) 09/06/2024 PAD (peripheral      Has pain in legs with exertion, no changes       Dear Madison Ansari MD        HPI  I had the pleasure of seeing your patient Gilberto Roberts in the office today.   As you recall, Gilberto Roberts is a 54 y.o.  male who you are currently following for routine health maintenance.  He has back for 1 month follow-up with testing.  He has complaints of bilateral hip pain with walking, left worse than right.  He was previously seen by Dr. Billings and underwent aortogram, left lower extremity angiogram balloon angioplasty with stent placement in his SFA of left lower extremity on 4/2/19 and again on 1/9/2020.  He was not taking his Plavix for the past year because he ran out of refills.  He has since been restarted on Plavix.  He is maintained on aspirin, Plavix, and Lipitor.      Review of Systems   Constitutional: Negative.  Negative for diaphoresis and fever.   HENT: Negative.     Eyes: Negative.    Respiratory: Negative.  Negative for shortness of breath and wheezing.    Cardiovascular: Negative.  Negative for chest pain and leg swelling.   Gastrointestinal: Negative.  Negative for abdominal pain.   Endocrine: Negative.    Genitourinary: Negative.    Musculoskeletal:  Positive for arthralgias.   Skin: Negative.    Allergic/Immunologic: Negative.    Neurological: Negative.  Negative for dizziness and weakness.   Hematological: Negative.    Psychiatric/Behavioral: Negative.         /62   Pulse 107   Ht 172.7 cm (68\")   Wt 98.2 kg (216 lb 9.6 oz)   SpO2 98%   BMI 32.93 kg/m²       Physical Exam  Vitals and nursing note reviewed.   Constitutional:       General: He is not in acute distress.     Appearance: Normal appearance. He is obese. He is not diaphoretic.   HENT:      Head: " Normocephalic. No right periorbital erythema or left periorbital erythema.      Right Ear: Decreased hearing noted.      Left Ear: Decreased hearing noted.      Ears:      Comments: Bilateral hearing aids     Nose: Nose normal.   Eyes:      General: No scleral icterus.     Pupils: Pupils are equal.   Cardiovascular:      Rate and Rhythm: Normal rate and regular rhythm.      Pulses: Normal pulses.           Femoral pulses are 2+ on the right side and 2+ on the left side.       Popliteal pulses are 2+ on the right side and 2+ on the left side.        Dorsalis pedis pulses are 2+ on the right side and detected w/ Doppler on the left side.        Posterior tibial pulses are 2+ on the right side and detected w/ Doppler on the left side.      Heart sounds: Normal heart sounds. No murmur heard.  Pulmonary:      Effort: Pulmonary effort is normal. No respiratory distress.      Breath sounds: Normal breath sounds.   Abdominal:      General: Bowel sounds are normal. There is no distension.      Palpations: Abdomen is soft.      Tenderness: There is no abdominal tenderness. There is no guarding.   Musculoskeletal:         General: No swelling or tenderness. Normal range of motion.      Cervical back: Normal range of motion and neck supple.      Right lower leg: No edema.      Left lower leg: No edema.   Feet:      Right foot:      Skin integrity: Skin integrity normal.      Left foot:      Skin integrity: Skin integrity normal.   Skin:     General: Skin is warm and dry.      Findings: No erythema or rash.   Neurological:      General: No focal deficit present.      Mental Status: He is alert and oriented to person, place, and time. Mental status is at baseline.      Cranial Nerves: No cranial nerve deficit.      Gait: Gait normal.   Psychiatric:         Attention and Perception: Attention normal.         Mood and Affect: Mood normal.         Behavior: Behavior normal.         Thought Content: Thought content normal.          Judgment: Judgment normal.     DIAGNOSTIC DATA    CT Angiogram Abdomen Pelvis    Result Date: 11/7/2024  Narrative: EXAMINATION: CT ANGIOGRAM ABDOMEN PELVIS-   11/7/2024 7:59 AM  HISTORY: pad; I73.9-Peripheral vascular disease, unspecified  In order to have a CT radiation dose as low as reasonably achievable Automated Exposure Control was utilized for adjustment of the mA and/or KV according to patient size.  Total DLP = 880.1 mGy.cm  CT angiography of the abdomen and pelvis is performed after intravenous contrast enhancement.  Images are acquired in axial plane and subsequent 2D reconstruction coronal and sagittal planes and 3D maximum intensity projection image reconstruction.  Comparison is made with the previous study dated 1/6/2023.  There are severe atheromatous changes of the mid to distal abdominal aorta and iliac arteries bilaterally.  No aneurysmal dilatation. No dissection.  There is normal origin course and caliber of the celiac and superior mesenteric arteries. Normal branches.  Normal origin course and caliber of the renal arteries bilaterally. Normal branches. There is a tiny accessory left renal artery supplying the upper pole of the left kidney.  There is approximately 50% focal stenosis of the origin of the inferior mesenteric artery which arises at the level of a significant take noncalcified plaque in the anterior wall of the distal abdominal aorta. The remaining inferior mesenteric artery distal to the origin is of normal size with a subsequent normal left colic and superior rectal branches.  Severe atheromatous changes of the common iliac artery bilaterally. There is a probable chronic appearing dissection of the proximal left common iliac artery. The remaining common iliac arteries appear unremarkable except for mild ectasia of the right common iliac artery which measures up to 1.2 cm in diameter. There is a large mixed plaque in the proximal left external iliac artery with 50% diameter  stenosis. There is chronic appearing dissection of the proximal right external iliac artery. Moderate ectasia of the distal left external iliac artery is seen which measure up to 11 mm. Atheromatous plaques are seen in the common femoral artery bilaterally with evidence of a focal dissection of the right common femoral artery?. The right common femoral artery divide into normal appearing superficial and deep femoral arteries. The left common femoral artery divides into a normal deep femoral and a diminutive superficial femoral artery which is immediately occluded. No reopacification of the left superficial femoral artery in the available images.  The lung bases included in the study are unremarkable except for atelectatic changes at bilateral bases. Some scarring in the right middle lobe and atelectasis in the lingular segment of the left upper lobe.  The liver and spleen are normal.  No radiopaque gallstones.  The pancreas is normal.  Moderate lobulation of the adrenal glands bilaterally is seen. No discrete mass.  The kidneys bilaterally appear normal. No mass. No calculi. No hydronephrosis. Limited visualized ureters are nondilated. The urinary bladder is moderately well distended. No intrinsic abnormality.  Prostate is moderately enlarged with intrinsic calcification.  There are bilateral fat-containing inguinal hernias, left larger than the right. There is a small fat-containing umbilical hernia.  The stomach is decompressed. No focal abnormality. Duodenum is normal. Small bowel is nondistended and nondilated. Normal appendix. Moderate gas and stool is seen throughout the colon. No evidence of obstruction.  No evidence of abdominal or pelvic lymphadenopathy.  Images reviewed in bone window show chronic degenerative changes of the lumbar and limited visualized thoracic spine. No acute bony abnormality.      Impression: 1. Severe atheromatous changes of the abdominal aorta and iliac arteries. 2. Normal origin  course and caliber of the celiac, superior mesenteric and renal arteries. 50% focal stenosis of the origin of the inferior mesenteric artery. 3. Significant disease of the common and external iliac arteries bilaterally as detailed above. 4. Complete occlusion of the left superficial femoral artery adjacent and distal to the bifurcation of the left common femoral artery. 5. Nonacute stable nonvascular finding of the abdomen and pelvis similar to the previous study.      This report was signed and finalized on 11/7/2024 10:03 AM by Dr. Andrea Frias MD.       Patient Active Problem List   Diagnosis    Essential hypertension    Tobacco use    Congenital hearing disorder of both ears    Current moderate episode of major depressive disorder without prior episode    Hemorrhoids    Nerve damage    Mixed hyperlipidemia    Spondylosis of thoracolumbar region without myelopathy or radiculopathy    Hallux valgus with bunions    PAD (peripheral artery disease)    Nonobstructive atherosclerosis of coronary artery    Erectile dysfunction due to arterial insufficiency    Class 1 obesity due to excess calories without serious comorbidity with body mass index (BMI) of 32.0 to 32.9 in adult         ICD-10-CM ICD-9-CM   1. PAD (peripheral artery disease)  I73.9 443.9   2. Essential hypertension  I10 401.9   3. Mixed hyperlipidemia  E78.2 272.2   4. Tobacco use  Z72.0 305.1   5. Class 1 obesity due to excess calories without serious comorbidity with body mass index (BMI) of 32.0 to 32.9 in adult  E66.811 278.00    E66.09 V85.32    Z68.32            Plan: After thoroughly evaluating Gilberto Roberts, I believe the best course of action is to proceed with left lower extremity angiogram.  Risks of angiogram were discussed.  These include, but are not limited to, bleeding, infection, vessel damage, nerve damage, embolus, and loss of limb.  The patient understands these risks and wishes to proceed with procedure.  He has continued to  complain of left leg pain with ambulation, bilateral hip pain left worse than right.  I along with Dr. Hong did review his CTA abdomen pelvis which shows occlusion to his left superficial femoral artery, he has had previous stenting to his left SFA in 2019 and again in 2020 by Dr. Billings.  He should continue his aspirin 81 mg daily, Plavix 75 mg daily, and Lipitor 80 mg nightly, in addition to his other medications.  He will take these uninterrupted up until the day prior to surgery.  I did discuss vascular risk factors as they pertain to the progression of vascular disease including controlling his hypertension, hyperlipidemia, and smoking cessation.  His blood pressure stable today in office.  His last lipid panel shows all values within normal limits.  Unfortunately he is a daily smoker and has no desire to quit smoking at this time.  I did  extensively on smoking cessation, and the patient was advised of the continued risks of smoking.  I provided over 10 minutes counseling on this matter.  Body mass index is 32.93 kg/m².       This was all discussed in full with complete understanding.    Thank you for allowing me to participate in the care of your patient.  Please do not hesitate with any questions or concerns.  I will keep you aware of any further encounters with Gilberto Roberts.        Sincerely yours,         KASEY Guzman

## 2024-11-12 RX ORDER — ATORVASTATIN CALCIUM 80 MG/1
80 TABLET, FILM COATED ORAL DAILY
Qty: 30 TABLET | Refills: 0 | Status: SHIPPED | OUTPATIENT
Start: 2024-11-12

## 2024-11-14 ENCOUNTER — OFFICE VISIT (OUTPATIENT)
Dept: FAMILY MEDICINE CLINIC | Facility: CLINIC | Age: 54
End: 2024-11-14
Payer: MEDICARE

## 2024-11-14 VITALS
BODY MASS INDEX: 33.19 KG/M2 | SYSTOLIC BLOOD PRESSURE: 132 MMHG | TEMPERATURE: 98 F | DIASTOLIC BLOOD PRESSURE: 80 MMHG | WEIGHT: 219 LBS | OXYGEN SATURATION: 98 % | RESPIRATION RATE: 20 BRPM | HEIGHT: 68 IN | HEART RATE: 76 BPM

## 2024-11-14 DIAGNOSIS — T50.8X5A ALLERGIC REACTION TO CONTRAST DYE, INITIAL ENCOUNTER: Primary | ICD-10-CM

## 2024-11-14 RX ORDER — CETIRIZINE HYDROCHLORIDE 10 MG/1
10 TABLET ORAL DAILY
Qty: 30 TABLET | Refills: 0 | Status: SHIPPED | OUTPATIENT
Start: 2024-11-14

## 2024-11-14 RX ORDER — HYDROXYZINE HYDROCHLORIDE 25 MG/1
25 TABLET, FILM COATED ORAL 3 TIMES DAILY PRN
Qty: 30 TABLET | Refills: 0 | Status: SHIPPED | OUTPATIENT
Start: 2024-11-14

## 2024-11-14 RX ORDER — PREDNISONE 20 MG/1
20 TABLET ORAL DAILY
Qty: 5 TABLET | Refills: 0 | Status: SHIPPED | OUTPATIENT
Start: 2024-11-14

## 2024-11-14 NOTE — PROGRESS NOTES
"Chief Complaint  Rash (Patient here for rash.)    Subjective        Gilbertovaughn Roberts presents to [facility]  History of Present Illness  Here for acute visit  Reports has contrast a 11/7/24 and rash over abdomen, arms, and legs which is spreading  Itching   Has tried allergy medicine to help  No difficulty breathing      Objective   Vital Signs:  /80 (BP Location: Right arm, Patient Position: Sitting, Cuff Size: Large Adult)   Pulse 76   Temp 98 °F (36.7 °C) (Infrared)   Resp 20   Ht 172.7 cm (68\")   Wt 99.3 kg (219 lb)   SpO2 98%   BMI 33.30 kg/m²   Estimated body mass index is 33.3 kg/m² as calculated from the following:    Height as of this encounter: 172.7 cm (68\").    Weight as of this encounter: 99.3 kg (219 lb).            Physical Exam  Vitals and nursing note reviewed.   Constitutional:       Appearance: He is well-developed.   HENT:      Head: Normocephalic and atraumatic.   Eyes:      Conjunctiva/sclera: Conjunctivae normal.   Cardiovascular:      Rate and Rhythm: Normal rate and regular rhythm.   Pulmonary:      Effort: Pulmonary effort is normal.   Musculoskeletal:      Cervical back: Normal range of motion.   Skin:     Findings: Rash present.   Neurological:      General: No focal deficit present.      Mental Status: He is alert and oriented to person, place, and time.   Psychiatric:         Mood and Affect: Mood normal.         Behavior: Behavior normal.            Result Review :                Assessment and Plan   Diagnoses and all orders for this visit:    1. Allergic reaction to contrast dye, initial encounter (Primary)    Other orders  -     hydrOXYzine (ATARAX) 25 MG tablet; Take 1 tablet by mouth 3 (Three) Times a Day As Needed for Itching.  Dispense: 30 tablet; Refill: 0  -     predniSONE (DELTASONE) 20 MG tablet; Take 1 tablet by mouth Daily. (Patient not taking: Reported on 11/27/2024)  Dispense: 5 tablet; Refill: 0  -     cetirizine (zyrTEC) 10 " MG tablet; Take 1 tablet by mouth Daily.  Dispense: 30 tablet; Refill: 0      Plan:  Steroid pills  Atarax as needed for itching  Zyrtec for antihistamine  Cool compresses           Follow Up   Return in about 1 week (around 11/21/2024), or if symptoms worsen or fail to improve.  Patient was given instructions and counseling regarding his condition or for health maintenance advice. Please see specific information pulled into the AVS if appropriate.

## 2024-11-18 PROBLEM — Z01.818 PREOP TESTING: Status: ACTIVE | Noted: 2024-11-07

## 2024-11-19 ENCOUNTER — LAB (OUTPATIENT)
Dept: LAB | Facility: HOSPITAL | Age: 54
End: 2024-11-19
Payer: MEDICARE

## 2024-11-19 ENCOUNTER — CLINICAL SUPPORT (OUTPATIENT)
Dept: ONCOLOGY | Facility: CLINIC | Age: 54
End: 2024-11-19
Payer: MEDICARE

## 2024-11-19 DIAGNOSIS — Z01.818 PREOP TESTING: ICD-10-CM

## 2024-11-19 DIAGNOSIS — D75.1 POLYCYTHEMIA: Primary | ICD-10-CM

## 2024-11-19 DIAGNOSIS — I73.9 PAD (PERIPHERAL ARTERY DISEASE): ICD-10-CM

## 2024-11-19 DIAGNOSIS — D75.1 POLYCYTHEMIA: ICD-10-CM

## 2024-11-19 DIAGNOSIS — Z51.81 ENCOUNTER FOR MONITORING ANTIPLATELET THERAPY: ICD-10-CM

## 2024-11-19 DIAGNOSIS — Z79.02 ENCOUNTER FOR MONITORING ANTIPLATELET THERAPY: ICD-10-CM

## 2024-11-19 LAB
ALBUMIN SERPL-MCNC: 3.7 G/DL (ref 3.5–5.2)
ALBUMIN/GLOB SERPL: 1.3 G/DL
ALP SERPL-CCNC: 126 U/L (ref 39–117)
ALT SERPL W P-5'-P-CCNC: 27 U/L (ref 1–41)
ANION GAP SERPL CALCULATED.3IONS-SCNC: 10 MMOL/L (ref 5–15)
APTT PPP: 23 SECONDS (ref 24.5–36)
AST SERPL-CCNC: 13 U/L (ref 1–40)
BASOPHILS # BLD AUTO: 0.08 10*3/MM3 (ref 0–0.2)
BASOPHILS NFR BLD AUTO: 0.6 % (ref 0–1.5)
BILIRUB SERPL-MCNC: 0.3 MG/DL (ref 0–1.2)
BUN SERPL-MCNC: 17 MG/DL (ref 6–20)
BUN/CREAT SERPL: 20 (ref 7–25)
CALCIUM SPEC-SCNC: 9.3 MG/DL (ref 8.6–10.5)
CHLORIDE SERPL-SCNC: 102 MMOL/L (ref 98–107)
CO2 SERPL-SCNC: 26 MMOL/L (ref 22–29)
CREAT SERPL-MCNC: 0.85 MG/DL (ref 0.76–1.27)
DEPRECATED RDW RBC AUTO: 45 FL (ref 37–54)
EGFRCR SERPLBLD CKD-EPI 2021: 102 ML/MIN/1.73
EOSINOPHIL # BLD AUTO: 0.4 10*3/MM3 (ref 0–0.4)
EOSINOPHIL NFR BLD AUTO: 2.9 % (ref 0.3–6.2)
ERYTHROCYTE [DISTWIDTH] IN BLOOD BY AUTOMATED COUNT: 14.1 % (ref 12.3–15.4)
GLOBULIN UR ELPH-MCNC: 2.9 GM/DL
GLUCOSE SERPL-MCNC: 80 MG/DL (ref 65–99)
HCT VFR BLD AUTO: 48.1 % (ref 37.5–51)
HGB BLD-MCNC: 15.9 G/DL (ref 13–17.7)
IMM GRANULOCYTES # BLD AUTO: 0.12 10*3/MM3 (ref 0–0.05)
IMM GRANULOCYTES NFR BLD AUTO: 0.9 % (ref 0–0.5)
INR PPP: 0.86 (ref 0.91–1.09)
LYMPHOCYTES # BLD AUTO: 4.41 10*3/MM3 (ref 0.7–3.1)
LYMPHOCYTES NFR BLD AUTO: 32 % (ref 19.6–45.3)
MCH RBC QN AUTO: 28.8 PG (ref 26.6–33)
MCHC RBC AUTO-ENTMCNC: 33.1 G/DL (ref 31.5–35.7)
MCV RBC AUTO: 87 FL (ref 79–97)
MONOCYTES # BLD AUTO: 1.17 10*3/MM3 (ref 0.1–0.9)
MONOCYTES NFR BLD AUTO: 8.5 % (ref 5–12)
NEUTROPHILS NFR BLD AUTO: 55.1 % (ref 42.7–76)
NEUTROPHILS NFR BLD AUTO: 7.62 10*3/MM3 (ref 1.7–7)
NRBC BLD AUTO-RTO: 0 /100 WBC (ref 0–0.2)
PLATELET # BLD AUTO: 261 10*3/MM3 (ref 140–450)
PMV BLD AUTO: 9.9 FL (ref 6–12)
POTASSIUM SERPL-SCNC: 3.8 MMOL/L (ref 3.5–5.2)
PROT SERPL-MCNC: 6.6 G/DL (ref 6–8.5)
PROTHROMBIN TIME: 12.1 SECONDS (ref 11.8–14.8)
RBC # BLD AUTO: 5.53 10*6/MM3 (ref 4.14–5.8)
SODIUM SERPL-SCNC: 138 MMOL/L (ref 136–145)
WBC NRBC COR # BLD AUTO: 13.8 10*3/MM3 (ref 3.4–10.8)

## 2024-11-19 PROCEDURE — 80053 COMPREHEN METABOLIC PANEL: CPT

## 2024-11-19 PROCEDURE — 36415 COLL VENOUS BLD VENIPUNCTURE: CPT

## 2024-11-19 PROCEDURE — 85730 THROMBOPLASTIN TIME PARTIAL: CPT

## 2024-11-19 PROCEDURE — 85610 PROTHROMBIN TIME: CPT

## 2024-11-19 PROCEDURE — 85025 COMPLETE CBC W/AUTO DIFF WBC: CPT

## 2024-11-19 NOTE — PROGRESS NOTES
Patient here for lab evaluation for possible 500 ml phlebotomy for polycythemia if hematocrit >50.  Patient denies any problems today.  Skin warm, dry, and color within normal limits.  Labs reviewed hematocrit 48.1.  patient does not need a phlebotomy today.  Will return in 3 months for follow up with Conchita PARKS.

## 2024-11-25 ENCOUNTER — OFFICE VISIT (OUTPATIENT)
Dept: CARDIOLOGY | Facility: CLINIC | Age: 54
End: 2024-11-25
Payer: MEDICARE

## 2024-11-25 VITALS
WEIGHT: 219 LBS | SYSTOLIC BLOOD PRESSURE: 108 MMHG | BODY MASS INDEX: 33.19 KG/M2 | HEART RATE: 75 BPM | DIASTOLIC BLOOD PRESSURE: 68 MMHG | HEIGHT: 68 IN

## 2024-11-25 DIAGNOSIS — E78.2 MIXED HYPERLIPIDEMIA: ICD-10-CM

## 2024-11-25 DIAGNOSIS — Z72.0 TOBACCO USE: ICD-10-CM

## 2024-11-25 DIAGNOSIS — I73.9 PAD (PERIPHERAL ARTERY DISEASE): ICD-10-CM

## 2024-11-25 DIAGNOSIS — I25.10 NONOBSTRUCTIVE ATHEROSCLEROSIS OF CORONARY ARTERY: ICD-10-CM

## 2024-11-25 DIAGNOSIS — I10 ESSENTIAL HYPERTENSION: Primary | ICD-10-CM

## 2024-11-25 PROCEDURE — 3078F DIAST BP <80 MM HG: CPT | Performed by: NURSE PRACTITIONER

## 2024-11-25 PROCEDURE — 1160F RVW MEDS BY RX/DR IN RCRD: CPT | Performed by: NURSE PRACTITIONER

## 2024-11-25 PROCEDURE — 3074F SYST BP LT 130 MM HG: CPT | Performed by: NURSE PRACTITIONER

## 2024-11-25 PROCEDURE — 93000 ELECTROCARDIOGRAM COMPLETE: CPT | Performed by: NURSE PRACTITIONER

## 2024-11-25 PROCEDURE — 1159F MED LIST DOCD IN RCRD: CPT | Performed by: NURSE PRACTITIONER

## 2024-11-25 PROCEDURE — 99214 OFFICE O/P EST MOD 30 MIN: CPT | Performed by: NURSE PRACTITIONER

## 2024-11-25 NOTE — PROGRESS NOTES
Subjective:     Encounter Date:11/25/2024      Patient ID: Gilberto Roberts is a 54 y.o. male with hypertension, nonobstructive coronary artery disease, PAD, hyperlipidemia and tobacco abuse.    Chief Complaint: coronary artery disease  History of Present Illness  Patient presents today for management of coronary artery disease. Patient reports that he has been doing ok. He reports some chest pain from time to time. He reports that it is located in the middle of his chest. He reports that it seems to correlate after he has eaten foods that give him acid reflux such as chili. He reports that he doesn't get any chest pain with exertion. He reports that he is not very active due to his hip pain/locking up. He denies any heart racing or palpitations. He denies any dizziness or lightheadedness. He denies any leg swelling, orthopnea or PND. He reports that his blood pressure has remained normal at other dr visits. Patient follows with Dr Ansari as PCP.     Previous Cardiac Testing and Procedures:  -LHC (3/5/2020) 55% mid LAD stenosis with myocardial bridging, OSCAR II flow without significant obstruction, mild to moderate irregularities of dominant LCx, OM1 with proximal diffuse 50% stenosis, nondominant RCA with no significant stenosis  -[facility] (6/28/2021) no significant arterial insufficiency of the right lower extremity, mild arterial insufficiency of the left lower extremity  -Lipid panel (5/30/2023) total cholesterol 203, HDL 39, , triglycerides 168  -Lipid panel (7/25/2024) total cholesterol 143, HDL 45, LDL 78, triglycerides 107    The following portions of the patient's history were reviewed and updated as appropriate: allergies, current medications, past family history, past medical history, past social history, past surgical history and problem list.    Allergies   Allergen Reactions    Contrast Dye (Echo Or Unknown Ct/Mr) Rash       Current Outpatient Medications:     Aspirin Low Dose 81 MG EC tablet, TAKE  ONE TABLET BY MOUTH DAILY., Disp: 90 tablet, Rfl: 3    atorvastatin (LIPITOR) 80 MG tablet, TAKE 1 TABLET BY MOUTH DAILY., Disp: 30 tablet, Rfl: 0    cetirizine (zyrTEC) 10 MG tablet, Take 1 tablet by mouth Daily., Disp: 30 tablet, Rfl: 0    clopidogrel (PLAVIX) 75 MG tablet, Take 1 tablet by mouth Daily., Disp: 30 tablet, Rfl: 6    escitalopram (LEXAPRO) 10 MG tablet, TAKE 1 TABLET BY MOUTH DAILY., Disp: 90 tablet, Rfl: 1    hydrOXYzine (ATARAX) 25 MG tablet, Take 1 tablet by mouth 3 (Three) Times a Day As Needed for Itching., Disp: 30 tablet, Rfl: 0    lisinopril (PRINIVIL,ZESTRIL) 5 MG tablet, TAKE 1 TABLET BY MOUTH DAILY, Disp: 90 tablet, Rfl: 1    predniSONE (DELTASONE) 20 MG tablet, Take 1 tablet by mouth Daily., Disp: 5 tablet, Rfl: 0    sildenafil (Viagra) 100 MG tablet, Take 1 tablet by mouth Daily As Needed for Erectile Dysfunction., Disp: 10 tablet, Rfl: 11    sulfamethoxazole-trimethoprim (Bactrim DS) 800-160 MG per tablet, Take 1 tablet by mouth 2 (Two) Times a Day., Disp: 14 tablet, Rfl: 0    Past Medical History:   Diagnosis Date    Anxiety     Arthritis     Coronary artery disease involving native coronary artery of native heart with unstable angina pectoris 8/3/2020    Current moderate episode of major depressive disorder without prior episode 2021    Essential hypertension 3/29/2019    Head injury     [place] (hard of hearing)     Hyperlipidemia     Injury of back      Social History     Socioeconomic History    Marital status: Single   Tobacco Use    Smoking status: Every Day     Current packs/day: 0.00     Average packs/day: 0.5 packs/day for 33.0 years (16.5 ttl pk-yrs)     Types: Cigarettes     Start date:      Last attempt to quit: 2020     Years since quittin.9    Smokeless tobacco: Former     Types: Chew    Tobacco comments:     pt states it is too hard to quit, he is by himself.   Vaping Use    Vaping status: Never Used   Substance and Sexual Activity    Alcohol use: No    Drug  "use: No    Sexual activity: Defer       Review of Systems   Constitutional: Negative for malaise/fatigue.   Cardiovascular:  Positive for dyspnea on exertion (unchanged). Negative for chest pain, irregular heartbeat, leg swelling, near-syncope, orthopnea, palpitations, paroxysmal nocturnal dyspnea and syncope.   Hematologic/Lymphatic: Does not bruise/bleed easily.   Musculoskeletal:  Positive for joint pain.   Genitourinary:  Negative for hematuria.   Neurological:  Negative for dizziness and weakness.   All other systems reviewed and are negative.         Objective:     Vitals reviewed.   Constitutional:       General: Not in acute distress.     Appearance: Normal appearance. Well-developed.   Eyes:      Pupils: Pupils are equal, round, and reactive to light.   HENT:      Head: Normocephalic and atraumatic.      Nose: Nose normal.   Neck:      Vascular: No carotid bruit.   Pulmonary:      Effort: Pulmonary effort is normal. No respiratory distress.      Breath sounds: Normal breath sounds. No wheezing. No rales.   Cardiovascular:      Normal rate. Regular rhythm.      Murmurs: There is no murmur.   Edema:     Peripheral edema absent.   Abdominal:      General: There is no distension.      Palpations: Abdomen is soft.   Musculoskeletal: Normal range of motion.      Cervical back: Normal range of motion and neck supple. Skin:     General: Skin is warm.      Findings: No erythema or rash.   Neurological:      General: No focal deficit present.      Mental Status: Alert and oriented to person, place, and time.   Psychiatric:         Speech: Speech normal.         Behavior: Behavior normal.         Thought Content: Thought content normal.         Judgment: Judgment normal.         /68   Pulse 75   Ht 172.7 cm (68\")   Wt 99.3 kg (219 lb)   BMI 33.30 kg/m²       ECG 12 Lead    Date/Time: 11/25/2024 1:27 PM  Performed by: Jd Ferguson APRN    Authorized by: Jd Ferguson APRN  Comparison: compared with " previous ECG from 5/20/2023  Similar to previous ECG  Rhythm: sinus rhythm  Rate: normal  BPM: 75  Other findings: non-specific ST-T wave changes    Clinical impression: non-specific ECG          Lab Review:       Lab Results   Component Value Date    CHOL 203 (H) 05/30/2023    CHLPL 143 07/25/2024    TRIG 107 07/25/2024    HDL 45 07/25/2024    LDL 78 07/25/2024     I have personally reviewed labs, and past office notes prior to patients visit  Assessment:          Diagnosis Plan   1. Essential hypertension        2. Nonobstructive atherosclerosis of coronary artery  ECG 12 Lead      3. Mixed hyperlipidemia        4. PAD (peripheral artery disease)        5. Tobacco use               Plan:       Hypertension: controlled in office. Continue current medications    2. Nonobstructive coronary artery disease: moderate nonobstructive CAD on [facility] 3/5/2020 at [facility]. Patient denies any chest pain. Continue aspirin and atorvastatin.      3. Hyperlipidemia: lipid panel 7/25/2024 showed controlled cholesterol. Continue atorvastatin    4. Peripheral arterial disease: Follows with Vascular surgery. Continue aspirin, plavix and atorvastatin    5. Tobacco use: Gilberto Roberts  reports that he has been smoking cigarettes. He started smoking about 37 years ago. He has a 16.5 pack-year smoking history. He has quit using smokeless tobacco.  His smokeless tobacco use included chew. I have educated him on the risk of diseases from using tobacco products such as cancer, COPD, and heart disease. I advised him to quit and he is not willing to quit. I spent 3  minutes counseling the patient.     I spent 35 minutes caring for Gilberto on this date of service. This time includes time spent by me in the following activities:preparing for the visit, reviewing tests, obtaining and/or reviewing a separately obtained history, performing a medically appropriate examination and/or evaluation , counseling and educating the patient/family/caregiver,  and documenting information in the medical record     I spent 2 minutes on the separately reported service of EKG. This time is not included in the time used to support the E/M service also reported today.    Patient is to follow up in  or sooner if needed

## 2024-11-29 NOTE — H&P
11/29/2024             Madison Ansari MD  [address] [facility] [address]  [facility] [address]        Gilberto Roberts  1970          Chief Complaint   Patient presents with    PAD (peripheral artery disease) 09/06/2024 PAD (peripheral        Has pain in legs with exertion, no changes         Dear Madison Ansari MD           HPI  I had the pleasure of seeing your patient Gilberto Roberts in the office today.   As you recall, Gilberto Roberts is a 54 y.o.  male who you are currently following for routine health maintenance.  He has back for 1 month follow-up with testing.  He has complaints of bilateral hip pain with walking, left worse than right.  He was previously seen by Dr. Billings and underwent aortogram, left lower extremity angiogram balloon angioplasty with stent placement in his SFA of left lower extremity on 4/2/19 and again on 1/9/2020.  He was not taking his Plavix for the past year because he ran out of refills.  He has since been restarted on Plavix.  He is maintained on aspirin, Plavix, and Lipitor.      Past Medical History:   Diagnosis Date    Anxiety     Arthritis     Coronary artery disease involving native coronary artery of native heart with unstable angina pectoris 08/03/2020    Current moderate episode of major depressive disorder without prior episode 05/17/2021    Essential hypertension 03/29/2019    GERD (gastroesophageal reflux disease)     Head injury     [place] (hard of hearing)     Hyperlipidemia     Injury of back      Past Surgical History:   Procedure Laterality Date    AORTAGRAM Bilateral 4/2/2019    Procedure: AORTAGRAM, LEFT LEG ANGIOGRAM, ANGIOPLASTY/STENT PLACEMENT, ANGIOSEAL CLOSURE;  Surgeon: Luis Billings MD;  Location:  [facility] 12;  Service: Vascular    AORTAGRAM Left 1/9/2020    Procedure: AORTAGRAM, LEFT LOWER EXTREMITY ANGIOGRAM, ATHRECTOMY, BALLOON ANGIOPLASTY, STENT PLACEMENT, MYNX CLOSURE;  Surgeon: Luis Billings MD;  Location:  [facility]  "12;  Service: Vascular    HEMORRHOIDECTOMY      x 3    HERNIA REPAIR      LEG SURGERY      as a child due to \"club foot\"    NECK MASS EXCISION       Social History     Socioeconomic History    Marital status: Single   Tobacco Use    Smoking status: Every Day     Current packs/day: 0.00     Average packs/day: 1.5 packs/day for 33.0 years (49.5 ttl pk-yrs)     Types: Cigarettes     Start date:      Last attempt to quit: 2020     Years since quittin.9    Smokeless tobacco: Former     Types: Chew    Tobacco comments:     pt states it is too hard to quit, he is by himself.   Vaping Use    Vaping status: Never Used   Substance and Sexual Activity    Alcohol use: No    Drug use: No    Sexual activity: Defer     Family History   Problem Relation Age of Onset    COPD Mother         respiratory failure    Dementia Father     Diabetes Father      Allergies   Allergen Reactions    Contrast Dye (Echo Or Unknown Ct/Mr) Rash     Current Outpatient Medications   Medication Instructions    Aspirin Low Dose 81 mg, Oral, Daily    atorvastatin (LIPITOR) 80 mg, Oral, Daily    cetirizine (ZYRTEC) 10 mg, Oral, Daily    clopidogrel (PLAVIX) 75 mg, Oral, Daily    escitalopram (LEXAPRO) 10 mg, Oral, Daily    hydrOXYzine (ATARAX) 25 mg, Oral, 3 Times Daily PRN    lisinopril (PRINIVIL,ZESTRIL) 5 mg, Oral, Daily    predniSONE (DELTASONE) 20 mg, Oral, Daily    sildenafil (VIAGRA) 100 mg, Oral, Daily PRN    sulfamethoxazole-trimethoprim (Bactrim DS) 800-160 MG per tablet 1 tablet, Oral, 2 Times Daily        Review of Systems   Constitutional: Negative.  Negative for diaphoresis and fever.   HENT: Negative.     Eyes: Negative.    Respiratory: Negative.  Negative for shortness of breath and wheezing.    Cardiovascular: Negative.  Negative for chest pain and leg swelling.   Gastrointestinal: Negative.  Negative for abdominal pain.   Endocrine: Negative.    Genitourinary: Negative.    Musculoskeletal:  Positive for arthralgias.   Skin: " "Negative.    Allergic/Immunologic: Negative.    Neurological: Negative.  Negative for dizziness and weakness.   Hematological: Negative.    Psychiatric/Behavioral: Negative.           /62   Pulse 107   Ht 172.7 cm (68\")   Wt 98.2 kg (216 lb 9.6 oz)   SpO2 98%   BMI 32.93 kg/m²         Physical Exam  Vitals and nursing note reviewed.   Constitutional:       General: He is not in acute distress.     Appearance: Normal appearance. He is obese. He is not diaphoretic.   HENT:      Head: Normocephalic. No right periorbital erythema or left periorbital erythema.      Right Ear: Decreased hearing noted.      Left Ear: Decreased hearing noted.      Ears:      Comments: Bilateral hearing aids     Nose: Nose normal.   Eyes:      General: No scleral icterus.     Pupils: Pupils are equal.   Cardiovascular:      Rate and Rhythm: Normal rate and regular rhythm.      Pulses: Normal pulses.           Femoral pulses are 2+ on the right side and 2+ on the left side.       Popliteal pulses are 2+ on the right side and 2+ on the left side.        Dorsalis pedis pulses are 2+ on the right side and detected w/ Doppler on the left side.        Posterior tibial pulses are 2+ on the right side and detected w/ Doppler on the left side.      Heart sounds: Normal heart sounds. No murmur heard.  Pulmonary:      Effort: Pulmonary effort is normal. No respiratory distress.      Breath sounds: Normal breath sounds.   Abdominal:      General: Bowel sounds are normal. There is no distension.      Palpations: Abdomen is soft.      Tenderness: There is no abdominal tenderness. There is no guarding.   Musculoskeletal:         General: No swelling or tenderness. Normal range of motion.      Cervical back: Normal range of motion and neck supple.      Right lower leg: No edema.      Left lower leg: No edema.   Feet:      Right foot:      Skin integrity: Skin integrity normal.      Left foot:      Skin integrity: Skin integrity normal.   Skin:    "  General: Skin is warm and dry.      Findings: No erythema or rash.   Neurological:      General: No focal deficit present.      Mental Status: He is alert and oriented to person, place, and time. Mental status is at baseline.      Cranial Nerves: No cranial nerve deficit.      Gait: Gait normal.   Psychiatric:         Attention and Perception: Attention normal.         Mood and Affect: Mood normal.         Behavior: Behavior normal.         Thought Content: Thought content normal.         Judgment: Judgment normal.      DIAGNOSTIC DATA    CT Angiogram Abdomen Pelvis     Result Date: 11/7/2024  Narrative: EXAMINATION: CT ANGIOGRAM ABDOMEN PELVIS-   11/7/2024 7:59 AM  HISTORY: [facility]; I73.9-Peripheral vascular disease, unspecified  In order to have a CT radiation dose as low as reasonably achievable Automated Exposure Control was utilized for adjustment of the mA and/or KV according to patient size.  Total DLP = 880.1 mGy.cm  CT angiography of the abdomen and pelvis is performed after intravenous contrast enhancement.  Images are acquired in axial plane and subsequent 2D reconstruction coronal and sagittal planes and 3D maximum intensity projection image reconstruction.  Comparison is made with the previous study dated 1/6/2023.  There are severe atheromatous changes of the mid to distal abdominal aorta and iliac arteries bilaterally.  No aneurysmal dilatation. No dissection.  There is normal origin course and caliber of the celiac and superior mesenteric arteries. Normal branches.  Normal origin course and caliber of the renal arteries bilaterally. Normal branches. There is a tiny accessory left renal artery supplying the upper pole of the left kidney.  There is approximately 50% focal stenosis of the origin of the inferior mesenteric artery which arises at the level of a significant take noncalcified plaque in the anterior wall of the distal abdominal aorta. The remaining inferior mesenteric artery distal to the  origin is of normal size with a subsequent normal left colic and superior rectal branches.  Severe atheromatous changes of the common iliac artery bilaterally. There is a probable chronic appearing dissection of the proximal left common iliac artery. The remaining common iliac arteries appear unremarkable except for mild ectasia of the right common iliac artery which measures up to 1.2 cm in diameter. There is a large mixed plaque in the proximal left external iliac artery with 50% diameter stenosis. There is chronic appearing dissection of the proximal right external iliac artery. Moderate ectasia of the distal left external iliac artery is seen which measure up to 11 mm. Atheromatous plaques are seen in the common femoral artery bilaterally with evidence of a focal dissection of the right common femoral artery?. The right common femoral artery divide into normal appearing superficial and deep femoral arteries. The left common femoral artery divides into a normal deep femoral and a diminutive superficial femoral artery which is immediately occluded. No reopacification of the left superficial femoral artery in the available images.  The lung bases included in the study are unremarkable except for atelectatic changes at bilateral bases. Some scarring in the right middle lobe and atelectasis in the lingular segment of the left upper lobe.  The liver and spleen are normal.  No radiopaque gallstones.  The pancreas is normal.  Moderate lobulation of the adrenal glands bilaterally is seen. No discrete mass.  The kidneys bilaterally appear normal. No mass. No calculi. No hydronephrosis. Limited visualized ureters are nondilated. The urinary bladder is moderately well distended. No intrinsic abnormality.  Prostate is moderately enlarged with intrinsic calcification.  There are bilateral fat-containing inguinal hernias, left larger than the right. There is a small fat-containing umbilical hernia.  The stomach is  decompressed. No focal abnormality. Duodenum is normal. Small bowel is nondistended and nondilated. Normal appendix. Moderate gas and stool is seen throughout the colon. No evidence of obstruction.  No evidence of abdominal or pelvic lymphadenopathy.  Images reviewed in bone window show chronic degenerative changes of the lumbar and limited visualized thoracic spine. No acute bony abnormality.       Impression: 1. Severe atheromatous changes of the abdominal aorta and iliac arteries. 2. Normal origin course and caliber of the celiac, superior mesenteric and renal arteries. 50% focal stenosis of the origin of the inferior mesenteric artery. 3. Significant disease of the common and external iliac arteries bilaterally as detailed above. 4. Complete occlusion of the left superficial femoral artery adjacent and distal to the bifurcation of the left common femoral artery. 5. Nonacute stable nonvascular finding of the abdomen and pelvis similar to the previous study.      This report was signed and finalized on 11/7/2024 10:03 AM by Dr. Andrea Frias MD.        Problem List       Patient Active Problem List   Diagnosis    Essential hypertension    Tobacco use    Congenital hearing disorder of both ears    Current moderate episode of major depressive disorder without prior episode    Hemorrhoids    Nerve damage    Mixed hyperlipidemia    Spondylosis of thoracolumbar region without myelopathy or radiculopathy    Hallux valgus with bunions    PAD (peripheral artery disease)    Nonobstructive atherosclerosis of coronary artery    Erectile dysfunction due to arterial insufficiency    Class 1 obesity due to excess calories without serious comorbidity with body mass index (BMI) of 32.0 to 32.9 in adult            Visit Diagnosis       ICD-10-CM ICD-9-CM   1. PAD (peripheral artery disease)  I73.9 443.9   2. Essential hypertension  I10 401.9   3. Mixed hyperlipidemia  E78.2 272.2   4. Tobacco use  Z72.0 305.1   5. Class 1  obesity due to excess calories without serious comorbidity with body mass index (BMI) of 32.0 to 32.9 in adult  E66.811 278.00     E66.09 V85.32     Z68.32                    Plan: After thoroughly evaluating Gilberto Roberts, I believe the best course of action is to proceed with left lower extremity angiogram.  Risks of angiogram were discussed.  These include, but are not limited to, bleeding, infection, vessel damage, nerve damage, embolus, and loss of limb.  The patient understands these risks and wishes to proceed with procedure.  He has continued to complain of left leg pain with ambulation, bilateral hip pain left worse than right.  I along with Dr. Hong did review his CTA abdomen pelvis which shows occlusion to his left superficial femoral artery, he has had previous stenting to his left SFA in 2019 and again in 2020 by Dr. Billings.  He should continue his aspirin 81 mg daily, Plavix 75 mg daily, and Lipitor 80 mg nightly, in addition to his other medications.  He will take these uninterrupted up until the day prior to surgery.  I did discuss vascular risk factors as they pertain to the progression of vascular disease including controlling his hypertension, hyperlipidemia, and smoking cessation.  His blood pressure stable today in office.  His last lipid panel shows all values within normal limits.  Unfortunately he is a daily smoker and has no desire to quit smoking at this time.  I did  extensively on smoking cessation, and the patient was advised of the continued risks of smoking.  I provided over 10 minutes counseling on this matter.  Body mass index is 32.93 kg/m².        This was all discussed in full with complete understanding.     Thank you for allowing me to participate in the care of your patient.  Please do not hesitate with any questions or concerns.  I will keep you aware of any further encounters with Gilberto Roberts.           Sincerely yours,           Carlos Hong, DO

## 2024-12-05 ENCOUNTER — TELEPHONE (OUTPATIENT)
Dept: VASCULAR SURGERY | Facility: CLINIC | Age: 54
End: 2024-12-05
Payer: MEDICARE

## 2024-12-05 NOTE — TELEPHONE ENCOUNTER
Patient called wanting to reschedule his procedure for 12/06/2024. Patient states he is not feeling well. Procedure rescheduled for 12/13/2024.

## 2025-01-21 ENCOUNTER — TELEPHONE (OUTPATIENT)
Dept: VASCULAR SURGERY | Facility: CLINIC | Age: 55
End: 2025-01-21
Payer: MEDICARE

## 2025-01-21 NOTE — TELEPHONE ENCOUNTER
Patient called and stated he wanted to cancel his surgery on 01/27/2025. Reached out to surgery scheduling and cancelled procedure have not rescheduled at this time.

## 2025-02-07 ENCOUNTER — OFFICE VISIT (OUTPATIENT)
Dept: FAMILY MEDICINE CLINIC | Facility: CLINIC | Age: 55
End: 2025-02-07
Payer: MEDICARE

## 2025-02-07 VITALS
BODY MASS INDEX: 32.13 KG/M2 | RESPIRATION RATE: 20 BRPM | TEMPERATURE: 98 F | HEART RATE: 75 BPM | DIASTOLIC BLOOD PRESSURE: 59 MMHG | WEIGHT: 212 LBS | SYSTOLIC BLOOD PRESSURE: 108 MMHG | HEIGHT: 68 IN | OXYGEN SATURATION: 99 %

## 2025-02-07 DIAGNOSIS — E66.09 CLASS 1 OBESITY DUE TO EXCESS CALORIES WITHOUT SERIOUS COMORBIDITY WITH BODY MASS INDEX (BMI) OF 32.0 TO 32.9 IN ADULT: ICD-10-CM

## 2025-02-07 DIAGNOSIS — I73.9 PAD (PERIPHERAL ARTERY DISEASE): Primary | ICD-10-CM

## 2025-02-07 DIAGNOSIS — I10 ESSENTIAL HYPERTENSION: ICD-10-CM

## 2025-02-07 DIAGNOSIS — R91.1 LUNG NODULE: ICD-10-CM

## 2025-02-07 DIAGNOSIS — E66.811 CLASS 1 OBESITY DUE TO EXCESS CALORIES WITHOUT SERIOUS COMORBIDITY WITH BODY MASS INDEX (BMI) OF 32.0 TO 32.9 IN ADULT: ICD-10-CM

## 2025-02-07 DIAGNOSIS — F41.9 ANXIETY: ICD-10-CM

## 2025-02-07 PROCEDURE — 3078F DIAST BP <80 MM HG: CPT | Performed by: FAMILY MEDICINE

## 2025-02-07 PROCEDURE — G2211 COMPLEX E/M VISIT ADD ON: HCPCS | Performed by: FAMILY MEDICINE

## 2025-02-07 PROCEDURE — 99214 OFFICE O/P EST MOD 30 MIN: CPT | Performed by: FAMILY MEDICINE

## 2025-02-07 PROCEDURE — 1126F AMNT PAIN NOTED NONE PRSNT: CPT | Performed by: FAMILY MEDICINE

## 2025-02-07 PROCEDURE — G0296 VISIT TO DETERM LDCT ELIG: HCPCS | Performed by: FAMILY MEDICINE

## 2025-02-07 PROCEDURE — 3074F SYST BP LT 130 MM HG: CPT | Performed by: FAMILY MEDICINE

## 2025-02-07 PROCEDURE — 1160F RVW MEDS BY RX/DR IN RCRD: CPT | Performed by: FAMILY MEDICINE

## 2025-02-07 PROCEDURE — 1159F MED LIST DOCD IN RCRD: CPT | Performed by: FAMILY MEDICINE

## 2025-02-07 RX ORDER — LISINOPRIL 5 MG/1
5 TABLET ORAL DAILY
Qty: 90 TABLET | Refills: 1 | Status: SHIPPED | OUTPATIENT
Start: 2025-02-07

## 2025-02-07 RX ORDER — ESCITALOPRAM OXALATE 10 MG/1
10 TABLET ORAL DAILY
Qty: 90 TABLET | Refills: 1 | Status: SHIPPED | OUTPATIENT
Start: 2025-02-07

## 2025-02-07 NOTE — PATIENT INSTRUCTIONS
Obesity, Adult  Obesity is the condition of having too much total body fat. Being overweight or obese means that your weight is greater than what is considered healthy for your body size. Obesity is determined by a measurement called BMI (body mass index). BMI is an estimate of body fat and is calculated from height and weight. For adults, a BMI of 30 or higher is considered obese.  Obesity can lead to other health concerns and major illnesses, including:  Stroke.  Coronary artery disease (CAD).  Type 2 diabetes.  Some types of cancer, including cancers of the colon, breast, uterus, and gallbladder.  High blood pressure (hypertension).  High cholesterol.  Gallbladder stones.  Obesity can also contribute to:  Osteoarthritis.  Sleep apnea.  Infertility problems.  What are the causes?  Common causes of this condition include:  Eating daily meals that are high in calories, sugar, and fat.  Drinking high amounts of sugar-sweetened beverages, such as soft drinks.  Being born with genes that may make you more likely to become obese.  Having a medical condition that causes obesity, including:  Hypothyroidism.  Polycystic ovarian syndrome (PCOS).  Binge-eating disorder.  Cushing syndrome.  Taking certain medicines, such as steroids, antidepressants, and seizure medicines.  Not being physically active (sedentary lifestyle).  Not getting enough sleep.  What increases the risk?  The following factors may make you more likely to develop this condition:  Having a family history of obesity.  Living in an area with limited access to:  [place], recreation centers, or sidewalks.  Healthy food choices, such as grocery stores and [employer] markets.  What are the signs or symptoms?  The main sign of this condition is having too much body fat.  How is this diagnosed?  This condition is diagnosed based on:  Your BMI. If you are an adult with a BMI of 30 or higher, you are considered obese.  Your waist circumference. This measures the  distance around your waistline.  Your skinfold thickness. Your health care provider may gently pinch a fold of your skin and measure it.  You may have other tests to check for underlying conditions.  How is this treated?  Treatment for this condition often includes changing your lifestyle. Treatment may include some or all of the following:  Dietary changes. This may include developing a healthy meal plan.  Regular physical activity. This may include activity that causes your heart to beat faster (aerobic exercise) and strength training. Work with your health care provider to design an exercise program that works for you.  Medicine to help you lose weight if you are unable to lose one pound a week after six weeks of healthy eating and more physical activity.  Treating conditions that cause the obesity (underlying conditions).  Surgery. Surgical options may include gastric banding and gastric bypass. Surgery may be done if:  Other treatments have not helped to improve your condition.  You have a BMI of 40 or higher.  You have life-threatening health problems related to obesity.  Follow these instructions at home:  Eating and drinking    Follow recommendations from your health care provider about what you eat and drink. Your health care provider may advise you to:  Limit fast food, sweets, and processed snack foods.  Choose low-fat options, such as low-fat milk instead of whole milk.  Eat five or more servings of fruits or vegetables every day.  Choose healthy foods when you eat out.  Keep low-fat snacks available.  Limit sugary drinks, such as soda, fruit juice, sweetened iced tea, and flavored milk.  Drink enough water to keep your urine pale yellow.  Do not follow a fad diet. Fad diets can be unhealthy and even dangerous.  Other healthful choices include:  Eat at home more often. This gives you more control over what you eat.  Learn to read food labels. This will help you understand how much food is considered one  serving.  Learn what a healthy serving size is.  Physical activity  Exercise regularly, as told by your health care provider.  Most adults should get up to 150 minutes of moderate-intensity exercise every week.  Ask your health care provider what types of exercise are safe for you and how often you should exercise.  Warm up and stretch before being active.  Cool down and stretch after being active.  Rest between periods of activity.  Lifestyle  Work with your health care provider and a dietitian to set a weight-loss goal that is healthy and reasonable for you.  Limit your screen time.  Find ways to reward yourself that do not involve food.  Do not drink alcohol if:  Your health care provider tells you not to drink.  You are pregnant, may be pregnant, or are planning to become pregnant.  If you drink alcohol:  Limit how much you have to:  0-1 drink a day for women.  0-2 drinks a day for men.  Know how much alcohol is in your drink. In the [place] one drink equals one 12 oz bottle of beer (355 mL), one 5 oz glass of wine (148 mL), or one 1½ oz glass of hard liquor (44 mL).  General instructions  Keep a weight-loss journal to keep track of the food you eat and how much exercise you get.  Take over-the-counter and prescription medicines only as told by your health care provider.  Take vitamins and supplements only as told by your health care provider.  Consider joining a support group. Your health care provider may be able to recommend a support group.  Pay attention to your mental health as obesity can lead to depression or self esteem issues.  Keep all follow-up visits. This is important.  Contact a health care provider if:  You are unable to meet your weight-loss goal after six weeks of dietary and lifestyle changes.  You have trouble breathing.  Summary  Obesity is the condition of having too much total body fat.  Being overweight or obese means that your weight is greater than what is considered healthy for your body  size.  Work with your health care provider and a dietitian to set a weight-loss goal that is healthy and reasonable for you.  Exercise regularly, as told by your health care provider. Ask your health care provider what types of exercise are safe for you and how often you should exercise.  This information is not intended to replace advice given to you by your health care provider. Make sure you discuss any questions you have with your health care provider.  Document Revised: 07/26/2022 Document Reviewed: 07/26/2022  Elsevier Patient Education © 2024 [employer]

## 2025-02-07 NOTE — PROGRESS NOTES
"Vera Roberts is a 54 y.o. male.     History of Present Illness  The patient presents for evaluation of bilateral hip pain, weight management, lung nodule, and hypertension.    He reports persistent discomfort in his toe, which he attributes to arthritis. He has been under the care of Dr. Hong, who has recommended imaging of his left leg due to suspected blockages. He has not yet undergone any surgical intervention for this issue. He has previously consulted with Dr. Billings and underwent an aortogram, left lower extremity angiogram, and balloon angioplasty with stent placement in the SFA of the left lower extremity in 04/2019 and again in 01/2020. He had a brief period off Plavix but has since resumed it. He continues to take Plavix, aspirin, and cholesterol medication.    He also mentions experiencing a sensation of coldness in his stomach. His weight has decreased from 220 pounds to 112 pounds, but he is uncertain about the appropriateness of this weight loss. He expresses a desire to improve his dietary habits and increase his physical activity.    He has a history of smoking.    He is currently on lisinopril for blood pressure management.    SOCIAL HISTORY  The patient admits to smoking.    MEDICATIONS  Plavix, aspirin, lisinopril    Results      The following portions of the patient's history were reviewed and updated as appropriate: allergies, current medications, past family history, past medical history, past social history, past surgical history, and problem list.        A review of systems was performed, and pertinent findings are noted in the HPI.    Objective   /59 (BP Location: Right arm, Patient Position: Sitting, Cuff Size: Adult)   Pulse 75   Temp 98 °F (36.7 °C) (Infrared)   Resp 20   Ht 172.7 cm (68\")   Wt 96.2 kg (212 lb)   SpO2 99%   BMI 32.23 kg/m²          Physical Exam  Vitals and nursing note reviewed.   Constitutional:       General: He is not in acute " distress.     Appearance: He is obese. He is not toxic-appearing.   HENT:      Nose: Nose normal.   Eyes:      General:         Right eye: No discharge.         Left eye: No discharge.      Conjunctiva/sclera: Conjunctivae normal.   Pulmonary:      Effort: Pulmonary effort is normal. No respiratory distress.   Neurological:      Mental Status: He is alert and oriented to person, place, and time.      Motor: Weakness present.      Gait: Gait abnormal.   Psychiatric:         Mood and Affect: Mood normal.         Behavior: Behavior normal.         Thought Content: Thought content normal.         Judgment: Judgment normal.         Assessment & Plan   Problems Addressed this Visit          Cardiac and Vasculature    Essential hypertension    Relevant Medications    lisinopril (PRINIVIL,ZESTRIL) 5 MG tablet    PAD (peripheral artery disease) - Primary       Endocrine and Metabolic    Class 1 obesity due to excess calories without serious comorbidity with body mass index (BMI) of 32.0 to 32.9 in adult     Other Visit Diagnoses       Anxiety        Relevant Medications    escitalopram (LEXAPRO) 10 MG tablet    Lung nodule        Relevant Orders    CT Chest Without Contrast          Diagnoses         Codes Comments    PAD (peripheral artery disease)    -  Primary ICD-10-CM: I73.9  ICD-9-CM: 443.9     Class 1 obesity due to excess calories without serious comorbidity with body mass index (BMI) of 32.0 to 32.9 in adult     ICD-10-CM: E66.811, E66.09, Z68.32  ICD-9-CM: 278.00, V85.32     Essential hypertension     ICD-10-CM: I10  ICD-9-CM: 401.9     Anxiety     ICD-10-CM: F41.9  ICD-9-CM: 300.00     Lung nodule     ICD-10-CM: R91.1  ICD-9-CM: 793.11             Assessment & Plan  1. Bilateral hip pain.  He reports bilateral hip pain with walking, left worse than right. He previously saw Dr. Billings and had an aortogram, left lower extremity angiogram, and balloon angioplasty with stent placement in his SFA of the left lower  extremity in April 2019 and January 2020. He has been restarted on Plavix and is currently taking Plavix, aspirin, and his cholesterol medication. Additional testing showed some occlusion of the left femoral artery. Dr. Hong plans to continue medication management and consider surgery to correct the occlusion.    2. Weight management.  He has been experiencing weight loss but has not yet reached his target weight. He was advised to continue his weight loss efforts by modifying his diet, specifically reducing the intake of breads, potatoes, pastas, chips, cakes, and cookies. Information on dietary changes will be sent to his [facility] for further guidance.    3. Lung nodule.  Given his smoking history and previous lung nodule findings, it is crucial to maintain up-to-date scans. A CT scan of the chest has been ordered to monitor the lung nodule. He will be contacted to schedule the scan.    4. Hypertension.  His blood pressure readings are within the normal range today. A prescription for lisinopril has been renewed to manage his blood pressure.    PROCEDURE  Aortogram, left lower extremity angiogram, and balloon angioplasty with stent placement in the SFA of the left lower extremity in 04/2019 and 01/2020.               Transcribed from ambient dictation for Madison Ansari MD by Madisno Ansari MD.  02/07/25   15:54 CST    Patient or patient representative verbalized consent for the use of Ambient Listening during the visit with  Madison Ansari MD for chart documentation. 2/7/2025  15:54 CST          This document has been electronically signed by Madison Ansari MD on February 7, 2025 15:54 CST

## 2025-03-18 ENCOUNTER — TELEPHONE (OUTPATIENT)
Dept: ONCOLOGY | Facility: CLINIC | Age: 55
End: 2025-03-18

## 2025-03-18 NOTE — TELEPHONE ENCOUNTER
Caller: Gilberto Roberts    Relationship to patient: Self    Best call back number: [phone]     Chief complaint: R/S    Type of visit: LAB & FOLLOW UP 2    Requested date: PLEASE CALL PT TO R/S, HUB UNABLE TO SCHEDULE WITHIN CORRECT TIMEFRAME     If rescheduling, when is the original appointment: 3/24

## 2025-04-09 ENCOUNTER — HOSPITAL ENCOUNTER (OUTPATIENT)
Dept: CT IMAGING | Facility: HOSPITAL | Age: 55
Discharge: HOME | End: 2025-04-09
Admitting: FAMILY MEDICINE
Payer: MEDICARE

## 2025-04-09 DIAGNOSIS — R91.1 LUNG NODULE: ICD-10-CM

## 2025-04-09 PROCEDURE — 71250 CT THORAX DX C-: CPT

## 2025-05-06 ENCOUNTER — TELEPHONE (OUTPATIENT)
Dept: VASCULAR SURGERY | Facility: CLINIC | Age: 55
End: 2025-05-06
Payer: MEDICARE

## 2025-05-06 ENCOUNTER — RESULTS FOLLOW-UP (OUTPATIENT)
Dept: FAMILY MEDICINE CLINIC | Facility: CLINIC | Age: 55
End: 2025-05-06
Payer: MEDICARE

## 2025-05-06 NOTE — TELEPHONE ENCOUNTER
Call placed lizette patient to reschedule surgery he cancelled, no answer and voicemail left requesting call back to reschedule surgery

## 2025-05-07 ENCOUNTER — TELEPHONE (OUTPATIENT)
Dept: VASCULAR SURGERY | Facility: CLINIC | Age: 55
End: 2025-05-07
Payer: MEDICARE

## 2025-05-07 DIAGNOSIS — D75.1 POLYCYTHEMIA: Primary | ICD-10-CM

## 2025-05-07 DIAGNOSIS — D75.1 POLYCYTHEMIA: ICD-10-CM

## 2025-05-07 DIAGNOSIS — I73.9 PAD (PERIPHERAL ARTERY DISEASE): Primary | ICD-10-CM

## 2025-05-07 RX ORDER — CLOPIDOGREL BISULFATE 75 MG/1
75 TABLET ORAL DAILY
Qty: 30 TABLET | Refills: 6 | Status: SHIPPED | OUTPATIENT
Start: 2025-05-07

## 2025-05-07 NOTE — TELEPHONE ENCOUNTER
SPOKE WITH PT. Your surgery is scheduled for 06/06/2025, you need to be at the hospital at 5:00 AM.  Nothing to eat or drink after midnight the night before your procedure. Patient  will continue to take aspirin 81 mg daily, Plavix 75 mg daily, and Lipitor 80 mg nightly uninterrupted prior to surgery.  Your pre work is scheduled for 05/28/2025, you need to be at the hospital at 1:30 PM.  You do not need to fast before your pre work. If you have any questions or concerns, please contact our office at [phone].

## 2025-05-08 ENCOUNTER — OFFICE VISIT (OUTPATIENT)
Dept: ONCOLOGY | Facility: CLINIC | Age: 55
End: 2025-05-08
Payer: MEDICARE

## 2025-05-08 ENCOUNTER — LAB (OUTPATIENT)
Dept: LAB | Facility: HOSPITAL | Age: 55
End: 2025-05-08
Payer: MEDICARE

## 2025-05-08 VITALS
HEART RATE: 64 BPM | BODY MASS INDEX: 33.48 KG/M2 | SYSTOLIC BLOOD PRESSURE: 136 MMHG | WEIGHT: 220.9 LBS | DIASTOLIC BLOOD PRESSURE: 84 MMHG | RESPIRATION RATE: 16 BRPM | TEMPERATURE: 97.9 F | HEIGHT: 68 IN | OXYGEN SATURATION: 96 %

## 2025-05-08 DIAGNOSIS — D75.1 POLYCYTHEMIA: Primary | ICD-10-CM

## 2025-05-08 DIAGNOSIS — D72.829 LEUKOCYTOSIS, UNSPECIFIED TYPE: ICD-10-CM

## 2025-05-08 DIAGNOSIS — Z72.0 TOBACCO USE: ICD-10-CM

## 2025-05-08 DIAGNOSIS — R74.8 ALKALINE PHOSPHATASE RAISED: ICD-10-CM

## 2025-05-08 LAB
ALBUMIN SERPL-MCNC: 4 G/DL (ref 3.5–5.2)
ALBUMIN/GLOB SERPL: 1.4 G/DL
ALP SERPL-CCNC: 160 U/L (ref 39–117)
ALT SERPL W P-5'-P-CCNC: 19 U/L (ref 1–41)
ANION GAP SERPL CALCULATED.3IONS-SCNC: 8 MMOL/L (ref 5–15)
AST SERPL-CCNC: 11 U/L (ref 1–40)
BASOPHILS # BLD AUTO: 0.09 10*3/MM3 (ref 0–0.2)
BASOPHILS NFR BLD AUTO: 0.8 % (ref 0–1.5)
BILIRUB SERPL-MCNC: 0.5 MG/DL (ref 0–1.2)
BUN SERPL-MCNC: 12 MG/DL (ref 6–20)
BUN/CREAT SERPL: 14.8 (ref 7–25)
CALCIUM SPEC-SCNC: 9.2 MG/DL (ref 8.6–10.5)
CHLORIDE SERPL-SCNC: 105 MMOL/L (ref 98–107)
CO2 SERPL-SCNC: 25 MMOL/L (ref 22–29)
CREAT SERPL-MCNC: 0.81 MG/DL (ref 0.76–1.27)
DEPRECATED RDW RBC AUTO: 45.1 FL (ref 37–54)
EGFRCR SERPLBLD CKD-EPI 2021: 103.5 ML/MIN/1.73
EOSINOPHIL # BLD AUTO: 0.31 10*3/MM3 (ref 0–0.4)
EOSINOPHIL NFR BLD AUTO: 2.8 % (ref 0.3–6.2)
ERYTHROCYTE [DISTWIDTH] IN BLOOD BY AUTOMATED COUNT: 14 % (ref 12.3–15.4)
GLOBULIN UR ELPH-MCNC: 2.9 GM/DL
GLUCOSE SERPL-MCNC: 89 MG/DL (ref 65–99)
HCT VFR BLD AUTO: 49.3 % (ref 37.5–51)
HGB BLD-MCNC: 16.3 G/DL (ref 13–17.7)
HOLD SPECIMEN: NORMAL
IMM GRANULOCYTES # BLD AUTO: 0.07 10*3/MM3 (ref 0–0.05)
IMM GRANULOCYTES NFR BLD AUTO: 0.6 % (ref 0–0.5)
LYMPHOCYTES # BLD AUTO: 3.51 10*3/MM3 (ref 0.7–3.1)
LYMPHOCYTES NFR BLD AUTO: 31.9 % (ref 19.6–45.3)
MCH RBC QN AUTO: 29.1 PG (ref 26.6–33)
MCHC RBC AUTO-ENTMCNC: 33.1 G/DL (ref 31.5–35.7)
MCV RBC AUTO: 88 FL (ref 79–97)
MONOCYTES # BLD AUTO: 0.94 10*3/MM3 (ref 0.1–0.9)
MONOCYTES NFR BLD AUTO: 8.5 % (ref 5–12)
NEUTROPHILS NFR BLD AUTO: 55.4 % (ref 42.7–76)
NEUTROPHILS NFR BLD AUTO: 6.1 10*3/MM3 (ref 1.7–7)
NRBC BLD AUTO-RTO: 0 /100 WBC (ref 0–0.2)
PLATELET # BLD AUTO: 261 10*3/MM3 (ref 140–450)
PMV BLD AUTO: 10.3 FL (ref 6–12)
POTASSIUM SERPL-SCNC: 4.1 MMOL/L (ref 3.5–5.2)
PROT SERPL-MCNC: 6.9 G/DL (ref 6–8.5)
RBC # BLD AUTO: 5.6 10*6/MM3 (ref 4.14–5.8)
SODIUM SERPL-SCNC: 138 MMOL/L (ref 136–145)
WBC NRBC COR # BLD AUTO: 11.02 10*3/MM3 (ref 3.4–10.8)

## 2025-05-08 PROCEDURE — 1126F AMNT PAIN NOTED NONE PRSNT: CPT | Performed by: NURSE PRACTITIONER

## 2025-05-08 PROCEDURE — 85025 COMPLETE CBC W/AUTO DIFF WBC: CPT | Performed by: NURSE PRACTITIONER

## 2025-05-08 PROCEDURE — 99214 OFFICE O/P EST MOD 30 MIN: CPT | Performed by: NURSE PRACTITIONER

## 2025-05-08 PROCEDURE — 3079F DIAST BP 80-89 MM HG: CPT | Performed by: NURSE PRACTITIONER

## 2025-05-08 PROCEDURE — 80053 COMPREHEN METABOLIC PANEL: CPT

## 2025-05-08 PROCEDURE — 3075F SYST BP GE 130 - 139MM HG: CPT | Performed by: NURSE PRACTITIONER

## 2025-05-08 PROCEDURE — 36415 COLL VENOUS BLD VENIPUNCTURE: CPT

## 2025-05-08 NOTE — PROGRESS NOTES
[facility]  [address]  [place] [address] [phone]    Patient Name: Gilberto Roberts  Encounter Date: 05/08/2025  YOB: 1970  Patient Number: [id number]    PROGRESS NOTE    HISTORY OF PRESENT ILLNESS: Gilberto Roberts is a 54 y.o. male was seen in our office on 06/28/22 for diagnostic and management secondary polycythemia. Advised therapeutic phlebotomy to keep hematocrit less than 50    Diagnostic Workup   Pathologist review of peripheral smear showedLeukocytosis with absolute neutrophilia. Increased hemoglobin/hematocrit.  Comment: Persistent elevation of hematocrit/hemoglobin and white blood cells without clinical cause suggests the possibility of a myeloproliferative neoplasm.  Negative for BCR ABL rearrangement  Negative for JAK2 V6 17F mutation  FISH negative for CLL     His last phlebotomy was 08/20/2024    INTERVAL HISTORY  Patient presents to clinic today for continued follow-up    He has no acute complaints today.   He had labs drawn and results were reviewed with him in office.       LABS    Lab Results - Last 18 Months   Lab Units 05/28/25  1352 05/08/25  1345 11/19/24  0948 08/20/24  0946 07/25/24  0918   HEMOGLOBIN g/dL 16.8 16.3 15.9 16.7 16.4   HEMATOCRIT % 50.6 49.3 48.1 50.8 49.8   MCV fL 89.2 88.0 87.0 91.4 90.9   WBC 10*3/mm3 12.43* 11.02* 13.80* 10.99* 13.17*   RDW % 14.0 14.0 14.1 13.9 13.7   MPV fL 10.1 10.3 9.9 9.9  --    PLATELETS 10*3/mm3 265 261 261 266 276   IMM GRAN % % 0.6* 0.6* 0.9* 0.5  --    NEUTROS ABS 10*3/mm3 7.85* 6.10 7.62* 6.99  --    LYMPHS ABS 10*3/mm3 3.29* 3.51* 4.41* 2.66  --    MONOS ABS 10*3/mm3 0.79 0.94* 1.17* 0.91*  --    EOS ABS 10*3/mm3 0.34 0.31 0.40 0.31  --    BASOS ABS 10*3/mm3 0.09 0.09 0.08 0.07  --    IMMATURE GRANS (ABS) 10*3/mm3 0.07* 0.07* 0.12* 0.05  --    NRBC /100 WBC 0.0 0.0 0.0 0.0  --        Lab Results - Last 18 Months   Lab Units  "05/28/25  1352 05/08/25  1345 11/19/24  0948 08/20/24  0946 07/25/24  0918   GLUCOSE mg/dL 78 89 80 80 56*   SODIUM mmol/L 138 138 138 138 138   POTASSIUM mmol/L 4.1 4.1 3.8 4.4 4.7   CO2 mmol/L 25.0 25.0 26.0 27.0 25.6   CHLORIDE mmol/L 103 105 102 102 104   ANION GAP mmol/L 10.0 8.0 10.0 9.0  --    CREATININE mg/dL 0.81 0.81 0.85 0.77 0.92   BUN mg/dL 9.7 12 17 13 15   BUN / CREAT RATIO  12.0 14.8 20.0 16.9 16.3   CALCIUM mg/dL 9.0 9.2 9.3 9.5 9.7   ALK PHOS U/L  --  160* 126* 152* 141*   TOTAL PROTEIN g/dL  --  6.9 6.6 7.2 6.7   ALT (SGPT) U/L  --  19 27 17 30   AST (SGOT) U/L  --  11 13 13 15   BILIRUBIN mg/dL  --  0.5 0.3 0.5 0.4   ALBUMIN g/dL  --  4.0 3.7 4.1 4.4   GLOBULIN gm/dL  --  2.9 2.9 3.1  --    GLOBULINREF gm/dL  --   --   --   --  2.3       No results for input(s): \"MSPIKE\", \"KAPPALAMB\", \"IGLFLC\", \"URICACID\", \"FREEKAPPAL\", \"CEA\", \"LDH\", \"REFLABREPO\" in the last 71328 hours.      No results for input(s): \"IRON\", \"TIBC\", \"LABIRON\", \"FERRITIN\", \"N7XGBHT\", \"TSH\", \"FOLATE\" in the last 49670 hours.    Invalid input(s): \"VITB12\"      PAST MEDICAL HISTORY:  ALLERGIES:  Allergies   Allergen Reactions    Contrast Dye (Echo Or Unknown Ct/Mr) Rash     CURRENT MEDICATIONS:  No facility-administered encounter medications on file as of 5/8/2025.     Outpatient Encounter Medications as of 5/8/2025   Medication Sig Dispense Refill    Aspirin Low Dose 81 MG EC tablet TAKE ONE TABLET BY MOUTH DAILY. 90 tablet 3    atorvastatin (LIPITOR) 80 MG tablet TAKE 1 TABLET BY MOUTH DAILY. 30 tablet 0    cetirizine (zyrTEC) 10 MG tablet Take 1 tablet by mouth Daily. 30 tablet 0    clopidogrel (PLAVIX) 75 MG tablet Take 1 tablet by mouth Daily. 30 tablet 6    escitalopram (LEXAPRO) 10 MG tablet Take 1 tablet by mouth Daily. 90 tablet 1    hydrOXYzine (ATARAX) 25 MG tablet Take 1 tablet by mouth 3 (Three) Times a Day As Needed for Itching. 30 tablet 0    lisinopril (PRINIVIL,ZESTRIL) 5 MG tablet Take 1 tablet by mouth Daily. 90 " tablet 1    sildenafil (Viagra) 100 MG tablet Take 1 tablet by mouth Daily As Needed for Erectile Dysfunction. 10 tablet 11     ADULT ILLNESSES:  Patient Active Problem List   Diagnosis Code    Essential hypertension I10    Tobacco use Z72.0    Congenital hearing disorder of both ears H90.5    Current moderate episode of major depressive disorder without prior episode F32.1    Hemorrhoids K64.9    Nerve damage T14.8XXA    Mixed hyperlipidemia E78.2    Spondylosis of thoracolumbar region without myelopathy or radiculopathy M47.815    Hallux valgus with bunions M20.10, M21.619    PAD (peripheral artery disease) I73.9    Nonobstructive atherosclerosis of coronary artery I25.10    Erectile dysfunction due to arterial insufficiency N52.01    Class 1 obesity due to excess calories without serious comorbidity with body mass index (BMI) of 32.0 to 32.9 in adult E66.811, E66.09, Z68.32    Preop testing Z01.818    Polycythemia D75.1       HEALTH MAINTENANCE ITEMS:  Health Maintenance Due   Topic Date Due    ZOSTER VACCINE (1 of 2) Never done    COVID-19 Vaccine (1 - 2024-25 season) Never done       <no information>  Last Completed Colonoscopy            Upcoming       COLORECTAL CANCER SCREENING (COLONOSCOPY - Every 10 Years) Next due on 7/16/2028 01/06/2023  Fecal Occult Blood component of POC Occult Blood Stool    07/16/2018  SCANNED - COLONOSCOPY                          Immunization History   Administered Date(s) Administered    Flu Vaccine Quad PF >36MO 10/04/2020    Fluzone  >6mos 10/30/2024    Fluzone (or Fluarix & Flulaval for VFC) >6mos 10/23/2019, 10/04/2020, 10/05/2022, 10/10/2023    Influenza TIV (IM) 01/31/2008    Pneumococcal Conjugate 13-Valent (PCV13) 10/04/2020    Pneumococcal Polysaccharide (PPSV23) 07/24/2018    Tdap 02/08/2017     Last Completed Mammogram    This patient has no relevant Health Maintenance data.           FAMILY HISTORY:  Family History   Problem Relation Age of Onset    COPD Mother          respiratory failure    Dementia Father     Diabetes Father      SOCIAL HISTORY:  Social History     Socioeconomic History    Marital status: Single   Tobacco Use    Smoking status: Every Day     Current packs/day: 0.00     Average packs/day: 1.5 packs/day for 33.0 years (49.5 ttl pk-yrs)     Types: Cigarettes     Start date:      Last attempt to quit: 2020     Years since quittin.4    Smokeless tobacco: Former     Types: Chew    Tobacco comments:     pt states it is too hard to quit, he is by himself.   Vaping Use    Vaping status: Never Used   Substance and Sexual Activity    Alcohol use: No    Drug use: No    Sexual activity: Defer       REVIEW OF SYSTEMS:  Review of Systems   Constitutional:  Positive for fatigue. Negative for activity change, appetite change, fever, unexpected weight gain and unexpected weight loss.   HENT:  Negative for dental problem, facial swelling, swollen glands and trouble swallowing.         Congenital hearing loss, uses hearing aids    Eyes:  Negative for double vision and discharge.   Respiratory:  Negative for cough, shortness of breath and wheezing.    Cardiovascular:  Negative for chest pain, palpitations and leg swelling.   Gastrointestinal:  Negative for abdominal pain, nausea and vomiting. Blood in stool: Hemorrhoids Has had mutliple surgeries.  Wears briefs r/t the bleeding..  Endocrine: Negative.    Genitourinary:  Negative for dysuria and hematuria.   Musculoskeletal:  Negative for arthralgias and myalgias.   Skin:  Negative for rash, skin lesions and wound.        States he has pimples on his back often   Allergic/Immunologic: Negative for immunocompromised state.   Neurological:  Negative for speech difficulty, light-headedness, headache, memory problem and confusion.   Hematological:  Negative for adenopathy.   Psychiatric/Behavioral:  Negative for self-injury and suicidal ideas. The patient is nervous/anxious.        /84   Pulse 64   Temp 97.9 °F  "(36.6 °C)   Resp 16   Ht 172.7 cm (68\")   Wt 100 kg (220 lb 14.4 oz)   SpO2 96%   BMI 33.59 kg/m²  Body surface area is 2.13 meters squared.      Physical Exam  Constitutional:       Appearance: Normal appearance.   HENT:      Head: Normocephalic and atraumatic.   Cardiovascular:      Rate and Rhythm: Normal rate and regular rhythm.   Pulmonary:      Effort: Pulmonary effort is normal.      Breath sounds: Normal breath sounds.   Chest:      Comments: Abcess to right breast approx 6 cm wide.  Indurated, red, warm to touch, tender to touch     Abdominal:      General: Bowel sounds are normal.      Palpations: Abdomen is soft.   Musculoskeletal:         General: Normal range of motion.      Right lower leg: No edema.      Left lower leg: No edema.   Skin:     General: Skin is warm and dry.   Neurological:      Mental Status: He is alert and oriented to person, place, and time.   Psychiatric:         Attention and Perception: Attention normal.         Mood and Affect: Mood normal.         Judgment: Judgment normal.     I have reviewed the PHYSICAL EXAM and the accuracy of it. No changes since the information was documented.  Conchita Winslow, APRN 05/08/2025      Gilberto Roberts reports a pain score of 0.  No intervention indicated.      ASSESSMENT / PLAN:    1. Polycythemia    2. Leukocytosis, unspecified type    3. Tobacco use    4. Alkaline phosphatase raised           1.  Leukocytosis / Elevated Hemoglobin  -All work-up for myeloproliferative neoplasms have been negative  - Patient smokes 1-1/2 packs/day and has for the past 30 years which is likely etiology of his leukocytosis and polycythemia  -Advised therapeutic phlebotomy to keep hematocrit less than 50  -He is currently taking Plavix and ASA   -Labs today WBC 11.02, Hgb 16.3, Hct 49.3  -Stable for observation      2.  Tobacco abuse  -Patient smokes 1-1/2 packs/day and has for the past 31 years  -Likely cause of cell elevation   -Advised smoking cessation "   -Importance of Smoking Cessation discussed with patient and informed patient additional information will be on today's AVS.      4.  Elevated Alk Phos   -Alk Phos  160   -Fatty liver on 2019 scan   On Simvistastin  -Stable for observation      Peripheral artery disease  -Claudication to the left lower extremity s/p atherectomy, angioplasty, and stenting of the SFA in 2020    -Continue follow up with Dr. Billings, Vascular Surgery       PLAN:  -No Phlebotomy   today.  -Goal is to keep Hct < 50  - Continue all follow-up, treatment plans, medications per PCP and any other providers  -Labs and phlebotomy in 3 months   - Patient will return to clinic in 6 months for continued follow-up.  He will have CBC, CMP drawn before his visit.  - Patient voices understanding and agrees to treatment plan.            Conchita Winslow, APRN  05/08/2025

## 2025-05-14 NOTE — TELEPHONE ENCOUNTER
Rx Refill Note  Requested Prescriptions     Pending Prescriptions Disp Refills    escitalopram (LEXAPRO) 10 MG tablet [Pharmacy Med Name: ESCITALOPRAM 10 MG TABLET 10 Tablet] 90 tablet 1     Sig: TAKE 1 TABLET BY MOUTH DAILY.    Aspirin Low Dose 81 MG EC tablet [Pharmacy Med Name: ASPIRIN LOW DOSE 81 MG TBEC 81 Tablet] 90 tablet 3     Sig: TAKE ONE TABLET BY MOUTH DAILY.      Last office visit with prescribing clinician: 7/25/2024   Last telemedicine visit with prescribing clinician: Visit date not found   Next office visit with prescribing clinician: 10/31/2024     Leola Vieyra MA  08/14/24, 09:02 CDT    
Male

## 2025-05-28 ENCOUNTER — PRE-ADMISSION TESTING (OUTPATIENT)
Dept: PREADMISSION TESTING | Facility: HOSPITAL | Age: 55
End: 2025-05-28
Payer: MEDICARE

## 2025-05-28 ENCOUNTER — HOSPITAL ENCOUNTER (OUTPATIENT)
Dept: GENERAL RADIOLOGY | Facility: HOSPITAL | Age: 55
Discharge: HOME | End: 2025-05-28
Payer: MEDICARE

## 2025-05-28 ENCOUNTER — PREP FOR SURGERY (OUTPATIENT)
Dept: OTHER | Facility: HOSPITAL | Age: 55
End: 2025-05-28
Payer: MEDICARE

## 2025-05-28 VITALS
HEART RATE: 90 BPM | RESPIRATION RATE: 20 BRPM | BODY MASS INDEX: 34.64 KG/M2 | WEIGHT: 220.68 LBS | DIASTOLIC BLOOD PRESSURE: 65 MMHG | HEIGHT: 67 IN | OXYGEN SATURATION: 98 % | SYSTOLIC BLOOD PRESSURE: 107 MMHG

## 2025-05-28 DIAGNOSIS — Z01.818 PREOP TESTING: ICD-10-CM

## 2025-05-28 DIAGNOSIS — I73.9 PAD (PERIPHERAL ARTERY DISEASE): ICD-10-CM

## 2025-05-28 DIAGNOSIS — Z79.02 ENCOUNTER FOR MONITORING ANTIPLATELET THERAPY: ICD-10-CM

## 2025-05-28 DIAGNOSIS — Z51.81 ENCOUNTER FOR MONITORING ANTIPLATELET THERAPY: ICD-10-CM

## 2025-05-28 DIAGNOSIS — I73.9 PAD (PERIPHERAL ARTERY DISEASE): Primary | ICD-10-CM

## 2025-05-28 LAB
ANION GAP SERPL CALCULATED.3IONS-SCNC: 10 MMOL/L (ref 5–15)
APTT PPP: 25.4 SECONDS (ref 24.5–36)
BASOPHILS # BLD AUTO: 0.09 10*3/MM3 (ref 0–0.2)
BASOPHILS NFR BLD AUTO: 0.7 % (ref 0–1.5)
BUN SERPL-MCNC: 9.7 MG/DL (ref 6–20)
BUN/CREAT SERPL: 12 (ref 7–25)
CALCIUM SPEC-SCNC: 9 MG/DL (ref 8.6–10.5)
CHLORIDE SERPL-SCNC: 103 MMOL/L (ref 98–107)
CO2 SERPL-SCNC: 25 MMOL/L (ref 22–29)
CREAT SERPL-MCNC: 0.81 MG/DL (ref 0.76–1.27)
DEPRECATED RDW RBC AUTO: 45.9 FL (ref 37–54)
EGFRCR SERPLBLD CKD-EPI 2021: 103.5 ML/MIN/1.73
EOSINOPHIL # BLD AUTO: 0.34 10*3/MM3 (ref 0–0.4)
EOSINOPHIL NFR BLD AUTO: 2.7 % (ref 0.3–6.2)
ERYTHROCYTE [DISTWIDTH] IN BLOOD BY AUTOMATED COUNT: 14 % (ref 12.3–15.4)
GLUCOSE SERPL-MCNC: 78 MG/DL (ref 65–99)
HCT VFR BLD AUTO: 50.6 % (ref 37.5–51)
HGB BLD-MCNC: 16.8 G/DL (ref 13–17.7)
IMM GRANULOCYTES # BLD AUTO: 0.07 10*3/MM3 (ref 0–0.05)
IMM GRANULOCYTES NFR BLD AUTO: 0.6 % (ref 0–0.5)
INR PPP: 0.87 (ref 0.91–1.09)
LYMPHOCYTES # BLD AUTO: 3.29 10*3/MM3 (ref 0.7–3.1)
LYMPHOCYTES NFR BLD AUTO: 26.5 % (ref 19.6–45.3)
MCH RBC QN AUTO: 29.6 PG (ref 26.6–33)
MCHC RBC AUTO-ENTMCNC: 33.2 G/DL (ref 31.5–35.7)
MCV RBC AUTO: 89.2 FL (ref 79–97)
MONOCYTES # BLD AUTO: 0.79 10*3/MM3 (ref 0.1–0.9)
MONOCYTES NFR BLD AUTO: 6.4 % (ref 5–12)
NEUTROPHILS NFR BLD AUTO: 63.1 % (ref 42.7–76)
NEUTROPHILS NFR BLD AUTO: 7.85 10*3/MM3 (ref 1.7–7)
NRBC BLD AUTO-RTO: 0 /100 WBC (ref 0–0.2)
PLATELET # BLD AUTO: 265 10*3/MM3 (ref 140–450)
PMV BLD AUTO: 10.1 FL (ref 6–12)
POTASSIUM SERPL-SCNC: 4.1 MMOL/L (ref 3.5–5.2)
PROTHROMBIN TIME: 12.3 SECONDS (ref 11.8–14.8)
RBC # BLD AUTO: 5.67 10*6/MM3 (ref 4.14–5.8)
SODIUM SERPL-SCNC: 138 MMOL/L (ref 136–145)
WBC NRBC COR # BLD AUTO: 12.43 10*3/MM3 (ref 3.4–10.8)

## 2025-05-28 PROCEDURE — 36415 COLL VENOUS BLD VENIPUNCTURE: CPT

## 2025-05-28 PROCEDURE — 71046 X-RAY EXAM CHEST 2 VIEWS: CPT

## 2025-05-28 PROCEDURE — 85025 COMPLETE CBC W/AUTO DIFF WBC: CPT

## 2025-05-28 PROCEDURE — 93005 ELECTROCARDIOGRAM TRACING: CPT

## 2025-05-28 PROCEDURE — 85730 THROMBOPLASTIN TIME PARTIAL: CPT

## 2025-05-28 PROCEDURE — 80048 BASIC METABOLIC PNL TOTAL CA: CPT

## 2025-05-28 PROCEDURE — 85610 PROTHROMBIN TIME: CPT

## 2025-05-28 NOTE — DISCHARGE INSTRUCTIONS

## 2025-05-29 LAB
QT INTERVAL: 378 MS
QTC INTERVAL: 399 MS

## 2025-06-02 NOTE — H&P
6/2/2025             Madison Ansari MD  [address] [facility] [address]  [facility] [address]        Gilberto Roberts  1970          Chief Complaint   Patient presents with    PAD (peripheral artery disease) 09/06/2024 PAD (peripheral        Has pain in legs with exertion, no changes         Dear Madison Ansari MD           HPI  I had the pleasure of seeing your patient Gilberto Roberts in the office today.   As you recall, Gilberto Roberts is a 54 y.o.  male who you are currently following for routine health maintenance.  He has back for 1 month follow-up with testing.  He has complaints of bilateral hip pain with walking, left worse than right.  He was previously seen by Dr. Billings and underwent aortogram, left lower extremity angiogram balloon angioplasty with stent placement in his SFA of left lower extremity on 4/2/19 and again on 1/9/2020.  He was not taking his Plavix for the past year because he ran out of refills.  He has since been restarted on Plavix.  He is maintained on aspirin, Plavix, and Lipitor.      Past Medical History:   Diagnosis Date    Anxiety     Arthritis     Coronary artery disease involving native coronary artery of native heart with unstable angina pectoris 08/03/2020    Current moderate episode of major depressive disorder without prior episode 05/17/2021    Essential hypertension 03/29/2019    GERD (gastroesophageal reflux disease)     Head injury     [place] (hard of hearing)     Hyperlipidemia     Injury of back       Past Surgical History:   Procedure Laterality Date    AORTOGRAM Bilateral 4/2/2019    Procedure: AORTAGRAM, LEFT LEG ANGIOGRAM, ANGIOPLASTY/STENT PLACEMENT, ANGIOSEAL CLOSURE;  Surgeon: Luis Billings MD;  Location:  [facility] HYBRID OR 12;  Service: Vascular    AORTOGRAM Left 1/9/2020    Procedure: AORTAGRAM, LEFT LOWER EXTREMITY ANGIOGRAM, ATHRECTOMY, BALLOON ANGIOPLASTY, STENT PLACEMENT, MYNX CLOSURE;  Surgeon: Luis Billings MD;  Location: [facility] HYBRID OR 12;  " Service: Vascular    HEMORRHOIDECTOMY      x 3    HERNIA REPAIR      LEG SURGERY      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   Vaping Use    Vaping status: Never Used   Substance and Sexual Activity    Alcohol use: No    Drug use: No    Sexual activity: Defer     Family History   Problem Relation Age of Onset    COPD Mother         respiratory failure    Dementia Father     Diabetes Father      Allergies   Allergen Reactions    Contrast Dye (Echo Or Unknown Ct/Mr) Rash     Current Outpatient Medications   Medication Instructions    Aspirin Low Dose 81 mg, Oral, Daily    atorvastatin (LIPITOR) 80 mg, Oral, Daily    cetirizine (ZYRTEC) 10 mg, Oral, Daily    clopidogrel (PLAVIX) 75 mg, Oral, Daily    escitalopram (LEXAPRO) 10 mg, Oral, Daily    hydrOXYzine (ATARAX) 25 mg, Oral, 3 Times Daily PRN    lisinopril (PRINIVIL,ZESTRIL) 5 mg, Oral, Daily    sildenafil (VIAGRA) 100 mg, Oral, Daily PRN        Review of Systems   Constitutional: Negative.  Negative for diaphoresis and fever.   HENT: Negative.     Eyes: Negative.    Respiratory: Negative.  Negative for shortness of breath and wheezing.    Cardiovascular: Negative.  Negative for chest pain and leg swelling.   Gastrointestinal: Negative.  Negative for abdominal pain.   Endocrine: Negative.    Genitourinary: Negative.    Musculoskeletal:  Positive for arthralgias.   Skin: Negative.    Allergic/Immunologic: Negative.    Neurological: Negative.  Negative for dizziness and weakness.   Hematological: Negative.  " "  Psychiatric/Behavioral: Negative.           /62   Pulse 107   Ht 172.7 cm (68\")   Wt 98.2 kg (216 lb 9.6 oz)   SpO2 98%   BMI 32.93 kg/m²         Physical Exam  Vitals and nursing note reviewed.   Constitutional:       General: He is not in acute distress.     Appearance: Normal appearance. He is obese. He is not diaphoretic.   HENT:      Head: Normocephalic. No right periorbital erythema or left periorbital erythema.      Right Ear: Decreased hearing noted.      Left Ear: Decreased hearing noted.      Ears:      Comments: Bilateral hearing aids     Nose: Nose normal.   Eyes:      General: No scleral icterus.     Pupils: Pupils are equal.   Cardiovascular:      Rate and Rhythm: Normal rate and regular rhythm.      Pulses: Normal pulses.           Femoral pulses are 2+ on the right side and 2+ on the left side.       Popliteal pulses are 2+ on the right side and 2+ on the left side.        Dorsalis pedis pulses are 2+ on the right side and detected w/ Doppler on the left side.        Posterior tibial pulses are 2+ on the right side and detected w/ Doppler on the left side.      Heart sounds: Normal heart sounds. No murmur heard.  Pulmonary:      Effort: Pulmonary effort is normal. No respiratory distress.      Breath sounds: Normal breath sounds.   Abdominal:      General: Bowel sounds are normal. There is no distension.      Palpations: Abdomen is soft.      Tenderness: There is no abdominal tenderness. There is no guarding.   Musculoskeletal:         General: No swelling or tenderness. Normal range of motion.      Cervical back: Normal range of motion and neck supple.      Right lower leg: No edema.      Left lower leg: No edema.   Feet:      Right foot:      Skin integrity: Skin integrity normal.      Left foot:      Skin integrity: Skin integrity normal.   Skin:     General: Skin is warm and dry.      Findings: No erythema or rash.   Neurological:      General: No focal deficit present.      Mental " Status: He is alert and oriented to person, place, and time. Mental status is at baseline.      Cranial Nerves: No cranial nerve deficit.      Gait: Gait normal.   Psychiatric:         Attention and Perception: Attention normal.         Mood and Affect: Mood normal.         Behavior: Behavior normal.         Thought Content: Thought content normal.         Judgment: Judgment normal.      DIAGNOSTIC DATA    CT Angiogram Abdomen Pelvis     Result Date: 11/7/2024  Narrative: EXAMINATION: CT ANGIOGRAM ABDOMEN PELVIS-   11/7/2024 7:59 AM  HISTORY: [facility]; I73.9-Peripheral vascular disease, unspecified  In order to have a CT radiation dose as low as reasonably achievable Automated Exposure Control was utilized for adjustment of the mA and/or KV according to patient size.  Total DLP = 880.1 mGy.cm  CT angiography of the abdomen and pelvis is performed after intravenous contrast enhancement.  Images are acquired in axial plane and subsequent 2D reconstruction coronal and sagittal planes and 3D maximum intensity projection image reconstruction.  Comparison is made with the previous study dated 1/6/2023.  There are severe atheromatous changes of the mid to distal abdominal aorta and iliac arteries bilaterally.  No aneurysmal dilatation. No dissection.  There is normal origin course and caliber of the celiac and superior mesenteric arteries. Normal branches.  Normal origin course and caliber of the renal arteries bilaterally. Normal branches. There is a tiny accessory left renal artery supplying the upper pole of the left kidney.  There is approximately 50% focal stenosis of the origin of the inferior mesenteric artery which arises at the level of a significant take noncalcified plaque in the anterior wall of the distal abdominal aorta. The remaining inferior mesenteric artery distal to the origin is of normal size with a subsequent normal left colic and superior rectal branches.  Severe atheromatous changes of the common iliac  artery bilaterally. There is a probable chronic appearing dissection of the proximal left common iliac artery. The remaining common iliac arteries appear unremarkable except for mild ectasia of the right common iliac artery which measures up to 1.2 cm in diameter. There is a large mixed plaque in the proximal left external iliac artery with 50% diameter stenosis. There is chronic appearing dissection of the proximal right external iliac artery. Moderate ectasia of the distal left external iliac artery is seen which measure up to 11 mm. Atheromatous plaques are seen in the common femoral artery bilaterally with evidence of a focal dissection of the right common femoral artery?. The right common femoral artery divide into normal appearing superficial and deep femoral arteries. The left common femoral artery divides into a normal deep femoral and a diminutive superficial femoral artery which is immediately occluded. No reopacification of the left superficial femoral artery in the available images.  The lung bases included in the study are unremarkable except for atelectatic changes at bilateral bases. Some scarring in the right middle lobe and atelectasis in the lingular segment of the left upper lobe.  The liver and spleen are normal.  No radiopaque gallstones.  The pancreas is normal.  Moderate lobulation of the adrenal glands bilaterally is seen. No discrete mass.  The kidneys bilaterally appear normal. No mass. No calculi. No hydronephrosis. Limited visualized ureters are nondilated. The urinary bladder is moderately well distended. No intrinsic abnormality.  Prostate is moderately enlarged with intrinsic calcification.  There are bilateral fat-containing inguinal hernias, left larger than the right. There is a small fat-containing umbilical hernia.  The stomach is decompressed. No focal abnormality. Duodenum is normal. Small bowel is nondistended and nondilated. Normal appendix. Moderate gas and stool is seen  throughout the colon. No evidence of obstruction.  No evidence of abdominal or pelvic lymphadenopathy.  Images reviewed in bone window show chronic degenerative changes of the lumbar and limited visualized thoracic spine. No acute bony abnormality.       Impression: 1. Severe atheromatous changes of the abdominal aorta and iliac arteries. 2. Normal origin course and caliber of the celiac, superior mesenteric and renal arteries. 50% focal stenosis of the origin of the inferior mesenteric artery. 3. Significant disease of the common and external iliac arteries bilaterally as detailed above. 4. Complete occlusion of the left superficial femoral artery adjacent and distal to the bifurcation of the left common femoral artery. 5. Nonacute stable nonvascular finding of the abdomen and pelvis similar to the previous study.      This report was signed and finalized on 11/7/2024 10:03 AM by Dr. Andrea Frias MD.        Problem List       Patient Active Problem List   Diagnosis    Essential hypertension    Tobacco use    Congenital hearing disorder of both ears    Current moderate episode of major depressive disorder without prior episode    Hemorrhoids    Nerve damage    Mixed hyperlipidemia    Spondylosis of thoracolumbar region without myelopathy or radiculopathy    Hallux valgus with bunions    PAD (peripheral artery disease)    Nonobstructive atherosclerosis of coronary artery    Erectile dysfunction due to arterial insufficiency    Class 1 obesity due to excess calories without serious comorbidity with body mass index (BMI) of 32.0 to 32.9 in adult            Visit Diagnosis       ICD-10-CM ICD-9-CM   1. PAD (peripheral artery disease)  I73.9 443.9   2. Essential hypertension  I10 401.9   3. Mixed hyperlipidemia  E78.2 272.2   4. Tobacco use  Z72.0 305.1   5. Class 1 obesity due to excess calories without serious comorbidity with body mass index (BMI) of 32.0 to 32.9 in adult  E66.811 278.00     E66.09 V85.32      Z68.32                    Plan: After thoroughly evaluating Gilberto Roberts, I believe the best course of action is to proceed with left lower extremity angiogram.  Risks of angiogram were discussed.  These include, but are not limited to, bleeding, infection, vessel damage, nerve damage, embolus, and loss of limb.  The patient understands these risks and wishes to proceed with procedure.  He has continued to complain of left leg pain with ambulation, bilateral hip pain left worse than right.  I along with Dr. Hong did review his CTA abdomen pelvis which shows occlusion to his left superficial femoral artery, he has had previous stenting to his left SFA in 2019 and again in 2020 by Dr. Billings.  He should continue his aspirin 81 mg daily, Plavix 75 mg daily, and Lipitor 80 mg nightly, in addition to his other medications.  He will take these uninterrupted up until the day prior to surgery.  I did discuss vascular risk factors as they pertain to the progression of vascular disease including controlling his hypertension, hyperlipidemia, and smoking cessation.  His blood pressure stable today in office.  His last lipid panel shows all values within normal limits.  Unfortunately he is a daily smoker and has no desire to quit smoking at this time.  I did  extensively on smoking cessation, and the patient was advised of the continued risks of smoking.  I provided over 10 minutes counseling on this matter.  Body mass index is 32.93 kg/m².        This was all discussed in full with complete understanding.     Thank you for allowing me to participate in the care of your patient.  Please do not hesitate with any questions or concerns.  I will keep you aware of any further encounters with Gilberto Roberts.           Sincerely yours,           Carlos Hong, DO

## 2025-06-05 ENCOUNTER — TELEPHONE (OUTPATIENT)
Dept: VASCULAR SURGERY | Facility: CLINIC | Age: 55
End: 2025-06-05
Payer: MEDICARE

## 2025-06-05 NOTE — TELEPHONE ENCOUNTER
SPOKE WITH PT AS A REMINDER OF 0700 ARRIVAL MAIN REG FOR SURGERY. NOTHING TO EAT OR DRINK AFTER MIDNIGHT. PT STATED UNDERSTANDING.

## 2025-06-06 ENCOUNTER — ANESTHESIA EVENT (OUTPATIENT)
Dept: PERIOP | Facility: HOSPITAL | Age: 55
End: 2025-06-06
Payer: MEDICARE

## 2025-06-06 ENCOUNTER — ANESTHESIA (OUTPATIENT)
Dept: PERIOP | Facility: HOSPITAL | Age: 55
End: 2025-06-06
Payer: MEDICARE

## 2025-06-06 ENCOUNTER — HOSPITAL ENCOUNTER (OUTPATIENT)
Facility: HOSPITAL | Age: 55
Setting detail: HOSPITAL OUTPATIENT SURGERY
Discharge: HOME | End: 2025-06-06
Attending: SURGERY | Admitting: SURGERY
Payer: MEDICARE

## 2025-06-06 ENCOUNTER — APPOINTMENT (OUTPATIENT)
Dept: INTERVENTIONAL RADIOLOGY/VASCULAR | Facility: HOSPITAL | Age: 55
End: 2025-06-06
Payer: MEDICARE

## 2025-06-06 VITALS
OXYGEN SATURATION: 95 % | TEMPERATURE: 97.7 F | RESPIRATION RATE: 16 BRPM | HEART RATE: 81 BPM | SYSTOLIC BLOOD PRESSURE: 116 MMHG | DIASTOLIC BLOOD PRESSURE: 76 MMHG

## 2025-06-06 DIAGNOSIS — I73.9 PAD (PERIPHERAL ARTERY DISEASE): ICD-10-CM

## 2025-06-06 PROCEDURE — C1769 GUIDE WIRE: HCPCS | Performed by: SURGERY

## 2025-06-06 PROCEDURE — 25010000002 FENTANYL CITRATE (PF) 100 MCG/2ML SOLUTION

## 2025-06-06 PROCEDURE — 86901 BLOOD TYPING SEROLOGIC RH(D): CPT | Performed by: NURSE PRACTITIONER

## 2025-06-06 PROCEDURE — 25010000002 HEPARIN (PORCINE) PER 1000 UNITS: Performed by: SURGERY

## 2025-06-06 PROCEDURE — C1894 INTRO/SHEATH, NON-LASER: HCPCS | Performed by: SURGERY

## 2025-06-06 PROCEDURE — 25010000002 CEFAZOLIN PER 500 MG: Performed by: NURSE PRACTITIONER

## 2025-06-06 PROCEDURE — 25010000002 METHYLPREDNISOLONE PER 125 MG

## 2025-06-06 PROCEDURE — 25010000002 HEPARIN (PORCINE) PER 1000 UNITS

## 2025-06-06 PROCEDURE — 76000 FLUOROSCOPY <1 HR PHYS/QHP: CPT

## 2025-06-06 PROCEDURE — 75625 CONTRAST EXAM ABDOMINL AORTA: CPT | Performed by: SURGERY

## 2025-06-06 PROCEDURE — 75710 ARTERY X-RAYS ARM/LEG: CPT

## 2025-06-06 PROCEDURE — C1760 CLOSURE DEV, VASC: HCPCS | Performed by: SURGERY

## 2025-06-06 PROCEDURE — 37228 PR REVSC OPN/PRQ TIB/PERO W/ANGIOPLASTY UNI: CPT | Performed by: SURGERY

## 2025-06-06 PROCEDURE — 25010000002 LIDOCAINE PF 2% 2 % SOLUTION

## 2025-06-06 PROCEDURE — 25810000003 LACTATED RINGERS PER 1000 ML: Performed by: SURGERY

## 2025-06-06 PROCEDURE — 76937 US GUIDE VASCULAR ACCESS: CPT | Performed by: SURGERY

## 2025-06-06 PROCEDURE — 25010000002 DIPHENHYDRAMINE PER 50 MG

## 2025-06-06 PROCEDURE — 25510000001 IOPAMIDOL 61 % SOLUTION: Performed by: SURGERY

## 2025-06-06 PROCEDURE — 75625 CONTRAST EXAM ABDOMINL AORTA: CPT

## 2025-06-06 PROCEDURE — C1887 CATHETER, GUIDING: HCPCS | Performed by: SURGERY

## 2025-06-06 PROCEDURE — 86900 BLOOD TYPING SEROLOGIC ABO: CPT | Performed by: NURSE PRACTITIONER

## 2025-06-06 PROCEDURE — 75710 ARTERY X-RAYS ARM/LEG: CPT | Performed by: SURGERY

## 2025-06-06 PROCEDURE — 25010000002 BUPIVACAINE (PF) 0.5 % SOLUTION: Performed by: SURGERY

## 2025-06-06 PROCEDURE — 86850 RBC ANTIBODY SCREEN: CPT | Performed by: NURSE PRACTITIONER

## 2025-06-06 PROCEDURE — 25010000002 PROPOFOL 1000 MG/100ML EMULSION

## 2025-06-06 PROCEDURE — 25010000002 PROTAMINE SULFATE PER 10 MG

## 2025-06-06 PROCEDURE — C1725 CATH, TRANSLUMIN NON-LASER: HCPCS | Performed by: SURGERY

## 2025-06-06 RX ORDER — HYDROCODONE BITARTRATE AND ACETAMINOPHEN 5; 325 MG/1; MG/1
1 TABLET ORAL ONCE AS NEEDED
Refills: 0 | Status: DISCONTINUED | OUTPATIENT
Start: 2025-06-06 | End: 2025-06-06 | Stop reason: HOSPADM

## 2025-06-06 RX ORDER — LABETALOL HYDROCHLORIDE 5 MG/ML
5 INJECTION, SOLUTION INTRAVENOUS
Status: DISCONTINUED | OUTPATIENT
Start: 2025-06-06 | End: 2025-06-06 | Stop reason: HOSPADM

## 2025-06-06 RX ORDER — FLUMAZENIL 0.1 MG/ML
0.2 INJECTION INTRAVENOUS AS NEEDED
Status: DISCONTINUED | OUTPATIENT
Start: 2025-06-06 | End: 2025-06-06 | Stop reason: HOSPADM

## 2025-06-06 RX ORDER — ONDANSETRON 2 MG/ML
4 INJECTION INTRAMUSCULAR; INTRAVENOUS
Status: DISCONTINUED | OUTPATIENT
Start: 2025-06-06 | End: 2025-06-06 | Stop reason: HOSPADM

## 2025-06-06 RX ORDER — PROTAMINE SULFATE 10 MG/ML
INJECTION, SOLUTION INTRAVENOUS AS NEEDED
Status: DISCONTINUED | OUTPATIENT
Start: 2025-06-06 | End: 2025-06-06 | Stop reason: SURG

## 2025-06-06 RX ORDER — PROPOFOL 10 MG/ML
INJECTION, EMULSION INTRAVENOUS CONTINUOUS PRN
Status: DISCONTINUED | OUTPATIENT
Start: 2025-06-06 | End: 2025-06-06 | Stop reason: SURG

## 2025-06-06 RX ORDER — LIDOCAINE HYDROCHLORIDE 20 MG/ML
INJECTION, SOLUTION EPIDURAL; INFILTRATION; INTRACAUDAL; PERINEURAL AS NEEDED
Status: DISCONTINUED | OUTPATIENT
Start: 2025-06-06 | End: 2025-06-06 | Stop reason: SURG

## 2025-06-06 RX ORDER — FENTANYL CITRATE 50 UG/ML
INJECTION, SOLUTION INTRAMUSCULAR; INTRAVENOUS AS NEEDED
Status: DISCONTINUED | OUTPATIENT
Start: 2025-06-06 | End: 2025-06-06 | Stop reason: SURG

## 2025-06-06 RX ORDER — METHYLPREDNISOLONE SODIUM SUCCINATE 125 MG/2ML
INJECTION, POWDER, LYOPHILIZED, FOR SOLUTION INTRAMUSCULAR; INTRAVENOUS AS NEEDED
Status: DISCONTINUED | OUTPATIENT
Start: 2025-06-06 | End: 2025-06-06 | Stop reason: SURG

## 2025-06-06 RX ORDER — OXYCODONE AND ACETAMINOPHEN 10; 325 MG/1; MG/1
1 TABLET ORAL EVERY 4 HOURS PRN
Status: DISCONTINUED | OUTPATIENT
Start: 2025-06-06 | End: 2025-06-06 | Stop reason: HOSPADM

## 2025-06-06 RX ORDER — NALOXONE HCL 0.4 MG/ML
0.04 VIAL (ML) INJECTION AS NEEDED
Status: DISCONTINUED | OUTPATIENT
Start: 2025-06-06 | End: 2025-06-06 | Stop reason: HOSPADM

## 2025-06-06 RX ORDER — FENTANYL CITRATE 50 UG/ML
50 INJECTION, SOLUTION INTRAMUSCULAR; INTRAVENOUS
Status: DISCONTINUED | OUTPATIENT
Start: 2025-06-06 | End: 2025-06-06 | Stop reason: HOSPADM

## 2025-06-06 RX ORDER — HEPARIN SODIUM 1000 [USP'U]/ML
INJECTION, SOLUTION INTRAVENOUS; SUBCUTANEOUS AS NEEDED
Status: DISCONTINUED | OUTPATIENT
Start: 2025-06-06 | End: 2025-06-06 | Stop reason: SURG

## 2025-06-06 RX ORDER — HYDROMORPHONE HYDROCHLORIDE 1 MG/ML
0.5 INJECTION, SOLUTION INTRAMUSCULAR; INTRAVENOUS; SUBCUTANEOUS
Status: DISCONTINUED | OUTPATIENT
Start: 2025-06-06 | End: 2025-06-06 | Stop reason: HOSPADM

## 2025-06-06 RX ORDER — SODIUM CHLORIDE, SODIUM LACTATE, POTASSIUM CHLORIDE, CALCIUM CHLORIDE 600; 310; 30; 20 MG/100ML; MG/100ML; MG/100ML; MG/100ML
1000 INJECTION, SOLUTION INTRAVENOUS CONTINUOUS
Status: DISCONTINUED | OUTPATIENT
Start: 2025-06-06 | End: 2025-06-06 | Stop reason: HOSPADM

## 2025-06-06 RX ORDER — PHENYLEPHRINE HCL IN 0.9% NACL 1 MG/10 ML
SYRINGE (ML) INTRAVENOUS AS NEEDED
Status: DISCONTINUED | OUTPATIENT
Start: 2025-06-06 | End: 2025-06-06 | Stop reason: SURG

## 2025-06-06 RX ORDER — IOPAMIDOL 612 MG/ML
INJECTION, SOLUTION INTRAVASCULAR AS NEEDED
Status: DISCONTINUED | OUTPATIENT
Start: 2025-06-06 | End: 2025-06-06 | Stop reason: HOSPADM

## 2025-06-06 RX ORDER — LIDOCAINE HYDROCHLORIDE 10 MG/ML
0.5 INJECTION, SOLUTION EPIDURAL; INFILTRATION; INTRACAUDAL; PERINEURAL ONCE AS NEEDED
Status: DISCONTINUED | OUTPATIENT
Start: 2025-06-06 | End: 2025-06-06 | Stop reason: HOSPADM

## 2025-06-06 RX ORDER — SODIUM CHLORIDE 0.9 % (FLUSH) 0.9 %
3 SYRINGE (ML) INJECTION AS NEEDED
Status: DISCONTINUED | OUTPATIENT
Start: 2025-06-06 | End: 2025-06-06 | Stop reason: HOSPADM

## 2025-06-06 RX ORDER — ONDANSETRON 2 MG/ML
4 INJECTION INTRAMUSCULAR; INTRAVENOUS ONCE AS NEEDED
Status: DISCONTINUED | OUTPATIENT
Start: 2025-06-06 | End: 2025-06-06 | Stop reason: HOSPADM

## 2025-06-06 RX ORDER — DIPHENHYDRAMINE HYDROCHLORIDE 50 MG/ML
INJECTION, SOLUTION INTRAMUSCULAR; INTRAVENOUS AS NEEDED
Status: DISCONTINUED | OUTPATIENT
Start: 2025-06-06 | End: 2025-06-06 | Stop reason: SURG

## 2025-06-06 RX ORDER — BUPIVACAINE HYDROCHLORIDE 5 MG/ML
INJECTION, SOLUTION EPIDURAL; INTRACAUDAL; PERINEURAL AS NEEDED
Status: DISCONTINUED | OUTPATIENT
Start: 2025-06-06 | End: 2025-06-06 | Stop reason: HOSPADM

## 2025-06-06 RX ADMIN — Medication 100 MCG: at 10:54

## 2025-06-06 RX ADMIN — PROPOFOL 150 MCG/KG/MIN: 10 INJECTION, EMULSION INTRAVENOUS at 09:56

## 2025-06-06 RX ADMIN — HEPARIN SODIUM 1000 UNITS: 1000 INJECTION, SOLUTION INTRAVENOUS; SUBCUTANEOUS at 10:12

## 2025-06-06 RX ADMIN — CEFAZOLIN 2000 MG: 2 INJECTION, POWDER, FOR SOLUTION INTRAMUSCULAR; INTRAVENOUS at 10:01

## 2025-06-06 RX ADMIN — METHYLPREDNISOLONE SODIUM SUCCINATE 125 MG: 125 INJECTION, POWDER, FOR SOLUTION INTRAMUSCULAR; INTRAVENOUS at 10:01

## 2025-06-06 RX ADMIN — LIDOCAINE HYDROCHLORIDE 100 MG: 20 INJECTION, SOLUTION EPIDURAL; INFILTRATION; INTRACAUDAL; PERINEURAL at 09:56

## 2025-06-06 RX ADMIN — HEPARIN SODIUM 6000 UNITS: 1000 INJECTION, SOLUTION INTRAVENOUS; SUBCUTANEOUS at 10:19

## 2025-06-06 RX ADMIN — FENTANYL CITRATE 50 MCG: 50 INJECTION, SOLUTION INTRAMUSCULAR; INTRAVENOUS at 09:56

## 2025-06-06 RX ADMIN — Medication 100 MCG: at 10:48

## 2025-06-06 RX ADMIN — Medication 150 MCG: at 10:59

## 2025-06-06 RX ADMIN — DIPHENHYDRAMINE HYDROCHLORIDE 25 MG: 50 INJECTION, SOLUTION INTRAMUSCULAR; INTRAVENOUS at 10:01

## 2025-06-06 RX ADMIN — PROPOFOL 50 MG: 10 INJECTION, EMULSION INTRAVENOUS at 10:24

## 2025-06-06 RX ADMIN — Medication 100 MCG: at 10:14

## 2025-06-06 RX ADMIN — SODIUM CHLORIDE, POTASSIUM CHLORIDE, SODIUM LACTATE AND CALCIUM CHLORIDE 1000 ML: 600; 310; 30; 20 INJECTION, SOLUTION INTRAVENOUS at 08:10

## 2025-06-06 RX ADMIN — FENTANYL CITRATE 50 MCG: 50 INJECTION, SOLUTION INTRAMUSCULAR; INTRAVENOUS at 10:24

## 2025-06-06 RX ADMIN — SODIUM CHLORIDE, POTASSIUM CHLORIDE, SODIUM LACTATE AND CALCIUM CHLORIDE 1000 ML: 600; 310; 30; 20 INJECTION, SOLUTION INTRAVENOUS at 11:30

## 2025-06-06 RX ADMIN — SODIUM CHLORIDE, POTASSIUM CHLORIDE, SODIUM LACTATE AND CALCIUM CHLORIDE 1000 ML: 600; 310; 30; 20 INJECTION, SOLUTION INTRAVENOUS at 13:15

## 2025-06-06 RX ADMIN — Medication 150 MCG: at 10:05

## 2025-06-06 RX ADMIN — PROTAMINE SULFATE 10 MG: 10 INJECTION, SOLUTION INTRAVENOUS at 10:51

## 2025-06-06 RX ADMIN — LIDOCAINE HYDROCHLORIDE 100 MG: 20 INJECTION, SOLUTION EPIDURAL; INFILTRATION; INTRACAUDAL; PERINEURAL at 10:24

## 2025-06-06 NOTE — ANESTHESIA PREPROCEDURE EVALUATION
Anesthesia Evaluation     Patient summary reviewed and Nursing notes reviewed   no history of anesthetic complications:   NPO Solid Status: > 8 hours             Airway   Mallampati: III  TM distance: >3 FB  No difficulty expected  Dental    (+) edentulous    Pulmonary    (+) a smoker Current,  (-) COPD, asthma, sleep apnea  Cardiovascular   Exercise tolerance: poor (<4 METS)    ECG reviewed    (+) hypertension, CAD, PVD, hyperlipidemia  (-) pacemaker, past MI, angina, cardiac stents      Neuro/Psych  (-) seizures, TIA, CVA  GI/Hepatic/Renal/Endo    (+) obesity  (-) GERD, liver disease, no renal disease, diabetes    Musculoskeletal     (+) back pain  Abdominal   (+) obese   Substance History      OB/GYN          Other   arthritis,                       Anesthesia Plan    ASA 3     MAC     intravenous induction     Anesthetic plan, risks, benefits, and alternatives have been provided, discussed and informed consent has been obtained with: patient.

## 2025-06-06 NOTE — OP NOTE
Gilberto Roberts  6/6/2025     PREOPERATIVE DIAGNOSIS: PAD (peripheral artery disease) [I73.9]  Preop testing [Z01.818]     POSTOPERATIVE DIAGNOSIS: Post-Op Diagnosis Codes:     * PAD (peripheral artery disease) [I73.9]     * Preop testing [Z01.818]     PROCEDURE PERFORMED:   Introduction of catheter/sheath into the aorta  Aortoiliac angiogram with left lower extremity runoff with radiographic supervision and interpretation  Contralateral cannulation of the left common iliac artery  Attempted antegrade crossing of the long SFA in-stent chronic total occlusion  Ultrasound-guided cannulation of the posterior tibial artery at the left ankle  Attempted retrograde crossing of the long SFA in-stent chronic total occlusion  Balloon angioplasty of the proximal popliteal artery using a 5 x 20 mm EverCross balloon  Mynx closure of the right common femoral artery     SURGEON: Carlos Hong DO      ANESTHESIA: MAC    PREPARATION: Routine.    STAFF: Circulator: Charlie Shetty RN  Scrub Person: Leola Torres; Babita Tyler  Vascular Radiology Technician: Christal Rea    Estimated Blood Loss: minimal    SPECIMENS: None    COMPLICATIONS: None    INDICATIONS: Gilberto Roberts is a 54 y.o. male who you are currently following for routine health maintenance. He has back for 1 month follow-up with testing. He has complaints of bilateral hip pain with walking, left worse than right. He was previously seen by Dr. Billings and underwent aortogram, left lower extremity angiogram balloon angioplasty with stent placement in his SFA of left lower extremity on 4/2/19 and again on 1/9/2020. He was not taking his Plavix for the past year because he ran out of refills. He has since been restarted on Plavix. He is maintained on aspirin, Plavix, and Lipitor. The indications, risks, and possible complications of the procedure were explained to the patient, who voiced understanding and wished to proceed with surgery.     PROCEDURE IN DETAIL: The  patient was taken to the operating room and placed on the operating table in a supine position. After MAC anesthesia was obtained, the bilateral groins was prepped and draped in a sterile manner.  5 cc of 0.5% Marcaine plain was used to infiltrate the right groin for local anesthesia.  Using a micropuncture technique, the right common femoral artery was cannulated and a microsheath was placed.  The advantage Glidewire was advanced through the aorta and a short 6 Chadian sheath was placed.  The patient was given 1000 units of intravenous heparin.  The Omni Flush catheter was advanced into the aorta and an aortoiliac angiogram was performed.  Findings are as follows:   Patent renal arteries bilaterally without stenosis   Patent aorta without stenosis  Patent iliac systems bilaterally with evidence of mild stenosis in the left common iliac artery.  The right common iliac artery appeared to be slightly aneurysmal.     Contralateral cannulation was established to the left common iliac artery.  The catheter was then brought down to the level of the femoral head.  Angiogram with runoff was performed.  Findings are as follows:  Patent common femoral and profunda femoris arteries without stenosis  Flush occlusion of the SFA with occluded SFA stents with reconstitution of the popliteal artery just above the knee joint  Patent below-knee popliteal artery without stenosis  Patent two-vessel runoff to the foot via the posterior tibial and peroneal arteries.  The anterior tibial artery appeared occluded    At this point, the decision was made to try and cross the SFA .  A 7 Chadian by 45 cm destination sheath was placed into the proximal common femoral artery.  The patient was given 6000 units of intravenous heparin.  With the help of the Coronado cross catheter, multiple times were made to cannulate the flush  but this was unsuccessful.  The left foot was then properly prepped and draped in standard sterile fashion.  Under  ultrasound guidance, using a micropuncture technique, the posterior tibial artery was cannulated and a microsheath was placed.  An angiogram was performed to ensure that we were in true lumen.  The 5 Colombian tibial sheath was placed.  Retrograde attempts were made with the Coronado cross catheter but this was unsuccessful at obtaining true lumen of the stent.  A 5 x 20 mm EverCross balloon was used just below the stents in the popliteal artery to try and regain access into the true lumen but this was unsuccessful.  At this point, I felt no further intervention was warranted.  The sheath and wire were removed and Mynx device was used to seal off the right common femoral artery.  The patient was given 10 mg of protamine intravenously.  The sheath at the ankle was also pulled and direct pressure held for 10 minutes.  Sterile dressings were applied. The patient tolerated the procedure well. Sponge and needle counts were correct. The patient was then awakened in the operating room and taken to the recovery room in good condition.    Carlos Hong,   Date: 6/6/2025 Time: 10:54 CDT    CC:Madison Ansari MD

## 2025-06-06 NOTE — ANESTHESIA POSTPROCEDURE EVALUATION
Patient: Gilberto Roberts    Procedure Summary       Date: 06/06/25 Room / Location:  [facility]  /  [facility]    Anesthesia Start: 0951 Anesthesia Stop: 1111    Procedure: LEFT LOWER EXTREMITY ANGIOGRAM (Left: Groin) Diagnosis:       PAD (peripheral artery disease)      Preop testing      (PAD (peripheral artery disease) [I73.9])      (Preop testing [Z01.818])    Surgeons: Carlos Hong DO Provider: Zach Jacobson CRNA    Anesthesia Type: MAC ASA Status: 3            Anesthesia Type: MAC    Vitals  Vitals Value Taken Time   /73 06/06/25 13:11   Temp 97.7 °F (36.5 °C) 06/06/25 13:08   Pulse 73 06/06/25 13:21   Resp 15 06/06/25 13:08   SpO2 95 % 06/06/25 13:21   Vitals shown include unfiled device data.        Post Anesthesia Care and Evaluation    Patient location during evaluation: PACU  Patient participation: complete - patient participated  Level of consciousness: awake and alert  Pain management: adequate    Airway patency: patent  Anesthetic complications: No anesthetic complications    Cardiovascular status: acceptable  Respiratory status: acceptable  Hydration status: acceptable    Comments: Blood pressure 108/76, pulse 89, temperature 97.7 °F (36.5 °C), resp. rate 16, SpO2 93%.    Pt discharged from PACU based on razia score >8

## 2025-06-10 DIAGNOSIS — I73.9 PAD (PERIPHERAL ARTERY DISEASE): ICD-10-CM

## 2025-06-10 NOTE — TELEPHONE ENCOUNTER
Caller: [email or web address]    Relationship: Self    Best call back number: [phone]     Requested Prescriptions:   Requested Prescriptions     Pending Prescriptions Disp Refills    clopidogrel (PLAVIX) 75 MG tablet 30 tablet 6     Sig: Take 1 tablet by mouth Daily.        Pharmacy where request should be sent: [facility]  [address] - [phone] PH - [phone] FX     Last office visit with prescribing clinician: 2/7/2025   Last telemedicine visit with prescribing clinician: Visit date not found   Next office visit with prescribing clinician: Visit date not found     Additional details provided by patient:     Does the patient have less than a 3 day supply:  [x] Yes  [] No    Would you like a call back once the refill request has been completed: [x] Yes [] No    If the office needs to give you a call back, can they leave a voicemail: [] Yes [] No    Balaji Roberts Rep   06/10/25 12:25 CDT

## 2025-06-12 RX ORDER — CLOPIDOGREL BISULFATE 75 MG/1
75 TABLET ORAL DAILY
Qty: 30 TABLET | Refills: 6 | OUTPATIENT
Start: 2025-06-12

## 2025-06-19 ENCOUNTER — OFFICE VISIT (OUTPATIENT)
Age: 55
End: 2025-06-19
Payer: MEDICARE

## 2025-06-19 VITALS
BODY MASS INDEX: 34.53 KG/M2 | HEART RATE: 75 BPM | HEIGHT: 67 IN | DIASTOLIC BLOOD PRESSURE: 80 MMHG | OXYGEN SATURATION: 95 % | WEIGHT: 220 LBS | SYSTOLIC BLOOD PRESSURE: 122 MMHG

## 2025-06-19 DIAGNOSIS — I70.212 ATHEROSCLER OF NATIVE ARTERY OF LEFT LEG WITH INTERMIT CLAUDICATION: ICD-10-CM

## 2025-06-19 DIAGNOSIS — M47.815 SPONDYLOSIS OF THORACOLUMBAR REGION WITHOUT MYELOPATHY OR RADICULOPATHY: ICD-10-CM

## 2025-06-19 DIAGNOSIS — B35.1 ONYCHOMYCOSIS: Primary | ICD-10-CM

## 2025-06-19 DIAGNOSIS — L84 FOOT CALLUS: ICD-10-CM

## 2025-06-19 DIAGNOSIS — L60.2 ONYCHOGRYPHOSIS: ICD-10-CM

## 2025-06-19 DIAGNOSIS — Z72.0 TOBACCO USE: ICD-10-CM

## 2025-06-19 NOTE — PROGRESS NOTES
[facility] - PODIATRY    Today's Date: 06/19/2025     Patient Name: Gilberto Roberts  MRN: [id number]  CSN: [id number]  PCP: Madison Ansari MD  Referring Provider: No ref. provider found    SUBJECTIVE     Chief Complaint   Patient presents with    Follow-up     Madison Ansari MD-02/07/2025-[facility]/last seen 10/2024  Pt here for nail/foot care. Pt reports no pain or issues.     HPI: Gilberto Roberts, a 55 y.o.male, comes to clinic as a(n) established patient complaining of thickened, irregular toenails of both feet. Patient has h/o anxiety, arthritis, HTN, Head injury, HLD, DDD. Patient presents with complaints of long, thickened, irregular toenails of both feet. States that he has issues with his back and is unable to reach his feet. Takes Plavix daily and follows with vascular surgery. Notes occasional LLE cramping.  Reports that he has missed his last few appointments, and has significantly long nails today.  Denies pain. Relates previous treatment(s) including care by podiatry. Denies any constitutional symptoms. No other pedal complaints at this time.    Past Medical History:   Diagnosis Date    Anxiety O6 2008    Arthritis     2011    Callus     Since kid    Coronary artery disease involving native coronary artery of native heart with unstable angina pectoris 08/03/2020    Current moderate episode of major depressive disorder without prior episode 05/17/2021    Difficulty walking     Essential hypertension 03/29/2019    GERD (gastroesophageal reflux disease)     Head injury     [place] (hard of hearing)     Hyperlipidemia     Ingrown toenail     Injury of back     Onychomycosis     Don't know,  longtime    Verruca     Since kid     Past Surgical History:   Procedure Laterality Date    ANKLE OPEN REDUCTION INTERNAL FIXATION      Born with it. Did surgery on both knees    AORTOGRAM Bilateral 04/02/2019    Procedure: AORTAGRAM, LEFT LEG ANGIOGRAM, ANGIOPLASTY/STENT PLACEMENT, ANGIOSEAL  "CLOSURE;  Surgeon: Luis Billings MD;  Location:  [facility];  Service: Vascular    AORTOGRAM Left 2020    Procedure: AORTAGRAM, LEFT LOWER EXTREMITY ANGIOGRAM, ATHRECTOMY, BALLOON ANGIOPLASTY, STENT PLACEMENT, MYNX CLOSURE;  Surgeon: Luis Billings MD;  Location:  [facility];  Service: Vascular    AORTOGRAM Left 2025    Procedure: LEFT LOWER EXTREMITY ANGIOGRAM;  Surgeon: Carlos Hong DO;  Location:  [facility];  Service: Vascular;  Laterality: Left;    CORONARY ANGIOPLASTY      Every 3 year caused off bleeding stools    HEMORRHOIDECTOMY      x 3    HERNIA REPAIR      LEG SURGERY      as a child due to \"club foot\"    NECK MASS EXCISION      TOENAIL EXCISION      3 years ago     Family History   Problem Relation Age of Onset    COPD Mother         respiratory failure    Asthma Mother         Gone 16 years ago    Dementia Father     Diabetes Father         Not sure     Social History     Socioeconomic History    Marital status: Single   Tobacco Use    Smoking status: Every Day     Current packs/day: 0.00     Average packs/day: 1.5 packs/day for 33.0 years (49.5 ttl pk-yrs)     Types: Cigarettes     Start date:      Last attempt to quit:      Years since quittin.4     Passive exposure: Current    Smokeless tobacco: Former     Types: Chew    Tobacco comments:     pt states it is too hard to quit, he is by himself.   Vaping Use    Vaping status: Never Used   Substance and Sexual Activity    Alcohol use: No    Drug use: No    Sexual activity: Defer     Allergies   Allergen Reactions    Contrast Dye (Echo Or Unknown Ct/Mr) Rash     Current Outpatient Medications   Medication Sig Dispense Refill    Aspirin Low Dose 81 MG EC tablet TAKE ONE TABLET BY MOUTH DAILY. 90 tablet 3    atorvastatin (LIPITOR) 80 MG tablet TAKE 1 TABLET BY MOUTH DAILY. 30 tablet 0    cetirizine (zyrTEC) 10 MG tablet Take 1 tablet by mouth Daily. 30 tablet 0    clopidogrel (PLAVIX) 75 " MG tablet Take 1 tablet by mouth Daily. 30 tablet 6    escitalopram (LEXAPRO) 10 MG tablet Take 1 tablet by mouth Daily. 90 tablet 1    hydrOXYzine (ATARAX) 25 MG tablet Take 1 tablet by mouth 3 (Three) Times a Day As Needed for Itching. 30 tablet 0    lisinopril (PRINIVIL,ZESTRIL) 5 MG tablet Take 1 tablet by mouth Daily. 90 tablet 1    sildenafil (Viagra) 100 MG tablet Take 1 tablet by mouth Daily As Needed for Erectile Dysfunction. 10 tablet 11     No current facility-administered medications for this visit.     Review of Systems   Constitutional:  Negative for chills and fever.   HENT:  Negative for congestion.    Respiratory:  Negative for shortness of breath.    Cardiovascular:  Positive for leg swelling. Negative for chest pain.   Gastrointestinal:  Negative for constipation, diarrhea, nausea and vomiting.   Musculoskeletal:  Positive for arthralgias, back pain and gait problem. Negative for myalgias.   Skin:  Negative for wound.   Neurological:  Negative for numbness.   Hematological:  Bruises/bleeds easily.       OBJECTIVE     Vitals:    06/19/25 1406   BP: 122/80   Pulse: 75   SpO2: 95%         PHYSICAL EXAM  GEN:   Accompanied by none.     Foot/Ankle Exam    GENERAL  Appearance:  appears stated age and obese  Orientation:  AAOx3  Affect:  appropriate  Gait:  shuffling  Assistance:  independent  Right shoe gear: casual shoe  Left shoe gear: casual shoe    VASCULAR     Right Foot Vascularity   Dorsalis pedis:  1+  Posterior tibial:  2+  Skin temperature:  warm  Edema grading:  Non-pitting and trace  CFT:  4  Pedal hair growth:  Absent  Varicosities:  spider veins     Left Foot Vascularity   Dorsalis pedis:  1+  Posterior tibial:  1+  Skin temperature:  warm  Edema grading:  None, trace and non-pitting  CFT:  4  Pedal hair growth:  Absent  Varicosities:  spider veins     NEUROLOGIC     Right Foot Neurologic   Normal sensation    Light touch sensation: normal  Vibratory sensation: normal  Hot/Cold sensation:  normal     Left Foot Neurologic   Normal sensation    Light touch sensation: normal  Vibratory sensation: normal  Hot/Cold sensation:  normal    MUSCULOSKELETAL     Right Foot Musculoskeletal   Ecchymosis:  none  Tenderness:  toenail problem    Arch:  Normal  Hallux valgus: Yes       Left Foot Musculoskeletal   Ecchymosis:  none  Tenderness:  toenail problem  Arch:  Normal  Hallux valgus: Yes      MUSCLE STRENGTH     Right Foot Muscle Strength   Foot dorsiflexion:  4  Foot plantar flexion:  4  Foot inversion:  4  Foot eversion:  4     Left Foot Muscle Strength   Foot dorsiflexion:  4  Foot plantar flexion:  4  Foot inversion:  4  Foot eversion:  4    RANGE OF MOTION     Right Foot Range of Motion   Foot and ankle ROM within normal limits       Left Foot Range of Motion   Foot and ankle ROM within normal limits      DERMATOLOGIC      Right Foot Dermatologic   Skin  Right foot skin is intact.   Nails  1.  Positive for elongated, onychomycosis, abnormal thickness, subungual debris and dystrophic nail.  2.  Positive for elongated, onychomycosis, abnormal thickness and subungual debris.  3.  Positive for elongated, onychomycosis, abnormal thickness and subungual debris.  4.  Positive for elongated, onychomycosis, abnormal thickness and subungual debris.  5.  Positive for elongated, onychomycosis, abnormal thickness and subungual debris.  Nails comment:  1-5     Left Foot Dermatologic   Skin  Positive for corn.   Nails comment:  1-5  Nails  1.  Positive for elongated, onychomycosis, abnormal thickness, subungual debris and dystrophic nail.  2.  Positive for elongated, onychomycosis, abnormal thickness and subungual debris.  3.  Positive for elongated, onychomycosis, abnormal thickness and subungual debris.  4.  Positive for elongated, onychomycosis, abnormally thick and subungual debris.  5.  Positive for elongated, onychomycosis, abnormally thick and subungual debris.    Image:       RADIOLOGY/NUCLEAR:  IR Angiogram  Extremity  IR Angiogram Extremity, FL C Arm During Surgery  Result Date: 6/6/2025  Narrative: Performed by Dr. Hong. Please see procedure note.  This report was signed and finalized on 6/6/2025 2:51 PM by Dr. Carlos Hong MD.      XR Chest 2 View  Result Date: 5/28/2025  Narrative: EXAM/TECHNIQUE: XR CHEST 2 VW-  INDICATION: preop angiogram; I73.9-Peripheral vascular disease, unspecified  COMPARISON: 1/16/2022  FINDINGS:  Cardiac silhouette is within normal limits. No pleural effusion, pneumothorax, or focal consolidation. No acute osseous finding.      Impression:  No acute findings.  This report was signed and finalized on 5/28/2025 1:56 PM by Dr. Fito Downs MD.          LABORATORY/CULTURE RESULTS:      PATHOLOGY RESULTS:       ASSESSMENT/PLAN     Diagnoses and all orders for this visit:    1. Onychomycosis (Primary)    2. Onychogryphosis    3. 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 Tobacco use    5. Atheroscler of native artery of left leg with intermit claudication    6. Foot callus        Comprehensive lower extremity examination and evaluation was performed.  Discussed findings and treatment plan including risks, benefits, and treatment options with patient in detail. Patient agreed with treatment plan.  CMP reviewed.Vascular surgery note reviewed.   After verbal consent obtained, nail(s) x10 debrided of length and thickness with nail nipper without incidence  After verbal consent obtained, calluses x1 pared utilizing dermal curette and/or scalpel without incidence  Patient may maintain nails and calluses at home utilizing emery board or pumice stone between visits as needed  Reviewed at home diabetic foot care including daily foot checks   Tobacco cessation needed. Not ready to quit.  Continue vascular surgery follow-ups.   An After Visit Summary was printed and given to the patient at discharge, including (if requested) any available informative/educational  handouts regarding diagnosis, treatment, or medications. All questions were answered to patient/family satisfaction. Should symptoms fail to improve or worsen they agree to call or return to clinic or to go to the Emergency Department. Discussed the importance of following up with any needed screening tests/labs/specialist appointments and any requested follow-up recommended by me today. Importance of maintaining follow-up discussed and patient accepts that missed appointments can delay diagnosis and potentially lead to worsening of conditions.  I spent 15 minutes caring for Gilberto on this date of service. This time includes time spent by me in the following activities: preparing for the visit, reviewing tests, performing a medically appropriate examination and/or evaluation, counseling and educating the patient/family/caregiver, and documenting information in the medical record  I spent 11 minutes on the separately reported service of nail debridement and callus paring. This time is not included in the time used to support the E/M service also reported today.    Return in about 3 months (around 9/19/2025) for Diabetic foot care clinic, With Ml PARKS., or sooner if acute issues arise.    Lab Frequency Next Occurrence   Follow Anesthesia Guidelines / Standing Orders Once 12/23/2019   Obtain Informed Consent Once 12/28/2019   Provide NPO Instructions to Patient Once 12/28/2019   Chlorhexidine Skin Prep Once 12/28/2019   US Ankle / Brachial Indices Extremity Complete Once 05/14/2022       This document has been electronically signed by KASEY Ang on June 20, 2025 08:16 CDT

## 2025-06-20 ENCOUNTER — OFFICE VISIT (OUTPATIENT)
Dept: VASCULAR SURGERY | Facility: CLINIC | Age: 55
End: 2025-06-20
Payer: MEDICARE

## 2025-06-20 VITALS
HEART RATE: 71 BPM | DIASTOLIC BLOOD PRESSURE: 68 MMHG | SYSTOLIC BLOOD PRESSURE: 120 MMHG | HEIGHT: 67 IN | OXYGEN SATURATION: 98 % | WEIGHT: 218 LBS | BODY MASS INDEX: 34.21 KG/M2

## 2025-06-20 DIAGNOSIS — I65.23 BILATERAL CAROTID ARTERY STENOSIS: ICD-10-CM

## 2025-06-20 DIAGNOSIS — Z72.0 TOBACCO USE: ICD-10-CM

## 2025-06-20 DIAGNOSIS — E66.811 CLASS 1 OBESITY DUE TO EXCESS CALORIES WITH BODY MASS INDEX (BMI) OF 34.0 TO 34.9 IN ADULT, UNSPECIFIED WHETHER SERIOUS COMORBIDITY PRESENT: ICD-10-CM

## 2025-06-20 DIAGNOSIS — E66.09 CLASS 1 OBESITY DUE TO EXCESS CALORIES WITH BODY MASS INDEX (BMI) OF 34.0 TO 34.9 IN ADULT, UNSPECIFIED WHETHER SERIOUS COMORBIDITY PRESENT: ICD-10-CM

## 2025-06-20 DIAGNOSIS — I10 ESSENTIAL HYPERTENSION: ICD-10-CM

## 2025-06-20 DIAGNOSIS — E78.2 MIXED HYPERLIPIDEMIA: ICD-10-CM

## 2025-06-20 DIAGNOSIS — I73.9 PAD (PERIPHERAL ARTERY DISEASE): Primary | ICD-10-CM

## 2025-06-20 NOTE — PROGRESS NOTES
"6/20/2025        Madison Ansari MD  [address]  [place] [address]      Gilberto Roberts  1970    Chief Complaint   Patient presents with    Post-op     2 week post op. Left angiogram 6/6/25. States that he can't tell a difference.        Dear Madison Ansari MD        Post-op    I had the pleasure of seeing your patient Gilberto Roberts in the office today.   As you recall, Gilberto Roberts is a 55 y.o.  male who you are currently following for routine health maintenance.  He is back for 2-week postop after undergoing left lower extremity angiogram.  Previously he has underwent aortogram, left lower extremity angiogram balloon angioplasty with stent placement in his SFA of the left lower extremity on 4/2/2019 and again on 1/9/2020.  He returned with complaints of bilateral hip pain with walking, left worse than right.  He was not taking his Plavix for the past year because he ran out of refills.  He has since been restarted on his Plavix.  He is maintained on aspirin, Plavix, and Lipitor.      Review of Systems   Constitutional: Negative.  Negative for diaphoresis and fever.   HENT: Negative.     Eyes: Negative.    Respiratory: Negative.  Negative for shortness of breath and wheezing.    Cardiovascular: Negative.  Negative for chest pain and leg swelling.   Gastrointestinal: Negative.  Negative for abdominal pain.   Endocrine: Negative.    Genitourinary: Negative.    Musculoskeletal:  Positive for arthralgias.   Skin: Negative.    Allergic/Immunologic: Negative.    Neurological: Negative.  Negative for dizziness and weakness.   Hematological: Negative.    Psychiatric/Behavioral: Negative.         /68   Pulse 71   Ht 168.9 cm (66.5\")   Wt 98.9 kg (218 lb)   SpO2 98%   BMI 34.66 kg/m²       Physical Exam  Vitals and nursing note reviewed.   Constitutional:       General: He is not in acute distress.     Appearance: Normal appearance. He is obese. He is not diaphoretic.   HENT:      " Head: Normocephalic. No right periorbital erythema or left periorbital erythema.      Right Ear: Decreased hearing noted.      Left Ear: Decreased hearing noted.      Nose: Nose normal.   Eyes:      General: No scleral icterus.     Pupils: Pupils are equal.   Cardiovascular:      Rate and Rhythm: Normal rate and regular rhythm.      Pulses:           Dorsalis pedis pulses are detected w/ Doppler on the left side.        Posterior tibial pulses are detected w/ Doppler on the left side.      Heart sounds: No murmur heard.  Pulmonary:      Effort: Pulmonary effort is normal. No respiratory distress.   Abdominal:      General: There is no distension.      Palpations: Abdomen is soft.      Tenderness: There is no abdominal tenderness. There is no guarding.   Musculoskeletal:         General: No swelling or tenderness. Normal range of motion.      Cervical back: Normal range of motion and neck supple.      Right lower leg: No edema.      Left lower leg: No edema.   Feet:      Right foot:      Skin integrity: Skin integrity normal.      Left foot:      Skin integrity: Skin integrity normal.   Skin:     General: Skin is warm and dry.      Findings: No erythema or rash.   Neurological:      General: No focal deficit present.      Mental Status: He is alert and oriented to person, place, and time. Mental status is at baseline.      Cranial Nerves: No cranial nerve deficit.      Gait: Gait normal.   Psychiatric:         Attention and Perception: Attention normal.         Mood and Affect: Mood normal.         Behavior: Behavior normal.         Thought Content: Thought content normal.         Judgment: Judgment normal.     DIAGNOSTIC DATA      Patient Active Problem List   Diagnosis    Essential hypertension    Tobacco use    Congenital hearing disorder of both ears    Current moderate episode of major depressive disorder without prior episode    Hemorrhoids    Nerve damage    Mixed hyperlipidemia    Spondylosis of thoracolumbar  region without myelopathy or radiculopathy    Hallux valgus with bunions    PAD (peripheral artery disease)    Nonobstructive atherosclerosis of coronary artery    Erectile dysfunction due to arterial insufficiency    Class 1 obesity due to excess calories without serious comorbidity with body mass index (BMI) of 32.0 to 32.9 in adult    Preop testing    Polycythemia         ICD-10-CM ICD-9-CM   1. PAD (peripheral artery disease)  I73.9 443.9   2. Essential hypertension  I10 401.9   3. Mixed hyperlipidemia  E78.2 272.2   4. Tobacco use  Z72.0 305.1   5. Class 1 obesity due to excess calories with body mass index (BMI) of 34.0 to 34.9 in adult, unspecified whether serious comorbidity present  E66.811 278.00    E66.09 V85.34    Z68.34    6. Bilateral carotid artery stenosis  I65.23 433.10     433.30             Plan: After thoroughly evaluating Gilberto Roberts, I believe the best course of action is to remain conservative from vascular surgery standpoint.  Unfortunately the procedure was unsuccessful, this is why the patient cannot tell a difference.  I do suggest that he remain on his aspirin 81 mg daily, Plavix 75 mg daily, and Lipitor 80 mg nightly in addition to his other medications.  We will see the patient back in 6 months with repeat noninvasive testing for continued surveillance including carotid duplex and ABIs.  I did discuss vascular risk factors as they pertain to the progression of vascular disease including controlling his hypertension, hyperlipidemia, and smoking cessation.  His blood pressure is stable today in office.  His most recent lipid panel 11 months ago shows all values within normal limits.  Unfortunately he is a daily smoker and has no desire to quit smoking at this time.  Body mass index is 34.66 kg/m².    This was all discussed in full with complete understanding.    Thank you for allowing me to participate in the care of your patient.  Please do not hesitate with any questions or  concerns.  I will keep you aware of any further encounters with Gilberto Roberts.        Sincerely yours,         KASEY Garcia

## 2025-06-20 NOTE — LETTER
June 20, 2025     Madison Ansari MD  [address]  [place] [address]    Patient: Gilberto Roberts   YOB: 1970   Date of Visit: 6/20/2025     Dear Madison Ansari MD:       Thank you for referring Gilberto Roberts to me for evaluation. Below are the relevant portions of my assessment and plan of care.    If you have questions, please do not hesitate to call me. I look forward to following Gilberto along with you.         Sincerely,        KASEY Garcia        CC: No Recipients    Malu Padilla APRN  06/20/25 1521  Sign when Signing Visit  6/20/2025        Madison Ansari MD  [address]  [facility] [address]      Gilberto Roberts  1970    Chief Complaint   Patient presents with   • Post-op     2 week post op. Left angiogram 6/6/25. States that he can't tell a difference.        Dear Madison Ansari MD        Post-op    I had the pleasure of seeing your patient Gilberto Roberts in the office today.   As you recall, Gilberto Roberts is a 55 y.o.  male who you are currently following for routine health maintenance.  He is back for 2-week postop after undergoing left lower extremity angiogram.  Previously he has underwent aortogram, left lower extremity angiogram balloon angioplasty with stent placement in his SFA of the left lower extremity on 4/2/2019 and again on 1/9/2020.  He returned with complaints of bilateral hip pain with walking, left worse than right.  He was not taking his Plavix for the past year because he ran out of refills.  He has since been restarted on his Plavix.  He is maintained on aspirin, Plavix, and Lipitor.      Review of Systems   Constitutional: Negative.  Negative for diaphoresis and fever.   HENT: Negative.     Eyes: Negative.    Respiratory: Negative.  Negative for shortness of breath and wheezing.    Cardiovascular: Negative.  Negative for chest pain and leg swelling.   Gastrointestinal: Negative.  Negative for abdominal pain.   Endocrine:  "Negative.    Genitourinary: Negative.    Musculoskeletal:  Positive for arthralgias.   Skin: Negative.    Allergic/Immunologic: Negative.    Neurological: Negative.  Negative for dizziness and weakness.   Hematological: Negative.    Psychiatric/Behavioral: Negative.         /68   Pulse 71   Ht 168.9 cm (66.5\")   Wt 98.9 kg (218 lb)   SpO2 98%   BMI 34.66 kg/m²       Physical Exam  Vitals and nursing note reviewed.   Constitutional:       General: He is not in acute distress.     Appearance: Normal appearance. He is obese. He is not diaphoretic.   HENT:      Head: Normocephalic. No right periorbital erythema or left periorbital erythema.      Right Ear: Decreased hearing noted.      Left Ear: Decreased hearing noted.      Nose: Nose normal.   Eyes:      General: No scleral icterus.     Pupils: Pupils are equal.   Cardiovascular:      Rate and Rhythm: Normal rate and regular rhythm.      Pulses:           Dorsalis pedis pulses are detected w/ Doppler on the left side.        Posterior tibial pulses are detected w/ Doppler on the left side.      Heart sounds: No murmur heard.  Pulmonary:      Effort: Pulmonary effort is normal. No respiratory distress.   Abdominal:      General: There is no distension.      Palpations: Abdomen is soft.      Tenderness: There is no abdominal tenderness. There is no guarding.   Musculoskeletal:         General: No swelling or tenderness. Normal range of motion.      Cervical back: Normal range of motion and neck supple.      Right lower leg: No edema.      Left lower leg: No edema.   Feet:      Right foot:      Skin integrity: Skin integrity normal.      Left foot:      Skin integrity: Skin integrity normal.   Skin:     General: Skin is warm and dry.      Findings: No erythema or rash.   Neurological:      General: No focal deficit present.      Mental Status: He is alert and oriented to person, place, and time. Mental status is at baseline.      Cranial Nerves: No cranial " nerve deficit.      Gait: Gait normal.   Psychiatric:         Attention and Perception: Attention normal.         Mood and Affect: Mood normal.         Behavior: Behavior normal.         Thought Content: Thought content normal.         Judgment: Judgment normal.     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 PAD (peripheral artery disease)  I73.9 443.9   2. Essential hypertension  I10 401.9   3. Mixed hyperlipidemia  E78.2 272.2   4. Tobacco use  Z72.0 305.1   5. Class 1 obesity due to excess calories with body mass index (BMI) of 34.0 to 34.9 in adult, unspecified whether serious comorbidity present  E66.811 278.00    E66.09 V85.34    Z68.34    6. Bilateral carotid artery stenosis  I65.23 433.10     433.30             Plan: After thoroughly evaluating Gilberto Roberts, I believe the best course of action is to remain conservative from vascular surgery standpoint.  Unfortunately the procedure was unsuccessful, this is why the patient cannot tell a difference.  I do suggest that he remain on his aspirin 81 mg daily, Plavix 75 mg daily, and Lipitor 80 mg nightly in addition to his other medications.  We will see the patient back in 6 months with repeat noninvasive testing for continued surveillance including carotid duplex and ABIs.  I did  discuss vascular risk factors as they pertain to the progression of vascular disease including controlling his hypertension, hyperlipidemia, and smoking cessation.  His blood pressure is stable today in office.  His most recent lipid panel 11 months ago shows all values within normal limits.  Unfortunately he is a daily smoker and has no desire to quit smoking at this time.  Body mass index is 34.66 kg/m².    This was all discussed in full with complete understanding.    Thank you for allowing me to participate in the care of your patient.  Please do not hesitate with any questions or concerns.  I will keep you aware of any further encounters with Gilberto Roberts.        Sincerely yours,         KASEY Garcia

## 2025-06-30 RX ORDER — ATORVASTATIN CALCIUM 80 MG/1
80 TABLET, FILM COATED ORAL DAILY
Qty: 30 TABLET | Refills: 3 | Status: SHIPPED | OUTPATIENT
Start: 2025-06-30

## 2025-06-30 NOTE — TELEPHONE ENCOUNTER
Caller: Gilberto Roberts    Relationship: Self    Best call back number:   Telephone Information:   Mobile [phone]         Requested Prescriptions:   Requested Prescriptions     Pending Prescriptions Disp Refills    atorvastatin (LIPITOR) 80 MG tablet 30 tablet 0     Sig: Take 1 tablet by mouth Daily.        Pharmacy where request should be sent: [facility]  [place], [place] - [address] - [phone]  - [phone]     Last office visit with prescribing clinician: 7/24/2023   Last telemedicine visit with prescribing clinician: Visit date not found   Next office visit with prescribing clinician: Visit date not found     Additional details provided by patient: OUT OF MEDICATION    Does the patient have less than a 3 day supply:  [x] Yes  [] No    Would you like a call back once the refill request has been completed: [] Yes [] No    If the office needs to give you a call back, can they leave a voicemail: [] Yes [] No    Balaji Gomez Rep   06/30/25 10:06 CDT

## 2025-07-01 ENCOUNTER — OFFICE VISIT (OUTPATIENT)
Dept: FAMILY MEDICINE CLINIC | Facility: CLINIC | Age: 55
End: 2025-07-01
Payer: MEDICARE

## 2025-07-01 VITALS
RESPIRATION RATE: 16 BRPM | OXYGEN SATURATION: 97 % | DIASTOLIC BLOOD PRESSURE: 82 MMHG | HEIGHT: 67 IN | BODY MASS INDEX: 34.69 KG/M2 | TEMPERATURE: 98 F | SYSTOLIC BLOOD PRESSURE: 112 MMHG | HEART RATE: 69 BPM | WEIGHT: 221 LBS

## 2025-07-01 DIAGNOSIS — M79.642 LEFT HAND PAIN: Primary | ICD-10-CM

## 2025-07-01 DIAGNOSIS — I10 ESSENTIAL HYPERTENSION: ICD-10-CM

## 2025-07-01 DIAGNOSIS — F41.9 ANXIETY: ICD-10-CM

## 2025-07-01 PROCEDURE — 99213 OFFICE O/P EST LOW 20 MIN: CPT | Performed by: FAMILY MEDICINE

## 2025-07-01 PROCEDURE — 3074F SYST BP LT 130 MM HG: CPT | Performed by: FAMILY MEDICINE

## 2025-07-01 PROCEDURE — 3079F DIAST BP 80-89 MM HG: CPT | Performed by: FAMILY MEDICINE

## 2025-07-01 PROCEDURE — 1125F AMNT PAIN NOTED PAIN PRSNT: CPT | Performed by: FAMILY MEDICINE

## 2025-07-01 RX ORDER — METHYLPREDNISOLONE 4 MG/1
TABLET ORAL
Qty: 21 TABLET | Refills: 0 | Status: SHIPPED | OUTPATIENT
Start: 2025-07-01

## 2025-07-01 RX ORDER — LISINOPRIL 5 MG/1
5 TABLET ORAL DAILY
Qty: 90 TABLET | Refills: 1 | OUTPATIENT
Start: 2025-07-01

## 2025-07-01 RX ORDER — ESCITALOPRAM OXALATE 10 MG/1
10 TABLET ORAL DAILY
Qty: 90 TABLET | Refills: 1 | OUTPATIENT
Start: 2025-07-01

## 2025-07-01 NOTE — TELEPHONE ENCOUNTER
Refills too soon     Rx Refill Note  Requested Prescriptions     Pending Prescriptions Disp Refills    lisinopril (PRINIVIL,ZESTRIL) 5 MG tablet [Pharmacy Med Name: lisinopril 5 mg tablet] 90 tablet 1     Sig: TAKE ONE TABLET BY MOUTH DAILY    escitalopram (LEXAPRO) 10 MG tablet [Pharmacy Med Name: escitalopram 10 mg tablet] 90 tablet 1     Sig: TAKE ONE TABLET BY MOUTH DAILY      Last office visit with prescribing clinician: 7/1/2025   Last telemedicine visit with prescribing clinician: Visit date not found   Next office visit with prescribing clinician: 11/20/2025     Erika Tucker MA  07/01/25, 11:01 DENG

## 2025-07-10 ENCOUNTER — OFFICE VISIT (OUTPATIENT)
Dept: CARDIOLOGY | Facility: CLINIC | Age: 55
End: 2025-07-10
Payer: MEDICARE

## 2025-07-10 VITALS
OXYGEN SATURATION: 99 % | BODY MASS INDEX: 35.68 KG/M2 | DIASTOLIC BLOOD PRESSURE: 58 MMHG | SYSTOLIC BLOOD PRESSURE: 110 MMHG | HEIGHT: 66 IN | HEART RATE: 68 BPM | WEIGHT: 222 LBS

## 2025-07-10 DIAGNOSIS — E78.2 MIXED HYPERLIPIDEMIA: ICD-10-CM

## 2025-07-10 DIAGNOSIS — I73.9 PAD (PERIPHERAL ARTERY DISEASE): ICD-10-CM

## 2025-07-10 DIAGNOSIS — Z72.0 TOBACCO USE: ICD-10-CM

## 2025-07-10 DIAGNOSIS — I25.10 NONOBSTRUCTIVE ATHEROSCLEROSIS OF CORONARY ARTERY: ICD-10-CM

## 2025-07-10 DIAGNOSIS — I10 ESSENTIAL HYPERTENSION: Primary | ICD-10-CM

## 2025-07-10 PROCEDURE — 3078F DIAST BP <80 MM HG: CPT | Performed by: NURSE PRACTITIONER

## 2025-07-10 PROCEDURE — 99214 OFFICE O/P EST MOD 30 MIN: CPT | Performed by: NURSE PRACTITIONER

## 2025-07-10 PROCEDURE — 3074F SYST BP LT 130 MM HG: CPT | Performed by: NURSE PRACTITIONER

## 2025-07-10 PROCEDURE — 1160F RVW MEDS BY RX/DR IN RCRD: CPT | Performed by: NURSE PRACTITIONER

## 2025-07-10 PROCEDURE — 1159F MED LIST DOCD IN RCRD: CPT | Performed by: NURSE PRACTITIONER

## 2025-07-10 NOTE — PROGRESS NOTES
Subjective:     Encounter Date:07/10/2025      Patient ID: Gilberto Roberts is a 55 y.o. male with hypertension, nonobstructive coronary artery disease, PAD, hyperlipidemia and tobacco abuse.    Chief Complaint: coronary artery disease   History of Present Illness  Patient presents today for management of coronary artery disease. Patient reports that he has been doing ok. He reports that he hasn't been able to do a lot of walking due to hip pain. He denies any chest pain. He denies any heart racing or palpitations. He denies any dizziness or lightheadedness. He denies any leg swelling, orthopnea or PND. He reports that his blood pressure has remained normal at other dr visits. Patient follows with Dr Ansari as PCP.     Previous Cardiac Testing and Procedures:  -LHC (3/5/2020) 55% mid LAD stenosis with myocardial bridging, OSCAR II flow without significant obstruction, mild to moderate irregularities of dominant LCx, OM1 with proximal diffuse 50% stenosis, nondominant RCA with no significant stenosis  -[facility]ALINA (6/28/2021) no significant arterial insufficiency of the right lower extremity, mild arterial insufficiency of the left lower extremity  -Lipid panel (5/30/2023) total cholesterol 203, HDL 39, , triglycerides 168  -Lipid panel (7/25/2024) total cholesterol 143, HDL 45, LDL 78, triglycerides 107    The following portions of the patient's history were reviewed and updated as appropriate: allergies, current medications, past family history, past medical history, past social history, past surgical history and problem list.    Allergies   Allergen Reactions    Contrast Dye (Echo Or Unknown Ct/Mr) Rash       Current Outpatient Medications:     Aspirin Low Dose 81 MG EC tablet, TAKE ONE TABLET BY MOUTH DAILY., Disp: 90 tablet, Rfl: 3    atorvastatin (LIPITOR) 80 MG tablet, Take 1 tablet by mouth Daily., Disp: 30 tablet, Rfl: 3    cetirizine (zyrTEC) 10 MG tablet, Take 1 tablet by mouth Daily., Disp: 30 tablet, Rfl:  0    clopidogrel (PLAVIX) 75 MG tablet, Take 1 tablet by mouth Daily., Disp: 30 tablet, Rfl: 6    escitalopram (LEXAPRO) 10 MG tablet, Take 1 tablet by mouth Daily., Disp: 90 tablet, Rfl: 1    hydrOXYzine (ATARAX) 25 MG tablet, Take 1 tablet by mouth 3 (Three) Times a Day As Needed for Itching., Disp: 30 tablet, Rfl: 0    lisinopril (PRINIVIL,ZESTRIL) 5 MG tablet, Take 1 tablet by mouth Daily., Disp: 90 tablet, Rfl: 1    methylPREDNISolone (MEDROL) 4 MG dose pack, Take as directed on package instructions., Disp: 21 tablet, Rfl: 0    sildenafil (Viagra) 100 MG tablet, Take 1 tablet by mouth Daily As Needed for Erectile Dysfunction., Disp: 10 tablet, Rfl: 11    Past Medical History:   Diagnosis Date    Anxiety O6     Arthritis     2011    Callus     Since kid    Coronary artery disease involving native coronary artery of native heart with unstable angina pectoris 2020    Current moderate episode of major depressive disorder without prior episode 2021    Difficulty walking     Essential hypertension 2019    GERD (gastroesophageal reflux disease)     Head injury     [place]     Hyperlipidemia     Ingrown toenail     Injury of back     Onychomycosis     Don't know,  longtime    [place]     Since kid     Social History     Socioeconomic History    Marital status: Single   Tobacco Use    Smoking status: Every Day     Current packs/day: 0.00     Average packs/day: 1.5 packs/day for 33.0 years (49.5 ttl pk-yrs)     Types: Cigarettes     Start date:      Last attempt to quit: 2020     Years since quittin.5     Passive exposure: Current    Smokeless tobacco: Former     Types: Chew    Tobacco comments:     pt states it is too hard to quit, he is by himself.   Vaping Use    Vaping status: Never Used   Substance and Sexual Activity    Alcohol use: No    Drug use: No    Sexual activity: Defer       Review of Systems   Constitutional: Negative for malaise/fatigue.   Cardiovascular:   "Positive for dyspnea on exertion (unchanged). Negative for chest pain, irregular heartbeat, leg swelling, near-syncope, orthopnea, palpitations, paroxysmal nocturnal dyspnea and syncope.   Hematologic/Lymphatic: Does not bruise/bleed easily.   Musculoskeletal:  Positive for arthritis and joint pain (hip).   Genitourinary:  Negative for hematuria.   Neurological:  Negative for dizziness and weakness.   All other systems reviewed and are negative.         Objective:     Vitals reviewed.   Constitutional:       General: Not in acute distress.     Appearance: Normal appearance. Well-developed.   Eyes:      Pupils: Pupils are equal, round, and reactive to light.   HENT:      Head: Normocephalic and atraumatic.      Nose: Nose normal.   Neck:      Vascular: No carotid bruit.   Pulmonary:      Effort: Pulmonary effort is normal. No respiratory distress.      Breath sounds: Normal breath sounds. No wheezing. No rales.   Cardiovascular:      Normal rate. Regular rhythm.      Murmurs: There is no murmur.   Edema:     Peripheral edema absent.   Abdominal:      General: There is no distension.      Palpations: Abdomen is soft.   Musculoskeletal: Normal range of motion.      Cervical back: Normal range of motion and neck supple. Skin:     General: Skin is warm.      Findings: No erythema or rash.   Neurological:      General: No focal deficit present.      Mental Status: Alert and oriented to person, place, and time.   Psychiatric:         Speech: Speech normal.         Behavior: Behavior normal.         Thought Content: Thought content normal.         Judgment: Judgment normal.         /58   Pulse 68   Ht 168.9 cm (66.5\")   Wt 101 kg (222 lb)   SpO2 99%   BMI 35.30 kg/m²     Procedures    Lab Review:       Lab Results   Component Value Date    CHOL 203 (H) 05/30/2023    CHLPL 143 07/25/2024    TRIG 107 07/25/2024    HDL 45 07/25/2024    LDL 78 07/25/2024     I have personally reviewed labs, and past office notes " prior to patients visit  Assessment:          Diagnosis Plan   1. Essential hypertension        2. Nonobstructive atherosclerosis of coronary artery        3. Mixed hyperlipidemia        4. PAD (peripheral artery disease)        5. Tobacco use                 Plan:       Hypertension: controlled in office. Continue current medications    2. Nonobstructive coronary artery disease: moderate nonobstructive CAD on [facility] 3/5/2020 at [facility]. Patient denies any chest pain. Continue aspirin and atorvastatin.      3. Hyperlipidemia: lipid panel 7/25/2024 showed controlled cholesterol. Continue atorvastatin    4. Peripheral arterial disease: Follows with Vascular surgery. Continue aspirin, plavix and atorvastatin    5. Tobacco use: Gilberto Roberts  reports that he has been smoking cigarettes. He started smoking about 38 years ago. He has a 49.5 pack-year smoking history. He has been exposed to tobacco smoke. He has quit using smokeless tobacco.  His smokeless tobacco use included chew. I have educated him on the risk of diseases from using tobacco products such as cancer, COPD, and heart disease. I advised him to quit and he is not willing to quit. I spent 3  minutes counseling the patient.    I attest that all portions of this note reviewed and all information has been updated by myself to reflect the patient's current status.      I spent 35 minutes caring for Gilberto on this date of service. This time includes time spent by me in the following activities:preparing for the visit, reviewing tests, obtaining and/or reviewing a separately obtained history, performing a medically appropriate examination and/or evaluation , counseling and educating the patient/family/caregiver, and documenting information in the medical record    Patient is to follow up in 1 year or sooner if needed

## 2025-07-19 NOTE — PROGRESS NOTES
"Subjective   Gilberto Roberts is a 55 y.o. male.     History of Present Illness  The patient presents for evaluation of left hand pain.    He reports experiencing pain in his left hand, which he first noticed approximately 3 weeks ago. The pain is described as sharp and is particularly pronounced when he attempts to make a fist. He does not recall any specific injury that could have precipitated this discomfort. He has been managing the pain with Aleve.    Results      The following portions of the patient's history were reviewed and updated as appropriate: allergies, current medications, past family history, past medical history, past social history, past surgical history, and problem list.        A review of systems was performed, and pertinent findings are noted in the HPI.    Objective   /82 (BP Location: Left arm, Patient Position: Sitting, Cuff Size: Large Adult)   Pulse 69   Temp 98 °F (36.7 °C) (Infrared)   Resp 16   Ht 168.9 cm (66.5\")   Wt 100 kg (221 lb)   SpO2 97%   BMI 35.14 kg/m²          Physical Exam  Vitals and nursing note reviewed.   Constitutional:       General: He is not in acute distress.     Appearance: Normal appearance. He is obese. He is not toxic-appearing.   HENT:      Right Ear: External ear normal.      Left Ear: External ear normal.   Cardiovascular:      Rate and Rhythm: Normal rate and regular rhythm.      Pulses: Normal pulses.   Pulmonary:      Effort: Pulmonary effort is normal. No respiratory distress.   Musculoskeletal:         General: Tenderness present.   Neurological:      Mental Status: He is alert and oriented to person, place, and time. Mental status is at baseline.      Motor: Weakness present.      Gait: Gait abnormal.   Psychiatric:         Mood and Affect: Mood normal.         Behavior: Behavior normal.         Thought Content: Thought content normal.         Judgment: Judgment normal.         Assessment & Plan   Problems Addressed this Visit  "   None  Visit Diagnoses         Left hand pain    -  Primary    Relevant Medications    methylPREDNISolone (MEDROL) 4 MG dose pack    Other Relevant Orders    XR Hand 3+ View Left (Completed)          Diagnoses         Codes Comments      Left hand pain    -  Primary ICD-10-CM: M79.642  ICD-9-CM: 729.5             Assessment & Plan  1. Pain in the left hand.  - The pain in the left hand may be due to inflammation of a tendon or ligament.  - An x-ray of the left hand will be conducted today to rule out any fractures or dislocations.  - A prescription for steroids has been provided to alleviate the pain, which should be taken with food to prevent potential stomach irritation.  - Anti-inflammatories are to be avoided due to their interaction with Plavix, which could lead to easy bleeding. The results of the x-ray will be communicated via [employer] later today.               Transcribed from ambient dictation for Madison Ansari MD by Madison Ansari MD.  07/19/25   17:36 CDT    Patient or patient representative verbalized consent for the use of Ambient Listening during the visit with  Madison Ansari MD for chart documentation. 7/19/2025  17:36 CDT          This document has been electronically signed by Madison Ansari MD on July 19, 2025 17:37 CDT

## 2025-08-07 ENCOUNTER — CLINICAL SUPPORT (OUTPATIENT)
Dept: ONCOLOGY | Facility: CLINIC | Age: 55
End: 2025-08-07
Payer: MEDICARE

## 2025-08-07 ENCOUNTER — LAB (OUTPATIENT)
Dept: LAB | Facility: HOSPITAL | Age: 55
End: 2025-08-07
Payer: MEDICARE

## 2025-08-07 VITALS
RESPIRATION RATE: 16 BRPM | SYSTOLIC BLOOD PRESSURE: 130 MMHG | HEART RATE: 81 BPM | DIASTOLIC BLOOD PRESSURE: 78 MMHG | OXYGEN SATURATION: 97 %

## 2025-08-07 DIAGNOSIS — D75.1 POLYCYTHEMIA: Primary | ICD-10-CM

## 2025-08-07 DIAGNOSIS — D75.1 POLYCYTHEMIA: ICD-10-CM

## 2025-08-07 DIAGNOSIS — D72.829 LEUKOCYTOSIS, UNSPECIFIED TYPE: ICD-10-CM

## 2025-08-07 LAB
ANISOCYTOSIS BLD QL: ABNORMAL
BASOPHILS # BLD MANUAL: 0 10*3/MM3 (ref 0–0.2)
BASOPHILS NFR BLD MANUAL: 0 % (ref 0–1.5)
DEPRECATED RDW RBC AUTO: 46.3 FL (ref 37–54)
EOSINOPHIL # BLD MANUAL: 0.2 10*3/MM3 (ref 0–0.4)
EOSINOPHIL NFR BLD MANUAL: 1.9 % (ref 0.3–6.2)
ERYTHROCYTE [DISTWIDTH] IN BLOOD BY AUTOMATED COUNT: 13.9 % (ref 12.3–15.4)
HCT VFR BLD AUTO: 52.5 % (ref 37.5–51)
HGB BLD-MCNC: 17.1 G/DL (ref 13–17.7)
LYMPHOCYTES # BLD MANUAL: 2.98 10*3/MM3 (ref 0.7–3.1)
LYMPHOCYTES NFR BLD MANUAL: 2.9 % (ref 5–12)
MCH RBC QN AUTO: 29.5 PG (ref 26.6–33)
MCHC RBC AUTO-ENTMCNC: 32.6 G/DL (ref 31.5–35.7)
MCV RBC AUTO: 90.5 FL (ref 79–97)
MONOCYTES # BLD: 0.3 10*3/MM3 (ref 0.1–0.9)
NEUTROPHILS # BLD AUTO: 6.84 10*3/MM3 (ref 1.7–7)
NEUTROPHILS NFR BLD MANUAL: 66.3 % (ref 42.7–76)
PLAT MORPH BLD: NORMAL
PLATELET # BLD AUTO: 271 10*3/MM3 (ref 140–450)
PMV BLD AUTO: 9.8 FL (ref 6–12)
POIKILOCYTOSIS BLD QL SMEAR: ABNORMAL
RBC # BLD AUTO: 5.8 10*6/MM3 (ref 4.14–5.8)
VARIANT LYMPHS NFR BLD MANUAL: 23.1 % (ref 19.6–45.3)
VARIANT LYMPHS NFR BLD MANUAL: 5.8 % (ref 0–5)
WBC MORPH BLD: NORMAL
WBC NRBC COR # BLD AUTO: 10.32 10*3/MM3 (ref 3.4–10.8)

## 2025-08-07 PROCEDURE — 85007 BL SMEAR W/DIFF WBC COUNT: CPT

## 2025-08-07 PROCEDURE — 85025 COMPLETE CBC W/AUTO DIFF WBC: CPT

## 2025-08-07 PROCEDURE — 99195 PHLEBOTOMY: CPT | Performed by: NURSE PRACTITIONER

## 2025-08-07 PROCEDURE — 36415 COLL VENOUS BLD VENIPUNCTURE: CPT

## 2025-08-25 DIAGNOSIS — F41.9 ANXIETY: ICD-10-CM

## 2025-08-25 RX ORDER — ESCITALOPRAM OXALATE 10 MG/1
10 TABLET ORAL DAILY
Qty: 90 TABLET | Refills: 1 | Status: SHIPPED | OUTPATIENT
Start: 2025-08-25

## (undated) DEVICE — CVR PROB GEN PURP W ISOSILK 6X48

## (undated) DEVICE — GLV SURG SENSICARE PI LF PF 8 GRN STRL

## (undated) DEVICE — RADIFOCUS GLIDECATH: Brand: GLIDECATH

## (undated) DEVICE — PAD ENDOVASCULAR: Brand: MEDLINE INDUSTRIES, INC.

## (undated) DEVICE — SYR LUERLOK 20CC BX/50

## (undated) DEVICE — MODEL BT2000 P/N 700287-012KIT CONTENTS: MANIFOLD WITH SALINE AND CONTRAST PORTS, SALINE TUBING WITH SPIKE AND HAND SYRINGE, TRANSDUCER: Brand: BT2000 AUTOMATED MANIFOLD KIT

## (undated) DEVICE — DEV CLS VASC MYNXCONTROL 6FTO7F

## (undated) DEVICE — NAVICROSS SUPPORT CATHETER: Brand: NAVICROSS

## (undated) DEVICE — PK TURNOVER RM ADV

## (undated) DEVICE — RADIFOCUS GLIDEWIRE ADVANTAGE GUIDEWIRE: Brand: GLIDEWIRE ADVANTAGE

## (undated) DEVICE — BG BANDED WRUBBER BAND AND TP 36X54IN

## (undated) DEVICE — A2000 MULTI-USE SYRINGE KIT, P/N 701277-003KIT CONTENTS: 100ML CONTRAST RESERVOIR AND TUBING WITH CONTRAST SPIKE AND CLAMP: Brand: A2000 MULTI-USE SYRINGE KIT

## (undated) DEVICE — DESTINATION RENAL GUIDING SHEATH: Brand: DESTINATION

## (undated) DEVICE — PINNACLE R/O II INTRODUCER SHEATH WITH RADIOPAQUE MARKER: Brand: PINNACLE

## (undated) DEVICE — GLV SURG BIOGEL LTX PF 7 1/2

## (undated) DEVICE — PINNACLE INTRODUCER SHEATH: Brand: PINNACLE

## (undated) DEVICE — DRSNG SURESITE WNDW 4X4.5

## (undated) DEVICE — GLIDESHEATH SLENDER STAINLESS STEEL KIT: Brand: GLIDESHEATH SLENDER

## (undated) DEVICE — Device

## (undated) DEVICE — INTENDED FOR TISSUE SEPARATION, AND OTHER PROCEDURES THAT REQUIRE A SHARP SURGICAL BLADE TO PUNCTURE OR CUT.: Brand: BARD-PARKER ®  SAFETY SCALPED

## (undated) DEVICE — BALN EVERCROSS OTW .035 5F 5X60 135

## (undated) DEVICE — CATH FLSH OMNI SFT 5F 90CM

## (undated) DEVICE — MODEL AT P65, P/N 701554-001KIT CONTENTS: HAND CONTROLLER, 3-WAY HIGH-PRESSURE STOPCOCK WITH ROTATING END AND PREMIUM HIGH-PRESSURE TUBING: Brand: ANGIOTOUCH® KIT

## (undated) DEVICE — GLV SURG SENSICARE W/ALOE PF LF 7.5 STRL

## (undated) DEVICE — ST MIC/INTRO ACC SHRP/NDL TUNG/TP NITNL 5F 45CM 7CM

## (undated) DEVICE — GLV SURG TRIUMPH GREEN W/ALOE PF LTX 8 STRL

## (undated) DEVICE — ATHERECTOMY H1-LX HAWKONE 7F EXT TIP US: Brand: HAWKONE™

## (undated) DEVICE — SYR LUERLOK 30CC

## (undated) DEVICE — SYR LL TP 10ML STRL

## (undated) DEVICE — GW STARTER JB STR .035 15X150CM

## (undated) DEVICE — BALN EVERCROSS OTW .035 5F 5X20135

## (undated) DEVICE — PROXIMATE RH ROTATING HEAD SKIN STAPLERS (35 WIDE) CONTAINS 35 STAINLESS STEEL STAPLES: Brand: PROXIMATE

## (undated) DEVICE — INFLATION DEVICE: Brand: ENCORE™ 26

## (undated) DEVICE — ARMADA 35 PTA CATHETER 8 MM X 40 MM X 80 CM / OVER-THE-WIRE: Brand: ARMADA

## (undated) DEVICE — ANGIO-SEAL VIP VASCULAR CLOSURE DEVICE: Brand: ANGIO-SEAL

## (undated) DEVICE — DEV EPS SPIDERFX 6MM OTW320/RX190CM